# Patient Record
Sex: MALE | Race: WHITE | NOT HISPANIC OR LATINO | Employment: OTHER | URBAN - METROPOLITAN AREA
[De-identification: names, ages, dates, MRNs, and addresses within clinical notes are randomized per-mention and may not be internally consistent; named-entity substitution may affect disease eponyms.]

---

## 2017-03-17 ENCOUNTER — OFFICE VISIT - HEALTHEAST (OUTPATIENT)
Dept: FAMILY MEDICINE | Facility: CLINIC | Age: 67
End: 2017-03-17

## 2017-03-17 DIAGNOSIS — E78.49 OTHER HYPERLIPIDEMIA: ICD-10-CM

## 2017-03-17 DIAGNOSIS — E78.5 HYPERLIPIDEMIA: ICD-10-CM

## 2017-03-17 DIAGNOSIS — E11.9 TYPE 2 DIABETES MELLITUS (H): ICD-10-CM

## 2017-03-17 DIAGNOSIS — B37.2 CANDIDAL DERMATITIS: ICD-10-CM

## 2017-03-17 DIAGNOSIS — I10 ESSENTIAL HYPERTENSION WITH GOAL BLOOD PRESSURE LESS THAN 130/80: ICD-10-CM

## 2017-03-17 DIAGNOSIS — M06.4 UNSPECIFIED INFLAMMATORY POLYARTHROPATHY: ICD-10-CM

## 2017-03-17 LAB
CHOLEST SERPL-MCNC: 177 MG/DL
FASTING STATUS PATIENT QL REPORTED: YES
HBA1C MFR BLD: 6.5 % (ref 3.5–6)
HDLC SERPL-MCNC: 29 MG/DL
LDLC SERPL CALC-MCNC: 76 MG/DL
TRIGL SERPL-MCNC: 360 MG/DL

## 2017-03-21 LAB — ANA SER QL: 0.1 U

## 2017-03-28 ENCOUNTER — OFFICE VISIT - HEALTHEAST (OUTPATIENT)
Dept: FAMILY MEDICINE | Facility: CLINIC | Age: 67
End: 2017-03-28

## 2017-03-28 DIAGNOSIS — E11.9 TYPE 2 DIABETES MELLITUS (H): ICD-10-CM

## 2017-03-28 DIAGNOSIS — Z02.4 ENCOUNTER FOR DEPARTMENT OF TRANSPORTATION (DOT) EXAMINATION FOR TRUCKING LICENCE: ICD-10-CM

## 2017-03-28 DIAGNOSIS — I10 ESSENTIAL HYPERTENSION WITH GOAL BLOOD PRESSURE LESS THAN 130/80: ICD-10-CM

## 2017-03-28 ASSESSMENT — MIFFLIN-ST. JEOR: SCORE: 2017.71

## 2017-05-09 ENCOUNTER — COMMUNICATION - HEALTHEAST (OUTPATIENT)
Dept: FAMILY MEDICINE | Facility: CLINIC | Age: 67
End: 2017-05-09

## 2017-05-09 DIAGNOSIS — I10 HTN (HYPERTENSION): ICD-10-CM

## 2017-06-14 ENCOUNTER — COMMUNICATION - HEALTHEAST (OUTPATIENT)
Dept: FAMILY MEDICINE | Facility: CLINIC | Age: 67
End: 2017-06-14

## 2017-06-14 DIAGNOSIS — E11.9 TYPE 2 DIABETES MELLITUS (H): ICD-10-CM

## 2017-07-08 ENCOUNTER — COMMUNICATION - HEALTHEAST (OUTPATIENT)
Dept: FAMILY MEDICINE | Facility: CLINIC | Age: 67
End: 2017-07-08

## 2017-07-08 DIAGNOSIS — I10 ESSENTIAL HYPERTENSION: ICD-10-CM

## 2017-07-14 ENCOUNTER — OFFICE VISIT - HEALTHEAST (OUTPATIENT)
Dept: FAMILY MEDICINE | Facility: CLINIC | Age: 67
End: 2017-07-14

## 2017-07-14 DIAGNOSIS — I10 ESSENTIAL HYPERTENSION WITH GOAL BLOOD PRESSURE LESS THAN 130/80: ICD-10-CM

## 2017-07-14 DIAGNOSIS — R60.0 PERIPHERAL EDEMA: ICD-10-CM

## 2017-07-14 DIAGNOSIS — E66.09 NON MORBID OBESITY DUE TO EXCESS CALORIES: ICD-10-CM

## 2017-07-14 DIAGNOSIS — E11.9 TYPE 2 DIABETES MELLITUS WITHOUT COMPLICATION, WITHOUT LONG-TERM CURRENT USE OF INSULIN (H): ICD-10-CM

## 2017-07-14 DIAGNOSIS — E78.49 OTHER HYPERLIPIDEMIA: ICD-10-CM

## 2017-07-14 LAB — HBA1C MFR BLD: 6.7 % (ref 3.5–6)

## 2017-07-17 ENCOUNTER — COMMUNICATION - HEALTHEAST (OUTPATIENT)
Dept: FAMILY MEDICINE | Facility: CLINIC | Age: 67
End: 2017-07-17

## 2017-10-06 ENCOUNTER — COMMUNICATION - HEALTHEAST (OUTPATIENT)
Dept: FAMILY MEDICINE | Facility: CLINIC | Age: 67
End: 2017-10-06

## 2017-10-25 ENCOUNTER — COMMUNICATION - HEALTHEAST (OUTPATIENT)
Dept: FAMILY MEDICINE | Facility: CLINIC | Age: 67
End: 2017-10-25

## 2017-10-25 DIAGNOSIS — R60.0 PERIPHERAL EDEMA: ICD-10-CM

## 2017-10-26 ENCOUNTER — OFFICE VISIT - HEALTHEAST (OUTPATIENT)
Dept: FAMILY MEDICINE | Facility: CLINIC | Age: 67
End: 2017-10-26

## 2017-10-26 DIAGNOSIS — E11.9 TYPE 2 DIABETES MELLITUS (H): ICD-10-CM

## 2017-10-26 DIAGNOSIS — E55.9 VITAMIN D INSUFFICIENCY: ICD-10-CM

## 2017-10-26 DIAGNOSIS — Z23 NEED FOR IMMUNIZATION AGAINST INFLUENZA: ICD-10-CM

## 2017-10-26 DIAGNOSIS — E78.5 HYPERLIPIDEMIA: ICD-10-CM

## 2017-10-26 DIAGNOSIS — R60.0 PERIPHERAL EDEMA: ICD-10-CM

## 2017-10-26 DIAGNOSIS — I10 ESSENTIAL HYPERTENSION WITH GOAL BLOOD PRESSURE LESS THAN 130/80: ICD-10-CM

## 2017-10-26 LAB
CHOLEST SERPL-MCNC: 158 MG/DL
FASTING STATUS PATIENT QL REPORTED: ABNORMAL
HBA1C MFR BLD: 6.7 % (ref 3.5–6)
HDLC SERPL-MCNC: 27 MG/DL
LDLC SERPL CALC-MCNC: 83 MG/DL
TRIGL SERPL-MCNC: 240 MG/DL

## 2017-10-27 ENCOUNTER — COMMUNICATION - HEALTHEAST (OUTPATIENT)
Dept: FAMILY MEDICINE | Facility: CLINIC | Age: 67
End: 2017-10-27

## 2017-12-15 ENCOUNTER — COMMUNICATION - HEALTHEAST (OUTPATIENT)
Dept: FAMILY MEDICINE | Facility: CLINIC | Age: 67
End: 2017-12-15

## 2017-12-15 DIAGNOSIS — N40.1 BPH WITH URINARY OBSTRUCTION: ICD-10-CM

## 2017-12-15 DIAGNOSIS — N13.8 BPH WITH URINARY OBSTRUCTION: ICD-10-CM

## 2017-12-24 ENCOUNTER — COMMUNICATION - HEALTHEAST (OUTPATIENT)
Dept: FAMILY MEDICINE | Facility: CLINIC | Age: 67
End: 2017-12-24

## 2017-12-24 DIAGNOSIS — E11.9 TYPE 2 DIABETES MELLITUS (H): ICD-10-CM

## 2018-03-20 ENCOUNTER — AMBULATORY - HEALTHEAST (OUTPATIENT)
Dept: FAMILY MEDICINE | Facility: CLINIC | Age: 68
End: 2018-03-20

## 2018-03-20 DIAGNOSIS — E11.9 TYPE 2 DIABETES MELLITUS (H): ICD-10-CM

## 2018-03-21 ENCOUNTER — COMMUNICATION - HEALTHEAST (OUTPATIENT)
Dept: FAMILY MEDICINE | Facility: CLINIC | Age: 68
End: 2018-03-21

## 2018-03-21 DIAGNOSIS — N13.8 BPH WITH URINARY OBSTRUCTION: ICD-10-CM

## 2018-03-21 DIAGNOSIS — I10 HTN (HYPERTENSION): ICD-10-CM

## 2018-03-21 DIAGNOSIS — N40.1 BPH WITH URINARY OBSTRUCTION: ICD-10-CM

## 2018-04-05 ENCOUNTER — OFFICE VISIT - HEALTHEAST (OUTPATIENT)
Dept: FAMILY MEDICINE | Facility: CLINIC | Age: 68
End: 2018-04-05

## 2018-04-05 DIAGNOSIS — I10 ESSENTIAL HYPERTENSION WITH GOAL BLOOD PRESSURE LESS THAN 130/80: ICD-10-CM

## 2018-04-05 DIAGNOSIS — E55.9 VITAMIN D INSUFFICIENCY: ICD-10-CM

## 2018-04-05 DIAGNOSIS — B35.3 TINEA PEDIS: ICD-10-CM

## 2018-04-05 DIAGNOSIS — E78.49 OTHER HYPERLIPIDEMIA: ICD-10-CM

## 2018-04-05 DIAGNOSIS — E11.9 TYPE 2 DIABETES MELLITUS WITHOUT COMPLICATION, WITHOUT LONG-TERM CURRENT USE OF INSULIN (H): ICD-10-CM

## 2018-04-05 LAB
ANION GAP SERPL CALCULATED.3IONS-SCNC: 11 MMOL/L (ref 5–18)
BUN SERPL-MCNC: 14 MG/DL (ref 8–22)
CALCIUM SERPL-MCNC: 9 MG/DL (ref 8.5–10.5)
CHLORIDE BLD-SCNC: 104 MMOL/L (ref 98–107)
CO2 SERPL-SCNC: 25 MMOL/L (ref 22–31)
CREAT SERPL-MCNC: 0.87 MG/DL (ref 0.7–1.3)
GFR SERPL CREATININE-BSD FRML MDRD: >60 ML/MIN/1.73M2
GLUCOSE BLD-MCNC: 160 MG/DL (ref 70–125)
HBA1C MFR BLD: 7.9 % (ref 3.5–6)
POTASSIUM BLD-SCNC: 4.6 MMOL/L (ref 3.5–5)
SODIUM SERPL-SCNC: 140 MMOL/L (ref 136–145)

## 2018-04-06 ENCOUNTER — COMMUNICATION - HEALTHEAST (OUTPATIENT)
Dept: FAMILY MEDICINE | Facility: CLINIC | Age: 68
End: 2018-04-06

## 2018-04-06 LAB — 25(OH)D3 SERPL-MCNC: 24.7 NG/ML (ref 30–80)

## 2018-05-31 ENCOUNTER — COMMUNICATION - HEALTHEAST (OUTPATIENT)
Dept: SCHEDULING | Facility: CLINIC | Age: 68
End: 2018-05-31

## 2018-06-01 ENCOUNTER — OFFICE VISIT - HEALTHEAST (OUTPATIENT)
Dept: FAMILY MEDICINE | Facility: CLINIC | Age: 68
End: 2018-06-01

## 2018-06-01 DIAGNOSIS — I10 ESSENTIAL HYPERTENSION: ICD-10-CM

## 2018-06-01 DIAGNOSIS — R63.4 UNINTENTIONAL WEIGHT LOSS: ICD-10-CM

## 2018-06-01 DIAGNOSIS — E11.9 TYPE 2 DIABETES MELLITUS (H): ICD-10-CM

## 2018-06-01 LAB
ALBUMIN SERPL-MCNC: 4.4 G/DL (ref 3.5–5)
ALP SERPL-CCNC: 86 U/L (ref 45–120)
ALT SERPL W P-5'-P-CCNC: 19 U/L (ref 0–45)
ANION GAP SERPL CALCULATED.3IONS-SCNC: 10 MMOL/L (ref 5–18)
AST SERPL W P-5'-P-CCNC: 15 U/L (ref 0–40)
BASOPHILS # BLD AUTO: 0 THOU/UL (ref 0–0.2)
BASOPHILS NFR BLD AUTO: 0 % (ref 0–2)
BILIRUB SERPL-MCNC: 0.8 MG/DL (ref 0–1)
BUN SERPL-MCNC: 20 MG/DL (ref 8–22)
CALCIUM SERPL-MCNC: 10.1 MG/DL (ref 8.5–10.5)
CHLORIDE BLD-SCNC: 101 MMOL/L (ref 98–107)
CO2 SERPL-SCNC: 27 MMOL/L (ref 22–31)
CREAT SERPL-MCNC: 1.25 MG/DL (ref 0.7–1.3)
EOSINOPHIL # BLD AUTO: 0.1 THOU/UL (ref 0–0.4)
EOSINOPHIL NFR BLD AUTO: 2 % (ref 0–6)
ERYTHROCYTE [DISTWIDTH] IN BLOOD BY AUTOMATED COUNT: 13.4 % (ref 11–14.5)
GFR SERPL CREATININE-BSD FRML MDRD: 57 ML/MIN/1.73M2
GLUCOSE BLD-MCNC: 171 MG/DL (ref 70–125)
HCT VFR BLD AUTO: 46.3 % (ref 40–54)
HGB BLD-MCNC: 16 G/DL (ref 14–18)
HIV 1+2 AB+HIV1 P24 AG SERPL QL IA: NEGATIVE
LYMPHOCYTES # BLD AUTO: 2.5 THOU/UL (ref 0.8–4.4)
LYMPHOCYTES NFR BLD AUTO: 33 % (ref 20–40)
MCH RBC QN AUTO: 29.2 PG (ref 27–34)
MCHC RBC AUTO-ENTMCNC: 34.6 G/DL (ref 32–36)
MCV RBC AUTO: 85 FL (ref 80–100)
MONOCYTES # BLD AUTO: 0.6 THOU/UL (ref 0–0.9)
MONOCYTES NFR BLD AUTO: 8 % (ref 2–10)
NEUTROPHILS # BLD AUTO: 4.3 THOU/UL (ref 2–7.7)
NEUTROPHILS NFR BLD AUTO: 57 % (ref 50–70)
PLATELET # BLD AUTO: 209 THOU/UL (ref 140–440)
PMV BLD AUTO: 8.6 FL (ref 7–10)
POTASSIUM BLD-SCNC: 4.7 MMOL/L (ref 3.5–5)
PROT SERPL-MCNC: 7.2 G/DL (ref 6–8)
RBC # BLD AUTO: 5.47 MILL/UL (ref 4.4–6.2)
SODIUM SERPL-SCNC: 138 MMOL/L (ref 136–145)
TSH SERPL DL<=0.005 MIU/L-ACNC: 1.87 UIU/ML (ref 0.3–5)
WBC: 7.6 THOU/UL (ref 4–11)

## 2018-06-01 ASSESSMENT — MIFFLIN-ST. JEOR: SCORE: 1950.58

## 2018-06-04 ENCOUNTER — COMMUNICATION - HEALTHEAST (OUTPATIENT)
Dept: FAMILY MEDICINE | Facility: CLINIC | Age: 68
End: 2018-06-04

## 2018-06-05 ENCOUNTER — COMMUNICATION - HEALTHEAST (OUTPATIENT)
Dept: FAMILY MEDICINE | Facility: CLINIC | Age: 68
End: 2018-06-05

## 2018-06-05 ENCOUNTER — OFFICE VISIT - HEALTHEAST (OUTPATIENT)
Dept: FAMILY MEDICINE | Facility: CLINIC | Age: 68
End: 2018-06-05

## 2018-06-05 DIAGNOSIS — M06.9 RHEUMATOID ARTHRITIS, ADULT (H): ICD-10-CM

## 2018-06-05 DIAGNOSIS — E11.9 TYPE 2 DIABETES MELLITUS WITHOUT COMPLICATION, WITHOUT LONG-TERM CURRENT USE OF INSULIN (H): ICD-10-CM

## 2018-06-05 DIAGNOSIS — I10 ESSENTIAL HYPERTENSION WITH GOAL BLOOD PRESSURE LESS THAN 130/80: ICD-10-CM

## 2018-06-05 DIAGNOSIS — R63.0 ANOREXIA: ICD-10-CM

## 2018-06-05 DIAGNOSIS — R53.83 FATIGUE: ICD-10-CM

## 2018-06-05 DIAGNOSIS — R45.89 DEPRESSED MOOD: ICD-10-CM

## 2018-06-05 DIAGNOSIS — R63.4 ABNORMAL WEIGHT LOSS: ICD-10-CM

## 2018-07-05 ENCOUNTER — OFFICE VISIT - HEALTHEAST (OUTPATIENT)
Dept: FAMILY MEDICINE | Facility: CLINIC | Age: 68
End: 2018-07-05

## 2018-07-05 ENCOUNTER — COMMUNICATION - HEALTHEAST (OUTPATIENT)
Dept: FAMILY MEDICINE | Facility: CLINIC | Age: 68
End: 2018-07-05

## 2018-07-05 DIAGNOSIS — I10 ESSENTIAL HYPERTENSION WITH GOAL BLOOD PRESSURE LESS THAN 130/80: ICD-10-CM

## 2018-07-05 DIAGNOSIS — M06.9 RHEUMATOID ARTHRITIS, ADULT (H): ICD-10-CM

## 2018-07-05 DIAGNOSIS — E11.9 TYPE 2 DIABETES MELLITUS (H): ICD-10-CM

## 2018-07-05 DIAGNOSIS — E78.2 MIXED HYPERLIPIDEMIA: ICD-10-CM

## 2018-07-05 DIAGNOSIS — E55.9 VITAMIN D DEFICIENCY: ICD-10-CM

## 2018-07-05 LAB
ANION GAP SERPL CALCULATED.3IONS-SCNC: 10 MMOL/L (ref 5–18)
BUN SERPL-MCNC: 11 MG/DL (ref 8–22)
CALCIUM SERPL-MCNC: 9.9 MG/DL (ref 8.5–10.5)
CHLORIDE BLD-SCNC: 104 MMOL/L (ref 98–107)
CHOLEST SERPL-MCNC: 134 MG/DL
CO2 SERPL-SCNC: 27 MMOL/L (ref 22–31)
CREAT SERPL-MCNC: 0.93 MG/DL (ref 0.7–1.3)
CREAT UR-MCNC: 24.7 MG/DL
FASTING STATUS PATIENT QL REPORTED: ABNORMAL
GFR SERPL CREATININE-BSD FRML MDRD: >60 ML/MIN/1.73M2
GLUCOSE BLD-MCNC: 126 MG/DL (ref 70–125)
HBA1C MFR BLD: 7.3 % (ref 3.5–6)
HDLC SERPL-MCNC: 29 MG/DL
LDLC SERPL CALC-MCNC: 59 MG/DL
MICROALBUMIN UR-MCNC: <0.5 MG/DL (ref 0–1.99)
MICROALBUMIN/CREAT UR: NORMAL MG/G
POTASSIUM BLD-SCNC: 4.3 MMOL/L (ref 3.5–5)
SODIUM SERPL-SCNC: 141 MMOL/L (ref 136–145)
TRIGL SERPL-MCNC: 231 MG/DL

## 2018-07-20 ENCOUNTER — OFFICE VISIT - HEALTHEAST (OUTPATIENT)
Dept: RHEUMATOLOGY | Facility: CLINIC | Age: 68
End: 2018-07-20

## 2018-07-20 ENCOUNTER — COMMUNICATION - HEALTHEAST (OUTPATIENT)
Dept: RHEUMATOLOGY | Facility: CLINIC | Age: 68
End: 2018-07-20

## 2018-07-20 DIAGNOSIS — M13.0 POLYARTHRITIS: ICD-10-CM

## 2018-07-20 DIAGNOSIS — M25.50 POLYARTHRALGIA: ICD-10-CM

## 2018-07-20 LAB
ALBUMIN SERPL-MCNC: 4 G/DL (ref 3.5–5)
ALT SERPL W P-5'-P-CCNC: 13 U/L (ref 0–45)
BASOPHILS # BLD AUTO: 0 THOU/UL (ref 0–0.2)
BASOPHILS NFR BLD AUTO: 0 % (ref 0–2)
C REACTIVE PROTEIN LHE: 0.3 MG/DL (ref 0–0.8)
C3 SERPL-MCNC: 140 MG/DL (ref 83–177)
C4 SERPL-MCNC: 28 MG/DL (ref 19–59)
CREAT SERPL-MCNC: 0.97 MG/DL (ref 0.7–1.3)
EOSINOPHIL # BLD AUTO: 0.2 THOU/UL (ref 0–0.4)
EOSINOPHIL NFR BLD AUTO: 3 % (ref 0–6)
ERYTHROCYTE [DISTWIDTH] IN BLOOD BY AUTOMATED COUNT: 13.2 % (ref 11–14.5)
ERYTHROCYTE [SEDIMENTATION RATE] IN BLOOD BY WESTERGREN METHOD: 7 MM/HR (ref 0–15)
FERRITIN SERPL-MCNC: 466 NG/ML (ref 27–300)
GFR SERPL CREATININE-BSD FRML MDRD: >60 ML/MIN/1.73M2
HCT VFR BLD AUTO: 39.9 % (ref 40–54)
HGB BLD-MCNC: 13.5 G/DL (ref 14–18)
LYMPHOCYTES # BLD AUTO: 2.6 THOU/UL (ref 0.8–4.4)
LYMPHOCYTES NFR BLD AUTO: 36 % (ref 20–40)
MCH RBC QN AUTO: 29.1 PG (ref 27–34)
MCHC RBC AUTO-ENTMCNC: 33.8 G/DL (ref 32–36)
MCV RBC AUTO: 86 FL (ref 80–100)
MONOCYTES # BLD AUTO: 0.6 THOU/UL (ref 0–0.9)
MONOCYTES NFR BLD AUTO: 8 % (ref 2–10)
NEUTROPHILS # BLD AUTO: 3.8 THOU/UL (ref 2–7.7)
NEUTROPHILS NFR BLD AUTO: 53 % (ref 50–70)
PLATELET # BLD AUTO: 231 THOU/UL (ref 140–440)
PMV BLD AUTO: 8.8 FL (ref 7–10)
RBC # BLD AUTO: 4.62 MILL/UL (ref 4.4–6.2)
RHEUMATOID FACT SERPL-ACNC: <15 IU/ML (ref 0–30)
URATE SERPL-MCNC: 6.4 MG/DL (ref 3–8)
WBC: 7.2 THOU/UL (ref 4–11)

## 2018-07-23 LAB
ANA SER QL: 0.1 U
HCV AB SERPL QL IA: NEGATIVE

## 2018-07-24 LAB — CCP AB SER IA-ACNC: <0.5 U/ML

## 2018-07-25 ENCOUNTER — RECORDS - HEALTHEAST (OUTPATIENT)
Dept: ADMINISTRATIVE | Facility: OTHER | Age: 68
End: 2018-07-25

## 2018-07-27 ENCOUNTER — COMMUNICATION - HEALTHEAST (OUTPATIENT)
Dept: ADMINISTRATIVE | Facility: CLINIC | Age: 68
End: 2018-07-27

## 2018-07-27 DIAGNOSIS — M13.0 POLYARTHRITIS: ICD-10-CM

## 2018-09-26 ENCOUNTER — COMMUNICATION - HEALTHEAST (OUTPATIENT)
Dept: RHEUMATOLOGY | Facility: CLINIC | Age: 68
End: 2018-09-26

## 2018-09-26 ENCOUNTER — OFFICE VISIT - HEALTHEAST (OUTPATIENT)
Dept: RHEUMATOLOGY | Facility: CLINIC | Age: 68
End: 2018-09-26

## 2018-09-26 DIAGNOSIS — M13.0 POLYARTHRITIS: ICD-10-CM

## 2018-09-26 DIAGNOSIS — M25.50 POLYARTHRALGIA: ICD-10-CM

## 2018-09-26 DIAGNOSIS — M54.50 LUMBAGO: ICD-10-CM

## 2018-09-26 DIAGNOSIS — M65.321 TRIGGER FINGER, RIGHT INDEX FINGER: ICD-10-CM

## 2018-10-07 ENCOUNTER — COMMUNICATION - HEALTHEAST (OUTPATIENT)
Dept: FAMILY MEDICINE | Facility: CLINIC | Age: 68
End: 2018-10-07

## 2018-10-07 DIAGNOSIS — I10 HTN (HYPERTENSION): ICD-10-CM

## 2018-10-09 ENCOUNTER — COMMUNICATION - HEALTHEAST (OUTPATIENT)
Dept: FAMILY MEDICINE | Facility: CLINIC | Age: 68
End: 2018-10-09

## 2018-10-09 DIAGNOSIS — N40.1 BPH WITH URINARY OBSTRUCTION: ICD-10-CM

## 2018-10-09 DIAGNOSIS — N13.8 BPH WITH URINARY OBSTRUCTION: ICD-10-CM

## 2018-11-08 ENCOUNTER — OFFICE VISIT - HEALTHEAST (OUTPATIENT)
Dept: FAMILY MEDICINE | Facility: CLINIC | Age: 68
End: 2018-11-08

## 2018-11-08 DIAGNOSIS — Z23 NEED FOR VACCINATION: ICD-10-CM

## 2018-11-08 DIAGNOSIS — L30.9 DERMATITIS: ICD-10-CM

## 2018-11-08 DIAGNOSIS — G56.02 CARPAL TUNNEL SYNDROME OF LEFT WRIST: ICD-10-CM

## 2018-11-08 DIAGNOSIS — E78.2 MIXED HYPERLIPIDEMIA: ICD-10-CM

## 2018-11-08 DIAGNOSIS — I10 ESSENTIAL HYPERTENSION WITH GOAL BLOOD PRESSURE LESS THAN 130/80: ICD-10-CM

## 2018-11-08 DIAGNOSIS — E55.9 VITAMIN D DEFICIENCY: ICD-10-CM

## 2018-11-08 DIAGNOSIS — E11.9 TYPE 2 DIABETES MELLITUS (H): ICD-10-CM

## 2018-11-08 DIAGNOSIS — M06.9 RHEUMATOID ARTHRITIS, ADULT (H): ICD-10-CM

## 2018-11-08 DIAGNOSIS — D64.9 NORMOCHROMIC NORMOCYTIC ANEMIA: ICD-10-CM

## 2018-11-08 DIAGNOSIS — Z00.01 ENCOUNTER FOR GENERAL ADULT MEDICAL EXAMINATION WITH ABNORMAL FINDINGS: ICD-10-CM

## 2018-11-08 LAB
ALBUMIN SERPL-MCNC: 4.6 G/DL (ref 3.5–5)
ALP SERPL-CCNC: 114 U/L (ref 45–120)
ALT SERPL W P-5'-P-CCNC: 19 U/L (ref 0–45)
ANION GAP SERPL CALCULATED.3IONS-SCNC: 15 MMOL/L (ref 5–18)
AST SERPL W P-5'-P-CCNC: 16 U/L (ref 0–40)
BILIRUB SERPL-MCNC: 0.6 MG/DL (ref 0–1)
BUN SERPL-MCNC: 13 MG/DL (ref 8–22)
CALCIUM SERPL-MCNC: 10.2 MG/DL (ref 8.5–10.5)
CHLORIDE BLD-SCNC: 102 MMOL/L (ref 98–107)
CO2 SERPL-SCNC: 25 MMOL/L (ref 22–31)
CREAT SERPL-MCNC: 0.92 MG/DL (ref 0.7–1.3)
ERYTHROCYTE [DISTWIDTH] IN BLOOD BY AUTOMATED COUNT: 12.8 % (ref 11–14.5)
GFR SERPL CREATININE-BSD FRML MDRD: >60 ML/MIN/1.73M2
GLUCOSE BLD-MCNC: 135 MG/DL (ref 70–125)
HBA1C MFR BLD: 7 % (ref 3.5–6)
HCT VFR BLD AUTO: 47.8 % (ref 40–54)
HGB BLD-MCNC: 16.2 G/DL (ref 14–18)
MCH RBC QN AUTO: 28.5 PG (ref 27–34)
MCHC RBC AUTO-ENTMCNC: 33.8 G/DL (ref 32–36)
MCV RBC AUTO: 84 FL (ref 80–100)
PLATELET # BLD AUTO: 230 THOU/UL (ref 140–440)
PMV BLD AUTO: 9.5 FL (ref 7–10)
POTASSIUM BLD-SCNC: 4.4 MMOL/L (ref 3.5–5)
PROT SERPL-MCNC: 7.7 G/DL (ref 6–8)
RBC # BLD AUTO: 5.67 MILL/UL (ref 4.4–6.2)
SODIUM SERPL-SCNC: 142 MMOL/L (ref 136–145)
WBC: 8.4 THOU/UL (ref 4–11)

## 2018-11-08 ASSESSMENT — MIFFLIN-ST. JEOR: SCORE: 1919.62

## 2018-11-09 LAB — 25(OH)D3 SERPL-MCNC: 36.9 NG/ML (ref 30–80)

## 2018-11-12 ENCOUNTER — COMMUNICATION - HEALTHEAST (OUTPATIENT)
Dept: FAMILY MEDICINE | Facility: CLINIC | Age: 68
End: 2018-11-12

## 2018-12-15 ENCOUNTER — COMMUNICATION - HEALTHEAST (OUTPATIENT)
Dept: FAMILY MEDICINE | Facility: CLINIC | Age: 68
End: 2018-12-15

## 2018-12-15 DIAGNOSIS — E78.5 HYPERLIPIDEMIA: ICD-10-CM

## 2018-12-27 ENCOUNTER — OFFICE VISIT - HEALTHEAST (OUTPATIENT)
Dept: RHEUMATOLOGY | Facility: CLINIC | Age: 68
End: 2018-12-27

## 2018-12-27 DIAGNOSIS — G56.02 LEFT CARPAL TUNNEL SYNDROME: ICD-10-CM

## 2018-12-27 DIAGNOSIS — M13.0 POLYARTHRITIS: ICD-10-CM

## 2018-12-27 DIAGNOSIS — M25.50 POLYARTHRALGIA: ICD-10-CM

## 2018-12-27 DIAGNOSIS — M54.50 LUMBAGO: ICD-10-CM

## 2019-01-02 ENCOUNTER — COMMUNICATION - HEALTHEAST (OUTPATIENT)
Dept: FAMILY MEDICINE | Facility: CLINIC | Age: 69
End: 2019-01-02

## 2019-01-02 DIAGNOSIS — R60.0 PERIPHERAL EDEMA: ICD-10-CM

## 2019-01-04 ENCOUNTER — COMMUNICATION - HEALTHEAST (OUTPATIENT)
Dept: FAMILY MEDICINE | Facility: CLINIC | Age: 69
End: 2019-01-04

## 2019-01-04 DIAGNOSIS — I10 ESSENTIAL HYPERTENSION WITH GOAL BLOOD PRESSURE LESS THAN 130/80: ICD-10-CM

## 2019-03-04 ENCOUNTER — COMMUNICATION - HEALTHEAST (OUTPATIENT)
Dept: FAMILY MEDICINE | Facility: CLINIC | Age: 69
End: 2019-03-04

## 2019-03-04 DIAGNOSIS — E11.9 TYPE 2 DIABETES MELLITUS (H): ICD-10-CM

## 2019-03-04 DIAGNOSIS — E11.9 TYPE 2 DIABETES MELLITUS WITHOUT COMPLICATION, WITHOUT LONG-TERM CURRENT USE OF INSULIN (H): ICD-10-CM

## 2019-03-25 ENCOUNTER — COMMUNICATION - HEALTHEAST (OUTPATIENT)
Dept: RHEUMATOLOGY | Facility: CLINIC | Age: 69
End: 2019-03-25

## 2019-03-25 DIAGNOSIS — M54.50 LUMBAGO: ICD-10-CM

## 2019-03-25 DIAGNOSIS — M25.50 POLYARTHRALGIA: ICD-10-CM

## 2019-03-25 DIAGNOSIS — M13.0 POLYARTHRITIS: ICD-10-CM

## 2019-03-28 ENCOUNTER — OFFICE VISIT - HEALTHEAST (OUTPATIENT)
Dept: RHEUMATOLOGY | Facility: CLINIC | Age: 69
End: 2019-03-28

## 2019-03-28 ENCOUNTER — AMBULATORY - HEALTHEAST (OUTPATIENT)
Dept: FAMILY MEDICINE | Facility: CLINIC | Age: 69
End: 2019-03-28

## 2019-03-28 DIAGNOSIS — E11.22 TYPE 2 DIABETES MELLITUS WITH STAGE 3 CHRONIC KIDNEY DISEASE, WITH LONG-TERM CURRENT USE OF INSULIN (H): ICD-10-CM

## 2019-03-28 DIAGNOSIS — G56.02 LEFT CARPAL TUNNEL SYNDROME: ICD-10-CM

## 2019-03-28 DIAGNOSIS — M08.3 CHRONIC POLYARTICULAR JUVENILE RHEUMATOID ARTHRITIS (H): ICD-10-CM

## 2019-03-28 DIAGNOSIS — N18.30 TYPE 2 DIABETES MELLITUS WITH STAGE 3 CHRONIC KIDNEY DISEASE, WITH LONG-TERM CURRENT USE OF INSULIN (H): ICD-10-CM

## 2019-03-28 DIAGNOSIS — Z79.4 TYPE 2 DIABETES MELLITUS WITH STAGE 3 CHRONIC KIDNEY DISEASE, WITH LONG-TERM CURRENT USE OF INSULIN (H): ICD-10-CM

## 2019-03-28 DIAGNOSIS — M13.0 POLYARTHRITIS: ICD-10-CM

## 2019-04-09 ENCOUNTER — COMMUNICATION - HEALTHEAST (OUTPATIENT)
Dept: FAMILY MEDICINE | Facility: CLINIC | Age: 69
End: 2019-04-09

## 2019-04-09 DIAGNOSIS — R60.0 PERIPHERAL EDEMA: ICD-10-CM

## 2019-05-25 ENCOUNTER — RECORDS - HEALTHEAST (OUTPATIENT)
Dept: ADMINISTRATIVE | Facility: OTHER | Age: 69
End: 2019-05-25

## 2019-05-25 ENCOUNTER — HOSPITAL ENCOUNTER (EMERGENCY)
Facility: CLINIC | Age: 69
Discharge: HOME | End: 2019-05-25
Attending: EMERGENCY MEDICINE | Admitting: EMERGENCY MEDICINE
Payer: MEDICARE

## 2019-05-25 VITALS
TEMPERATURE: 98.6 F | DIASTOLIC BLOOD PRESSURE: 78 MMHG | HEART RATE: 84 BPM | SYSTOLIC BLOOD PRESSURE: 133 MMHG | WEIGHT: 272.8 LBS | RESPIRATION RATE: 16 BRPM | BODY MASS INDEX: 36.15 KG/M2 | OXYGEN SATURATION: 95 % | HEIGHT: 73 IN

## 2019-05-25 DIAGNOSIS — J30.2 SEASONAL ALLERGIC RHINITIS, UNSPECIFIED TRIGGER: ICD-10-CM

## 2019-05-25 PROCEDURE — 99283 EMERGENCY DEPT VISIT LOW MDM: CPT | Performed by: EMERGENCY MEDICINE

## 2019-05-25 PROCEDURE — A9270 NON-COVERED ITEM OR SERVICE: HCPCS | Performed by: EMERGENCY MEDICINE

## 2019-05-25 PROCEDURE — 25000132 ZZH RX MED GY IP 250 OP 250 PS 637: Performed by: EMERGENCY MEDICINE

## 2019-05-25 PROCEDURE — 99284 EMERGENCY DEPT VISIT MOD MDM: CPT | Mod: Z6 | Performed by: EMERGENCY MEDICINE

## 2019-05-25 RX ORDER — FLUTICASONE PROPIONATE 50 MCG
1 SPRAY, SUSPENSION (ML) NASAL DAILY
Qty: 15.8 ML | Refills: 0 | Status: SHIPPED | OUTPATIENT
Start: 2019-05-25 | End: 2021-08-24

## 2019-05-25 RX ORDER — CETIRIZINE HYDROCHLORIDE 10 MG/1
10 TABLET ORAL DAILY
Qty: 30 TABLET | Refills: 0 | Status: SHIPPED | OUTPATIENT
Start: 2019-05-25 | End: 2021-08-24

## 2019-05-25 RX ORDER — CETIRIZINE HYDROCHLORIDE 10 MG/1
10 TABLET ORAL ONCE
Status: COMPLETED | OUTPATIENT
Start: 2019-05-25 | End: 2019-05-25

## 2019-05-25 RX ADMIN — Medication 1 SPRAY: at 19:56

## 2019-05-25 RX ADMIN — Medication 1 DROP: at 19:55

## 2019-05-25 RX ADMIN — CETIRIZINE HYDROCHLORIDE 10 MG: 10 TABLET, FILM COATED ORAL at 19:55

## 2019-05-25 ASSESSMENT — MIFFLIN-ST. JEOR: SCORE: 2056.29

## 2019-05-25 ASSESSMENT — ENCOUNTER SYMPTOMS
EYE REDNESS: 1
COUGH: 1
EYE ITCHING: 1

## 2019-05-25 NOTE — ED AVS SNAPSHOT
[facility], [place], Emergency Department  [address] [place] [id number]  Phone:  [phone]                                    Thiago Hein   MRN: [id number]    Department:  [facility], Emergency Department   Date of Visit:  5/25/2019           After Visit Summary Signature Page    I have received my discharge instructions, and my questions have been answered. I have discussed any challenges I see with this plan with the nurse or doctor.    ..........................................................................................................................................  Patient/Patient Representative Signature      ..........................................................................................................................................  Patient Representative Print Name and Relationship to Patient    ..................................................               ................................................  Date                                   Time    ..........................................................................................................................................  Reviewed by Signature/Title    ...................................................              ..............................................  Date                                               Time          22EPIC Rev 08/18

## 2019-05-26 NOTE — ED TRIAGE NOTES
Pt was working in his barn today and states there was lots of pigeon poop that he was cleaning up. Pt states both eyes are itching but L eye is worse. Both eyes are reddened. Has been sneezing and mild cough.

## 2019-05-26 NOTE — ED PROVIDER NOTES
[facility] EMERGENCY DEPARTMENT ([facility])  May 25, 2019    History     Chief Complaint   Patient presents with     Eye Problem     HPI  Thiago Hein is a 69 year old male who works as a farmer with cattle, horses and pigeons who presents to the ED with bilateral eye redness and itchiness and sneezing.  Patient states that he recently was cleaning a pigeon coop and noted that there was a lot of dust coming up as he was cleaning.  He states he tried to wash his hand continually and try to be mindful that pigeon feces is very filthy.  However, for the past 3 to 4 days, he has complained of bilateral eye itching and redness, sneezing, coughing and associated left lateral chest wall pain from coughing.  He searched the internet for his symptoms and self diagnosed himself with histoplasmosis.  He  denies history of seasonal allergies.      PAST MEDICAL HISTORY   Past Medical History:   Diagnosis Date     Diabetes (H)      Hypertension      PAST SURGICAL HISTORY  Past Surgical History:   Procedure Laterality Date     ANESTHESIA OUT OF OR MRI N/A 10/2/2015    Procedure: ANESTHESIA OUT OF OR MRI;  Surgeon: GENERIC ANESTHESIA PROVIDER;  Location: [facility] OR     BACK SURGERY  2008    lumbar spine surgery-- unclear details     SOFT TISSUE SURGERY  2012    skin cancer removed from L shoulder, chest, right neck     FAMILY HISTORY  Family History   Problem Relation Age of Onset     Coronary Artery Disease Father      Hypertension Father      SOCIAL HISTORY  Social History     Tobacco Use     Smoking status: Never Smoker     Smokeless tobacco: Never Used   Substance Use Topics     Alcohol use: No     Comment: Quit in 1990     MEDICATIONS  No current facility-administered medications for this encounter.      Current Outpatient Medications   Medication     cetirizine (ZYRTEC) 10 MG tablet     fluticasone (FLONASE) 50 MCG/ACT nasal spray     tetrahydrozoline-Zn sulfate (VISINE-AC) 0.05-0.25 % ophthalmic solution      "acetaminophen 650 MG TABS     amLODIPine (NORVASC) 10 MG tablet     celecoxib (CELEBREX) 200 MG capsule     hydroxychloroquine (PLAQUENIL) 200 MG tablet     lisinopril (PRINIVIL,ZESTRIL) 40 MG tablet     metFORMIN (GLUCOPHAGE-XR) 500 MG 24 hr tablet     metoprolol (LOPRESSOR) 100 MG tablet     multivitamin, therapeutic with minerals (THERA-VIT-M) TABS     ALLERGIES  No Known Allergies    I have reviewed the Medications, Allergies, Past Medical and Surgical History, and Social History in the Epic system.    Review of Systems   HENT: Positive for sneezing.    Eyes: Positive for redness (bilateral) and itching (bilateral).   Respiratory: Positive for cough.    Musculoskeletal:        Positive for left lateral chest wall pain.      All other systems reviewed and are negative.      Physical Exam   BP: 145/87  Pulse: 84  Heart Rate: 88  Temp: 98.6  F (37  C)  Resp: 16  Height: 185.4 cm (6' 1\")  Weight: 123.7 kg (272 lb 12.8 oz)  SpO2: 98 %      Physical Exam   Constitutional: He is oriented to person, place, and time. He appears well-developed and well-nourished. No distress.   Comfortably resting, lying in bed, NAD, nondiaphoretic, lucid, fully conversant, no  respiratory distress, alert and oriented.     HENT:   Head: Normocephalic and atraumatic.   Rhinorrhea   Eyes: Pupils are equal, round, and reactive to light. EOM are normal. No scleral icterus.   Coryza   Neck: Normal range of motion. Neck supple.   Cardiovascular: Normal rate.   Pulmonary/Chest: Effort normal. No respiratory distress.   Neurological: He is alert and oriented to person, place, and time.   Skin: Skin is warm and dry. Capillary refill takes less than 2 seconds. No rash noted. He is not diaphoretic.       ED Course        Procedures   7:09 PM  The patient was seen and examined by Dr. Vo in Room 22.                Critical Care time:  none         Medications   cetirizine (zyrTEC) tablet 10 mg (10 mg Oral Given 5/25/19 1955) "   naphazoline-pheniramine (OPCON-A/VISINE A/NAPHCON A) ophthalmic solution 1 drop (1 drop Both Eyes Given 5/25/19 1955)   phenylephrine (MARYURI-SYNEPHRINE) 0.25 % spray 1 spray (1 spray Both Nostrils Given 5/25/19 1956)            Labs Ordered and Resulted from Time of ED Arrival Up to the Time of Departure from the ED - No data to display      I have reviewed the patient's prior chart including recent labs, ER visits, and available inpatient discharge summaries if available.    On re-evaluation, the patient remained stable and symptoms have completely resolved after allergy mediations .  Discussed  plan including discharge with allergy medications and follow up with PCP.      Assessments & Plan (with Medical Decision Making)   This is a 69-year-old male coming into the emergency room with complaints of itchy red eyes and runny nose as well as some sneezing.  Evidently he was cleaning out his barn and started developing the symptoms.  He presents with a concern that he might have histoplasmosis because he googled this and that was his principal complaint.  We reassured him that his sudden onset of symptoms and list of symptoms are much more associated with allergies.  He is in otherwise no obvious distress.  He has obvious coryza, rhinorrhea but no shortness of breath or hives.  He is provided with allergy eyedrops as well as nasal spray and Zyrtec.  On reassessment he had dramatic improvement of all of his symptoms and is completely resolved.  Patient believes he can be safely discharged and will follow up with his primary care physician for further care management.    Pt was discharged home/self-care.  PT was provided written discharge instructions. Additionally verbal instructions were given and discussed with patient.  PT was asked to return to the ED immediately for any new or concerning symptoms.  Pt was in agreement, endorsed understanding, and questions were answered.       I have reviewed the nursing  "notes.    I have reviewed the findings, diagnosis, plan and need for follow up with the patient.       Medication List      Started    cetirizine 10 MG tablet  Commonly known as:  zyrTEC  10 mg, Oral, DAILY     fluticasone 50 MCG/ACT nasal spray  Commonly known as:  FLONASE  1 spray, Both Nostrils, DAILY     tetrahydrozoline-Zn sulfate 0.05-0.25 % ophthalmic solution  Commonly known as:  VISINE-AC  Place 1 to 2 drops in each eye 4 times a day for symptom relief            Final diagnoses:   Seasonal allergic rhinitis, unspecified trigger     I, Rafa Beatty, am serving as a trained medical scribe to document services personally performed by Maged Vo MD, based on the provider's statements to me.      I, Maged Vo MD, was physically present and have reviewed and verified the accuracy of this note documented by Rafa Beatty.       \"This dictation was performed with the assistance of voice recognition software and may contain inadvertant transcription  errors,  omissions and/or  inadvertent word substitution.\" --Maged Vo MD     5/25/2019   [facility], [place], EMERGENCY DEPARTMENT     Maged Vo MD  05/26/19 2035    "

## 2019-06-25 ENCOUNTER — COMMUNICATION - HEALTHEAST (OUTPATIENT)
Dept: SCHEDULING | Facility: CLINIC | Age: 69
End: 2019-06-25

## 2019-06-26 ENCOUNTER — OFFICE VISIT - HEALTHEAST (OUTPATIENT)
Dept: FAMILY MEDICINE | Facility: CLINIC | Age: 69
End: 2019-06-26

## 2019-06-26 DIAGNOSIS — H60.391 INFECTIVE OTITIS EXTERNA, RIGHT: ICD-10-CM

## 2019-06-26 DIAGNOSIS — E78.49 OTHER HYPERLIPIDEMIA: ICD-10-CM

## 2019-06-26 DIAGNOSIS — H90.2 CONDUCTIVE HEARING LOSS, UNILATERAL: ICD-10-CM

## 2019-06-26 DIAGNOSIS — I10 ESSENTIAL HYPERTENSION WITH GOAL BLOOD PRESSURE LESS THAN 130/80: ICD-10-CM

## 2019-06-26 DIAGNOSIS — E11.22 TYPE 2 DIABETES MELLITUS WITH STAGE 3 CHRONIC KIDNEY DISEASE, WITH LONG-TERM CURRENT USE OF INSULIN (H): ICD-10-CM

## 2019-06-26 DIAGNOSIS — N18.30 TYPE 2 DIABETES MELLITUS WITH STAGE 3 CHRONIC KIDNEY DISEASE, WITH LONG-TERM CURRENT USE OF INSULIN (H): ICD-10-CM

## 2019-06-26 DIAGNOSIS — Z79.4 TYPE 2 DIABETES MELLITUS WITH STAGE 3 CHRONIC KIDNEY DISEASE, WITH LONG-TERM CURRENT USE OF INSULIN (H): ICD-10-CM

## 2019-06-26 LAB — HBA1C MFR BLD: 7.9 % (ref 3.5–6)

## 2019-06-28 ENCOUNTER — COMMUNICATION - HEALTHEAST (OUTPATIENT)
Dept: RHEUMATOLOGY | Facility: CLINIC | Age: 69
End: 2019-06-28

## 2019-06-28 DIAGNOSIS — M54.50 LUMBAGO: ICD-10-CM

## 2019-06-28 DIAGNOSIS — M13.0 POLYARTHRITIS: ICD-10-CM

## 2019-06-28 DIAGNOSIS — M25.50 POLYARTHRALGIA: ICD-10-CM

## 2019-07-01 ENCOUNTER — OFFICE VISIT - HEALTHEAST (OUTPATIENT)
Dept: RHEUMATOLOGY | Facility: CLINIC | Age: 69
End: 2019-07-01

## 2019-07-01 DIAGNOSIS — G56.02 LEFT CARPAL TUNNEL SYNDROME: ICD-10-CM

## 2019-07-01 DIAGNOSIS — R20.0 NUMBNESS AND TINGLING IN LEFT HAND: ICD-10-CM

## 2019-07-01 DIAGNOSIS — R20.2 NUMBNESS AND TINGLING IN LEFT HAND: ICD-10-CM

## 2019-07-01 DIAGNOSIS — M06.00 SERONEGATIVE RHEUMATOID ARTHRITIS (H): ICD-10-CM

## 2019-07-01 LAB
ALBUMIN SERPL-MCNC: 4.2 G/DL (ref 3.5–5)
ALT SERPL W P-5'-P-CCNC: 23 U/L (ref 0–45)
CREAT SERPL-MCNC: 1.02 MG/DL (ref 0.7–1.3)
ERYTHROCYTE [DISTWIDTH] IN BLOOD BY AUTOMATED COUNT: 12.6 % (ref 11–14.5)
GFR SERPL CREATININE-BSD FRML MDRD: >60 ML/MIN/1.73M2
HCT VFR BLD AUTO: 43.3 % (ref 40–54)
HGB BLD-MCNC: 14.4 G/DL (ref 14–18)
MCH RBC QN AUTO: 28.5 PG (ref 27–34)
MCHC RBC AUTO-ENTMCNC: 33.2 G/DL (ref 32–36)
MCV RBC AUTO: 86 FL (ref 80–100)
PLATELET # BLD AUTO: 254 THOU/UL (ref 140–440)
PMV BLD AUTO: 9.3 FL (ref 7–10)
RBC # BLD AUTO: 5.05 MILL/UL (ref 4.4–6.2)
WBC: 8.7 THOU/UL (ref 4–11)

## 2019-07-01 ASSESSMENT — MIFFLIN-ST. JEOR: SCORE: 1996.73

## 2019-07-25 ENCOUNTER — HOSPITAL ENCOUNTER (OUTPATIENT)
Dept: PHYSICAL MEDICINE AND REHAB | Facility: CLINIC | Age: 69
Discharge: HOME | End: 2019-07-25
Attending: INTERNAL MEDICINE

## 2019-07-25 DIAGNOSIS — R20.0 NUMBNESS AND TINGLING IN LEFT HAND: ICD-10-CM

## 2019-07-25 DIAGNOSIS — R20.2 NUMBNESS AND TINGLING IN LEFT HAND: ICD-10-CM

## 2019-07-25 DIAGNOSIS — G56.02 LEFT CARPAL TUNNEL SYNDROME: ICD-10-CM

## 2019-08-09 ENCOUNTER — COMMUNICATION - HEALTHEAST (OUTPATIENT)
Dept: FAMILY MEDICINE | Facility: CLINIC | Age: 69
End: 2019-08-09

## 2019-08-09 DIAGNOSIS — E78.5 HYPERLIPIDEMIA: ICD-10-CM

## 2019-09-11 ENCOUNTER — COMMUNICATION - HEALTHEAST (OUTPATIENT)
Dept: ADMINISTRATIVE | Facility: CLINIC | Age: 69
End: 2019-09-11

## 2019-09-11 DIAGNOSIS — G56.00 CARPAL TUNNEL SYNDROME: ICD-10-CM

## 2019-10-08 ENCOUNTER — OFFICE VISIT - HEALTHEAST (OUTPATIENT)
Dept: RHEUMATOLOGY | Facility: CLINIC | Age: 69
End: 2019-10-08

## 2019-10-08 ENCOUNTER — COMMUNICATION - HEALTHEAST (OUTPATIENT)
Dept: RHEUMATOLOGY | Facility: CLINIC | Age: 69
End: 2019-10-08

## 2019-10-08 DIAGNOSIS — M54.50 LUMBAGO: ICD-10-CM

## 2019-10-08 DIAGNOSIS — M25.50 POLYARTHRALGIA: ICD-10-CM

## 2019-10-08 DIAGNOSIS — M13.0 POLYARTHRITIS: ICD-10-CM

## 2019-10-08 DIAGNOSIS — M06.00 SERONEGATIVE RHEUMATOID ARTHRITIS (H): ICD-10-CM

## 2019-10-08 DIAGNOSIS — G56.02 LEFT CARPAL TUNNEL SYNDROME: ICD-10-CM

## 2019-10-08 DIAGNOSIS — M15.0 PRIMARY OSTEOARTHRITIS INVOLVING MULTIPLE JOINTS: ICD-10-CM

## 2019-10-08 DIAGNOSIS — E11.9 TYPE 2 DIABETES MELLITUS WITHOUT COMPLICATION, WITHOUT LONG-TERM CURRENT USE OF INSULIN (H): ICD-10-CM

## 2019-10-09 ENCOUNTER — COMMUNICATION - HEALTHEAST (OUTPATIENT)
Dept: FAMILY MEDICINE | Facility: CLINIC | Age: 69
End: 2019-10-09

## 2019-10-09 DIAGNOSIS — N40.1 BPH WITH URINARY OBSTRUCTION: ICD-10-CM

## 2019-10-09 DIAGNOSIS — R60.0 PERIPHERAL EDEMA: ICD-10-CM

## 2019-10-09 DIAGNOSIS — E11.9 TYPE 2 DIABETES MELLITUS WITHOUT COMPLICATION, WITHOUT LONG-TERM CURRENT USE OF INSULIN (H): ICD-10-CM

## 2019-10-09 DIAGNOSIS — N13.8 BPH WITH URINARY OBSTRUCTION: ICD-10-CM

## 2019-10-10 ENCOUNTER — RECORDS - HEALTHEAST (OUTPATIENT)
Dept: ADMINISTRATIVE | Facility: OTHER | Age: 69
End: 2019-10-10

## 2019-10-13 ENCOUNTER — COMMUNICATION - HEALTHEAST (OUTPATIENT)
Dept: RHEUMATOLOGY | Facility: CLINIC | Age: 69
End: 2019-10-13

## 2019-10-13 DIAGNOSIS — M13.0 POLYARTHRITIS: ICD-10-CM

## 2019-10-13 DIAGNOSIS — M25.50 POLYARTHRALGIA: ICD-10-CM

## 2019-10-13 DIAGNOSIS — M54.50 LUMBAGO: ICD-10-CM

## 2019-10-14 ENCOUNTER — COMMUNICATION - HEALTHEAST (OUTPATIENT)
Dept: FAMILY MEDICINE | Facility: CLINIC | Age: 69
End: 2019-10-14

## 2019-10-14 DIAGNOSIS — I10 HTN (HYPERTENSION): ICD-10-CM

## 2019-10-14 DIAGNOSIS — I10 ESSENTIAL HYPERTENSION WITH GOAL BLOOD PRESSURE LESS THAN 130/80: ICD-10-CM

## 2019-10-15 ENCOUNTER — COMMUNICATION - HEALTHEAST (OUTPATIENT)
Dept: RHEUMATOLOGY | Facility: CLINIC | Age: 69
End: 2019-10-15

## 2019-10-15 ENCOUNTER — RECORDS - HEALTHEAST (OUTPATIENT)
Dept: ADMINISTRATIVE | Facility: OTHER | Age: 69
End: 2019-10-15

## 2019-10-15 DIAGNOSIS — M25.50 POLYARTHRALGIA: ICD-10-CM

## 2019-10-15 DIAGNOSIS — M54.50 LUMBAGO: ICD-10-CM

## 2019-10-15 DIAGNOSIS — M13.0 POLYARTHRITIS: ICD-10-CM

## 2019-10-21 ENCOUNTER — RECORDS - HEALTHEAST (OUTPATIENT)
Dept: ADMINISTRATIVE | Facility: OTHER | Age: 69
End: 2019-10-21

## 2019-10-25 ENCOUNTER — RECORDS - HEALTHEAST (OUTPATIENT)
Dept: ADMINISTRATIVE | Facility: OTHER | Age: 69
End: 2019-10-25

## 2019-10-29 ENCOUNTER — OFFICE VISIT - HEALTHEAST (OUTPATIENT)
Dept: FAMILY MEDICINE | Facility: CLINIC | Age: 69
End: 2019-10-29

## 2019-10-29 DIAGNOSIS — E11.22 TYPE 2 DIABETES MELLITUS WITH STAGE 3 CHRONIC KIDNEY DISEASE, WITH LONG-TERM CURRENT USE OF INSULIN (H): ICD-10-CM

## 2019-10-29 DIAGNOSIS — I10 ESSENTIAL HYPERTENSION WITH GOAL BLOOD PRESSURE LESS THAN 130/80: ICD-10-CM

## 2019-10-29 DIAGNOSIS — E78.49 OTHER HYPERLIPIDEMIA: ICD-10-CM

## 2019-10-29 DIAGNOSIS — Z79.4 TYPE 2 DIABETES MELLITUS WITH STAGE 3 CHRONIC KIDNEY DISEASE, WITH LONG-TERM CURRENT USE OF INSULIN (H): ICD-10-CM

## 2019-10-29 DIAGNOSIS — N18.30 TYPE 2 DIABETES MELLITUS WITH STAGE 3 CHRONIC KIDNEY DISEASE, WITH LONG-TERM CURRENT USE OF INSULIN (H): ICD-10-CM

## 2019-10-29 DIAGNOSIS — M06.00 RHEUMATOID ARTHRITIS WITH NEGATIVE RHEUMATOID FACTOR, INVOLVING UNSPECIFIED SITE (H): ICD-10-CM

## 2019-10-29 DIAGNOSIS — E11.9 TYPE 2 DIABETES MELLITUS WITHOUT COMPLICATION, WITHOUT LONG-TERM CURRENT USE OF INSULIN (H): ICD-10-CM

## 2019-10-29 LAB
ERYTHROCYTE [DISTWIDTH] IN BLOOD BY AUTOMATED COUNT: 12.6 % (ref 11–14.5)
HBA1C MFR BLD: 7.2 % (ref 3.5–6)
HCT VFR BLD AUTO: 45.5 % (ref 40–54)
HGB BLD-MCNC: 15.6 G/DL (ref 14–18)
MCH RBC QN AUTO: 29 PG (ref 27–34)
MCHC RBC AUTO-ENTMCNC: 34.3 G/DL (ref 32–36)
MCV RBC AUTO: 84 FL (ref 80–100)
PLATELET # BLD AUTO: 270 THOU/UL (ref 140–440)
PMV BLD AUTO: 8.8 FL (ref 7–10)
RBC # BLD AUTO: 5.38 MILL/UL (ref 4.4–6.2)
WBC: 8.2 THOU/UL (ref 4–11)

## 2019-10-30 ENCOUNTER — COMMUNICATION - HEALTHEAST (OUTPATIENT)
Dept: FAMILY MEDICINE | Facility: CLINIC | Age: 69
End: 2019-10-30

## 2019-10-30 LAB
ALBUMIN SERPL-MCNC: 4.5 G/DL (ref 3.5–5)
ALP SERPL-CCNC: 119 U/L (ref 45–120)
ALT SERPL W P-5'-P-CCNC: 21 U/L (ref 0–45)
ANION GAP SERPL CALCULATED.3IONS-SCNC: 11 MMOL/L (ref 5–18)
AST SERPL W P-5'-P-CCNC: 18 U/L (ref 0–40)
BILIRUB SERPL-MCNC: 0.7 MG/DL (ref 0–1)
BUN SERPL-MCNC: 16 MG/DL (ref 8–22)
CALCIUM SERPL-MCNC: 9.9 MG/DL (ref 8.5–10.5)
CHLORIDE BLD-SCNC: 104 MMOL/L (ref 98–107)
CHOLEST SERPL-MCNC: 117 MG/DL
CO2 SERPL-SCNC: 26 MMOL/L (ref 22–31)
CREAT SERPL-MCNC: 1.03 MG/DL (ref 0.7–1.3)
CREAT UR-MCNC: 20.8 MG/DL
FASTING STATUS PATIENT QL REPORTED: YES
GFR SERPL CREATININE-BSD FRML MDRD: >60 ML/MIN/1.73M2
GLUCOSE BLD-MCNC: 131 MG/DL (ref 70–125)
HDLC SERPL-MCNC: 32 MG/DL
LDLC SERPL CALC-MCNC: 54 MG/DL
MICROALBUMIN UR-MCNC: <0.5 MG/DL (ref 0–1.99)
MICROALBUMIN/CREAT UR: NORMAL MG/G{CREAT}
POTASSIUM BLD-SCNC: 4.5 MMOL/L (ref 3.5–5)
PROT SERPL-MCNC: 7.3 G/DL (ref 6–8)
SODIUM SERPL-SCNC: 141 MMOL/L (ref 136–145)
TRIGL SERPL-MCNC: 156 MG/DL

## 2019-11-16 ENCOUNTER — COMMUNICATION - HEALTHEAST (OUTPATIENT)
Dept: RHEUMATOLOGY | Facility: CLINIC | Age: 69
End: 2019-11-16

## 2019-11-16 DIAGNOSIS — M54.50 LUMBAGO: ICD-10-CM

## 2019-11-16 DIAGNOSIS — M13.0 POLYARTHRITIS: ICD-10-CM

## 2019-11-16 DIAGNOSIS — M25.50 POLYARTHRALGIA: ICD-10-CM

## 2019-12-15 ENCOUNTER — COMMUNICATION - HEALTHEAST (OUTPATIENT)
Dept: FAMILY MEDICINE | Facility: CLINIC | Age: 69
End: 2019-12-15

## 2019-12-15 DIAGNOSIS — R60.0 PERIPHERAL EDEMA: ICD-10-CM

## 2019-12-17 ENCOUNTER — RECORDS - HEALTHEAST (OUTPATIENT)
Dept: ADMINISTRATIVE | Facility: OTHER | Age: 69
End: 2019-12-17

## 2019-12-17 LAB — RETINOPATHY: NEGATIVE

## 2019-12-26 ENCOUNTER — RECORDS - HEALTHEAST (OUTPATIENT)
Dept: HEALTH INFORMATION MANAGEMENT | Facility: CLINIC | Age: 69
End: 2019-12-26

## 2020-01-02 ENCOUNTER — RECORDS - HEALTHEAST (OUTPATIENT)
Dept: ADMINISTRATIVE | Facility: OTHER | Age: 70
End: 2020-01-02

## 2020-01-07 ENCOUNTER — OFFICE VISIT - HEALTHEAST (OUTPATIENT)
Dept: RHEUMATOLOGY | Facility: CLINIC | Age: 70
End: 2020-01-07

## 2020-01-07 DIAGNOSIS — Z79.899 HIGH RISK MEDICATION USE: ICD-10-CM

## 2020-01-07 DIAGNOSIS — M25.50 POLYARTHRALGIA: ICD-10-CM

## 2020-01-07 DIAGNOSIS — M15.0 PRIMARY OSTEOARTHRITIS INVOLVING MULTIPLE JOINTS: ICD-10-CM

## 2020-01-07 DIAGNOSIS — M06.00 SERONEGATIVE RHEUMATOID ARTHRITIS (H): ICD-10-CM

## 2020-01-07 ASSESSMENT — MIFFLIN-ST. JEOR: SCORE: 1951.37

## 2020-01-19 ENCOUNTER — COMMUNICATION - HEALTHEAST (OUTPATIENT)
Dept: FAMILY MEDICINE | Facility: CLINIC | Age: 70
End: 2020-01-19

## 2020-01-19 DIAGNOSIS — I10 ESSENTIAL HYPERTENSION WITH GOAL BLOOD PRESSURE LESS THAN 130/80: ICD-10-CM

## 2020-02-20 ENCOUNTER — OFFICE VISIT - HEALTHEAST (OUTPATIENT)
Dept: FAMILY MEDICINE | Facility: CLINIC | Age: 70
End: 2020-02-20

## 2020-02-20 ENCOUNTER — COMMUNICATION - HEALTHEAST (OUTPATIENT)
Dept: FAMILY MEDICINE | Facility: CLINIC | Age: 70
End: 2020-02-20

## 2020-02-20 DIAGNOSIS — E11.22 TYPE 2 DIABETES MELLITUS WITH STAGE 3 CHRONIC KIDNEY DISEASE, WITH LONG-TERM CURRENT USE OF INSULIN (H): ICD-10-CM

## 2020-02-20 DIAGNOSIS — N40.1 BPH WITH URINARY OBSTRUCTION: ICD-10-CM

## 2020-02-20 DIAGNOSIS — M25.50 POLYARTHRALGIA: ICD-10-CM

## 2020-02-20 DIAGNOSIS — Z00.01 ENCOUNTER FOR GENERAL ADULT MEDICAL EXAMINATION WITH ABNORMAL FINDINGS: ICD-10-CM

## 2020-02-20 DIAGNOSIS — M06.00 RHEUMATOID ARTHRITIS WITH NEGATIVE RHEUMATOID FACTOR, INVOLVING UNSPECIFIED SITE (H): ICD-10-CM

## 2020-02-20 DIAGNOSIS — N18.30 TYPE 2 DIABETES MELLITUS WITH STAGE 3 CHRONIC KIDNEY DISEASE, WITH LONG-TERM CURRENT USE OF INSULIN (H): ICD-10-CM

## 2020-02-20 DIAGNOSIS — Z12.5 SCREENING FOR PROSTATE CANCER: ICD-10-CM

## 2020-02-20 DIAGNOSIS — E78.49 OTHER HYPERLIPIDEMIA: ICD-10-CM

## 2020-02-20 DIAGNOSIS — Z23 FLU VACCINE NEED: ICD-10-CM

## 2020-02-20 DIAGNOSIS — I10 ESSENTIAL HYPERTENSION WITH GOAL BLOOD PRESSURE LESS THAN 130/80: ICD-10-CM

## 2020-02-20 DIAGNOSIS — N13.8 BPH WITH URINARY OBSTRUCTION: ICD-10-CM

## 2020-02-20 DIAGNOSIS — Z79.899 HIGH RISK MEDICATION USE: ICD-10-CM

## 2020-02-20 DIAGNOSIS — Z79.4 TYPE 2 DIABETES MELLITUS WITH STAGE 3 CHRONIC KIDNEY DISEASE, WITH LONG-TERM CURRENT USE OF INSULIN (H): ICD-10-CM

## 2020-02-20 LAB
ALBUMIN SERPL-MCNC: 4.4 G/DL (ref 3.5–5)
ALP SERPL-CCNC: 103 U/L (ref 45–120)
ALT SERPL W P-5'-P-CCNC: 23 U/L (ref 0–45)
ANION GAP SERPL CALCULATED.3IONS-SCNC: 11 MMOL/L (ref 5–18)
AST SERPL W P-5'-P-CCNC: 14 U/L (ref 0–40)
BILIRUB SERPL-MCNC: 0.7 MG/DL (ref 0–1)
BUN SERPL-MCNC: 18 MG/DL (ref 8–22)
CALCIUM SERPL-MCNC: 9.8 MG/DL (ref 8.5–10.5)
CHLORIDE BLD-SCNC: 99 MMOL/L (ref 98–107)
CHOLEST SERPL-MCNC: 141 MG/DL
CO2 SERPL-SCNC: 27 MMOL/L (ref 22–31)
CREAT SERPL-MCNC: 0.88 MG/DL (ref 0.7–1.3)
ERYTHROCYTE [DISTWIDTH] IN BLOOD BY AUTOMATED COUNT: 13.7 % (ref 11–14.5)
FASTING STATUS PATIENT QL REPORTED: YES
GFR SERPL CREATININE-BSD FRML MDRD: >60 ML/MIN/1.73M2
GLUCOSE BLD-MCNC: 197 MG/DL (ref 70–125)
HBA1C MFR BLD: 7.7 % (ref 3.5–6)
HCT VFR BLD AUTO: 46.9 % (ref 40–54)
HDLC SERPL-MCNC: 37 MG/DL
HGB BLD-MCNC: 16.3 G/DL (ref 14–18)
LDLC SERPL CALC-MCNC: 76 MG/DL
MCH RBC QN AUTO: 29.1 PG (ref 27–34)
MCHC RBC AUTO-ENTMCNC: 34.7 G/DL (ref 32–36)
MCV RBC AUTO: 84 FL (ref 80–100)
PLATELET # BLD AUTO: 212 THOU/UL (ref 140–440)
PMV BLD AUTO: 8.6 FL (ref 7–10)
POTASSIUM BLD-SCNC: 4.3 MMOL/L (ref 3.5–5)
PROT SERPL-MCNC: 7.1 G/DL (ref 6–8)
PSA SERPL-MCNC: 2.5 NG/ML (ref 0–4.5)
RBC # BLD AUTO: 5.59 MILL/UL (ref 4.4–6.2)
SODIUM SERPL-SCNC: 137 MMOL/L (ref 136–145)
TRIGL SERPL-MCNC: 141 MG/DL
WBC: 8 THOU/UL (ref 4–11)

## 2020-02-20 ASSESSMENT — ANXIETY QUESTIONNAIRES
2. NOT BEING ABLE TO STOP OR CONTROL WORRYING: NOT AT ALL
4. TROUBLE RELAXING: NOT AT ALL
GAD7 TOTAL SCORE: 0
6. BECOMING EASILY ANNOYED OR IRRITABLE: NOT AT ALL
1. FEELING NERVOUS, ANXIOUS, OR ON EDGE: NOT AT ALL
IF YOU CHECKED OFF ANY PROBLEMS ON THIS QUESTIONNAIRE, HOW DIFFICULT HAVE THESE PROBLEMS MADE IT FOR YOU TO DO YOUR WORK, TAKE CARE OF THINGS AT HOME, OR GET ALONG WITH OTHER PEOPLE: NOT DIFFICULT AT ALL
7. FEELING AFRAID AS IF SOMETHING AWFUL MIGHT HAPPEN: NOT AT ALL
3. WORRYING TOO MUCH ABOUT DIFFERENT THINGS: NOT AT ALL
5. BEING SO RESTLESS THAT IT IS HARD TO SIT STILL: NOT AT ALL

## 2020-02-20 ASSESSMENT — MIFFLIN-ST. JEOR: SCORE: 1986.53

## 2020-02-20 ASSESSMENT — PATIENT HEALTH QUESTIONNAIRE - PHQ9: SUM OF ALL RESPONSES TO PHQ QUESTIONS 1-9: 1

## 2020-03-01 ENCOUNTER — COMMUNICATION - HEALTHEAST (OUTPATIENT)
Dept: RHEUMATOLOGY | Facility: CLINIC | Age: 70
End: 2020-03-01

## 2020-03-01 DIAGNOSIS — M54.50 LUMBAGO: ICD-10-CM

## 2020-03-01 DIAGNOSIS — M25.50 POLYARTHRALGIA: ICD-10-CM

## 2020-03-01 DIAGNOSIS — M13.0 POLYARTHRITIS: ICD-10-CM

## 2020-03-27 ENCOUNTER — COMMUNICATION - HEALTHEAST (OUTPATIENT)
Dept: SCHEDULING | Facility: CLINIC | Age: 70
End: 2020-03-27

## 2020-03-27 ENCOUNTER — COMMUNICATION - HEALTHEAST (OUTPATIENT)
Dept: ADMINISTRATIVE | Facility: CLINIC | Age: 70
End: 2020-03-27

## 2020-03-30 ENCOUNTER — COMMUNICATION - HEALTHEAST (OUTPATIENT)
Dept: RHEUMATOLOGY | Facility: CLINIC | Age: 70
End: 2020-03-30

## 2020-03-30 DIAGNOSIS — M54.50 LUMBAGO: ICD-10-CM

## 2020-03-30 DIAGNOSIS — M25.50 POLYARTHRALGIA: ICD-10-CM

## 2020-03-30 DIAGNOSIS — M13.0 POLYARTHRITIS: ICD-10-CM

## 2020-04-01 ENCOUNTER — COMMUNICATION - HEALTHEAST (OUTPATIENT)
Dept: RHEUMATOLOGY | Facility: CLINIC | Age: 70
End: 2020-04-01

## 2020-04-03 ENCOUNTER — COMMUNICATION - HEALTHEAST (OUTPATIENT)
Dept: ADMINISTRATIVE | Facility: CLINIC | Age: 70
End: 2020-04-03

## 2020-04-09 ENCOUNTER — OFFICE VISIT - HEALTHEAST (OUTPATIENT)
Dept: RHEUMATOLOGY | Facility: CLINIC | Age: 70
End: 2020-04-09

## 2020-04-09 DIAGNOSIS — M13.0 POLYARTHRITIS: ICD-10-CM

## 2020-04-09 DIAGNOSIS — M15.0 PRIMARY OSTEOARTHRITIS INVOLVING MULTIPLE JOINTS: ICD-10-CM

## 2020-04-09 DIAGNOSIS — M25.50 POLYARTHRALGIA: ICD-10-CM

## 2020-04-09 DIAGNOSIS — M06.00 SERONEGATIVE RHEUMATOID ARTHRITIS (H): ICD-10-CM

## 2020-05-07 ENCOUNTER — AMBULATORY - HEALTHEAST (OUTPATIENT)
Dept: LAB | Facility: CLINIC | Age: 70
End: 2020-05-07

## 2020-05-07 DIAGNOSIS — Z79.899 HIGH RISK MEDICATION USE: ICD-10-CM

## 2020-05-07 DIAGNOSIS — M25.50 POLYARTHRALGIA: ICD-10-CM

## 2020-05-07 LAB
ALBUMIN SERPL-MCNC: 4.1 G/DL (ref 3.5–5)
ALT SERPL W P-5'-P-CCNC: 20 U/L (ref 0–45)
CREAT SERPL-MCNC: 1.14 MG/DL (ref 0.7–1.3)
ERYTHROCYTE [DISTWIDTH] IN BLOOD BY AUTOMATED COUNT: 12.2 % (ref 11–14.5)
GFR SERPL CREATININE-BSD FRML MDRD: >60 ML/MIN/1.73M2
HCT VFR BLD AUTO: 44.6 % (ref 40–54)
HGB BLD-MCNC: 15.2 G/DL (ref 14–18)
MCH RBC QN AUTO: 29.8 PG (ref 27–34)
MCHC RBC AUTO-ENTMCNC: 34 G/DL (ref 32–36)
MCV RBC AUTO: 87 FL (ref 80–100)
PLATELET # BLD AUTO: 205 THOU/UL (ref 140–440)
PMV BLD AUTO: 9 FL (ref 7–10)
RBC # BLD AUTO: 5.1 MILL/UL (ref 4.4–6.2)
WBC: 7.9 THOU/UL (ref 4–11)

## 2020-06-09 ENCOUNTER — OFFICE VISIT - HEALTHEAST (OUTPATIENT)
Dept: RHEUMATOLOGY | Facility: CLINIC | Age: 70
End: 2020-06-09

## 2020-06-09 DIAGNOSIS — M25.50 POLYARTHRALGIA: ICD-10-CM

## 2020-06-09 DIAGNOSIS — M06.00 SERONEGATIVE RHEUMATOID ARTHRITIS (H): ICD-10-CM

## 2020-06-09 DIAGNOSIS — M15.0 PRIMARY OSTEOARTHRITIS INVOLVING MULTIPLE JOINTS: ICD-10-CM

## 2020-07-14 ENCOUNTER — COMMUNICATION - HEALTHEAST (OUTPATIENT)
Dept: FAMILY MEDICINE | Facility: CLINIC | Age: 70
End: 2020-07-14

## 2020-07-14 DIAGNOSIS — N13.8 BPH WITH URINARY OBSTRUCTION: ICD-10-CM

## 2020-07-14 DIAGNOSIS — N40.1 BPH WITH URINARY OBSTRUCTION: ICD-10-CM

## 2020-07-14 DIAGNOSIS — Z79.4 TYPE 2 DIABETES MELLITUS WITH STAGE 3 CHRONIC KIDNEY DISEASE, WITH LONG-TERM CURRENT USE OF INSULIN (H): ICD-10-CM

## 2020-07-14 DIAGNOSIS — E11.22 TYPE 2 DIABETES MELLITUS WITH STAGE 3 CHRONIC KIDNEY DISEASE, WITH LONG-TERM CURRENT USE OF INSULIN (H): ICD-10-CM

## 2020-07-14 DIAGNOSIS — N18.30 TYPE 2 DIABETES MELLITUS WITH STAGE 3 CHRONIC KIDNEY DISEASE, WITH LONG-TERM CURRENT USE OF INSULIN (H): ICD-10-CM

## 2020-07-14 DIAGNOSIS — I10 HTN (HYPERTENSION): ICD-10-CM

## 2020-08-04 ENCOUNTER — COMMUNICATION - HEALTHEAST (OUTPATIENT)
Dept: ADMINISTRATIVE | Facility: CLINIC | Age: 70
End: 2020-08-04

## 2020-08-09 ENCOUNTER — COMMUNICATION - HEALTHEAST (OUTPATIENT)
Dept: RHEUMATOLOGY | Facility: CLINIC | Age: 70
End: 2020-08-09

## 2020-08-09 DIAGNOSIS — M25.50 POLYARTHRALGIA: ICD-10-CM

## 2020-08-09 DIAGNOSIS — M15.0 PRIMARY OSTEOARTHRITIS INVOLVING MULTIPLE JOINTS: ICD-10-CM

## 2020-08-18 ENCOUNTER — RECORDS - HEALTHEAST (OUTPATIENT)
Dept: ADMINISTRATIVE | Facility: OTHER | Age: 70
End: 2020-08-18

## 2020-09-29 ENCOUNTER — COMMUNICATION - HEALTHEAST (OUTPATIENT)
Dept: RHEUMATOLOGY | Facility: CLINIC | Age: 70
End: 2020-09-29

## 2020-09-29 DIAGNOSIS — M06.00 SERONEGATIVE RHEUMATOID ARTHRITIS (H): ICD-10-CM

## 2020-10-20 ENCOUNTER — COMMUNICATION - HEALTHEAST (OUTPATIENT)
Dept: FAMILY MEDICINE | Facility: CLINIC | Age: 70
End: 2020-10-20

## 2020-10-20 DIAGNOSIS — I10 ESSENTIAL HYPERTENSION WITH GOAL BLOOD PRESSURE LESS THAN 130/80: ICD-10-CM

## 2020-10-21 ENCOUNTER — COMMUNICATION - HEALTHEAST (OUTPATIENT)
Dept: FAMILY MEDICINE | Facility: CLINIC | Age: 70
End: 2020-10-21

## 2020-10-21 DIAGNOSIS — E78.5 HYPERLIPIDEMIA: ICD-10-CM

## 2020-11-24 ENCOUNTER — COMMUNICATION - HEALTHEAST (OUTPATIENT)
Dept: ADMINISTRATIVE | Facility: CLINIC | Age: 70
End: 2020-11-24

## 2020-11-24 DIAGNOSIS — M15.0 PRIMARY OSTEOARTHRITIS INVOLVING MULTIPLE JOINTS: ICD-10-CM

## 2020-11-24 DIAGNOSIS — M25.50 POLYARTHRALGIA: ICD-10-CM

## 2020-11-25 ENCOUNTER — AMBULATORY - HEALTHEAST (OUTPATIENT)
Dept: LAB | Facility: CLINIC | Age: 70
End: 2020-11-25

## 2020-11-25 DIAGNOSIS — Z79.899 HIGH RISK MEDICATION USE: ICD-10-CM

## 2020-11-25 DIAGNOSIS — M25.50 POLYARTHRALGIA: ICD-10-CM

## 2020-11-27 ENCOUNTER — COMMUNICATION - HEALTHEAST (OUTPATIENT)
Dept: SCHEDULING | Facility: CLINIC | Age: 70
End: 2020-11-27

## 2020-11-27 ENCOUNTER — AMBULATORY - HEALTHEAST (OUTPATIENT)
Dept: LAB | Facility: CLINIC | Age: 70
End: 2020-11-27

## 2020-11-27 DIAGNOSIS — M25.50 POLYARTHRALGIA: ICD-10-CM

## 2020-11-27 DIAGNOSIS — E11.22 TYPE 2 DIABETES MELLITUS WITH STAGE 3 CHRONIC KIDNEY DISEASE, WITH LONG-TERM CURRENT USE OF INSULIN (H): ICD-10-CM

## 2020-11-27 DIAGNOSIS — N18.30 TYPE 2 DIABETES MELLITUS WITH STAGE 3 CHRONIC KIDNEY DISEASE, WITH LONG-TERM CURRENT USE OF INSULIN (H): ICD-10-CM

## 2020-11-27 DIAGNOSIS — Z79.4 TYPE 2 DIABETES MELLITUS WITH STAGE 3 CHRONIC KIDNEY DISEASE, WITH LONG-TERM CURRENT USE OF INSULIN (H): ICD-10-CM

## 2020-11-27 DIAGNOSIS — Z79.899 HIGH RISK MEDICATION USE: ICD-10-CM

## 2020-11-27 LAB
ALBUMIN SERPL-MCNC: 4 G/DL (ref 3.5–5)
ALT SERPL W P-5'-P-CCNC: 18 U/L (ref 0–45)
CREAT SERPL-MCNC: 0.91 MG/DL (ref 0.7–1.3)
ERYTHROCYTE [DISTWIDTH] IN BLOOD BY AUTOMATED COUNT: 12.7 % (ref 11–14.5)
GFR SERPL CREATININE-BSD FRML MDRD: >60 ML/MIN/1.73M2
HCT VFR BLD AUTO: 43.3 % (ref 40–54)
HGB BLD-MCNC: 14.9 G/DL (ref 14–18)
MCH RBC QN AUTO: 29.4 PG (ref 27–34)
MCHC RBC AUTO-ENTMCNC: 34.4 G/DL (ref 32–36)
MCV RBC AUTO: 86 FL (ref 80–100)
PLATELET # BLD AUTO: 242 THOU/UL (ref 140–440)
PMV BLD AUTO: 8.1 FL (ref 7–10)
RBC # BLD AUTO: 5.06 MILL/UL (ref 4.4–6.2)
WBC: 9.2 THOU/UL (ref 4–11)

## 2020-12-03 ENCOUNTER — OFFICE VISIT - HEALTHEAST (OUTPATIENT)
Dept: RHEUMATOLOGY | Facility: CLINIC | Age: 70
End: 2020-12-03

## 2020-12-03 ENCOUNTER — COMMUNICATION - HEALTHEAST (OUTPATIENT)
Dept: RHEUMATOLOGY | Facility: CLINIC | Age: 70
End: 2020-12-03

## 2020-12-03 DIAGNOSIS — M25.50 POLYARTHRALGIA: ICD-10-CM

## 2020-12-03 DIAGNOSIS — M06.00 SERONEGATIVE RHEUMATOID ARTHRITIS (H): ICD-10-CM

## 2020-12-03 DIAGNOSIS — M15.0 PRIMARY OSTEOARTHRITIS INVOLVING MULTIPLE JOINTS: ICD-10-CM

## 2020-12-11 ENCOUNTER — AMBULATORY - HEALTHEAST (OUTPATIENT)
Dept: FAMILY MEDICINE | Facility: CLINIC | Age: 70
End: 2020-12-11

## 2020-12-11 DIAGNOSIS — E11.9 TYPE 2 DIABETES MELLITUS WITHOUT COMPLICATION, WITHOUT LONG-TERM CURRENT USE OF INSULIN (H): ICD-10-CM

## 2020-12-29 ENCOUNTER — OFFICE VISIT - HEALTHEAST (OUTPATIENT)
Dept: FAMILY MEDICINE | Facility: CLINIC | Age: 70
End: 2020-12-29

## 2020-12-29 DIAGNOSIS — E11.9 TYPE 2 DIABETES MELLITUS WITHOUT COMPLICATION, WITHOUT LONG-TERM CURRENT USE OF INSULIN (H): ICD-10-CM

## 2020-12-29 DIAGNOSIS — I10 ESSENTIAL HYPERTENSION WITH GOAL BLOOD PRESSURE LESS THAN 130/80: ICD-10-CM

## 2020-12-29 DIAGNOSIS — I10 ESSENTIAL HYPERTENSION: ICD-10-CM

## 2020-12-29 DIAGNOSIS — R60.0 PERIPHERAL EDEMA: ICD-10-CM

## 2020-12-29 DIAGNOSIS — E78.49 OTHER HYPERLIPIDEMIA: ICD-10-CM

## 2020-12-29 DIAGNOSIS — Z23 ENCOUNTER FOR IMMUNIZATION: ICD-10-CM

## 2020-12-29 DIAGNOSIS — M06.00 SERONEGATIVE RHEUMATOID ARTHRITIS (H): ICD-10-CM

## 2020-12-29 DIAGNOSIS — M15.0 PRIMARY OSTEOARTHRITIS INVOLVING MULTIPLE JOINTS: ICD-10-CM

## 2020-12-29 DIAGNOSIS — N13.8 BPH WITH URINARY OBSTRUCTION: ICD-10-CM

## 2020-12-29 DIAGNOSIS — M25.50 POLYARTHRALGIA: ICD-10-CM

## 2020-12-29 DIAGNOSIS — E66.9 OBESITY, UNSPECIFIED: ICD-10-CM

## 2020-12-29 DIAGNOSIS — N40.1 BPH WITH URINARY OBSTRUCTION: ICD-10-CM

## 2020-12-29 DIAGNOSIS — K57.90 DIVERTICULOSIS: ICD-10-CM

## 2020-12-29 LAB
ANION GAP SERPL CALCULATED.3IONS-SCNC: 9 MMOL/L (ref 5–18)
BUN SERPL-MCNC: 19 MG/DL (ref 8–28)
CALCIUM SERPL-MCNC: 9.8 MG/DL (ref 8.5–10.5)
CHLORIDE BLD-SCNC: 101 MMOL/L (ref 98–107)
CO2 SERPL-SCNC: 31 MMOL/L (ref 22–31)
CREAT SERPL-MCNC: 0.94 MG/DL (ref 0.7–1.3)
CREAT UR-MCNC: 31.9 MG/DL
GFR SERPL CREATININE-BSD FRML MDRD: >60 ML/MIN/1.73M2
GLUCOSE BLD-MCNC: 129 MG/DL (ref 70–125)
HBA1C MFR BLD: 7.2 %
MICROALBUMIN UR-MCNC: <0.5 MG/DL (ref 0–1.99)
MICROALBUMIN/CREAT UR: NORMAL MG/G{CREAT}
POTASSIUM BLD-SCNC: 4.5 MMOL/L (ref 3.5–5)
SODIUM SERPL-SCNC: 141 MMOL/L (ref 136–145)

## 2020-12-30 ENCOUNTER — COMMUNICATION - HEALTHEAST (OUTPATIENT)
Dept: FAMILY MEDICINE | Facility: CLINIC | Age: 70
End: 2020-12-30

## 2021-01-18 ENCOUNTER — COMMUNICATION - HEALTHEAST (OUTPATIENT)
Dept: FAMILY MEDICINE | Facility: CLINIC | Age: 71
End: 2021-01-18

## 2021-01-18 DIAGNOSIS — R60.0 PERIPHERAL EDEMA: ICD-10-CM

## 2021-03-04 ENCOUNTER — OFFICE VISIT - HEALTHEAST (OUTPATIENT)
Dept: FAMILY MEDICINE | Facility: CLINIC | Age: 71
End: 2021-03-04

## 2021-03-04 ENCOUNTER — OFFICE VISIT - HEALTHEAST (OUTPATIENT)
Dept: RHEUMATOLOGY | Facility: CLINIC | Age: 71
End: 2021-03-04

## 2021-03-04 ENCOUNTER — COMMUNICATION - HEALTHEAST (OUTPATIENT)
Dept: FAMILY MEDICINE | Facility: CLINIC | Age: 71
End: 2021-03-04

## 2021-03-04 DIAGNOSIS — R10.31 BILATERAL GROIN PAIN: ICD-10-CM

## 2021-03-04 DIAGNOSIS — L03.818 CELLULITIS OF OTHER SPECIFIED SITE: ICD-10-CM

## 2021-03-04 DIAGNOSIS — E66.01 MORBID OBESITY (H): ICD-10-CM

## 2021-03-04 DIAGNOSIS — M15.0 PRIMARY OSTEOARTHRITIS INVOLVING MULTIPLE JOINTS: ICD-10-CM

## 2021-03-04 DIAGNOSIS — B35.6 TINEA CRURIS: ICD-10-CM

## 2021-03-04 DIAGNOSIS — M25.50 POLYARTHRALGIA: ICD-10-CM

## 2021-03-04 DIAGNOSIS — M06.00 SERONEGATIVE RHEUMATOID ARTHRITIS (H): ICD-10-CM

## 2021-03-04 DIAGNOSIS — R10.32 BILATERAL GROIN PAIN: ICD-10-CM

## 2021-03-12 ENCOUNTER — OFFICE VISIT - HEALTHEAST (OUTPATIENT)
Dept: FAMILY MEDICINE | Facility: CLINIC | Age: 71
End: 2021-03-12

## 2021-03-12 DIAGNOSIS — B35.6 TINEA CRURIS: ICD-10-CM

## 2021-03-15 ENCOUNTER — COMMUNICATION - HEALTHEAST (OUTPATIENT)
Dept: SCHEDULING | Facility: CLINIC | Age: 71
End: 2021-03-15

## 2021-03-15 DIAGNOSIS — E11.22 TYPE 2 DIABETES MELLITUS WITH STAGE 3 CHRONIC KIDNEY DISEASE, WITH LONG-TERM CURRENT USE OF INSULIN (H): ICD-10-CM

## 2021-03-15 DIAGNOSIS — Z79.4 TYPE 2 DIABETES MELLITUS WITH STAGE 3 CHRONIC KIDNEY DISEASE, WITH LONG-TERM CURRENT USE OF INSULIN (H): ICD-10-CM

## 2021-03-15 DIAGNOSIS — N18.30 TYPE 2 DIABETES MELLITUS WITH STAGE 3 CHRONIC KIDNEY DISEASE, WITH LONG-TERM CURRENT USE OF INSULIN (H): ICD-10-CM

## 2021-03-18 ENCOUNTER — COMMUNICATION - HEALTHEAST (OUTPATIENT)
Dept: FAMILY MEDICINE | Facility: CLINIC | Age: 71
End: 2021-03-18

## 2021-03-19 ENCOUNTER — IMMUNIZATION (OUTPATIENT)
Dept: FAMILY MEDICINE | Facility: CLINIC | Age: 71
End: 2021-03-19
Payer: MEDICARE

## 2021-03-19 PROCEDURE — 91301 PR COVID VAC MODERNA 100 MCG/0.5 ML IM: CPT

## 2021-03-19 PROCEDURE — 0011A PR COVID VAC MODERNA 100 MCG/0.5 ML IM: CPT

## 2021-03-22 ENCOUNTER — COMMUNICATION - HEALTHEAST (OUTPATIENT)
Dept: RHEUMATOLOGY | Facility: CLINIC | Age: 71
End: 2021-03-22

## 2021-03-22 DIAGNOSIS — M15.0 PRIMARY OSTEOARTHRITIS INVOLVING MULTIPLE JOINTS: ICD-10-CM

## 2021-03-22 DIAGNOSIS — M25.50 POLYARTHRALGIA: ICD-10-CM

## 2021-04-08 ENCOUNTER — COMMUNICATION - HEALTHEAST (OUTPATIENT)
Dept: FAMILY MEDICINE | Facility: CLINIC | Age: 71
End: 2021-04-08

## 2021-04-08 DIAGNOSIS — B35.6 TINEA CRURIS: ICD-10-CM

## 2021-04-15 ENCOUNTER — COMMUNICATION - HEALTHEAST (OUTPATIENT)
Dept: FAMILY MEDICINE | Facility: CLINIC | Age: 71
End: 2021-04-15

## 2021-04-15 DIAGNOSIS — N13.8 BPH WITH URINARY OBSTRUCTION: ICD-10-CM

## 2021-04-15 DIAGNOSIS — E78.5 HYPERLIPIDEMIA: ICD-10-CM

## 2021-04-15 DIAGNOSIS — I10 HTN (HYPERTENSION): ICD-10-CM

## 2021-04-15 DIAGNOSIS — N40.1 BPH WITH URINARY OBSTRUCTION: ICD-10-CM

## 2021-04-16 ENCOUNTER — IMMUNIZATION (OUTPATIENT)
Dept: FAMILY MEDICINE | Facility: CLINIC | Age: 71
End: 2021-04-16
Attending: FAMILY MEDICINE
Payer: MEDICARE

## 2021-04-16 PROCEDURE — 0012A PR COVID VAC MODERNA 100 MCG/0.5 ML IM: CPT

## 2021-04-16 PROCEDURE — 91301 PR COVID VAC MODERNA 100 MCG/0.5 ML IM: CPT

## 2021-04-22 ENCOUNTER — HOSPITAL ENCOUNTER (EMERGENCY)
Facility: CLINIC | Age: 71
Discharge: HOME | End: 2021-04-22
Attending: PHYSICIAN ASSISTANT | Admitting: PHYSICIAN ASSISTANT
Payer: MEDICARE

## 2021-04-22 ENCOUNTER — RECORDS - HEALTHEAST (OUTPATIENT)
Dept: ADMINISTRATIVE | Facility: OTHER | Age: 71
End: 2021-04-22

## 2021-04-22 VITALS
DIASTOLIC BLOOD PRESSURE: 80 MMHG | TEMPERATURE: 97 F | HEART RATE: 84 BPM | SYSTOLIC BLOOD PRESSURE: 161 MMHG | BODY MASS INDEX: 32.98 KG/M2 | RESPIRATION RATE: 16 BRPM | OXYGEN SATURATION: 97 % | WEIGHT: 250 LBS

## 2021-04-22 DIAGNOSIS — M54.16 LUMBAR BACK PAIN WITH RADICULOPATHY AFFECTING LEFT LOWER EXTREMITY: ICD-10-CM

## 2021-04-22 PROCEDURE — G0463 HOSPITAL OUTPT CLINIC VISIT: HCPCS | Performed by: PHYSICIAN ASSISTANT

## 2021-04-22 PROCEDURE — 99214 OFFICE O/P EST MOD 30 MIN: CPT | Performed by: PHYSICIAN ASSISTANT

## 2021-04-22 RX ORDER — OXYCODONE HYDROCHLORIDE 5 MG/1
5 TABLET ORAL EVERY 6 HOURS PRN
Qty: 10 TABLET | Refills: 0 | Status: SHIPPED | OUTPATIENT
Start: 2021-04-22 | End: 2021-04-23

## 2021-04-22 ASSESSMENT — ENCOUNTER SYMPTOMS
CONSTITUTIONAL NEGATIVE: 1
GASTROINTESTINAL NEGATIVE: 1
NEUROLOGICAL NEGATIVE: 1
BACK PAIN: 1

## 2021-04-22 NOTE — ED PROVIDER NOTES
History     Chief Complaint   Patient presents with     Back Pain     low back pain since sunday, no injury left knee pain, left  leg pain ,       HPI  Thiago Hein is a 71 year old male with hx back pain including osteomyelitis of the lumbar spine in 2015 as well as lumbar radiculopathy in the past who presents with complaints of left-sided low back pain for the past 4 days.  Patient has a history of back pain with last MRI in 2015.  He followed with neurosurgery at that time and had surgery on his back in 2009.  Patient states his back pain at this time is localized to his left low back and radiates down into his left hip, left upper leg, and left knee.  His pain is worse with walking and any sort of movement.  He does recall having history of spinal stenosis as well.  He thinks his back pain was exacerbated this past weekend after he rode for a long time in a bumpy vehicle as well as lifting a heavy piece of steel repeatedly with his left arm causing him to walk on even.  Denies saddle anesthesia, bowel or bladder incontinence, or lower extremity numbness/tingling/weakness.  Gait is steady but guarded.  Denies fevers, chills, nausea, vomiting, abdominal pain, urinary symptoms, or leg pain/swelling.        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     Priority: Medium     Leukocytosis 09/29/2015     Priority: Medium     Sepsis (H) 09/29/2015     Priority: Medium        Past Medical History:    Past Medical History:   Diagnosis Date     Diabetes (H)      Hypertension        Past Surgical History:    Past Surgical History:   Procedure Laterality Date     ANESTHESIA  OUT OF OR MRI N/A 10/2/2015    Procedure: ANESTHESIA OUT OF OR MRI;  Surgeon: GENERIC ANESTHESIA PROVIDER;  Location: [facility] OR     BACK SURGERY  2008    lumbar spine surgery-- unclear details     SOFT TISSUE SURGERY  2012    skin cancer removed from L shoulder, chest, right neck       Family History:    Family History   Problem Relation Age of Onset     Coronary Artery Disease Father      Hypertension Father        Social History:  Marital Status:  Single [1]  Social History     Tobacco Use     Smoking status: Never Smoker     Smokeless tobacco: Never Used   Substance Use Topics     Alcohol use: No     Comment: Quit in 1990     Drug use: No        Medications:    diclofenac (VOLTAREN) 1 % topical gel  oxyCODONE (ROXICODONE) 5 MG tablet  acetaminophen 650 MG TABS  amLODIPine (NORVASC) 10 MG tablet  celecoxib (CELEBREX) 200 MG capsule  cetirizine (ZYRTEC) 10 MG tablet  fluticasone (FLONASE) 50 MCG/ACT nasal spray  hydroxychloroquine (PLAQUENIL) 200 MG tablet  lisinopril (PRINIVIL,ZESTRIL) 40 MG tablet  metFORMIN (GLUCOPHAGE-XR) 500 MG 24 hr tablet  metoprolol (LOPRESSOR) 100 MG tablet  multivitamin, therapeutic with minerals (THERA-VIT-M) TABS  tetrahydrozoline-Zn sulfate (VISINE-AC) 0.05-0.25 % ophthalmic solution          Review of Systems   Constitutional: Negative.    Gastrointestinal: Negative.    Genitourinary: Negative.    Musculoskeletal: Positive for back pain (left low).        Back pain radiating down left leg   Skin: Negative.    Neurological: Negative.    All other systems reviewed and are negative.      Physical Exam   BP: (!) 161/80  Pulse: 84  Temp: 97  F (36.1  C)  Resp: 16  Weight: 113.4 kg (250 lb)  SpO2: 97 %      Physical Exam  Constitutional:       General: He is not in acute distress.     Appearance: Normal appearance. He is well-developed. He is not ill-appearing, toxic-appearing or diaphoretic.   HENT:      Head: Normocephalic and atraumatic.      Nose: Nose normal.      Mouth/Throat:      Mouth:  Mucous membranes are moist.   Eyes:      Extraocular Movements: Extraocular movements intact.      Conjunctiva/sclera: Conjunctivae normal.      Pupils: Pupils are equal, round, and reactive to light.   Neck:      Musculoskeletal: Normal range of motion and neck supple.   Cardiovascular:      Rate and Rhythm: Normal rate and regular rhythm.      Pulses: Normal pulses.      Heart sounds: Normal heart sounds.   Pulmonary:      Effort: Pulmonary effort is normal.      Breath sounds: Normal breath sounds.   Abdominal:      General: There is no distension.      Palpations: Abdomen is soft.      Tenderness: There is no abdominal tenderness. There is no guarding or rebound.   Musculoskeletal:      Left knee: Normal. He exhibits normal range of motion and no swelling. No tenderness found.      Lumbar back: He exhibits decreased range of motion, tenderness and pain. He exhibits no bony tenderness, no swelling and no edema.      Left upper leg: Normal. He exhibits no bony tenderness and no swelling.      Comments: No midline back tenderness on exam.  There is diffuse left-sided lumbar paraspinal muscle tenderness to palpation.  No overlying skin changes.   Skin:     General: Skin is warm and dry.      Capillary Refill: Capillary refill takes less than 2 seconds.   Neurological:      General: No focal deficit present.      Mental Status: He is alert.      Sensory: Sensation is intact.      Motor: Motor function is intact.         ED Course        Procedures    No results found for this or any previous visit (from the past 24 hour(s)).    Medications - No data to display    Assessments & Plan (with Medical Decision Making)     Pt is a 71 year old male with hx back pain including osteomyelitis of the lumbar spine in 2015 as well as lumbar radiculopathy in the past who presents with complaints of left-sided low back pain for the past 4 days.  Patient has a history of back pain with last MRI in 2015.  He followed with neurosurgery  at that time and had surgery on his back in 2009.  Patient states his back pain at this time is localized to his left low back and radiates down into his left hip, left upper leg, and left knee.  His pain is worse with walking and any sort of movement.  He does recall having history of spinal stenosis as well.  He thinks his back pain was exacerbated this past weekend after he rode for a long time in a bumpy vehicle as well as lifting a heavy piece of steel repeatedly with his left arm causing him to walk on even.      Pt is afebrile on arrival.  No midline back tenderness on exam.  There is diffuse left-sided lumbar paraspinal muscle tenderness to palpation.  No evidence of cauda equina.  Denies saddle anesthesia, bowel or bladder incontinence, lower extremity numbness/tingling/weakness.  Suspect musculoskeletal cause of pt's back pain given history and physical exam.  Lower suspicion for occult fracture as there is no high energy mechanism of injury and therefore x-ray imaging not indicated.  No infectious symptoms.  No indication for emergent MRI imaging today as pt has no new trauma or neurologic symptoms or objective findings of weakness or signs of cauda equina on exam.  Encouraged symptomatic treatments at home.  Physical therapy referral was placed.  Recommend he follow-up with his spine surgeon as well.  Pt is requesting I place an outpatient order for an MRI as he is a farmer and needs to expedite work-up.  Will place order for this but stressed importance of following-up with PCP and/or spine surgeon to go over these results.  He expressed understanding of this and agreement with this plan.  Return precautions were reviewed.  Hand-outs were provided.    Patient was sent with Voltaren gel and Oxycodone and was instructed to follow-up with PCP in 3-5 days for continued care and management or sooner if new or worsening symptoms.  He is to return to the ED for persistent and/or worsening symptoms.  Patient  expressed understanding of the diagnosis and plan and was discharged home in good condition.    I have reviewed the nursing notes.    I have reviewed the findings, diagnosis, plan and need for follow up with the patient.    Discharge Medication List as of 4/22/2021 12:45 PM      START taking these medications    Details   diclofenac (VOLTAREN) 1 % topical gel Apply 2 g topically 4 times daily, Disp-50 g, R-0, E-Prescribe      oxyCODONE (ROXICODONE) 5 MG tablet Take 1 tablet (5 mg) by mouth every 6 hours as needed for pain, Disp-10 tablet, R-0, E-Prescribe             Final diagnoses:   Lumbar back pain with radiculopathy affecting left lower extremity       4/22/2021   [facility] EMERGENCY DEPT      Disclaimer:  This note consists of symbols derived from keyboarding, dictation and/or voice recognition software.  As a result, there may be errors in the script that have gone undetected.  Please consider this when interpreting information found in this chart.     Mattie Merchant PA-C  04/22/21 [id number]

## 2021-04-23 ENCOUNTER — RECORDS - HEALTHEAST (OUTPATIENT)
Dept: ADMINISTRATIVE | Facility: OTHER | Age: 71
End: 2021-04-23

## 2021-04-23 ENCOUNTER — HOSPITAL ENCOUNTER (EMERGENCY)
Facility: CLINIC | Age: 71
Discharge: HOME | End: 2021-04-23
Attending: FAMILY MEDICINE | Admitting: FAMILY MEDICINE
Payer: MEDICARE

## 2021-04-23 ENCOUNTER — COMMUNICATION - HEALTHEAST (OUTPATIENT)
Dept: FAMILY MEDICINE | Facility: CLINIC | Age: 71
End: 2021-04-23

## 2021-04-23 ENCOUNTER — AMBULATORY - HEALTHEAST (OUTPATIENT)
Dept: FAMILY MEDICINE | Facility: CLINIC | Age: 71
End: 2021-04-23

## 2021-04-23 VITALS
HEART RATE: 73 BPM | TEMPERATURE: 97.8 F | HEIGHT: 72 IN | OXYGEN SATURATION: 97 % | RESPIRATION RATE: 18 BRPM | BODY MASS INDEX: 35.21 KG/M2 | SYSTOLIC BLOOD PRESSURE: 136 MMHG | WEIGHT: 260 LBS | DIASTOLIC BLOOD PRESSURE: 72 MMHG

## 2021-04-23 DIAGNOSIS — M54.42 ACUTE LEFT-SIDED LOW BACK PAIN WITH LEFT-SIDED SCIATICA: ICD-10-CM

## 2021-04-23 LAB
ANION GAP SERPL CALCULATED.3IONS-SCNC: 7 MMOL/L (ref 3–14)
BASOPHILS # BLD AUTO: 0 10E9/L (ref 0–0.2)
BASOPHILS NFR BLD AUTO: 0.5 %
BUN SERPL-MCNC: 20 MG/DL (ref 7–30)
CALCIUM SERPL-MCNC: 8.6 MG/DL (ref 8.5–10.1)
CHLORIDE SERPL-SCNC: 106 MMOL/L (ref 94–109)
CO2 SERPL-SCNC: 25 MMOL/L (ref 20–32)
CREAT SERPL-MCNC: 0.86 MG/DL (ref 0.66–1.25)
CRP SERPL-MCNC: 3.6 MG/L (ref 0–8)
DIFFERENTIAL METHOD BLD: NORMAL
EOSINOPHIL # BLD AUTO: 0.3 10E9/L (ref 0–0.7)
EOSINOPHIL NFR BLD AUTO: 3.7 %
ERYTHROCYTE [DISTWIDTH] IN BLOOD BY AUTOMATED COUNT: 13.1 % (ref 10–15)
ERYTHROCYTE [SEDIMENTATION RATE] IN BLOOD BY WESTERGREN METHOD: 9 MM/H (ref 0–20)
GFR SERPL CREATININE-BSD FRML MDRD: 87 ML/MIN/{1.73_M2}
GLUCOSE SERPL-MCNC: 176 MG/DL (ref 70–99)
HCT VFR BLD AUTO: 44.8 % (ref 40–53)
HGB BLD-MCNC: 15.2 G/DL (ref 13.3–17.7)
IMM GRANULOCYTES # BLD: 0 10E9/L (ref 0–0.4)
IMM GRANULOCYTES NFR BLD: 0.5 %
LYMPHOCYTES # BLD AUTO: 1.7 10E9/L (ref 0.8–5.3)
LYMPHOCYTES NFR BLD AUTO: 20.9 %
MCH RBC QN AUTO: 29 PG (ref 26.5–33)
MCHC RBC AUTO-ENTMCNC: 33.9 G/DL (ref 31.5–36.5)
MCV RBC AUTO: 85 FL (ref 78–100)
MONOCYTES # BLD AUTO: 0.6 10E9/L (ref 0–1.3)
MONOCYTES NFR BLD AUTO: 7.6 %
NEUTROPHILS # BLD AUTO: 5.5 10E9/L (ref 1.6–8.3)
NEUTROPHILS NFR BLD AUTO: 66.8 %
NRBC # BLD AUTO: 0 10*3/UL
NRBC BLD AUTO-RTO: 0 /100
PLATELET # BLD AUTO: 222 10E9/L (ref 150–450)
PLATELET # BLD EST: NORMAL 10*3/UL
POTASSIUM SERPL-SCNC: 4.1 MMOL/L (ref 3.4–5.3)
RBC # BLD AUTO: 5.25 10E12/L (ref 4.4–5.9)
RBC MORPH BLD: NORMAL
SODIUM SERPL-SCNC: 138 MMOL/L (ref 133–144)
WBC # BLD AUTO: 8.2 10E9/L (ref 4–11)

## 2021-04-23 PROCEDURE — 86140 C-REACTIVE PROTEIN: CPT | Performed by: FAMILY MEDICINE

## 2021-04-23 PROCEDURE — 85025 COMPLETE CBC W/AUTO DIFF WBC: CPT | Performed by: FAMILY MEDICINE

## 2021-04-23 PROCEDURE — 258N000003 HC RX IP 258 OP 636: Performed by: FAMILY MEDICINE

## 2021-04-23 PROCEDURE — 99284 EMERGENCY DEPT VISIT MOD MDM: CPT | Mod: 25 | Performed by: FAMILY MEDICINE

## 2021-04-23 PROCEDURE — 99284 EMERGENCY DEPT VISIT MOD MDM: CPT | Performed by: FAMILY MEDICINE

## 2021-04-23 PROCEDURE — 96374 THER/PROPH/DIAG INJ IV PUSH: CPT | Performed by: FAMILY MEDICINE

## 2021-04-23 PROCEDURE — 96361 HYDRATE IV INFUSION ADD-ON: CPT | Performed by: FAMILY MEDICINE

## 2021-04-23 PROCEDURE — 80048 BASIC METABOLIC PNL TOTAL CA: CPT | Performed by: FAMILY MEDICINE

## 2021-04-23 PROCEDURE — 250N000011 HC RX IP 250 OP 636: Performed by: FAMILY MEDICINE

## 2021-04-23 PROCEDURE — 85652 RBC SED RATE AUTOMATED: CPT | Performed by: FAMILY MEDICINE

## 2021-04-23 RX ORDER — KETOROLAC TROMETHAMINE 15 MG/ML
15 INJECTION, SOLUTION INTRAMUSCULAR; INTRAVENOUS ONCE
Status: COMPLETED | OUTPATIENT
Start: 2021-04-23 | End: 2021-04-23

## 2021-04-23 RX ORDER — OXYCODONE HYDROCHLORIDE 5 MG/1
5-10 TABLET ORAL EVERY 6 HOURS PRN
Qty: 12 TABLET | Refills: 0 | Status: SHIPPED | OUTPATIENT
Start: 2021-04-23 | End: 2021-08-24

## 2021-04-23 RX ORDER — HYDROMORPHONE HYDROCHLORIDE 1 MG/ML
0.5 INJECTION, SOLUTION INTRAMUSCULAR; INTRAVENOUS; SUBCUTANEOUS
Status: DISCONTINUED | OUTPATIENT
Start: 2021-04-23 | End: 2021-04-23 | Stop reason: HOSPADM

## 2021-04-23 RX ADMIN — KETOROLAC TROMETHAMINE 15 MG: 15 INJECTION, SOLUTION INTRAMUSCULAR; INTRAVENOUS at 07:13

## 2021-04-23 RX ADMIN — SODIUM CHLORIDE 500 ML: 9 INJECTION, SOLUTION INTRAVENOUS at 07:12

## 2021-04-23 ASSESSMENT — MIFFLIN-ST. JEOR: SCORE: 1972.35

## 2021-04-23 NOTE — DISCHARGE INSTRUCTIONS
Return to the Emergency Room if the following occurs:     Worsened pain in the back, new abdominal pain, fever, difficulty with bowel or bladder, new weakness of your legs that doesn't go away, or for any concern at anytime.    Or, follow-up with the following provider as we discussed:     Return to your primary doctor as needed, or if not improved over the next 5-7 days.    Medications discussed:    Ibuprofen 600 mg every six hours for pain (7 days duration).  Tylenol 1000 mg every six hours for pain (7 days duration).  Therefore, you can alternate these every three hours and do it safely.  Oxycodone 1-2 tablets every six hours as needed for pain control that is not relieved by the above.  MiraLax OTC.  Titrate the medicine to achieve a soft, easy stool every 1-2 days.    If you received pain-relieving or sedating medication during your time in the ER, avoid alcohol, driving automobiles, or working with machinery.  Also, a responsible adult must stay with you.        Call the Nurse Advice Line at [phone] or [phone] for any concern at anytime.

## 2021-04-23 NOTE — ED TRIAGE NOTES
Pt presents by ambulance from home with left sided low back pain that radiates down left leg to the knee. Onset was Sunday afternoon. Denies injury or recent falls. Pt was seen at  yesterday here for same complaint. Prescribed oxycodone which he states is not helping. Was referred to have MRI done, but did not follow through with plan due to difficulties with scheduling and pt states he needs an open MRI due to claustrophobia. Pt has hx of back pain in the past that was related to infections leading to sepsis.

## 2021-04-23 NOTE — ED PROVIDER NOTES
HPI   The patient is a 71-year-old male presenting with recurrent/persistent low back pain.  He was seen in the urgent care yesterday, see that note for details.  He has a known history of lumbar epidural abscess with osteomyelitis in 2015.  He had low back surgery in 2008.  He denies surgery related to the abscess or osteomyelitis.  He received a prescription for oxycodone and has taken the medication as directed.  He last took a pill at about midnight.    The patient has had low back pain over the past 5 days.  He recalls having a very bumpy ride in a truck prior to the onset of pain.  He recalls lifting some steel with his left arm away from his body prior to the onset of pain.  He recalls the exact mechanism with pain onset being bending over to fold some T-shirts.  He began to have low back pain on the left since that time.  His pain is been constant but waxing and waning.  It is worsened with activity or position change.  He denies new weakness.  He denies new difficulty with bowel or bladder.  He denies no saddle anesthesia.  He denies recent fall.  He denies having a fever or weight loss.  He denies paresthesia.  He does have radiating symptoms into his left buttocks and down the left lower extremity.        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     Priority: Medium     Leukocytosis 09/29/2015     Priority: Medium     Sepsis (H) 09/29/2015     Priority: Medium      Past Medical History:    Past Medical History:   Diagnosis Date     Diabetes (H)      Hypertension      Past Surgical History:    Past Surgical History:   Procedure Laterality Date     ANESTHESIA OUT OF OR  MRI N/A 10/2/2015    Procedure: ANESTHESIA OUT OF OR MRI;  Surgeon: GENERIC ANESTHESIA PROVIDER;  Location: [facility] OR     BACK SURGERY  2008    lumbar spine surgery-- unclear details     SOFT TISSUE SURGERY  2012    skin cancer removed from L shoulder, chest, right neck     Family History:    Family History   Problem Relation Age of Onset     Coronary Artery Disease Father      Hypertension Father      Social History:  Marital Status:  Single [1]  Social History     Tobacco Use     Smoking status: Never Smoker     Smokeless tobacco: Never Used   Substance Use Topics     Alcohol use: No     Comment: Quit in 1990     Drug use: No      Medications:    oxyCODONE (ROXICODONE) 5 MG tablet  acetaminophen 650 MG TABS  amLODIPine (NORVASC) 10 MG tablet  celecoxib (CELEBREX) 200 MG capsule  cetirizine (ZYRTEC) 10 MG tablet  diclofenac (VOLTAREN) 1 % topical gel  fluticasone (FLONASE) 50 MCG/ACT nasal spray  hydroxychloroquine (PLAQUENIL) 200 MG tablet  lisinopril (PRINIVIL,ZESTRIL) 40 MG tablet  metFORMIN (GLUCOPHAGE-XR) 500 MG 24 hr tablet  metoprolol (LOPRESSOR) 100 MG tablet  multivitamin, therapeutic with minerals (THERA-VIT-M) TABS  tetrahydrozoline-Zn sulfate (VISINE-AC) 0.05-0.25 % ophthalmic solution      Review of Systems   All other systems reviewed and are negative.      PE   BP: 131/81  Pulse: 85  Temp: 97.8  F (36.6  C)  Resp: 18  Height: 182.9 cm (6')  Weight: 117.9 kg (260 lb)  SpO2: 95 %  Physical Exam  Vitals signs and nursing note reviewed.   Constitutional:       General: He is not in acute distress.     Comments: The patient has great difficulty moving from the stretcher to the bed but he does so on his own.  He is able to slide from 1 to the next and denies assistance.  He has difficulty because of pain in the left low back.   HENT:      Head: Atraumatic.      Right Ear: External ear normal.      Left Ear: External ear normal.      Nose: Nose normal.      Mouth/Throat:      Mouth: Mucous membranes are moist.       Pharynx: Oropharynx is clear.   Eyes:      General: No scleral icterus.     Extraocular Movements: Extraocular movements intact.      Conjunctiva/sclera: Conjunctivae normal.      Pupils: Pupils are equal, round, and reactive to light.   Neck:      Musculoskeletal: Normal range of motion.   Cardiovascular:      Rate and Rhythm: Normal rate.   Pulmonary:      Effort: Pulmonary effort is normal. No respiratory distress.   Abdominal:      General: Abdomen is flat.      Tenderness: There is no abdominal tenderness.   Musculoskeletal:      Comments: The patient has tenderness in his low back.  The tenderness involves his lumbar musculature.  He is also tender along the midline.  He has tenderness in the left buttock.  He has pain that radiates into the left buttock and down the leg with any movement.  Straight leg raise causes radiating symptoms.  Strength 5/5.  Sensation grossly intact.   Skin:     General: Skin is warm and dry.   Neurological:      Mental Status: He is alert and oriented to person, place, and time.   Psychiatric:         Behavior: Behavior normal.         ED COURSE and MDM   0708.  The patient has low back pain with radiculopathy since 5 days ago.  Known history of osteomyelitis and epidural abscess.  He does not feel sick today or within the past week.  He suspects the pain was exacerbated by his recent activities, as above.  I did offer to get an MRI here today to clarify his anatomy and look for evidence of infection.  However, the patient refuses this because he needs a stand-up MRI secondary to claustrophobia.  I have ordered blood work and medication for pain.  I will reach out to his primary physician to try and coordinate that he place an order for an MRI lumbar spine as an outpatient.  There is no neurological deficit here.  He does not have a fever.    1015.  The patient's primary physician has not called back unfortunately.  The patient is better and is able to sit up in bed and move  about without difficulty.  I will provide a prescription for oxycodone at home to complement when he received yesterday, he had 7 left.  His markers of inflammation are all normal so I have low concern for osteomyelitis or recurrent epidural abscess.  I am not going to transfer the patient for an MRI.  He should call his primary physician to discuss further options.  Return here for worsening as discussed.    LABS  Labs Ordered and Resulted from Time of ED Arrival Up to the Time of Departure from the ED   BASIC METABOLIC PANEL - Abnormal; Notable for the following components:       Result Value    Glucose 176 (*)     All other components within normal limits   CBC WITH PLATELETS DIFFERENTIAL   CRP INFLAMMATION   ERYTHROCYTE SEDIMENTATION RATE AUTO       IMAGING  Images reviewed by me.  Radiology report also reviewed.  No orders to display       Procedures    Medications   HYDROmorphone (PF) (DILAUDID) injection 0.5 mg (0.5 mg Intravenous Not Given 4/23/21 0718)   ketorolac (TORADOL) injection 15 mg (15 mg Intravenous Given 4/23/21 0713)   0.9% sodium chloride BOLUS (500 mLs Intravenous New Bag 4/23/21 0712)         IMPRESSION       ICD-10-CM    1. Acute left-sided low back pain with left-sided sciatica  M54.42             Medication List      Modified    oxyCODONE 5 MG tablet  Commonly known as: ROXICODONE  5-10 mg, Oral, EVERY 6 HOURS PRN, Do not exceed 6 tablets over the course of 24 hours.  What changed:     how much to take    additional instructions                          Arun Carlin MD  04/23/21 1016

## 2021-04-24 ENCOUNTER — TRANSFERRED RECORDS (OUTPATIENT)
Dept: HEALTH INFORMATION MANAGEMENT | Facility: CLINIC | Age: 71
End: 2021-04-24

## 2021-04-24 ENCOUNTER — RECORDS - HEALTHEAST (OUTPATIENT)
Dept: ADMINISTRATIVE | Facility: OTHER | Age: 71
End: 2021-04-24

## 2021-04-30 ENCOUNTER — OFFICE VISIT - HEALTHEAST (OUTPATIENT)
Dept: FAMILY MEDICINE | Facility: CLINIC | Age: 71
End: 2021-04-30

## 2021-04-30 ENCOUNTER — COMMUNICATION - HEALTHEAST (OUTPATIENT)
Dept: FAMILY MEDICINE | Facility: CLINIC | Age: 71
End: 2021-04-30

## 2021-04-30 DIAGNOSIS — N40.1 BPH WITH URINARY OBSTRUCTION: ICD-10-CM

## 2021-04-30 DIAGNOSIS — Z00.01 ENCOUNTER FOR GENERAL ADULT MEDICAL EXAMINATION WITH ABNORMAL FINDINGS: ICD-10-CM

## 2021-04-30 DIAGNOSIS — N13.8 BPH WITH URINARY OBSTRUCTION: ICD-10-CM

## 2021-04-30 DIAGNOSIS — I10 ESSENTIAL HYPERTENSION: ICD-10-CM

## 2021-04-30 DIAGNOSIS — E78.49 OTHER HYPERLIPIDEMIA: ICD-10-CM

## 2021-04-30 DIAGNOSIS — M54.42 ACUTE LEFT-SIDED LOW BACK PAIN WITH LEFT-SIDED SCIATICA: ICD-10-CM

## 2021-04-30 DIAGNOSIS — E11.9 TYPE 2 DIABETES MELLITUS WITHOUT COMPLICATION, WITHOUT LONG-TERM CURRENT USE OF INSULIN (H): ICD-10-CM

## 2021-04-30 DIAGNOSIS — M06.00 SERONEGATIVE RHEUMATOID ARTHRITIS (H): ICD-10-CM

## 2021-04-30 LAB
CHOLEST SERPL-MCNC: 120 MG/DL
FASTING STATUS PATIENT QL REPORTED: NO
HBA1C MFR BLD: 7.5 %
HDLC SERPL-MCNC: 31 MG/DL
LDLC SERPL CALC-MCNC: 48 MG/DL
TRIGL SERPL-MCNC: 203 MG/DL

## 2021-04-30 ASSESSMENT — MIFFLIN-ST. JEOR: SCORE: 1959.31

## 2021-05-27 VITALS
SYSTOLIC BLOOD PRESSURE: 133 MMHG | DIASTOLIC BLOOD PRESSURE: 78 MMHG | OXYGEN SATURATION: 95 % | RESPIRATION RATE: 18 BRPM | HEART RATE: 88 BPM

## 2021-05-27 ASSESSMENT — PATIENT HEALTH QUESTIONNAIRE - PHQ9: SUM OF ALL RESPONSES TO PHQ QUESTIONS 1-9: 1

## 2021-05-27 NOTE — PROGRESS NOTES
"   ASSESSMENT AND PLAN:  Thiago Hein 69 y.o. male is seen here on 03/28/19 for follow-up.  He has combination of seronegative inflammatory arthritis, degenerative joint disease.  He has been on Celebrex hydroxychloroquine combination for the past more than a decade.  Prior to that on methotrexate, at one point he was considered to have still's later on not thought to be the case.  He has been having \"buzzing\" in his left hand, worsening at this typically affects his radial 3 and part of the ring finger almost textbook picture of left carpal tunnel syndrome.  His Tinel was told was negative.  He has had remote injury 50 years ago and carpal area.  We discussed various options as we have done before in December.  He would like to after reviewing various aspects of each of the options, side effects potentially of an injection done under ultrasound, wants to proceed with local injection which is done with 20 mg of methylprednisolone without Marcaine.  Should this not provide him durable relief then he would have an EMG and orthopedic referral.  For his underlying inflammatory joint disease he is to return for follow-up here in 3 months.    Diagnoses and all orders for this visit:    Left carpal tunnel syndrome  -     methylPREDNISolone acetate injection 20 mg (DEPO-MEDROL)    Adult Still's Disease    Polyarthritis      HISTORY OF PRESENTING ILLNESS:  Thiago Hein, 69 y.o., male is here for for follow-up of polyarthritis, at one point elsewhere consideration for still's disease, osteoarthritis.  He is on hydroxychloroquine and Celebrex and finding it helpful.  He has noted discomfort buzzing sensation in his left hand.  This is troubled over the past several months.  This can interfere with day-to-day activities.  He noted this to be moderately severe in intensity.  The right hand is not affected.  There is no involvement of the neck of the proximal left upper extremities.  The rest of his joints are doing " "great where he noted \"none\" in terms of pain.  He is able to do most of his day-to-day activities without difficulty morning stiffness no more than 30 minutes.He has had eye examination.  No retinal toxicity noted.  His recent labs are negative for autoimmunity.  His morning stiffness is no more than 30 minutes.  He has not had fever weight loss blurry vision eye redness moccasin nausea cough or rash.        As noted during his first visit here he reports that it was approximately 15 years or so ago when he first started hurting in his joints.  He remembers it was in his hands, fingers.  He also had pain in the knees.  He was started on methotrexate.  He was on it for 18-24 months.  He wonders if the reason for discontinuation of methotrexate was something to do with his skin and sun sensitivity.  He was then changed to a combination of hydroxychloroquine and Celebrex.  He has been taking that regularly over the past 12-13 years.  Only recently instead of taking 200 twice daily of each she has been taking one daily of each.  He ran out of these a few days ago and is beginning to notice some discomfort in his hands and knees.  His previous rheumatologisty recently gave him sulfasalazine.  He wonders if the plan was for him to continue hydroxychloroquine and sulfasalazine and discontinue Celebrex.  Currently he noted pain as being mild.  He reports that he is able to do most of his day-to-day activities without difficulty.  He noted morning stiffness of 30 minutes.  He has no personal family history of psoriasis, rheumatoid arthritis in the family or lupus.  He reports history of sun sensitivity.  He does not smoke does not take alcohol no regular exercise apart from being on his feet most of the time.  He had back surgery this is 10 years ago her lumbar spine region.  He considers himself otherwise in good health.    Further historical information and ADL limitations as noted in the multidimensional health assessment " questionnaire attached in the EMR.  ALLERGIES:Patient has no known allergies.    PAST MEDICAL/ACTIVE PROBLEMS/MEDICATION/ FAMILY HISTORY/SOCIAL DATA:  The patient has a family history of  No past medical history on file.  Social History     Tobacco Use   Smoking Status Never Smoker   Smokeless Tobacco Former User     Patient Active Problem List   Diagnosis     Type 2 diabetes mellitus (H)     Obesity     Hypertension     Lower Back Pain     Emotional Lability     Adult Still's Disease     Epidural abscess     Sepsis (H)     Arm DVT (deep venous thromboembolism), acute, left (H)     Hyperlipidemia     Diverticulosis     Peripheral edema     Non morbid obesity due to excess calories     Essential hypertension with goal blood pressure less than 130/80     Unintentional weight loss     Polyarthralgia     Polyarthritis     Trigger finger, right index finger     Left carpal tunnel syndrome     Current Outpatient Medications   Medication Sig Dispense Refill     amLODIPine (NORVASC) 5 MG tablet Take 1 tablet (5 mg total) by mouth daily. 90 tablet 3     atorvastatin (LIPITOR) 20 MG tablet TAKE 1 TABLET(20 MG) BY MOUTH AT BEDTIME 90 tablet 2     celecoxib (CELEBREX) 100 MG capsule TAKE 1 CAPSULE(100 MG) BY MOUTH TWICE DAILY 180 capsule 0     furosemide (LASIX) 40 MG tablet TAKE 1 TABLET(40 MG) BY MOUTH DAILY 90 tablet 0     hydroxychloroquine (PLAQUENIL) 200 mg tablet TAKE 1 TABLET(200 MG) BY MOUTH TWICE DAILY 180 tablet 1     lisinopril (PRINIVIL,ZESTRIL) 10 MG tablet Take 1 tablet (10 mg total) by mouth daily. 90 tablet 3     metFORMIN (GLUCOPHAGE-XR) 500 MG 24 hr tablet Take 3 tablets (1,500 mg total) by mouth daily with breakfast. 270 tablet 1     nystatin (MYCOSTATIN) cream apply topically to affected areas 3-4 times per day as needed 60 g 3     tamsulosin (FLOMAX) 0.4 mg cap TAKE 1 CAPSULE(0.4 MG) BY MOUTH DAILY AFTER BREAKFAST 90 capsule 3     No current facility-administered medications for this visit.        DETAILED  EXAMINATION  03/28/19  :  Vitals:    03/28/19 1029   BP: 112/70   Patient Site: Right Arm   Patient Position: Sitting   Cuff Size: Adult Large   Pulse: 84   Weight: (!) 251 lb (113.9 kg)     Alert oriented. Head including the face is examined for malar rash, heliotropes, scarring, lupus pernio. Eyes examined for redness such as in episcleritis/scleritis, periorbital lesions.   Neck/ Face examined for parotid gland swelling, range of motion of neck.  Left upper and lower and right upper and lower extremities examined for tenderness, swelling, warmth of the appendicular joints, range of motion, edema, rash.  Some of the important findings included: He has a scar on his left carpal area remanent of a remote injury.  He does not have a positive Tinel's or Phalen's.  He does not have synovitis of any of the palpable joints of upper extremities subtle tenderness in the left wrist.    LAB / IMAGING DATA:  ALT   Date Value Ref Range Status   11/08/2018 19 0 - 45 U/L Final   07/20/2018 13 0 - 45 U/L Final   06/01/2018 19 0 - 45 U/L Final     Albumin   Date Value Ref Range Status   11/08/2018 4.6 3.5 - 5.0 g/dL Final   07/20/2018 4.0 3.5 - 5.0 g/dL Final   06/01/2018 4.4 3.5 - 5.0 g/dL Final     Creatinine   Date Value Ref Range Status   11/08/2018 0.92 0.70 - 1.30 mg/dL Final   07/20/2018 0.97 0.70 - 1.30 mg/dL Final   07/05/2018 0.93 0.70 - 1.30 mg/dL Final       WBC   Date Value Ref Range Status   11/08/2018 8.4 4.0 - 11.0 thou/uL Final   07/20/2018 7.2 4.0 - 11.0 thou/uL Final     Hemoglobin   Date Value Ref Range Status   11/08/2018 16.2 14.0 - 18.0 g/dL Final   07/20/2018 13.5 (L) 14.0 - 18.0 g/dL Final   06/01/2018 16.0 14.0 - 18.0 g/dL Final     Platelets   Date Value Ref Range Status   11/08/2018 230 140 - 440 thou/uL Final   07/20/2018 231 140 - 440 thou/uL Final   06/01/2018 209 140 - 440 thou/uL Final       Lab Results   Component Value Date    RF <15.0 07/20/2018    SEDRATE 7 07/20/2018

## 2021-05-27 NOTE — TELEPHONE ENCOUNTER
Refill Approved    Rx renewed per Medication Renewal Policy. Medication was last renewed on 1/3/19.    Laura Zaldivar, [facility] Triage/Med Refill 4/10/2019     Requested Prescriptions   Pending Prescriptions Disp Refills     furosemide (LASIX) 40 MG tablet [Pharmacy Med Name: FUROSEMIDE 40MG TABLETS] 90 tablet 0     Sig: TAKE 1 TABLET(40 MG) BY MOUTH DAILY       Diuretics/Combination Diuretics Refill Protocol  Passed - 4/9/2019 11:23 AM        Passed - Visit with PCP or prescribing provider visit in past 12 months     Last office visit with prescriber/PCP: 7/5/2018 Don Armijo MD OR same dept: 7/5/2018 Don Armijo MD OR same specialty: 7/5/2018 Don Armijo MD  Last physical: 11/8/2018 Last MTM visit: Visit date not found   Next visit within 3 mo: Visit date not found  Next physical within 3 mo: Visit date not found  Prescriber OR PCP: Don Armijo MD  Last diagnosis associated with med order: 1. Peripheral edema  - furosemide (LASIX) 40 MG tablet [Pharmacy Med Name: FUROSEMIDE 40MG TABLETS]; TAKE 1 TABLET(40 MG) BY MOUTH DAILY  Dispense: 90 tablet; Refill: 0    If protocol passes may refill for 12 months if within 3 months of last provider visit (or a total of 15 months).             Passed - Serum Potassium in past 12 months      Lab Results   Component Value Date    Potassium 4.4 11/08/2018             Passed - Serum Sodium in past 12 months      Lab Results   Component Value Date    Sodium 142 11/08/2018             Passed - Blood pressure on file in past 12 months     BP Readings from Last 1 Encounters:   03/28/19 112/70             Passed - Serum Creatinine in past 12 months      Creatinine   Date Value Ref Range Status   11/08/2018 0.92 0.70 - 1.30 mg/dL Final

## 2021-05-28 ASSESSMENT — ANXIETY QUESTIONNAIRES: GAD7 TOTAL SCORE: 0

## 2021-05-29 ENCOUNTER — RECORDS - HEALTHEAST (OUTPATIENT)
Dept: ADMINISTRATIVE | Facility: CLINIC | Age: 71
End: 2021-05-29

## 2021-05-30 VITALS — HEIGHT: 72 IN | BODY MASS INDEX: 36.57 KG/M2 | WEIGHT: 270 LBS

## 2021-05-30 VITALS — BODY MASS INDEX: 35.94 KG/M2 | WEIGHT: 265 LBS

## 2021-05-30 NOTE — PATIENT INSTRUCTIONS - HE
Thank you for choosing the [facility] for your EMG testing.    The ordering provider will receive your final EMG results within the next few days.  Please follow up with your provider for the results and further treatment recommendations.

## 2021-05-30 NOTE — PROGRESS NOTES
"   ASSESSMENT AND PLAN:  Thiago Hein 69 y.o. male is seen here on 07/01/19 for follow-up of seronegative rheumatoid arthritis -elsewhere at one time thought to have Still's was on methotrexate 2, degenerative joint disease, numbness and tingling of his left hand partial responsive to corticosteroid injection, reminded of eye examination for hydroxychloroquine which is working well for his RA.  We will proceed with a EMG of the upper extremities.  Orthopedic referral or a repeat corticosteroid injection will be 1 of the options.  We will meet here in 3 months.      Diagnoses and all orders for this visit:    Seronegative rheumatoid arthritis (H)  -     hydroxychloroquine (PLAQUENIL) 200 mg tablet; TAKE 1 TABLET(200 MG) BY MOUTH TWICE DAILY  Dispense: 180 tablet; Refill: 1  -     ALT (SGPT)  -     Albumin  -     Creatinine  -     HM2(CBC w/o Differential)    Left carpal tunnel syndrome  -     EMG; Future; Expected date: 07/01/2019    Numbness and tingling in left hand  -     EMG; Future; Expected date: 07/01/2019      HISTORY OF PRESENTING ILLNESS:  Thiago Hein, 69 y.o., male is here for for follow-up of polyarthritis, at one point elsewhere consideration for still's disease, osteoarthritis.  He is on hydroxychloroquine and Celebrex and finding it helpful.  He has not had a flareup of his joint pains.  He noted that the \"buzzing\" feeling in his hand especially the thumb and index finger continues.  He is having difficult time using the hand such as counting money holding onto things, dropping things, he is not woken up from sleep.  He is not sure how much benefit he may have gotten from the corticosteroid injection done here 3 months ago.  He rated overall symptom severity at 1.5/10.  He is due for eye examination.      He has noted discomfort buzzing sensation in his left hand.  This is troubled over the past several months.  This can interfere with day-to-day activities.  He noted this to be moderately " "severe in intensity.  The right hand is not affected.  There is no involvement of the neck of the proximal left upper extremities.  The rest of his joints are doing great where he noted \"none\" in terms of pain.  He is able to do most of his day-to-day activities without difficulty morning stiffness no more than 30 minutes.He has had eye examination.  No retinal toxicity noted.  His recent labs are negative for autoimmunity.  His morning stiffness is no more than 30 minutes.  He has not had fever weight loss blurry vision eye redness moccasin nausea cough or rash.        As noted during his first visit here he reports that it was approximately 15 years or so ago when he first started hurting in his joints.  He remembers it was in his hands, fingers.  He also had pain in the knees.  He was started on methotrexate.  He was on it for 18-24 months.  He wonders if the reason for discontinuation of methotrexate was something to do with his skin and sun sensitivity.  He was then changed to a combination of hydroxychloroquine and Celebrex.  He has been taking that regularly over the past 12-13 years.  Only recently instead of taking 200 twice daily of each she has been taking one daily of each.  He ran out of these a few days ago and is beginning to notice some discomfort in his hands and knees.  His previous rheumatologisty recently gave him sulfasalazine.  He wonders if the plan was for him to continue hydroxychloroquine and sulfasalazine and discontinue Celebrex.  Currently he noted pain as being mild.  He reports that he is able to do most of his day-to-day activities without difficulty.  He noted morning stiffness of 30 minutes.  He has no personal family history of psoriasis, rheumatoid arthritis in the family or lupus.  He reports history of sun sensitivity.  He does not smoke does not take alcohol no regular exercise apart from being on his feet most of the time.  He had back surgery this is 10 years ago her lumbar " spine region.  He considers himself otherwise in good health.    Further historical information and ADL limitations as noted in the multidimensional health assessment questionnaire attached in the EMR.  ALLERGIES:Patient has no known allergies.    PAST MEDICAL/ACTIVE PROBLEMS/MEDICATION/ FAMILY HISTORY/SOCIAL DATA:  The patient has a family history of  No past medical history on file.  Social History     Tobacco Use   Smoking Status Never Smoker   Smokeless Tobacco Former User     Patient Active Problem List   Diagnosis     Type 2 diabetes mellitus (H)     Obesity     Hypertension     Lower Back Pain     Emotional Lability     Adult Still's Disease     Epidural abscess     Sepsis (H)     Arm DVT (deep venous thromboembolism), acute, left (H)     Hyperlipidemia     Diverticulosis     Peripheral edema     Non morbid obesity due to excess calories     Essential hypertension with goal blood pressure less than 130/80     Unintentional weight loss     Polyarthralgia     Polyarthritis     Trigger finger, right index finger     Left carpal tunnel syndrome     Current Outpatient Medications   Medication Sig Dispense Refill     amLODIPine (NORVASC) 5 MG tablet Take 1 tablet (5 mg total) by mouth daily. 90 tablet 3     atorvastatin (LIPITOR) 20 MG tablet TAKE 1 TABLET(20 MG) BY MOUTH AT BEDTIME 90 tablet 2     celecoxib (CELEBREX) 100 MG capsule TAKE 1 CAPSULE(100 MG) BY MOUTH TWICE DAILY 180 capsule 0     ciprofloxacin-dexamethasone (CIPRODEX) otic suspension Administer 4 drops to the right ear 2 (two) times a day for 7 days. 7.5 mL 0     furosemide (LASIX) 40 MG tablet TAKE 1 TABLET(40 MG) BY MOUTH DAILY 90 tablet 1     hydroxychloroquine (PLAQUENIL) 200 mg tablet TAKE 1 TABLET(200 MG) BY MOUTH TWICE DAILY 180 tablet 1     lisinopril (PRINIVIL,ZESTRIL) 10 MG tablet Take 1 tablet (10 mg total) by mouth daily. 90 tablet 3     metFORMIN (GLUCOPHAGE-XR) 500 MG 24 hr tablet Take 3 tablets (1,500 mg total) by mouth daily with  "breakfast. 270 tablet 1     nystatin (MYCOSTATIN) cream apply topically to affected areas 3-4 times per day as needed 60 g 3     tamsulosin (FLOMAX) 0.4 mg cap TAKE 1 CAPSULE(0.4 MG) BY MOUTH DAILY AFTER BREAKFAST 90 capsule 3     No current facility-administered medications for this visit.        DETAILED EXAMINATION  07/01/19  :  Vitals:    07/01/19 1201   BP: 110/78   Pulse: (!) 58   Weight: (!) 268 lb (121.6 kg)   Height: 5' 11.25\" (1.81 m)     Alert oriented. Head including the face is examined for malar rash, heliotropes, scarring, lupus pernio. Eyes examined for redness such as in episcleritis/scleritis, periorbital lesions.   Neck/ Face examined for parotid gland swelling, range of motion of neck.  Left upper and lower and right upper and lower extremities examined for tenderness, swelling, warmth of the appendicular joints, range of motion, edema, rash.  Some of the important findings included:he does not have evidence of synovitis in any of the palpable joints of the upper extremities.  No significant deformities of the digits.  No JLT effusion or warmth of the knees. He has a scar on his left carpal area remanent of a remote injury.  He does not have a positive Tinel's or Phalen's.      LAB / IMAGING DATA:  ALT   Date Value Ref Range Status   11/08/2018 19 0 - 45 U/L Final   07/20/2018 13 0 - 45 U/L Final   06/01/2018 19 0 - 45 U/L Final     Albumin   Date Value Ref Range Status   11/08/2018 4.6 3.5 - 5.0 g/dL Final   07/20/2018 4.0 3.5 - 5.0 g/dL Final   06/01/2018 4.4 3.5 - 5.0 g/dL Final     Creatinine   Date Value Ref Range Status   11/08/2018 0.92 0.70 - 1.30 mg/dL Final   07/20/2018 0.97 0.70 - 1.30 mg/dL Final   07/05/2018 0.93 0.70 - 1.30 mg/dL Final       WBC   Date Value Ref Range Status   11/08/2018 8.4 4.0 - 11.0 thou/uL Final   07/20/2018 7.2 4.0 - 11.0 thou/uL Final     Hemoglobin   Date Value Ref Range Status   11/08/2018 16.2 14.0 - 18.0 g/dL Final   07/20/2018 13.5 (L) 14.0 - 18.0 g/dL " Final   06/01/2018 16.0 14.0 - 18.0 g/dL Final     Platelets   Date Value Ref Range Status   11/08/2018 230 140 - 440 thou/uL Final   07/20/2018 231 140 - 440 thou/uL Final   06/01/2018 209 140 - 440 thou/uL Final       Lab Results   Component Value Date    RF <15.0 07/20/2018    SEDRATE 7 07/20/2018

## 2021-05-30 NOTE — PROGRESS NOTES
Patient presents at the request of Dr Bundy for a left upper extremity EMG.  He complains of left hand numbness and tingling digits 1-3.  No symptoms in the right arm.  He is right-hand.  Does have a history of left palm and wrist injury 40 years ago requiring stitches.    EMG/NCS  results: Please see scanned full report.    Comment NCS: Abnormal study:    1.  Absent left median SNAP and transcarpal study.  2.  Prolonged left median CMAP latency, decreased amplitude, and slowed conduction velocity.  3.  Low amplitude left upper extremity radial and ulnar SNAPs, and ulnar transcarpal study.  4.  normal left ulnar CMAP.      Comment EMG: Normal study.  1.  Normal needle EMG left upper extremity.    Interpretation: Abnormal study: There is electrodiagnostic evidence of:    1.  Left median neuropathy at the wrist consistent with entrapment in the carpal tunnel, severe.    2.  Diffuse low Left upper extremity sensory amplitudes and ulnar transcarpal amplitude.  This may be normal for the patient, but I cannot exclude a sensory or sensorimotor polyneuropathy which would correlate with his diagnosis of diabetes mellitus.     3. There is no electrodiagnostic evidence of cervical radiculopathy, brachial plexopathy, or ulnar neuropathy in the left upper extremity.    The testing was completed in its entirety by the physician.      It was our pleasure caring for your patient today, if there any questions or concerns please do not hesitate to contact us.

## 2021-05-30 NOTE — PROGRESS NOTES
"Assessment/Plan:    1. Infective otitis externa, right  Evidence for otitis externa.  Ciprodex 4 drops right ear twice daily x 7 days.  Notify of further concerns.  - ciprofloxacin-dexamethasone (CIPRODEX) otic suspension; Administer 4 drops to the right ear 2 (two) times a day for 7 days.  Dispense: 7.5 mL; Refill: 0    2. Type 2 diabetes mellitus with stage 3 chronic kidney disease, with long-term current use of insulin (H)  Type 2 diabetes suboptimal control with A1c increasing from 7.0 up to 7.9%.  Will increase metformin extended release from 1000 mg up to 1500 mg with dietary and exercise modifications reviewed before follow-up office visit in 4 months.  - Glycosylated Hemoglobin A1c    3. Essential hypertension with goal blood pressure less than 130/80  Hypertension stable on lisinopril 10 mg daily and amlodipine 5 mg daily.    4. Other hyperlipidemia  Continues atorvastatin 20 mg daily.  Will check fasting lipid cascade at follow-up in 4 months.    5. Conductive hearing loss, unilateral  Cerumen impaction with ear lavage performed.  Tolerated adequately.          Subjective:    Thiago Hein is seen today for right ear pain.  Decreased hearing.  Has had issues with cerumen impaction historically.  Type 2 diabetes.  Only using metformin extended release 1000 mg daily instead of 1500 mg daily.  Prior A1c had decreased from 7.3% down to 7.0% November 8, 2018.  Missed recent diabetic visit.  Atorvastatin 20 mg daily for lipid management.  Lisinopril 10 mg daily for hypertension along with amlodipine 5 mg daily.  Prior microalbumin screen October 2017 normal.  Will provide urine specimen at follow-up office visit.  Comprehensive review of systems as above otherwise all negative.  Denies fevers or chills.  No nausea.    Single  1 son - 32 \"Kraig\"  Tobacco: none  EtOH: none  Mom - Meena   Dad - currently 86 Yovanny - DM, CAD, HTN, high cholesterol, back problems, arthritis, spinal stenosis, " "pacemaker/defibrillator  1 bro - Be - HTN  Surgeries: \"lumbar back surgery for cyst removal\" - September, 2008 (Dr. 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     No family history on file.     No past medical history on file.     Social History     Tobacco Use     Smoking status: Never Smoker     Smokeless tobacco: Former User   Substance Use Topics     Alcohol use: No     Drug use: No        Current Outpatient Medications   Medication Sig Dispense Refill     amLODIPine (NORVASC) 5 MG tablet Take 1 tablet (5 mg total) by mouth daily. 90 tablet 3     atorvastatin (LIPITOR) 20 MG tablet TAKE 1 TABLET(20 MG) BY MOUTH AT BEDTIME 90 tablet 2     celecoxib (CELEBREX) 100 MG capsule TAKE 1 CAPSULE(100 MG) BY MOUTH TWICE DAILY 180 capsule 0     furosemide (LASIX) 40 MG tablet TAKE 1 TABLET(40 MG) BY MOUTH DAILY 90 tablet 1     hydroxychloroquine (PLAQUENIL) 200 mg tablet TAKE 1 TABLET(200 MG) BY MOUTH TWICE DAILY 180 tablet 1     lisinopril (PRINIVIL,ZESTRIL) 10 MG tablet Take 1 tablet (10 mg total) by mouth daily. 90 tablet 3     metFORMIN (GLUCOPHAGE-XR) 500 MG 24 hr tablet Take 3 tablets (1,500 mg total) by mouth daily with breakfast. 270 tablet 1     nystatin (MYCOSTATIN) cream apply topically to affected areas 3-4 times per day as needed 60 g 3     tamsulosin (FLOMAX) 0.4 mg cap TAKE 1 CAPSULE(0.4 MG) BY MOUTH DAILY AFTER BREAKFAST 90 capsule 3     ciprofloxacin-dexamethasone (CIPRODEX) otic suspension Administer 4 drops to the right ear 2 (two) times a day for 7 days. 7.5 mL 0     No current facility-administered medications for this visit.           Objective:    Vitals:    06/26/19 1332   BP: 120/80   Pulse: 91   SpO2: 97%   Weight: (!) 268 lb (121.6 kg)      Body mass index is 37.12 kg/m .    Alert.  No apparent distress.  Right external " auditory canal with concerns with seborrheic dermatitis with secondary otitis externa likely.  Cerumen impaction was removed cautiously with a ear lavage and tolerated adequately.  Tympanic membrane appears to be not involved.  Nasopharynx and oropharynx normal.  Chest clear.  Extremities warm and dry.      This note has been dictated using voice recognition software and as a result may contain minor grammatical errors and unintended word substitutions.

## 2021-05-30 NOTE — TELEPHONE ENCOUNTER
Triage call:   Patient is calling with concerns about right ear fullness that has been going on for about one month. Patient reports that he is aware of his ear and states that it is uncomfortable but doesn't necessarily think its pain. Patient reports that his hearing is decreased due to this congestion and has been using peroxide in his ear for the last month to clean it. If patient puts pressure on his ear he reports some relief but fullness returns in a short period of time. Patient denies additional symptoms.     Triaged to be seen with in 3 days, reviewed additional care advice and he verbalizes understanding. Patient warm transferred to scheduling for appointment. Appointment scheduled for tomorrow @ 1:20 pm with PCP.     Samantha Liu RN BA [facility] Triage/Med Refill 6/25/2019 4:53 PM    Reason for Disposition    [1] Decreased hearing in 1 ear AND [2] gradual onset    Protocols used: HEARING LOSS OR CHANGE-A-AH

## 2021-05-31 VITALS — WEIGHT: 272 LBS | BODY MASS INDEX: 36.89 KG/M2

## 2021-05-31 VITALS — BODY MASS INDEX: 35.67 KG/M2 | WEIGHT: 263 LBS

## 2021-05-31 NOTE — TELEPHONE ENCOUNTER
Refill Approved    Rx renewed per Medication Renewal Policy. Medication was last renewed on 12/17/18.    Olga Young, [facility] Triage/Med Refill 8/10/2019     Requested Prescriptions   Pending Prescriptions Disp Refills     atorvastatin (LIPITOR) 20 MG tablet [Pharmacy Med Name: ATORVASTATIN 20MG TABLETS] 90 tablet 0     Sig: TAKE 1 TABLET(20 MG) BY MOUTH AT BEDTIME       Statins Refill Protocol (Hmg CoA Reductase Inhibitors) Passed - 8/9/2019 10:15 AM        Passed - PCP or prescribing provider visit in past 12 months      Last office visit with prescriber/PCP: 6/26/2019 Don Armijo MD OR same dept: 6/26/2019 Don Armijo MD OR same specialty: 6/26/2019 Don Armijo MD  Last physical: 11/8/2018 Last MTM visit: Visit date not found   Next visit within 3 mo: Visit date not found  Next physical within 3 mo: Visit date not found  Prescriber OR PCP: Don Armijo MD  Last diagnosis associated with med order: 1. Hyperlipidemia  - atorvastatin (LIPITOR) 20 MG tablet [Pharmacy Med Name: ATORVASTATIN 20MG TABLETS]; TAKE 1 TABLET(20 MG) BY MOUTH AT BEDTIME  Dispense: 90 tablet; Refill: 0    If protocol passes may refill for 12 months if within 3 months of last provider visit (or a total of 15 months).

## 2021-06-01 VITALS — BODY MASS INDEX: 34.57 KG/M2 | HEIGHT: 72 IN | WEIGHT: 255.2 LBS

## 2021-06-01 VITALS — BODY MASS INDEX: 34.86 KG/M2 | WEIGHT: 257 LBS

## 2021-06-01 VITALS — BODY MASS INDEX: 34.45 KG/M2 | WEIGHT: 254 LBS

## 2021-06-01 VITALS — BODY MASS INDEX: 35.26 KG/M2 | WEIGHT: 260 LBS

## 2021-06-01 VITALS — BODY MASS INDEX: 37.7 KG/M2 | WEIGHT: 278 LBS

## 2021-06-01 NOTE — TELEPHONE ENCOUNTER
Per Dr Bundy- could repeat CTS injection but if it did not help the first time it may not help a second time, other option is sending pt to orthopedics. Pt notified and would like referral to Ortho. Referral entered in chart per Dr Bundy.

## 2021-06-01 NOTE — TELEPHONE ENCOUNTER
Pt notified of lab results and EMG results- left CTS per Dr Bundy. Pt states cortisone injection in Lt CTS did not help at all that he had done March 2019, pt states his hand is numb and has trouble picking things up and grasping things, wonders what the next step is.

## 2021-06-01 NOTE — TELEPHONE ENCOUNTER
Patient is wondering about recent testing he had done, he says Dr Bundy ordered testing and he never received results or a call about it and he's just wondering what ever came of that. Could someone call him about this?

## 2021-06-02 VITALS — WEIGHT: 251 LBS | BODY MASS INDEX: 35.14 KG/M2 | HEIGHT: 71 IN

## 2021-06-02 VITALS — BODY MASS INDEX: 34.76 KG/M2 | WEIGHT: 251 LBS

## 2021-06-02 NOTE — TELEPHONE ENCOUNTER
Refill Approved    Rx renewed per Medication Renewal Policy. Medication was last renewed on 10/8/18.11/8/18    Laura Zaldivar, [facility] Triage/Med Refill 10/15/2019     Requested Prescriptions   Pending Prescriptions Disp Refills     amLODIPine (NORVASC) 5 MG tablet [Pharmacy Med Name: AMLODIPINE BESYLATE 5MG TABLETS] 90 tablet 0     Sig: TAKE 1 TABLET(5 MG) BY MOUTH DAILY       Calcium-Channel Blockers Protocol Passed - 10/14/2019 10:17 AM        Passed - PCP or prescribing provider visit in past 12 months or next 3 months     Last office visit with prescriber/PCP: 6/26/2019 Don Armijo MD OR same dept: 6/26/2019 Don Armijo MD OR same specialty: 6/26/2019 Don Armijo MD  Last physical: 11/8/2018 Last MTM visit: Visit date not found   Next visit within 3 mo: Visit date not found  Next physical within 3 mo: Visit date not found  Prescriber OR PCP: Don Armijo MD  Last diagnosis associated with med order: 1. HTN (hypertension)  - amLODIPine (NORVASC) 5 MG tablet [Pharmacy Med Name: AMLODIPINE BESYLATE 5MG TABLETS]; TAKE 1 TABLET(5 MG) BY MOUTH DAILY  Dispense: 90 tablet; Refill: 0    2. Essential hypertension with goal blood pressure less than 130/80  - lisinopril (PRINIVIL,ZESTRIL) 10 MG tablet [Pharmacy Med Name: LISINOPRIL 10MG TABLETS]; TAKE 1 TABLET(10 MG) BY MOUTH DAILY  Dispense: 90 tablet; Refill: 0    If protocol passes may refill for 12 months if within 3 months of last provider visit (or a total of 15 months).             Passed - Blood pressure filed in past 12 months     BP Readings from Last 1 Encounters:   10/08/19 134/80             lisinopril (PRINIVIL,ZESTRIL) 10 MG tablet [Pharmacy Med Name: LISINOPRIL 10MG TABLETS] 90 tablet 0     Sig: TAKE 1 TABLET(10 MG) BY MOUTH DAILY       Ace Inhibitors Refill Protocol Passed - 10/14/2019 10:17 AM        Passed - PCP or prescribing provider visit in past 12 months       Last office visit with prescriber/PCP: 6/26/2019 Aleksander  Don STEELE MD OR same dept: 6/26/2019 Don Armijo MD OR same specialty: 6/26/2019 Don Armijo MD  Last physical: 11/8/2018 Last MTM visit: Visit date not found   Next visit within 3 mo: Visit date not found  Next physical within 3 mo: Visit date not found  Prescriber OR PCP: Don Armijo MD  Last diagnosis associated with med order: 1. HTN (hypertension)  - amLODIPine (NORVASC) 5 MG tablet [Pharmacy Med Name: AMLODIPINE BESYLATE 5MG TABLETS]; TAKE 1 TABLET(5 MG) BY MOUTH DAILY  Dispense: 90 tablet; Refill: 0    2. Essential hypertension with goal blood pressure less than 130/80  - lisinopril (PRINIVIL,ZESTRIL) 10 MG tablet [Pharmacy Med Name: LISINOPRIL 10MG TABLETS]; TAKE 1 TABLET(10 MG) BY MOUTH DAILY  Dispense: 90 tablet; Refill: 0    If protocol passes may refill for 12 months if within 3 months of last provider visit (or a total of 15 months).             Passed - Serum Potassium in past 12 months     Lab Results   Component Value Date    Potassium 4.4 11/08/2018             Passed - Blood pressure filed in past 12 months     BP Readings from Last 1 Encounters:   10/08/19 134/80             Passed - Serum Creatinine in past 12 months     Creatinine   Date Value Ref Range Status   07/01/2019 1.02 0.70 - 1.30 mg/dL Final

## 2021-06-02 NOTE — TELEPHONE ENCOUNTER
RN cannot approve Refill Request    RN can NOT refill Metformin because the Protocol failed and NO refill given. Last office visit: 6/26/2019 Don Armijo MD Last Physical: 11/8/2018 Last MTM visit: Visit date not found Last visit same specialty: 6/26/2019 Don Armijo MD.  Next visit within 3 mo: Visit date not found  Next physical within 3 mo: Visit date not found    Flomax and lasix Rx renewed per Medication Renewal Policy.    Gem Bowers, [facility] Triage/Med Refill 10/10/2019    Requested Prescriptions   Pending Prescriptions Disp Refills     tamsulosin (FLOMAX) 0.4 mg cap [Pharmacy Med Name: TAMSULOSIN 0.4MG CAPSULES] 90 capsule 0     Sig: TAKE 1 CAPSULE(0.4 MG) BY MOUTH DAILY AFTER BREAKFAST       Alfuzosin/Tamsulosin/Silodosin Refill Protocol  Passed - 10/9/2019 10:17 AM        Passed - PCP or prescribing provider visit in past 12 months       Last office visit with prescriber/PCP: 6/26/2019 Don Armijo MD OR same dept: 6/26/2019 Don Armijo MD OR same specialty: 6/26/2019 Don Armijo MD  Last physical: 11/8/2018 Last MTM visit: Visit date not found   Next visit within 3 mo: Visit date not found  Next physical within 3 mo: Visit date not found  Prescriber OR PCP: Don Armijo MD  Last diagnosis associated with med order: 1. BPH with urinary obstruction  - tamsulosin (FLOMAX) 0.4 mg cap [Pharmacy Med Name: TAMSULOSIN 0.4MG CAPSULES]; TAKE 1 CAPSULE(0.4 MG) BY MOUTH DAILY AFTER BREAKFAST  Dispense: 90 capsule; Refill: 0    2. Type 2 diabetes mellitus without complication, without long-term current use of insulin (H)  - metFORMIN (GLUCOPHAGE-XR) 500 MG 24 hr tablet [Pharmacy Med Name: METFORMIN ER 500MG 24HR TABS]; TAKE 3 TABLETS(1500 MG) BY MOUTH DAILY WITH BREAKFAST  Dispense: 270 tablet; Refill: 0    3. Peripheral edema  - furosemide (LASIX) 40 MG tablet [Pharmacy Med Name: FUROSEMIDE 40MG TABLETS]; TAKE 1 TABLET(40 MG) BY MOUTH DAILY  Dispense: 90 tablet; Refill:  0    If protocol passes may refill for 12 months if within 3 months of last provider visit (or a total of 15 months).             metFORMIN (GLUCOPHAGE-XR) 500 MG 24 hr tablet [Pharmacy Med Name: METFORMIN ER 500MG 24HR TABS] 270 tablet 0     Sig: TAKE 3 TABLETS(1500 MG) BY MOUTH DAILY WITH BREAKFAST       Metformin Refill Protocol Failed - 10/9/2019 10:17 AM        Failed - Microalbumin in last year      Microalbumin, Random Urine   Date Value Ref Range Status   07/05/2018 <0.50 0.00 - 1.99 mg/dL Final                  Passed - Blood pressure in last 12 months     BP Readings from Last 1 Encounters:   10/08/19 134/80             Passed - LFT or AST or ALT in last 12 months     Albumin   Date Value Ref Range Status   07/01/2019 4.2 3.5 - 5.0 g/dL Final     Bilirubin, Total   Date Value Ref Range Status   11/08/2018 0.6 0.0 - 1.0 mg/dL Final     Bilirubin, Direct   Date Value Ref Range Status   08/31/2011 0.2 <0.6 mg/dL Final     Alkaline Phosphatase   Date Value Ref Range Status   11/08/2018 114 45 - 120 U/L Final     AST   Date Value Ref Range Status   11/08/2018 16 0 - 40 U/L Final     ALT   Date Value Ref Range Status   07/01/2019 23 0 - 45 U/L Final     Protein, Total   Date Value Ref Range Status   11/08/2018 7.7 6.0 - 8.0 g/dL Final                Passed - GFR or Serum Creatinine in last 6 months     GFR MDRD Non Af Amer   Date Value Ref Range Status   07/01/2019 >60 >60 mL/min/1.73m2 Final     GFR MDRD Af Amer   Date Value Ref Range Status   07/01/2019 >60 >60 mL/min/1.73m2 Final             Passed - Visit with PCP or prescribing provider visit in last 6 months or next 3 months     Last office visit with prescriber/PCP: 6/26/2019 OR same dept: 6/26/2019 Don Armijo MD OR same specialty: 6/26/2019 Don Armijo MD Last physical: Visit date not found Last MTM visit: Visit date not found         Next appt within 3 mo: Visit date not found  Next physical within 3 mo: Visit date not found  Prescriber OR  PCP: Don Armijo MD  Last diagnosis associated with med order: 1. BPH with urinary obstruction  - tamsulosin (FLOMAX) 0.4 mg cap [Pharmacy Med Name: TAMSULOSIN 0.4MG CAPSULES]; TAKE 1 CAPSULE(0.4 MG) BY MOUTH DAILY AFTER BREAKFAST  Dispense: 90 capsule; Refill: 0    2. Type 2 diabetes mellitus without complication, without long-term current use of insulin (H)  - metFORMIN (GLUCOPHAGE-XR) 500 MG 24 hr tablet [Pharmacy Med Name: METFORMIN ER 500MG 24HR TABS]; TAKE 3 TABLETS(1500 MG) BY MOUTH DAILY WITH BREAKFAST  Dispense: 270 tablet; Refill: 0    3. Peripheral edema  - furosemide (LASIX) 40 MG tablet [Pharmacy Med Name: FUROSEMIDE 40MG TABLETS]; TAKE 1 TABLET(40 MG) BY MOUTH DAILY  Dispense: 90 tablet; Refill: 0     If protocol passes may refill for 12 months if within 3 months of last provider visit (or a total of 15 months).           Passed - A1C in last 6 months     Hemoglobin A1c   Date Value Ref Range Status   06/26/2019 7.9 (H) 3.5 - 6.0 % Final               furosemide (LASIX) 40 MG tablet [Pharmacy Med Name: FUROSEMIDE 40MG TABLETS] 90 tablet 0     Sig: TAKE 1 TABLET(40 MG) BY MOUTH DAILY       Diuretics/Combination Diuretics Refill Protocol  Passed - 10/9/2019 10:17 AM        Passed - Visit with PCP or prescribing provider visit in past 12 months     Last office visit with prescriber/PCP: 6/26/2019 Don Armijo MD OR same dept: 6/26/2019 Don Armijo MD OR same specialty: 6/26/2019 Don Armijo MD  Last physical: 11/8/2018 Last MTM visit: Visit date not found   Next visit within 3 mo: Visit date not found  Next physical within 3 mo: Visit date not found  Prescriber OR PCP: Don Armijo MD  Last diagnosis associated with med order: 1. BPH with urinary obstruction  - tamsulosin (FLOMAX) 0.4 mg cap [Pharmacy Med Name: TAMSULOSIN 0.4MG CAPSULES]; TAKE 1 CAPSULE(0.4 MG) BY MOUTH DAILY AFTER BREAKFAST  Dispense: 90 capsule; Refill: 0    2. Type 2 diabetes mellitus without complication,  without long-term current use of insulin (H)  - metFORMIN (GLUCOPHAGE-XR) 500 MG 24 hr tablet [Pharmacy Med Name: METFORMIN ER 500MG 24HR TABS]; TAKE 3 TABLETS(1500 MG) BY MOUTH DAILY WITH BREAKFAST  Dispense: 270 tablet; Refill: 0    3. Peripheral edema  - furosemide (LASIX) 40 MG tablet [Pharmacy Med Name: FUROSEMIDE 40MG TABLETS]; TAKE 1 TABLET(40 MG) BY MOUTH DAILY  Dispense: 90 tablet; Refill: 0    If protocol passes may refill for 12 months if within 3 months of last provider visit (or a total of 15 months).             Passed - Serum Potassium in past 12 months      Lab Results   Component Value Date    Potassium 4.4 11/08/2018             Passed - Serum Sodium in past 12 months      Lab Results   Component Value Date    Sodium 142 11/08/2018             Passed - Blood pressure on file in past 12 months     BP Readings from Last 1 Encounters:   10/08/19 134/80             Passed - Serum Creatinine in past 12 months      Creatinine   Date Value Ref Range Status   07/01/2019 1.02 0.70 - 1.30 mg/dL Final
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
## 2021-06-02 NOTE — PROGRESS NOTES
Assessment/Plan:    1. Type 2 diabetes mellitus with stage 3 chronic kidney disease, with long-term current use of insulin (H)  Type 2 diabetes improved control with A1c decreasing from 7.9% down to 7.2% with 10 pound weight loss since June 26, 2019.  Microbe human screen obtained.  Med monitoring.  Increase metformin extended release 500 mg from 3 tablets up to 4 tablets once daily with breakfast.  Reassess around February 15, 2020 annual wellness visit.  - metFORMIN (GLUCOPHAGE-XR) 500 MG 24 hr tablet; Take 4 tablets (2,000 mg total) by mouth daily.  Dispense: 360 tablet; Refill: 1  - Glycosylated Hemoglobin A1c  - Microalbumin, Random Urine  - Comprehensive Metabolic Panel    2. Essential hypertension with goal blood pressure less than 130/80  Hypertension stable.  Continues use of lisinopril 10 mg daily and amlodipine 5 mg daily.  - Comprehensive Metabolic Panel    3. Other hyperlipidemia  Remains on atorvastatin 20 mg daily for lipid management.  Lipid cascade pending.  - Lipid Cascade    4. Rheumatoid arthritis with negative rheumatoid factor, involving unspecified site (H)  Use of Celebrex 100 mg twice daily with hydroxychloroquine 200 mg twice daily.  Followed by Dr. Bundy and follows up with 3-month interval with prior office visit October 8, 2019 reviewed.  - Comprehensive Metabolic Panel  - HM2(CBC w/o Differential)        The following high BMI interventions were performed this visit: encouragement to exercise, weight monitoring, weight loss from baseline weight and lifestyle education regarding diet .  Ensure ongoing efforts to achieve weight goal < 250 pounds initially, < 240 pounds ideally.         Subjective:    Thiago Hein is seen today for follow-up evaluation.  Type 2 diabetes.  Continues metformin extended release 1500 mg daily.  Lisinopril 10 mg daily and amlodipine 5 mg daily for hypertension.  Atorvastatin 20 mg daily for lipid management.  Recent left carpal tunnel release surgery  "about 2 weeks ago on 10/15/19 with orthopedic specialist.  Seronegative rheumatoid arthritis followed by Dr. Bundy.  Has abnormal fingernail but he would like to have trimmed today with prior injury described versus local onychomycosis findings.  Denies recent illness.  Needs flu shot that is not available through our office at this time due to shortage.  No recent illness.  No side effects of metformin extended release 1500 mg daily including diarrhea, GI distress etc.  Comprehensive review of systems as above otherwise all negative.    Single  1 son - 32 \"Kraig\"  Tobacco: none  EtOH: none  Mom - Meena   Dad - currently 86 Yovanny - DM, CAD, HTN, high cholesterol, back problems, arthritis, spinal stenosis, pacemaker/defibrillator  1 bro - Be - HTN  Surgeries: \"lumbar back surgery for cyst removal\" - September, 2008 (Dr. 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     No family history on file.     No past medical history on file.     Social History     Tobacco Use     Smoking status: Never Smoker     Smokeless tobacco: Former User   Substance Use Topics     Alcohol use: No     Drug use: No        Current Outpatient Medications   Medication Sig Dispense Refill     amLODIPine (NORVASC) 5 MG tablet TAKE 1 TABLET(5 MG) BY MOUTH DAILY 90 tablet 2     atorvastatin (LIPITOR) 20 MG tablet TAKE 1 TABLET(20 MG) BY MOUTH AT BEDTIME 90 tablet 3     celecoxib (CELEBREX) 100 MG capsule TAKE 1 CAPSULE(100 MG) BY MOUTH TWICE DAILY 60 capsule 0     furosemide (LASIX) 40 MG tablet Take 1 tablet (40 mg total) by mouth daily. 90 tablet 0     hydroxychloroquine (PLAQUENIL) 200 mg tablet TAKE 1 TABLET(200 MG) BY MOUTH TWICE DAILY 180 tablet 1     lisinopril (PRINIVIL,ZESTRIL) 10 MG tablet TAKE 1 TABLET(10 MG) BY MOUTH DAILY 90 tablet 0     metFORMIN (GLUCOPHAGE-XR) " 500 MG 24 hr tablet Take 4 tablets (2,000 mg total) by mouth daily. 360 tablet 1     nystatin (MYCOSTATIN) cream apply topically to affected areas 3-4 times per day as needed 60 g 3     tamsulosin (FLOMAX) 0.4 mg cap Take 1 capsule (0.4 mg total) by mouth Daily after breakfast. 90 capsule 2     No current facility-administered medications for this visit.           Objective:    Vitals:    10/29/19 1003   BP: 120/70   Pulse: 88   SpO2: 97%   Weight: (!) 258 lb (117 kg)      Body mass index is 35.73 kg/m .    Alert.  No apparent distress.  Chest clear.  Cardiac exam regular.  Extremities warm and dry.  I did trim 1 of patient's fingernails today due to concerns regarding likely local onychomycosis.      This note has been dictated using voice recognition software and as a result may contain minor grammatical errors and unintended word substitutions.

## 2021-06-03 VITALS
DIASTOLIC BLOOD PRESSURE: 80 MMHG | SYSTOLIC BLOOD PRESSURE: 134 MMHG | HEART RATE: 76 BPM | WEIGHT: 260 LBS | BODY MASS INDEX: 36.01 KG/M2

## 2021-06-03 VITALS
BODY MASS INDEX: 36.12 KG/M2 | WEIGHT: 258 LBS | DIASTOLIC BLOOD PRESSURE: 80 MMHG | HEIGHT: 71 IN | SYSTOLIC BLOOD PRESSURE: 122 MMHG

## 2021-06-03 VITALS
DIASTOLIC BLOOD PRESSURE: 70 MMHG | WEIGHT: 258 LBS | BODY MASS INDEX: 35.73 KG/M2 | HEART RATE: 88 BPM | SYSTOLIC BLOOD PRESSURE: 120 MMHG | OXYGEN SATURATION: 97 %

## 2021-06-03 VITALS — BODY MASS INDEX: 35.36 KG/M2 | WEIGHT: 268 LBS

## 2021-06-03 VITALS — HEIGHT: 71 IN | WEIGHT: 268 LBS | BODY MASS INDEX: 37.52 KG/M2

## 2021-06-03 NOTE — TELEPHONE ENCOUNTER
Currently using: Celebrex       Medication Problem: He only received 60 tablets at his last refill. He was supposed to have gotten a 180 tablets. He is wondering why he only received 60 tablets. Please send the remainder asap. He took his last tablet last Thursday     Navin @ [phone]

## 2021-06-04 VITALS
BODY MASS INDEX: 35.76 KG/M2 | DIASTOLIC BLOOD PRESSURE: 76 MMHG | OXYGEN SATURATION: 97 % | SYSTOLIC BLOOD PRESSURE: 126 MMHG | WEIGHT: 264 LBS | HEIGHT: 72 IN | HEART RATE: 88 BPM

## 2021-06-04 NOTE — TELEPHONE ENCOUNTER
Refill Approved    Rx renewed per Medication Renewal Policy. Medication was last renewed on 10/10/19.    Laura Zaldivar, [facility] Triage/Med Refill 12/16/2019     Requested Prescriptions   Pending Prescriptions Disp Refills     furosemide (LASIX) 40 MG tablet [Pharmacy Med Name: FUROSEMIDE 40MG TABLETS] 90 tablet 0     Sig: TAKE 1 TABLET(40 MG) BY MOUTH DAILY       Diuretics/Combination Diuretics Refill Protocol  Passed - 12/15/2019  3:46 PM        Passed - Visit with PCP or prescribing provider visit in past 12 months     Last office visit with prescriber/PCP: 10/29/2019 oDn Armijo MD OR same dept: 10/29/2019 Don Armijo MD OR same specialty: 10/29/2019 Don Armijo MD  Last physical: 11/8/2018 Last MTM visit: Visit date not found   Next visit within 3 mo: Visit date not found  Next physical within 3 mo: Visit date not found  Prescriber OR PCP: Don Armijo MD  Last diagnosis associated with med order: 1. Peripheral edema  - furosemide (LASIX) 40 MG tablet [Pharmacy Med Name: FUROSEMIDE 40MG TABLETS]; TAKE 1 TABLET(40 MG) BY MOUTH DAILY  Dispense: 90 tablet; Refill: 0    If protocol passes may refill for 12 months if within 3 months of last provider visit (or a total of 15 months).             Passed - Serum Potassium in past 12 months      Lab Results   Component Value Date    Potassium 4.5 10/29/2019             Passed - Serum Sodium in past 12 months      Lab Results   Component Value Date    Sodium 141 10/29/2019             Passed - Blood pressure on file in past 12 months     BP Readings from Last 1 Encounters:   10/29/19 120/70             Passed - Serum Creatinine in past 12 months      Creatinine   Date Value Ref Range Status   10/29/2019 1.03 0.70 - 1.30 mg/dL Final

## 2021-06-05 VITALS
SYSTOLIC BLOOD PRESSURE: 120 MMHG | HEART RATE: 76 BPM | BODY MASS INDEX: 34.95 KG/M2 | HEIGHT: 72 IN | WEIGHT: 258 LBS | DIASTOLIC BLOOD PRESSURE: 70 MMHG

## 2021-06-05 VITALS
DIASTOLIC BLOOD PRESSURE: 64 MMHG | WEIGHT: 264.7 LBS | HEART RATE: 82 BPM | BODY MASS INDEX: 35.9 KG/M2 | SYSTOLIC BLOOD PRESSURE: 110 MMHG

## 2021-06-05 VITALS
WEIGHT: 259 LBS | DIASTOLIC BLOOD PRESSURE: 80 MMHG | OXYGEN SATURATION: 98 % | TEMPERATURE: 97.4 F | HEART RATE: 88 BPM | SYSTOLIC BLOOD PRESSURE: 135 MMHG | BODY MASS INDEX: 35.37 KG/M2

## 2021-06-05 NOTE — PROGRESS NOTES
ASSESSMENT AND PLAN:  Thiago Hein 69 y.o. male is seen here on 01/07/20 for follow-up of seronegative rheumatoid arthritis, widespread osteoarthritis, on hydroxychloroquine.  While he has had good response to HCQ he has residual pain such as in the PIP likely associated with OA.  We discussed options.  He is already on Celebrex, he wondered about increasing the dose other options discussed including duloxetine literature provided.  He had his eyes examined recently.  No retinal toxicity noted.  Follow-up here in 3 months.      Diagnoses and all orders for this visit:    Seronegative rheumatoid arthritis (H)  -     hydroxychloroquine (PLAQUENIL) 200 mg tablet; TAKE 1 TABLET(200 MG) BY MOUTH TWICE DAILY  Dispense: 180 tablet; Refill: 1    Primary osteoarthritis involving multiple joints  -     DULoxetine (CYMBALTA) 30 MG capsule; Take 1 capsule (30 mg total) by mouth daily.  Dispense: 30 capsule; Refill: 2    Polyarthralgia  -     DULoxetine (CYMBALTA) 30 MG capsule; Take 1 capsule (30 mg total) by mouth daily.  Dispense: 30 capsule; Refill: 2  -     ALT (SGPT); Standing  -     Albumin; Standing  -     Creatinine; Standing  -     HM2(CBC w/o Differential); Standing    High risk medication use  -     ALT (SGPT); Standing  -     Albumin; Standing  -     Creatinine; Standing  -     HM2(CBC w/o Differential); Standing      HISTORY OF PRESENTING ILLNESS:  Thiago Hein, 69 y.o., male is here for for follow-up of polyarthritis, at one point elsewhere consideration for still's disease, osteoarthritis.  He is on hydroxychloroquine and Celebrex and finding it helpful.  He has noted pain.  This is in his PIPs.  This is been intermittent.  Without significant swelling.  More worse with activity.  More so on the right than the left hand.  He has noted Celebrex helps partially in the past he used to take 200 mg twice daily and thought it was more helpful.  He has noted pain level apart from the PIPs to be 1.0/10 able  "to do most of his day-to-day activities.  He is noted stiffness in the morning of no more than 20 minutes. He is having difficult time using the hand such as counting money holding onto things, dropping things, he is not woken up from sleep.  He is not sure how much benefit he may have gotten from the corticosteroid injection done here 3 months ago.  He rated overall symptom severity at 1.5/10.  He is due for eye examination.  He did have carpal tunnel release done on his left hand he feels that he can a he will have difficult time calling it a success as he continues to \"feel buzzy\" at his digits on the left side..  His recent labs are negative for autoimmunity.  His morning stiffness is no more than 30 minutes.  He has not had fever weight loss blurry vision eye redness moccasin nausea cough or rash.        As noted during his first visit here he reports that it was approximately 15 years or so ago when he first started hurting in his joints.  He remembers it was in his hands, fingers.  He also had pain in the knees.  He was started on methotrexate.  He was on it for 18-24 months.  He wonders if the reason for discontinuation of methotrexate was something to do with his skin and sun sensitivity.  He was then changed to a combination of hydroxychloroquine and Celebrex.  He has been taking that regularly over the past 12-13 years.  Only recently instead of taking 200 twice daily of each she has been taking one daily of each.  He ran out of these a few days ago and is beginning to notice some discomfort in his hands and knees.  His previous rheumatologisty recently gave him sulfasalazine.  He wonders if the plan was for him to continue hydroxychloroquine and sulfasalazine and discontinue Celebrex.  Currently he noted pain as being mild.  He reports that he is able to do most of his day-to-day activities without difficulty.  He noted morning stiffness of 30 minutes.  He has no personal family history of psoriasis, " rheumatoid arthritis in the family or lupus.  He reports history of sun sensitivity.  He does not smoke does not take alcohol no regular exercise apart from being on his feet most of the time.  He had back surgery this is 10 years ago her lumbar spine region.  He considers himself otherwise in good health.    Further historical information and ADL limitations as noted in the multidimensional health assessment questionnaire attached in the EMR.  ALLERGIES:Patient has no known allergies.    PAST MEDICAL/ACTIVE PROBLEMS/MEDICATION/ FAMILY HISTORY/SOCIAL DATA:  The patient has a family history of  No past medical history on file.  Social History     Tobacco Use   Smoking Status Never Smoker   Smokeless Tobacco Former User     Patient Active Problem List   Diagnosis     Type 2 diabetes mellitus (H)     Obesity     Hypertension     Lower Back Pain     Emotional Lability     Adult Still's Disease     Epidural abscess     Sepsis (H)     Arm DVT (deep venous thromboembolism), acute, left (H)     Hyperlipidemia     Diverticulosis     Peripheral edema     Non morbid obesity due to excess calories     Essential hypertension with goal blood pressure less than 130/80     Unintentional weight loss     Polyarthralgia     Polyarthritis     Trigger finger, right index finger     Left carpal tunnel syndrome     Seronegative rheumatoid arthritis (H)     Primary osteoarthritis involving multiple joints     Current Outpatient Medications   Medication Sig Dispense Refill     amLODIPine (NORVASC) 5 MG tablet TAKE 1 TABLET(5 MG) BY MOUTH DAILY 90 tablet 2     atorvastatin (LIPITOR) 20 MG tablet TAKE 1 TABLET(20 MG) BY MOUTH AT BEDTIME 90 tablet 3     celecoxib (CELEBREX) 100 MG capsule TAKE 1 CAPSULE(100 MG) BY MOUTH TWICE DAILY 120 capsule 0     furosemide (LASIX) 40 MG tablet TAKE 1 TABLET(40 MG) BY MOUTH DAILY 90 tablet 3     hydroxychloroquine (PLAQUENIL) 200 mg tablet TAKE 1 TABLET(200 MG) BY MOUTH TWICE DAILY 180 tablet 1      "lisinopril (PRINIVIL,ZESTRIL) 10 MG tablet TAKE 1 TABLET(10 MG) BY MOUTH DAILY 90 tablet 0     metFORMIN (GLUCOPHAGE-XR) 500 MG 24 hr tablet Take 4 tablets (2,000 mg total) by mouth daily. 360 tablet 1     nystatin (MYCOSTATIN) cream apply topically to affected areas 3-4 times per day as needed 60 g 3     tamsulosin (FLOMAX) 0.4 mg cap Take 1 capsule (0.4 mg total) by mouth Daily after breakfast. 90 capsule 2     No current facility-administered medications for this visit.        DETAILED EXAMINATION  01/07/20  :  Vitals:    01/07/20 0736   BP: 122/80   Patient Site: Right Arm   Patient Position: Sitting   Cuff Size: Adult Regular   Weight: (!) 258 lb (117 kg)   Height: 5' 11.25\" (1.81 m)     Alert oriented. Head including the face is examined for malar rash, heliotropes, scarring, lupus pernio. Eyes examined for redness such as in episcleritis/scleritis, periorbital lesions.   Neck/ Face examined for parotid gland swelling, range of motion of neck.  Left upper and lower and right upper and lower extremities examined for tenderness, swelling, warmth of the appendicular joints, range of motion, edema, rash.  Some of the important findings included:he does not have synovitis in any of the palpable joints of the upper extremities.  Flexion deformity such as at his index and middle finger PIP especially the right side.  No JLT effusion or warmth of the knees. He has a scar on his left carpal area remanent of a remote injury.   Left index finger nail dystrophy from a trauma in the past.    LAB / IMAGING DATA:  ALT   Date Value Ref Range Status   10/29/2019 21 0 - 45 U/L Final   07/01/2019 23 0 - 45 U/L Final   11/08/2018 19 0 - 45 U/L Final     Albumin   Date Value Ref Range Status   10/29/2019 4.5 3.5 - 5.0 g/dL Final   07/01/2019 4.2 3.5 - 5.0 g/dL Final   11/08/2018 4.6 3.5 - 5.0 g/dL Final     Creatinine   Date Value Ref Range Status   10/29/2019 1.03 0.70 - 1.30 mg/dL Final   07/01/2019 1.02 0.70 - 1.30 mg/dL Final "   11/08/2018 0.92 0.70 - 1.30 mg/dL Final       WBC   Date Value Ref Range Status   10/29/2019 8.2 4.0 - 11.0 thou/uL Final   07/01/2019 8.7 4.0 - 11.0 thou/uL Final     Hemoglobin   Date Value Ref Range Status   10/29/2019 15.6 14.0 - 18.0 g/dL Final   07/01/2019 14.4 14.0 - 18.0 g/dL Final   11/08/2018 16.2 14.0 - 18.0 g/dL Final     Platelets   Date Value Ref Range Status   10/29/2019 270 140 - 440 thou/uL Final   07/01/2019 254 140 - 440 thou/uL Final   11/08/2018 230 140 - 440 thou/uL Final       Lab Results   Component Value Date    RF <15.0 07/20/2018    SEDRATE 7 07/20/2018

## 2021-06-05 NOTE — TELEPHONE ENCOUNTER
Refill Approved    Rx renewed per Medication Renewal Policy. Medication was last renewed on 10/15/19.    Laura Zaldivar, [facility] Triage/Med Refill 1/20/2020     Requested Prescriptions   Pending Prescriptions Disp Refills     lisinopril (PRINIVIL,ZESTRIL) 10 MG tablet [Pharmacy Med Name: LISINOPRIL 10MG TABLETS] 90 tablet 0     Sig: TAKE 1 TABLET(10 MG) BY MOUTH DAILY       [facility] Inhibitors Refill Protocol Passed - 1/19/2020  4:17 PM        Passed - PCP or prescribing provider visit in past 12 months       Last office visit with prescriber/PCP: 10/29/2019 Don Armijo MD OR same dept: 10/29/2019 Don Armijo MD OR same specialty: 10/29/2019 Don Armijo MD  Last physical: 11/8/2018 Last MTM visit: Visit date not found   Next visit within 3 mo: Visit date not found  Next physical within 3 mo: Visit date not found  Prescriber OR PCP: Don Armijo MD  Last diagnosis associated with med order: 1. Essential hypertension with goal blood pressure less than 130/80  - lisinopril (PRINIVIL,ZESTRIL) 10 MG tablet [Pharmacy Med Name: LISINOPRIL 10MG TABLETS]; TAKE 1 TABLET(10 MG) BY MOUTH DAILY  Dispense: 90 tablet; Refill: 0    If protocol passes may refill for 12 months if within 3 months of last provider visit (or a total of 15 months).             Passed - Serum Potassium in past 12 months     Lab Results   Component Value Date    Potassium 4.5 10/29/2019             Passed - Blood pressure filed in past 12 months     BP Readings from Last 1 Encounters:   01/07/20 122/80             Passed - Serum Creatinine in past 12 months     Creatinine   Date Value Ref Range Status   10/29/2019 1.03 0.70 - 1.30 mg/dL Final

## 2021-06-06 NOTE — PROGRESS NOTES
Assessment and Plan:       1. Encounter for general adult medical examination with abnormal findings  Annual wellness visit completed.  Risks associated with lack of purposeful exercise, suboptimal diet, etc.  Risk modifications reviewed.  Annual wellness visits to continue.  Prior colonoscopy November 14, 2016 normal told to repeat 10-year interval.  PHQ 9 questionnaire 1 out of 27 and OTONIEL 7 questionnaire 0 out of 21.    2. Type 2 diabetes mellitus with stage 3 chronic kidney disease, with long-term current use of insulin (H)  Type 2 diabetes noted with fair control with A1c increasing from 7.2% up to 7.7% while on metformin extended release 1500 mg daily.  Did recommend increasing to 2000 mg daily and reassessing at follow-up in 4 months with weight goal less than 250 pounds initially, less than 240 pounds ideally.  Microalbumin screen normal October 29, 2019.  Last diabetic eye exam December 2019.  Monofilament testing with some decreased sensation, mild.  - Glycosylated Hemoglobin A1c  - Comprehensive Metabolic Panel    3. Essential hypertension with goal blood pressure less than 130/80  Continued use of amlodipine 5 mg daily with trace peripheral edema noted on exam otherwise lisinopril 10 mg daily as well with renal protective benefits noted.  Blood pressure 126/76 on recheck.  - Comprehensive Metabolic Panel    4. Other hyperlipidemia  Atorvastatin 20 mg daily.  Check lipid cascade today.  - Lipid Cascade    5. Rheumatoid arthritis with negative rheumatoid factor, involving unspecified site (H)  Rheumatoid arthritis.  Followed by Dr. Bundy.  Seen January 7, 2020.  Started on duloxetine 30 mg daily however has not begun medication with PIP joint discomfort described at that visit.  Continues Plaquenil 200 mg twice daily and Celebrex 100 mg twice daily.    6. Polyarthralgia  As above, med monitoring completed.  - ALT (SGPT)  - Albumin  - Creatinine  - HM2(CBC w/o Differential)    7. High risk medication  use  As above, med monitoring completed.  - ALT (SGPT)  - Albumin  - Creatinine  - HM2(CBC w/o Differential)  - Comprehensive Metabolic Panel    8. Flu vaccine need  High-dose flu shot provided today.  - Influenza High Dose, Seasonal 65+ yrs    9. Screening for prostate cancer  PSA screen completed.  - PSA (Prostatic-Specific Antigen), Annual Screen    10. BPH with urinary obstruction  Continues use of tamsulosin 0.4 mg daily.  Nocturia decreasing from 3-4 times per night down to perhaps once per night.        The patient's current medical problems were reviewed.    I have had an Advance Directives discussion with the patient.  The following high BMI interventions were performed this visit: encouragement to exercise, weight monitoring, weight loss from baseline weight and lifestyle education regarding diet.  Ensure ongoing efforts to achieve weight goal < 250 pounds initially, < 240 pounds ideally.     The following health maintenance schedule was reviewed with the patient and provided in printed form in the after visit summary:   Health Maintenance   Topic Date Due     ZOSTER VACCINES (2 of 3) 02/16/2016     INFLUENZA VACCINE RULE BASED (1) 08/01/2019     A1C  08/20/2020     BMP  10/29/2020     LIPID  10/29/2020     MICROALBUMIN  10/29/2020     DIABETIC EYE EXAM  12/17/2020     MEDICARE ANNUAL WELLNESS VISIT  02/20/2021     DIABETIC FOOT EXAM  02/20/2021     FALL RISK ASSESSMENT  02/20/2021     TD 18+ HE  05/17/2021     ADVANCE CARE PLANNING  02/20/2025     COLONOSCOPY  11/14/2026     HEPATITIS C SCREENING  Completed     PNEUMOCOCCAL IMMUNIZATION 65+ HIGH/HIGHEST RISK  Completed        Subjective:     Chief Complaint: Thiago Hein is an 69 y.o. male here for an Annual Wellness visit.     HPI: In general doing well.  Underlying rheumatoid arthritis.  Plaquenil 200 mg twice daily and Celebrex 100 mg twice daily.  Was seen by Dr. Bundy January 7, 2020 and started on duloxetine 30 mg daily for PIP joint  "discomfort however has not begun medication yet.  Still has PIP joint discomfort.  Amlodipine 5 mg daily and lisinopril 10 mg daily for hypertension.  Atorvastatin 20 mg daily for lipid management.  Using metformin extended release 500 mg taking 3 tablets a day.  Prescription is for 4 tablets.  A1c previously improved from 7.9% down to 7.2% October 29, 2019.  Dietary indiscretions.  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  Right ear seems to drain at times.  Using hydrogen peroxide.    Review of Systems:  Please see above.  The rest of the review of systems are negative for all systems.    Single  1 son - 32 \"Kraig\"  Tobacco: none  EtOH: none  Mom - Meena   Dad - currently 86 Yovanny - DM, CAD, HTN, high cholesterol, back problems, arthritis, spinal stenosis, pacemaker/defibrillator  1 bro - Be - HTN  Surgeries: \"lumbar back surgery for cyst removal\" - September, 2008 (Dr. 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[place], [place])  Hobbies:     Patient Care Team:  Don Armijo MD as PCP - General  Don Armijo MD as Assigned PCP     Patient Active Problem List   Diagnosis     Type 2 diabetes mellitus (H)     Obesity     Hypertension     Lower Back Pain     Emotional Lability     Adult Still's Disease     Epidural abscess     Sepsis (H)     Arm DVT (deep venous thromboembolism), acute, left (H)     Hyperlipidemia     Diverticulosis     Peripheral edema     Non morbid obesity due to excess calories     Essential hypertension with goal blood pressure less than 130/80     Unintentional weight loss     Polyarthralgia     Polyarthritis     Trigger finger, right index finger     Left carpal tunnel syndrome     Rheumatoid arthritis with negative rheumatoid factor, involving unspecified site (H)     Primary osteoarthritis involving multiple joints     BPH with urinary " obstruction     History reviewed. No pertinent past medical history.   History reviewed. No pertinent surgical history.   History reviewed. No pertinent family history.   Social History     Socioeconomic History     Marital status: Single     Spouse name: Not on file     Number of children: Not on file     Years of education: Not on file     Highest education level: Not on file   Occupational History     Not on file   Social Needs     Financial resource strain: Not on file     Food insecurity:     Worry: Not on file     Inability: Not on file     Transportation needs:     Medical: Not on file     Non-medical: Not on file   Tobacco Use     Smoking status: Never Smoker     Smokeless tobacco: Former User   Substance and Sexual Activity     Alcohol use: No     Drug use: No     Sexual activity: Not on file   Lifestyle     Physical activity:     Days per week: Not on file     Minutes per session: Not on file     Stress: Not on file   Relationships     Social connections:     Talks on phone: Not on file     Gets together: Not on file     Attends [employer] service: Not on file     Active member of club or organization: Not on file     Attends meetings of clubs or organizations: Not on file     Relationship status: Not on file     Intimate partner violence:     Fear of current or ex partner: Not on file     Emotionally abused: Not on file     Physically abused: Not on file     Forced sexual activity: Not on file   Other Topics Concern     Not on file   Social History Narrative     Not on file      Current Outpatient Medications   Medication Sig Dispense Refill     amLODIPine (NORVASC) 5 MG tablet TAKE 1 TABLET(5 MG) BY MOUTH DAILY 90 tablet 2     atorvastatin (LIPITOR) 20 MG tablet TAKE 1 TABLET(20 MG) BY MOUTH AT BEDTIME 90 tablet 3     celecoxib (CELEBREX) 100 MG capsule TAKE 1 CAPSULE(100 MG) BY MOUTH TWICE DAILY 120 capsule 0     DULoxetine (CYMBALTA) 30 MG capsule Take 1 capsule (30 mg total) by mouth daily. 30 capsule  "2     furosemide (LASIX) 40 MG tablet TAKE 1 TABLET(40 MG) BY MOUTH DAILY 90 tablet 3     hydroxychloroquine (PLAQUENIL) 200 mg tablet TAKE 1 TABLET(200 MG) BY MOUTH TWICE DAILY 180 tablet 1     lisinopril (PRINIVIL,ZESTRIL) 10 MG tablet TAKE 1 TABLET(10 MG) BY MOUTH DAILY 90 tablet 2     metFORMIN (GLUCOPHAGE-XR) 500 MG 24 hr tablet Take 4 tablets (2,000 mg total) by mouth daily. 360 tablet 1     tamsulosin (FLOMAX) 0.4 mg cap Take 1 capsule (0.4 mg total) by mouth Daily after breakfast. 90 capsule 2     No current facility-administered medications for this visit.       Objective:   Vital Signs:   Visit Vitals  /76   Pulse 88   Ht 5' 11.75\" (1.822 m)   Wt (!) 264 lb (119.7 kg)   SpO2 97%   BMI 36.05 kg/m         VisionScreening:  No exam data present     PHYSICAL EXAM    General Appearance:    Alert, cooperative, no distress, appears stated age.  Obesity   Head:    Normocephalic, without obvious abnormality, atraumatic   Eyes:    PERRL, conjunctiva/corneas clear, EOM's intact, fundi     benign, both eyes.  Glasses.        Ears:    Normal TM's and external ear canals, both ears.  Bilateral cerumen removal with curette performed.   Nose:   Nares normal, septum midline, mucosa normal, no drainage    or sinus tenderness   Throat:   Lips, mucosa, and tongue normal; teeth and gums normal   Neck:   Supple, symmetrical, trachea midline, no adenopathy;        thyroid:  No enlargement/tenderness/nodules; no carotid    bruit or JVD   Back:     Symmetric, no curvature, ROM normal, no CVA tenderness   Lungs:     Clear to auscultation bilaterally, respirations unlabored   Chest wall:    No tenderness or deformity   Heart:    Regular rate and rhythm, S1 and S2 normal, no murmur, rub   or gallop   Abdomen:     Soft, non-tender, bowel sounds active all four quadrants,     no masses, no organomegaly.     Genitalia:    deferred per patient request     Rectal:    deferred per patient request.   Extremities:   Extremities normal, " atraumatic, no cyanosis or edema.  Onychomycosis.   Pulses:   2+ and symmetric all extremities   Skin:   Skin color, texture, turgor normal, no rashes or lesions   Lymph nodes:   Cervical, supraclavicular, and axillary nodes normal   Neurologic:   CNII-XII intact. Normal strength, sensation and reflexes       throughout.  Monofilament slightly decreased only.        Assessment Results 2/20/2020   Activities of Daily Living No help needed   Instrumental Activities of Daily Living No help needed   Get Up and Go Score Less than 12 seconds   Mini Cog Total Score 5   Some recent data might be hidden     A Mini-Cog score of 0-2 suggests the possibility of dementia, score of 3-5 suggests no dementia    Identified Health Risks:     Information regarding advance directives (living reece), including where he can download the appropriate form, was provided to the patient via the AVS.

## 2021-06-07 NOTE — TELEPHONE ENCOUNTER
To reduce the risk of the COVID-19 outbreak. Instead of coming in for an office visit pt is offered for a telephone visit / or rescheduled   Pt states he  would prefer a telephone visit from Dr. Bundy on Thursday 4.9.2020 around  1140. And is not interested in a video ishmael.    Pt has no other question at this time.    BENNY [facility] 4/1/2020 4:03 PM

## 2021-06-07 NOTE — PROGRESS NOTES
"Thiago Hein is a 70 y.o. male who is being evaluated via a billable telephone visit.      The patient has been notified of following:     \"This telephone visit will be conducted via a call between you and your physician/provider. We have found that certain health care needs can be provided without the need for a physical exam.  This service lets us provide the care you need with a short phone conversation.  If a prescription is necessary we can send it directly to your pharmacy.  If lab work is needed we can place an order for that and you can then stop by our lab to have the test done at a later time.    Telephone visits are billed at different rates depending on your insurance coverage. During this emergency period, for some insurers they may be billed the same as an in-person visit.  Please reach out to your insurance provider with any questions.    If during the course of the call the physician/provider feels a telephone visit is not appropriate, you will not be charged for this service.\"    Patient has given verbal consent to a Telephone visit? Yes    Patient would like to receive their AVS by AVS Preference: Cherelle.    Thiago Hein complains of    Chief Complaint   Patient presents with     Follow-up       I have reviewed and updated the patient's Past Medical History, Social History, Family History and Medication List.    ALLERGIES  Patient has no known allergies.    Additional provider notes:     Genoveva Bal CMA    ASSESSMENT AND PLAN:    Diagnoses and all orders for this visit:    Seronegative rheumatoid arthritis (H)  -     hydroxychloroquine (PLAQUENIL) 200 mg tablet; TAKE 1 TABLET(200 MG) BY MOUTH TWICE DAILY  Dispense: 180 tablet; Refill: 0    Polyarthritis    Primary osteoarthritis involving multiple joints  -     celecoxib (CELEBREX) 100 MG capsule; Take 1 capsule (100 mg total) by mouth 2 (two) times a day.  Dispense: 180 capsule; Refill: 0    Polyarthralgia  -     celecoxib " (CELEBREX) 100 MG capsule; Take 1 capsule (100 mg total) by mouth 2 (two) times a day.  Dispense: 180 capsule; Refill: 0          HISTORY OF PRESENTING ILLNESS:  Thiago Hein 70 y.o. is evaluated here via phone/tele health visit.  He has seronegative rheumatoid arthritis osteoarthritis, he is on now hydroxychloroquine, Celebrex.  He has painful knees worse with activity, mild to moderate in intensity no swelling no nocturnal symptoms.  On his previous visit he was given duloxetine.  Given the pandemic he did not want to start a new medication has not therefore started it.  He had eyes examined in December 2019.  Have asked him to cut back on Celebrex as much as he can and take acetaminophen instead.  We will meet here in 2 months or sooner.  Today we also discussed the issues related to the current pandemic, the pros and cons of the current treatment plan, the CDC guidelines such as social distancing washing the hands covering the cough.  ALLERGIES:Patient has no known allergies.    PAST MEDICAL/ACTIVE PROBLEMS/MEDICATION/SOCIAL DATA  No past medical history on file.  Social History     Tobacco Use   Smoking Status Never Smoker   Smokeless Tobacco Former User     Patient Active Problem List   Diagnosis     Type 2 diabetes mellitus (H)     Obesity     Hypertension     Lower Back Pain     Emotional Lability     Adult Still's Disease     Epidural abscess     Sepsis (H)     Arm DVT (deep venous thromboembolism), acute, left (H)     Hyperlipidemia     Diverticulosis     Peripheral edema     Non morbid obesity due to excess calories     Essential hypertension with goal blood pressure less than 130/80     Unintentional weight loss     Polyarthralgia     Polyarthritis     Trigger finger, right index finger     Left carpal tunnel syndrome     Rheumatoid arthritis with negative rheumatoid factor, involving unspecified site (H)     Primary osteoarthritis involving multiple joints     BPH with urinary obstruction      Current Outpatient Medications   Medication Sig Dispense Refill     amLODIPine (NORVASC) 5 MG tablet TAKE 1 TABLET(5 MG) BY MOUTH DAILY 90 tablet 2     atorvastatin (LIPITOR) 20 MG tablet TAKE 1 TABLET(20 MG) BY MOUTH AT BEDTIME 90 tablet 3     celecoxib (CELEBREX) 100 MG capsule TAKE 1 CAPSULE(100 MG) BY MOUTH TWICE DAILY 120 capsule 0     DULoxetine (CYMBALTA) 30 MG capsule Take 1 capsule (30 mg total) by mouth daily. 30 capsule 2     furosemide (LASIX) 40 MG tablet TAKE 1 TABLET(40 MG) BY MOUTH DAILY 90 tablet 3     hydroxychloroquine (PLAQUENIL) 200 mg tablet TAKE 1 TABLET(200 MG) BY MOUTH TWICE DAILY 180 tablet 1     lisinopril (PRINIVIL,ZESTRIL) 10 MG tablet TAKE 1 TABLET(10 MG) BY MOUTH DAILY 90 tablet 2     metFORMIN (GLUCOPHAGE-XR) 500 MG 24 hr tablet Take 4 tablets (2,000 mg total) by mouth daily. 360 tablet 1     tamsulosin (FLOMAX) 0.4 mg cap Take 1 capsule (0.4 mg total) by mouth Daily after breakfast. 90 capsule 2     No current facility-administered medications for this visit.          EXAMINATION: None, phone visit.      LAB / IMAGING DATA:  ALT   Date Value Ref Range Status   02/20/2020 23 0 - 45 U/L Final   10/29/2019 21 0 - 45 U/L Final   07/01/2019 23 0 - 45 U/L Final     Albumin   Date Value Ref Range Status   02/20/2020 4.4 3.5 - 5.0 g/dL Final   10/29/2019 4.5 3.5 - 5.0 g/dL Final   07/01/2019 4.2 3.5 - 5.0 g/dL Final     Creatinine   Date Value Ref Range Status   02/20/2020 0.88 0.70 - 1.30 mg/dL Final   10/29/2019 1.03 0.70 - 1.30 mg/dL Final   07/01/2019 1.02 0.70 - 1.30 mg/dL Final       WBC   Date Value Ref Range Status   02/20/2020 8.0 4.0 - 11.0 thou/uL Final   10/29/2019 8.2 4.0 - 11.0 thou/uL Final     Hemoglobin   Date Value Ref Range Status   02/20/2020 16.3 14.0 - 18.0 g/dL Final   10/29/2019 15.6 14.0 - 18.0 g/dL Final   07/01/2019 14.4 14.0 - 18.0 g/dL Final     Platelets   Date Value Ref Range Status   02/20/2020 212 140 - 440 thou/uL Final   10/29/2019 270 140 - 440 thou/uL  Final   07/01/2019 254 140 - 440 thou/uL Final       Lab Results   Component Value Date    RF <15.0 07/20/2018    SEDRATE 7 07/20/2018     Duration of the call: 5: 32 minutes

## 2021-06-07 NOTE — TELEPHONE ENCOUNTER
Pt notified that being on Hydroxychloroquine does not make him immune to COVID 19, pt should still use CDC guideline precautions to hopefully prevent from getting this virus. Pt notified that we have heard of potential shortages of HCQ and he will call to request a refill from [facility] as he will be due next week.

## 2021-06-07 NOTE — TELEPHONE ENCOUNTER
Patient wants to know since he's been on Hydroxychloroquine for 15 years dose that make him immuned to Covid-19?

## 2021-06-07 NOTE — TELEPHONE ENCOUNTER
Patient calling with questions.    On  Hydroxychloroquine or arthritis for many years.    Pt is wondering if being on this medication will make him immune from getting Covid-19.    Advised patient that it has not been proven in the [place]. According to the CDC, there is no approved FDA treatment for Covid-19 at this time.      Relayed information to patient. Pt verbalizes understanding and declines additional questions.    Genna John RN       Reason for Disposition    [1] No COVID-19 EXPOSURE BUT [2] questions about    Protocols used: CORONAVIRUS (COVID-19) EXPOSURE-A- 03.17.20

## 2021-06-08 NOTE — PROGRESS NOTES
"Thiago Hein is a 70 y.o. male who is being evaluated via a billable telephone visit.      The patient has been notified of following:     \"This telephone visit will be conducted via a call between you and your physician/provider. We have found that certain health care needs can be provided without the need for a physical exam.  This service lets us provide the care you need with a short phone conversation.  If a prescription is necessary we can send it directly to your pharmacy.  If lab work is needed we can place an order for that and you can then stop by our lab to have the test done at a later time.    Telephone visits are billed at different rates depending on your insurance coverage. During this emergency period, for some insurers they may be billed the same as an in-person visit.  Please reach out to your insurance provider with any questions.    If during the course of the call the physician/provider feels a telephone visit is not appropriate, you will not be charged for this service.\"    Patient has given verbal consent to a Telephone visit? Yes    What phone number would you like to be contacted at? [phone]    Patient would like to receive their AVS by AVS Preference: Cherelle.      ASSESSMENT AND PLAN:    Diagnoses and all orders for this visit:    Seronegative rheumatoid arthritis (H)  -     hydrOXYchloroQUINE (PLAQUENIL) 200 mg tablet; TAKE 1 TABLET(200 MG) BY MOUTH TWICE DAILY  Dispense: 180 tablet; Refill: 0    Primary osteoarthritis involving multiple joints    Polyarthralgia          HISTORY OF PRESENTING ILLNESS:  Thiago Hein 70 y.o. is evaluated here via phone link.  This is for follow-up.  This is for rheumatoid arthritis which is seronegative, osteoarthritis.  He is on hydroxychloroquine.  This is controlled his symptoms well.  With the exception of knee pain he noted no discomfort.  This is typically bilateral, worse with activity, without swelling, this is not such that he " thinks corticosteroid injection might be needed.  In the past 1 done in 1 of the joints in the hands was not helpful.  He has not observed swelling in any of his joints.  He had eyes examination 3 months ago.  He is not taking duloxetine anymore.  We will stay the course.  We will meet here in 3 months or sooner he will come in sooner if he feels corticosteroid injections might be needed for his knees.    ROS enquiry held for fever, ocular symptoms, rash, headache,  GI issues.  Today we also discussed the issues related to the current pandemic, the pros and cons of the current treatment plan, the CDC guidelines such as social distancing washing the hands covering the cough.  ALLERGIES:Patient has no known allergies.    PAST MEDICAL/ACTIVE PROBLEMS/MEDICATION/SOCIAL DATA  No past medical history on file.  Social History     Tobacco Use   Smoking Status Never Smoker   Smokeless Tobacco Former User     Patient Active Problem List   Diagnosis     Type 2 diabetes mellitus (H)     Obesity     Hypertension     Lower Back Pain     Emotional Lability     Adult Still's Disease     Epidural abscess     Sepsis (H)     Arm DVT (deep venous thromboembolism), acute, left (H)     Hyperlipidemia     Diverticulosis     Peripheral edema     Non morbid obesity due to excess calories     Essential hypertension with goal blood pressure less than 130/80     Unintentional weight loss     Polyarthralgia     Polyarthritis     Trigger finger, right index finger     Left carpal tunnel syndrome     Rheumatoid arthritis with negative rheumatoid factor, involving unspecified site (H)     Primary osteoarthritis involving multiple joints     BPH with urinary obstruction     Seronegative rheumatoid arthritis (H)     Current Outpatient Medications   Medication Sig Dispense Refill     amLODIPine (NORVASC) 5 MG tablet TAKE 1 TABLET(5 MG) BY MOUTH DAILY 90 tablet 2     atorvastatin (LIPITOR) 20 MG tablet TAKE 1 TABLET(20 MG) BY MOUTH AT BEDTIME 90  tablet 3     celecoxib (CELEBREX) 100 MG capsule Take 1 capsule (100 mg total) by mouth 2 (two) times a day. 180 capsule 0     furosemide (LASIX) 40 MG tablet TAKE 1 TABLET(40 MG) BY MOUTH DAILY 90 tablet 3     hydroxychloroquine (PLAQUENIL) 200 mg tablet TAKE 1 TABLET(200 MG) BY MOUTH TWICE DAILY 180 tablet 0     lisinopril (PRINIVIL,ZESTRIL) 10 MG tablet TAKE 1 TABLET(10 MG) BY MOUTH DAILY 90 tablet 2     metFORMIN (GLUCOPHAGE-XR) 500 MG 24 hr tablet Take 4 tablets (2,000 mg total) by mouth daily. 360 tablet 1     tamsulosin (FLOMAX) 0.4 mg cap Take 1 capsule (0.4 mg total) by mouth Daily after breakfast. 90 capsule 2     DULoxetine (CYMBALTA) 30 MG capsule Take 1 capsule (30 mg total) by mouth daily. 30 capsule 2     No current facility-administered medications for this visit.          EXAMINATION: Phone link    LAB / IMAGING DATA:  ALT   Date Value Ref Range Status   05/07/2020 20 0 - 45 U/L Final   02/20/2020 23 0 - 45 U/L Final   10/29/2019 21 0 - 45 U/L Final     Albumin   Date Value Ref Range Status   05/07/2020 4.1 3.5 - 5.0 g/dL Final   02/20/2020 4.4 3.5 - 5.0 g/dL Final   10/29/2019 4.5 3.5 - 5.0 g/dL Final     Creatinine   Date Value Ref Range Status   05/07/2020 1.14 0.70 - 1.30 mg/dL Final   02/20/2020 0.88 0.70 - 1.30 mg/dL Final   10/29/2019 1.03 0.70 - 1.30 mg/dL Final       WBC   Date Value Ref Range Status   05/07/2020 7.9 4.0 - 11.0 thou/uL Final   02/20/2020 8.0 4.0 - 11.0 thou/uL Final     Hemoglobin   Date Value Ref Range Status   05/07/2020 15.2 14.0 - 18.0 g/dL Final   02/20/2020 16.3 14.0 - 18.0 g/dL Final   10/29/2019 15.6 14.0 - 18.0 g/dL Final     Platelets   Date Value Ref Range Status   05/07/2020 205 140 - 440 thou/uL Final   02/20/2020 212 140 - 440 thou/uL Final   10/29/2019 270 140 - 440 thou/uL Final       Lab Results   Component Value Date    RF <15.0 07/20/2018    SEDRATE 7 07/20/2018     Duration of the call:5  Minutes  Call start: 1245  pm  Call end:   1251 pm

## 2021-06-09 NOTE — PROGRESS NOTES
"Subjective:    Thiago Hein is seen today for follow-up evaluation type 2 diabetes.  Recent A1c = 6.4% on 11/15/16 with normal microalbumin screen at that time.  Continues metformin extended release 500 mg using 2 tablets each morning. No hypoglycemic symptoms described. Tolerating well without diarrhea, constipation etc. Patient did recently complete colonoscopy yesterday at five-year interval stating that he does not want to wait 10 years because a lot can happen. Diverticulosis without other concerns identified told to repeat 10 year interval. Please lisinopril 20 mg daily with hypertension and renal protective effects. Recent microalbumin normal August 4, 2016. Continues amlodipine 5 mg daily metoprolol succinate 50 mg daily for hypertension management. Plaquenil 200 mg twice daily and Celebrex 200 mg twice daily for her history of still's disease followed by Dr. Patel. Immunizations reviewed through [facility] etc. up-to-date including Pneumovax, Prevnar, etc. History of prior L5 osteomyelitis concerns with MSSA bacteria completed cefazolin. Would like to have repeat blood work for history of sepsis including BMP, magnesium, CRP, ESR and CBC. Recent direct LDL 66 August 4, 2016.  Patient is fasting today.  Recent colonoscopy 11/14/16 \"normal\" (repeat in 10 years).  Ongoing groin rash.  Initially some improvement with desonide 0.05% cream.  Ran out.  Try ketoconazole 2% cream, clotrimazole 1% cream and triamcinolone 0.1% cream.  Also using petroleum jelly.  Significant pruritus noted.    Single  1 son - 32 \"Kraig\"  Tobacco: none  EtOH: none  Mom - Meena   Dad - currently 86 Yovanny - DM, CAD, HTN, high cholesterol, back problems, arthritis, spinal stenosis, pacemaker/defibrillator  1 bro - Be - HTN  Surgeries: \"lumbar back surgery for cyst removal\" - September, 2008 (Dr. 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 No pertinent surgical history.     History reviewed. No pertinent family history.     History reviewed. No pertinent past medical history.     Social History   Substance Use Topics     Smoking status: Never Smoker     Smokeless tobacco: None     Alcohol use No        Current Outpatient Prescriptions   Medication Sig Dispense Refill     amLODIPine (NORVASC) 5 MG tablet TAKE 1 TABLET BY MOUTH DAILY 90 tablet 2     celecoxib (CELEBREX) 200 MG capsule Take 200 mg by mouth.       hydroxychloroquine (PLAQUENIL) 200 mg tablet Take 200 mg by mouth.       lisinopril (PRINIVIL,ZESTRIL) 20 MG tablet TAKE 1 TABLET BY MOUTH DAILY 90 tablet 2     metFORMIN (GLUCOPHAGE-XR) 500 MG 24 hr tablet TAKE 2 TABLETS BY MOUTH DAILY WITH BREAKFAST 180 tablet 1     metoprolol succinate (TOPROL-XL) 50 MG 24 hr tablet TAKE 1 TABLET BY MOUTH DAILY 90 tablet 2     tamsulosin (FLOMAX) 0.4 mg Cp24 Take 1 capsule (0.4 mg total) by mouth Daily after breakfast. 90 capsule 3     nystatin (MYCOSTATIN) cream apply topically to affected areas 3-4 times per day as needed 60 g 3     No current facility-administered medications for this visit.           Objective:    Vitals:    03/17/17 1009 03/17/17 1032   BP: 140/78 126/80   Pulse: 64    Weight: (!) 265 lb (120.2 kg)       There is no height or weight on file to calculate BMI.    Alert.  No apparent distress.  Chest clear.  Cardiac exam regular.  Abdomen benign, obese.  Groin does show erythematous fairly well demarcated rash with satellite lesions consistent with Candida infection.  Extremities warm and dry.      Assessment:    1. Type 2 diabetes mellitus  Glycosylated Hemoglobin A1c    Glycosylated Hemoglobin A1c    Basic Metabolic Panel   2. Other hyperlipidemia     3. Essential hypertension with goal blood pressure less than 130/80  Basic Metabolic Panel   4. Candidal dermatitis  nystatin (MYCOSTATIN) cream   5. Hyperlipidemia  Lipid Cascade          Plan:    A1c relatively stable at 6.5% and will continue metformin extended release 500 mg using 2 capsules each morning with breakfast.  Blood pressure on recheck 126/80 and will continue amlodipine 5 mg daily metoprolol succinate 50 mg daily.  Continues to follow with Dr. Patel regarding stills disease.  Tamsulosin 0.4 mg daily for BPH management.  Check lipid cascade with history of hyperlipidemia.  Nystatin cream 3-4 times daily to affected areas of groin over next 2 weeks.  Diabetes follow-up in 4 months.  Immunizations reviewed and up-to-date.  Weight goal less than 240 pounds initially.

## 2021-06-09 NOTE — PROGRESS NOTES
Assessment/Plan:     1. 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  2 year medical card granted due to chronic conditions below.  - Urinalysis    2. Essential hypertension with goal blood pressure less than 130/80  3. Type 2 diabetes mellitus  Currently well controlled.  Plan to follow-up with primary care provider as directed.      Recent Results (from the past 24 hour(s))   Urinalysis   Result Value Ref Range    Color, UA Shannan (!) Colorless, Yellow, Straw, Light Yellow    Clarity, UA Clear Clear    Glucose, UA Negative Negative    Bilirubin, UA Negative Negative    Ketones, UA Trace (!) Negative    Specific Gravity, UA >=1.030 1.005 - 1.030    Blood, UA Negative Negative    pH, UA 5.5 5.0 - 8.0    Protein, UA 30 mg/dL (!) Negative mg/dL    Urobilinogen, UA 1.0 E.U./dL 0.2 E.U./dL, 1.0 E.U./dL    Nitrite, UA Negative Negative    Leukocytes, UA Negative Negative    Bacteria, UA Few (!) None Seen hpf    RBC, UA None Seen None Seen, 0-2 hpf    WBC, UA None Seen None Seen, 0-5 hpf    Squam Epithel, UA 0-5 None Seen, 0-5 lpf    Mucus, UA Moderate (!) None Seen lpf       Subjective:      Thiago Hein is a 67 y.o. male comes in today for DOT physical.  He currently has a 's license.  He works on a farm and uses heavy equipment.  He recently saw his primary care provider earlier this month we were able to review the visit note as well as the labs that were done.  Hemoglobin A1c is controlled at 6.5.  He is taking his medications he is not on insulin.  Otherwise he feels well.  Normal muscular strength and does not complain of any loss of sensation with diabetes.  He does follow with ophthalmology.  He does not wear distance glasses but does use glasses for reading.    Current Outpatient Prescriptions   Medication Sig Dispense Refill     amLODIPine (NORVASC) 5 MG tablet TAKE 1 TABLET BY MOUTH DAILY 90 tablet 2      celecoxib (CELEBREX) 200 MG capsule Take 200 mg by mouth.       hydroxychloroquine (PLAQUENIL) 200 mg tablet Take 200 mg by mouth.       lisinopril (PRINIVIL,ZESTRIL) 20 MG tablet TAKE 1 TABLET BY MOUTH DAILY 90 tablet 2     metFORMIN (GLUCOPHAGE-XR) 500 MG 24 hr tablet TAKE 2 TABLETS BY MOUTH DAILY WITH BREAKFAST 180 tablet 1     metoprolol succinate (TOPROL-XL) 50 MG 24 hr tablet TAKE 1 TABLET BY MOUTH DAILY 90 tablet 2     nystatin (MYCOSTATIN) cream apply topically to affected areas 3-4 times per day as needed 60 g 3     tamsulosin (FLOMAX) 0.4 mg Cp24 Take 1 capsule (0.4 mg total) by mouth Daily after breakfast. 90 capsule 3     No current facility-administered medications for this visit.        Past Medical History, Family History, and Social History reviewed.  No past medical history on file.  No past surgical history on file.  Review of patient's allergies indicates no known allergies.  No family history on file.  Social History     Social History     Marital status: Single     Spouse name: N/A     Number of children: N/A     Years of education: N/A     Occupational History     Not on file.     Social History Main Topics     Smoking status: Never Smoker     Smokeless tobacco: Not on file     Alcohol use No     Drug use: No     Sexual activity: Not on file     Other Topics Concern     Not on file     Social History Narrative         Review of systems is as stated in HPI, and the remainder of the 10 system review is otherwise negative.    Objective:     Vitals:    03/28/17 1034   BP: 128/74   Pulse: 78   SpO2: 96%   Weight: (!) 270 lb (122.5 kg)   Height: 6' (1.829 m)    Body mass index is 36.62 kg/(m^2).     Visual Acuity Screening    Right eye Left eye Both eyes   Without correction: 20/20 20/25 20/20   With correction:        General Appearance:    Alert, cooperative, no distress, appears stated age   Head:    Normocephalic, without obvious abnormality, atraumatic   Eyes:    PERRL, EOM's intact   Ears:     Normal TM's and external ear canals   Nose:   Mucosa normal, no drainage     or sinus tenderness   Throat:   Oropharynx is clear   Neck:   Supple, symmetrical, no adenopathy, no thyromegally       Lungs:     Clear to auscultation bilaterally, respirations unlabored   Chest Wall:    No tenderness or deformity    Heart:    Regular rate and rhythm, S1 and S2 normal, no murmur, rub    or gallop       Abdomen:     Soft, non-tender, bowel sounds active all four quadrants,     no masses, no organomegaly           Extremities    Neuro:   Extremities normal, atraumatic, no cyanosis or edema, normal muscular strength  Normal sensation reflexes in extremities    Pulses:   2+ and symmetric all extremities   Skin:   No rashes or lesions         This note has been dictated using voice recognition software. Any grammatical or context distortions are unintentional and inherent to the the software.

## 2021-06-09 NOTE — TELEPHONE ENCOUNTER
Refill Approved    Rx renewed per Medication Renewal Policy. Medication was last renewed on 10/15/19.    Laura Zaldivar, [facility] Triage/Med Refill 7/14/2020     Requested Prescriptions   Pending Prescriptions Disp Refills     amLODIPine (NORVASC) 5 MG tablet [Pharmacy Med Name: AMLODIPINE BESYLATE 5MG TABLETS] 90 tablet 2     Sig: TAKE 1 TABLET(5 MG) BY MOUTH DAILY       Calcium-Channel Blockers Protocol Passed - 7/14/2020 12:38 PM        Passed - PCP or prescribing provider visit in past 12 months or next 3 months     Last office visit with prescriber/PCP: 10/29/2019 Don Armijo MD OR same dept: 10/29/2019 Don Armijo MD OR same specialty: 10/29/2019 Don Armijo MD  Last physical: 2/20/2020 Last MTM visit: Visit date not found   Next visit within 3 mo: Visit date not found  Next physical within 3 mo: Visit date not found  Prescriber OR PCP: Don Armijo MD  Last diagnosis associated with med order: 1. HTN (hypertension)  - amLODIPine (NORVASC) 5 MG tablet [Pharmacy Med Name: AMLODIPINE BESYLATE 5MG TABLETS]; TAKE 1 TABLET(5 MG) BY MOUTH DAILY  Dispense: 90 tablet; Refill: 2    2. Type 2 diabetes mellitus with stage 3 chronic kidney disease, with long-term current use of insulin (H)  - metFORMIN (GLUCOPHAGE-XR) 500 MG 24 hr tablet [Pharmacy Med Name: METFORMIN ER 500MG 24HR TABS]; TAKE 4 TABLETS BY MOUTH EVERY DAY  Dispense: 360 tablet; Refill: 1    3. BPH with urinary obstruction  - tamsulosin (FLOMAX) 0.4 mg cap [Pharmacy Med Name: TAMSULOSIN 0.4MG CAPSULES]; TAKE 1 CAPSULE(0.4 MG) BY MOUTH DAILY AFTER BREAKFAST  Dispense: 90 capsule; Refill: 2    If protocol passes may refill for 12 months if within 3 months of last provider visit (or a total of 15 months).             Passed - Blood pressure filed in past 12 months     BP Readings from Last 1 Encounters:   02/20/20 126/76                metFORMIN (GLUCOPHAGE-XR) 500 MG 24 hr tablet [Pharmacy Med Name: METFORMIN ER 500MG 24HR TABS]  360 tablet 1     Sig: TAKE 4 TABLETS BY MOUTH EVERY DAY       Metformin Refill Protocol Passed - 7/14/2020 12:38 PM        Passed - Blood pressure in last 12 months     BP Readings from Last 1 Encounters:   02/20/20 126/76             Passed - LFT or AST or ALT in last 12 months     Albumin   Date Value Ref Range Status   05/07/2020 4.1 3.5 - 5.0 g/dL Final     Bilirubin, Total   Date Value Ref Range Status   02/20/2020 0.7 0.0 - 1.0 mg/dL Final     Bilirubin, Direct   Date Value Ref Range Status   08/31/2011 0.2 <0.6 mg/dL Final     Alkaline Phosphatase   Date Value Ref Range Status   02/20/2020 103 45 - 120 U/L Final     AST   Date Value Ref Range Status   02/20/2020 14 0 - 40 U/L Final     ALT   Date Value Ref Range Status   05/07/2020 20 0 - 45 U/L Final     Protein, Total   Date Value Ref Range Status   02/20/2020 7.1 6.0 - 8.0 g/dL Final                Passed - GFR or Serum Creatinine in last 6 months     GFR MDRD Non Af Amer   Date Value Ref Range Status   05/07/2020 >60 >60 mL/min/1.73m2 Final     GFR MDRD Af Amer   Date Value Ref Range Status   05/07/2020 >60 >60 mL/min/1.73m2 Final             Passed - Visit with PCP or prescribing provider visit in last 6 months or next 3 months     Last office visit with prescriber/PCP: Visit date not found OR same dept: 10/29/2019 Don Armijo MD OR same specialty: 10/29/2019 Don Armijo MD Last physical: 2/20/2020 Last MTM visit: Visit date not found         Next appt within 3 mo: Visit date not found  Next physical within 3 mo: Visit date not found  Prescriber OR PCP: Don Armijo MD  Last diagnosis associated with med order: 1. HTN (hypertension)  - amLODIPine (NORVASC) 5 MG tablet [Pharmacy Med Name: AMLODIPINE BESYLATE 5MG TABLETS]; TAKE 1 TABLET(5 MG) BY MOUTH DAILY  Dispense: 90 tablet; Refill: 2    2. Type 2 diabetes mellitus with stage 3 chronic kidney disease, with long-term current use of insulin (H)  - metFORMIN (GLUCOPHAGE-XR) 500 MG 24 hr  tablet [Pharmacy Med Name: METFORMIN ER 500MG 24HR TABS]; TAKE 4 TABLETS BY MOUTH EVERY DAY  Dispense: 360 tablet; Refill: 1    3. BPH with urinary obstruction  - tamsulosin (FLOMAX) 0.4 mg cap [Pharmacy Med Name: TAMSULOSIN 0.4MG CAPSULES]; TAKE 1 CAPSULE(0.4 MG) BY MOUTH DAILY AFTER BREAKFAST  Dispense: 90 capsule; Refill: 2     If protocol passes may refill for 12 months if within 3 months of last provider visit (or a total of 15 months).           Passed - A1C in last 6 months     Hemoglobin A1c   Date Value Ref Range Status   02/20/2020 7.7 (H) 3.5 - 6.0 % Final               Passed - Microalbumin in last year      Microalbumin, Random Urine   Date Value Ref Range Status   10/29/2019 <0.50 0.00 - 1.99 mg/dL Final                     tamsulosin (FLOMAX) 0.4 mg cap [Pharmacy Med Name: TAMSULOSIN 0.4MG CAPSULES] 90 capsule 2     Sig: TAKE 1 CAPSULE(0.4 MG) BY MOUTH DAILY AFTER BREAKFAST       Alfuzosin/Tamsulosin/Silodosin Refill Protocol  Passed - 7/14/2020 12:38 PM        Passed - PCP or prescribing provider visit in past 12 months       Last office visit with prescriber/PCP: 10/29/2019 Don Armijo MD OR same dept: 10/29/2019 Don Armijo MD OR same specialty: 10/29/2019 Don Armijo MD  Last physical: 2/20/2020 Last MTM visit: Visit date not found   Next visit within 3 mo: Visit date not found  Next physical within 3 mo: Visit date not found  Prescriber OR PCP: Don Armijo MD  Last diagnosis associated with med order: 1. HTN (hypertension)  - amLODIPine (NORVASC) 5 MG tablet [Pharmacy Med Name: AMLODIPINE BESYLATE 5MG TABLETS]; TAKE 1 TABLET(5 MG) BY MOUTH DAILY  Dispense: 90 tablet; Refill: 2    2. Type 2 diabetes mellitus with stage 3 chronic kidney disease, with long-term current use of insulin (H)  - metFORMIN (GLUCOPHAGE-XR) 500 MG 24 hr tablet [Pharmacy Med Name: METFORMIN ER 500MG 24HR TABS]; TAKE 4 TABLETS BY MOUTH EVERY DAY  Dispense: 360 tablet; Refill: 1    3. BPH with urinary  obstruction  - tamsulosin (FLOMAX) 0.4 mg cap [Pharmacy Med Name: TAMSULOSIN 0.4MG CAPSULES]; TAKE 1 CAPSULE(0.4 MG) BY MOUTH DAILY AFTER BREAKFAST  Dispense: 90 capsule; Refill: 2    If protocol passes may refill for 12 months if within 3 months of last provider visit (or a total of 15 months).

## 2021-06-10 NOTE — TELEPHONE ENCOUNTER
Dr Bundy      Pt would like an appointment reminder letter sent out to him.    His appt is:  Date: 9/21/2020 Status: Ila   Time: 10:00 AM Length: 15   Visit Type: OFFICE VISIT [[id number]] Copay: $0.00   Provider: Elias Bundy MBBS

## 2021-06-11 NOTE — PROGRESS NOTES
"Assessment:    1. Type 2 diabetes mellitus without complication, without long-term current use of insulin  Glycosylated Hemoglobin A1c    metFORMIN (GLUCOPHAGE-XR) 500 MG 24 hr tablet    Basic Metabolic Panel   2. Essential hypertension with goal blood pressure less than 130/80  lisinopril (PRINIVIL,ZESTRIL) 20 MG tablet    Basic Metabolic Panel   3. Other hyperlipidemia     4. Non morbid obesity due to excess calories     5. Peripheral edema  furosemide (LASIX) 40 MG tablet         Plan:    A1c did increase slightly from 6.5% to 6.7% with 7 pound weight gain since March 17, 2017.  Will increase metformin extended release 500 mg from 2 capsules up to 3 capsules each morning.  Stop amlodipine 5 mg due to peripheral edema.  Use furosemide 40 mg each morning ×5 days then as needed.  Increase lisinopril from 20 mg up to 40 mg since discontinuing amlodipine otherwise will continue metoprolol succinate 50 mg daily.  Recheck blood pressure, edema etc. at follow-up just over 3 months.  Patient continues Celebrex 200 mg twice daily and Plaquenil 200 mg twice daily for still's disease.        Subjective:    Thiago Hein is seen today for follow-up evaluation of type 2 diabetes.  Continues metformin extended release 500 mg using 2 capsules each morning.  Lisinopril 20 mg daily, amlodipine 5 mg daily, metoprolol succinate 50 mg daily for hypertension management.  Continues Celebrex and Plaquenil for arthritis associated with still's disease.  History of hyperlipidemia noted.  Has gained 7 pounds since prior office visit.  Notices increased ankle swelling.  Often has urinary frequency at night several hours after going to bed and then frequently after first awakening.  No fevers or chills.  No back pain.  Denies orthopnea or PND symptoms.  Comprehensive review of systems as above otherwise all negative.    Single  1 son - 32 \"Kraig\"  Tobacco: none  EtOH: none  Mom - Meena   Dad - currently 86 Yovanny - DM, CAD, HTN, high " "cholesterol, back problems, arthritis, spinal stenosis, pacemaker/defibrillator  1 bro - Be - HTN  Surgeries: \"lumbar back surgery for cyst removal\" - September, 2008 (Dr. MARY Ahn)  Hospitalizations: sepsis 9/29/15 [place], transferred to 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 No pertinent surgical history.     History reviewed. No pertinent family history.     History reviewed. No pertinent past medical history.     Social History   Substance Use Topics     Smoking status: Never Smoker     Smokeless tobacco: None     Alcohol use No        Current Outpatient Prescriptions   Medication Sig Dispense Refill     celecoxib (CELEBREX) 200 MG capsule Take 200 mg by mouth.       hydroxychloroquine (PLAQUENIL) 200 mg tablet Take 200 mg by mouth.       lisinopril (PRINIVIL,ZESTRIL) 20 MG tablet Take 2 tablets (40 mg total) by mouth daily. 180 tablet 1     metFORMIN (GLUCOPHAGE-XR) 500 MG 24 hr tablet Take 3 tablets (1,500 mg total) by mouth daily with breakfast. 270 tablet 1     metoprolol succinate (TOPROL-XL) 50 MG 24 hr tablet Take 1 tablet (50 mg total) by mouth daily. 90 tablet 3     nystatin (MYCOSTATIN) cream apply topically to affected areas 3-4 times per day as needed 60 g 3     tamsulosin (FLOMAX) 0.4 mg Cp24 Take 1 capsule (0.4 mg total) by mouth Daily after breakfast. 90 capsule 3     furosemide (LASIX) 40 MG tablet  30 each 0     furosemide (LASIX) 40 MG tablet Take 1 tablet (40 mg total) by mouth daily as needed. 30 tablet 2     No current facility-administered medications for this visit.           Objective:    Vitals:    07/14/17 1108   BP: 130/70   Pulse: 64   Weight: (!) 272 lb (123.4 kg)      Body mass index is 36.89 kg/(m^2).    Alert.  No apparent distress.  1+ edema bilateral lower extremities without calf tenderness.  Chest clear.  Cardiac exam regular.  Extremities warm and dry.       "

## 2021-06-12 NOTE — TELEPHONE ENCOUNTER
Refill Approved    Rx renewed per Medication Renewal Policy. Medication was last renewed on 1/20/20.    Laura Zaldivar, [facility] Triage/Med Refill 10/20/2020     Requested Prescriptions   Pending Prescriptions Disp Refills     lisinopriL (PRINIVIL,ZESTRIL) 10 MG tablet 90 tablet 2     Sig: Take 1 tablet (10 mg total) by mouth daily.       Ace Inhibitors Refill Protocol Passed - 10/20/2020  7:11 AM        Passed - PCP or prescribing provider visit in past 12 months       Last office visit with prescriber/PCP: 10/29/2019 Don Armijo MD OR same dept: 10/29/2019 Don Armijo MD OR same specialty: 10/29/2019 Don Armijo MD  Last physical: 2/20/2020 Last MTM visit: Visit date not found   Next visit within 3 mo: Visit date not found  Next physical within 3 mo: Visit date not found  Prescriber OR PCP: Don Armijo MD  Last diagnosis associated with med order: 1. Essential hypertension with goal blood pressure less than 130/80  - lisinopriL (PRINIVIL,ZESTRIL) 10 MG tablet; Take 1 tablet (10 mg total) by mouth daily.  Dispense: 90 tablet; Refill: 2    If protocol passes may refill for 12 months if within 3 months of last provider visit (or a total of 15 months).             Passed - Serum Potassium in past 12 months     Lab Results   Component Value Date    Potassium 4.3 02/20/2020             Passed - Blood pressure filed in past 12 months     BP Readings from Last 1 Encounters:   02/20/20 126/76             Passed - Serum Creatinine in past 12 months     Creatinine   Date Value Ref Range Status   05/07/2020 1.14 0.70 - 1.30 mg/dL Final

## 2021-06-12 NOTE — TELEPHONE ENCOUNTER
Refill Approved    Rx renewed per Medication Renewal Policy. Medication was last renewed on 8/10/19.    Laura Zaldivar, [facility] Triage/Med Refill 10/23/2020     Requested Prescriptions   Pending Prescriptions Disp Refills     atorvastatin (LIPITOR) 20 MG tablet [Pharmacy Med Name: ATORVASTATIN 20MG TABLETS] 90 tablet 3     Sig: TAKE 1 TABLET(20 MG) BY MOUTH AT BEDTIME       Statins Refill Protocol (Hmg CoA Reductase Inhibitors) Passed - 10/21/2020 10:16 AM        Passed - PCP or prescribing provider visit in past 12 months      Last office visit with prescriber/PCP: 10/29/2019 Don Armijo MD OR same dept: 10/29/2019 Don Armijo MD OR same specialty: 10/29/2019 Don Armijo MD  Last physical: 2/20/2020 Last MTM visit: Visit date not found   Next visit within 3 mo: Visit date not found  Next physical within 3 mo: Visit date not found  Prescriber OR PCP: Don Armijo MD  Last diagnosis associated with med order: 1. Hyperlipidemia  - atorvastatin (LIPITOR) 20 MG tablet [Pharmacy Med Name: ATORVASTATIN 20MG TABLETS]; TAKE 1 TABLET(20 MG) BY MOUTH AT BEDTIME  Dispense: 90 tablet; Refill: 3    If protocol passes may refill for 12 months if within 3 months of last provider visit (or a total of 15 months).

## 2021-06-13 NOTE — TELEPHONE ENCOUNTER
30 day supply sent to pharmacy per RN refill protocol    Please call pt to schedule lab appt and follow up appt with Dr Bundy within the next 30 days for future refills

## 2021-06-13 NOTE — TELEPHONE ENCOUNTER
Patient is calling because he needs a refill of his Celebrex. [facility] in [place]. He stated that they have sent refill requests to us. He is completely out and needs this ASAP.

## 2021-06-13 NOTE — TELEPHONE ENCOUNTER
Refill Request  Did you contact pharmacy: Yes  Medication name:   Requested Prescriptions     Pending Prescriptions Disp Refills     metFORMIN (GLUCOPHAGE-XR) 500 MG 24 hr tablet 360 tablet 0     Sig: Take 4 tablets (2,000 mg total) by mouth daily.     Who prescribed the medication: Don Armijo MD    Requested Pharmacy: [facility] # [id number]  Is patient out of medication: Yes, patient thought had refills, has been out for 2 days, Needs ASAP today please.  Patient notified refills processed in 3 business days:  yes  Okay to leave a detailed message: yes

## 2021-06-13 NOTE — TELEPHONE ENCOUNTER
RN cannot approve Refill Request    RN can NOT refill this medication PCP messaged that patient is overdue for Labs and Office Visit and Protocol failed and NO refill given. Last office visit: 10/29/2019 Don Armijo MD Last Physical: 2/20/2020 Last MTM visit: Visit date not found Last visit same specialty: Visit date not found.  Next visit within 3 mo: Visit date not found  Next physical within 3 mo: Visit date not found      Genna John, [facility] Triage/Med Refill 11/27/2020    Requested Prescriptions   Pending Prescriptions Disp Refills     metFORMIN (GLUCOPHAGE-XR) 500 MG 24 hr tablet 360 tablet 0     Sig: Take 4 tablets (2,000 mg total) by mouth daily.       Metformin Refill Protocol Failed - 11/27/2020 10:42 AM        Failed - Visit with PCP or prescribing provider visit in last 6 months or next 3 months     Last office visit with prescriber/PCP: Visit date not found OR same dept: Visit date not found OR same specialty: Visit date not found Last physical: Visit date not found Last MTM visit: Visit date not found         Next appt within 3 mo: Visit date not found  Next physical within 3 mo: Visit date not found  Prescriber OR PCP: Don Armijo MD  Last diagnosis associated with med order: 1. Type 2 diabetes mellitus with stage 3 chronic kidney disease, with long-term current use of insulin (H)  - metFORMIN (GLUCOPHAGE-XR) 500 MG 24 hr tablet; Take 4 tablets (2,000 mg total) by mouth daily.  Dispense: 360 tablet; Refill: 0     If protocol passes may refill for 12 months if within 3 months of last provider visit (or a total of 15 months).           Failed - A1C in last 6 months     Hemoglobin A1c   Date Value Ref Range Status   02/20/2020 7.7 (H) 3.5 - 6.0 % Final               Failed - Microalbumin in last year      Microalbumin, Random Urine   Date Value Ref Range Status   10/29/2019 <0.50 0.00 - 1.99 mg/dL Final                  Passed - Blood pressure in last 12 months     BP Readings  from Last 1 Encounters:   02/20/20 126/76             Passed - LFT or AST or ALT in last 12 months     Albumin   Date Value Ref Range Status   05/07/2020 4.1 3.5 - 5.0 g/dL Final     Bilirubin, Total   Date Value Ref Range Status   02/20/2020 0.7 0.0 - 1.0 mg/dL Final     Bilirubin, Direct   Date Value Ref Range Status   08/31/2011 0.2 <0.6 mg/dL Final     Alkaline Phosphatase   Date Value Ref Range Status   02/20/2020 103 45 - 120 U/L Final     AST   Date Value Ref Range Status   02/20/2020 14 0 - 40 U/L Final     ALT   Date Value Ref Range Status   05/07/2020 20 0 - 45 U/L Final     Protein, Total   Date Value Ref Range Status   02/20/2020 7.1 6.0 - 8.0 g/dL Final                Passed - GFR or Serum Creatinine in last 6 months     GFR MDRD Non Af Amer   Date Value Ref Range Status   05/07/2020 >60 >60 mL/min/1.73m2 Final     GFR MDRD Af Amer   Date Value Ref Range Status   05/07/2020 >60 >60 mL/min/1.73m2 Final

## 2021-06-13 NOTE — PROGRESS NOTES
Assessment:    1. Type 2 diabetes mellitus  Glycosylated Hemoglobin A1c    Basic Metabolic Panel    Microalbumin, Random Urine   2. Essential hypertension with goal blood pressure less than 130/80  lisinopril (PRINIVIL,ZESTRIL) 20 MG tablet    Basic Metabolic Panel   3. Hyperlipidemia  atorvastatin (LIPITOR) 20 MG tablet    Lipid Cascade   4. Need for immunization against influenza  Influenza High Dose, Seasonal 65+ yrs   5. Peripheral edema  furosemide (LASIX) 40 MG tablet   6. Vitamin D insufficiency  Vitamin D, Total (25-Hydroxy)         Plan:    A1c stable at 6.7%.  Did instruct patient to try increasing Metformin extended release 500 mg from 2 tablets up to 3 tablets daily with reassessment of follow-up in 4 months.  Reviewed medication for blood pressure and patient is to ensure that he had stopped amlodipine.  Since blood pressure currently good continue lisinopril 20 mg which he had only been using 1 tablet daily as well as metoprolol succinate 50 mg daily.  Will remain on furosemide 40 mg a.m. for peripheral edema management may use additional dose at noon on as-needed basis however try to use infrequently.  Refills provided.  Flu shot given.  Initiate atorvastatin 20 mg daily for lipid management.  Check lipid cascade today.  Repeat at follow-up in 4 months.        Subjective:    Thiago Hein is seen today for follow-up evaluation.  Type 2 diabetes.  Slow increase of A1c's noted historically with most recent 6.7% July 14, 2017.  States not certain that he stopped amlodipine previously with prior peripheral edema concerns.  States only using lisinopril 20 mg daily instead of 40 mg dose.  Continues metoprolol succinate 50 mg daily.  Tamsulosin 0.4 mg daily.  Plaquenil and Celebrex for still's disease management with arthritis.  Has lost 9 pounds since prior office visit.  Vitamin D insufficiency with level of 25.7 March 17, 2017.  Will check vitamin D level today.    Single  1 son - 32  "\"Kraig\"  Tobacco: none  EtOH: none  Rosario Robledo   Dad - currently 86 Yovanny - DM, CAD, HTN, high cholesterol, back problems, arthritis, spinal stenosis, pacemaker/defibrillator  1 bro - Be - HTN  Surgeries: \"lumbar back surgery for cyst removal\" - September, 2008 (Dr. 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 No pertinent surgical history.     History reviewed. No pertinent family history.     History reviewed. No pertinent past medical history.     Social History   Substance Use Topics     Smoking status: Never Smoker     Smokeless tobacco: None     Alcohol use No        Current Outpatient Prescriptions   Medication Sig Dispense Refill     celecoxib (CELEBREX) 200 MG capsule Take 200 mg by mouth.       hydroxychloroquine (PLAQUENIL) 200 mg tablet Take 200 mg by mouth.       lisinopril (PRINIVIL,ZESTRIL) 20 MG tablet Take 1 tablet (20 mg total) by mouth daily. 90 tablet 3     metFORMIN (GLUCOPHAGE-XR) 500 MG 24 hr tablet Take 3 tablets (1,500 mg total) by mouth daily with breakfast. 270 tablet 1     metoprolol succinate (TOPROL-XL) 50 MG 24 hr tablet Take 1 tablet (50 mg total) by mouth daily. 90 tablet 3     atorvastatin (LIPITOR) 20 MG tablet Take 1 tablet (20 mg total) by mouth at bedtime. 90 tablet 3     furosemide (LASIX) 40 MG tablet Take 1 tablet (40 mg total) by mouth daily. 90 tablet 3     nystatin (MYCOSTATIN) cream apply topically to affected areas 3-4 times per day as needed 60 g 3     tamsulosin (FLOMAX) 0.4 mg Cp24 Take 1 capsule (0.4 mg total) by mouth Daily after breakfast. 90 capsule 3     No current facility-administered medications for this visit.           Objective:    Vitals:    10/26/17 1007   BP: 120/70   Pulse: 64   Weight: (!) 263 lb (119.3 kg)      Body mass index is 35.67 kg/(m^2).    Alert.  No apparent distress.  Blood pressure " 120/70.  Chest clear.  Cardiac exam regular.  Extremities warm and dry.  Trace to 1+ peripheral edema.  No calf tenderness.  Symmetric exam.

## 2021-06-13 NOTE — PROGRESS NOTES
"Thiago Hein is a 70 y.o. male who is being evaluated via a billable telephone visit.      The patient has been notified of following:     \"This telephone visit will be conducted via a call between you and your physician/provider. We have found that certain health care needs can be provided without the need for a physical exam.  This service lets us provide the care you need with a short phone conversation.  If a prescription is necessary we can send it directly to your pharmacy.  If lab work is needed we can place an order for that and you can then stop by our lab to have the test done at a later time.    Telephone visits are billed at different rates depending on your insurance coverage. During this emergency period, for some insurers they may be billed the same as an in-person visit.  Please reach out to your insurance provider with any questions.    If during the course of the call the physician/provider feels a telephone visit is not appropriate, you will not be charged for this service.\"    Patient has given verbal consent to a Telephone visit? Yes    What phone number would you like to be contacted at? [phone]     Patient would like to receive their AVS by AVS Preference: Cherelle.          This document was created using a software with less than 100% fidelity, at times resulting in unintended, even erroneous syntax and grammar.  The reader is advised to keep this under consideration while reviewing, interpreting this note.    ASSESSMENT AND PLAN:    Diagnoses and all orders for this visit:    Primary osteoarthritis involving multiple joints    Seronegative rheumatoid arthritis (H)  -     hydrOXYchloroQUINE (PLAQUENIL) 200 mg tablet; Take 1 tablet (200 mg total) by mouth 2 (two) times a day.  Dispense: 180 tablet; Refill: 0    Polyarthralgia          HISTORY OF PRESENTING ILLNESS:  Thiago Hein 70 y.o. is evaluated here via phone link for follow-up.  He has rheumatoid arthritis, osteoarthritis. "  His rheumatoid arthritis is longstanding, multiple joints affected, this is not associated with autoimmunity signals, hands he is seronegative.  Is good response to current regimen.  Of recently noted discomfort in his fingers such as the right middle which may be locking, for example holding the setting of easily finds it hard to straighten his fingers.  In the morning he has stiffness for 30 minutes.  He noted no swelling.  He has noted pain in the knees.  This is worse with activity and at nighttime.  This is bilateral.  Moderately severe.  He has not tried over-the-counter measures yet. He is not taking duloxetine anymore.  We discussed various options he does not want to take prednisone.  The likelihood of knee pain emanating from osteoarthritis is discussed.  He is going to start him with acetaminophen sustained-release if that does not provide relief consider corticosteroid injections.  Hydroxychloroquine interaction with some of the commonly used drugs including Z-Bryson discussed eye examination outlined.  Follow-up here in 3 months.   ROS enquiry held for fever, ocular symptoms, rash, headache,  GI issues.  Today we also discussed the issues related to the current pandemic, the pros and cons of the current treatment plan, the CDC guidelines such as social distancing washing the hands covering the cough.  ALLERGIES:Patient has no known allergies.    PAST MEDICAL/ACTIVE PROBLEMS/MEDICATION/SOCIAL DATA  No past medical history on file.  Social History     Tobacco Use   Smoking Status Never Smoker   Smokeless Tobacco Former User     Patient Active Problem List   Diagnosis     Type 2 diabetes mellitus (H)     Obesity     Hypertension     Lower Back Pain     Emotional Lability     Adult Still's Disease     Epidural abscess     Sepsis (H)     Arm DVT (deep venous thromboembolism), acute, left (H)     Hyperlipidemia     Diverticulosis     Peripheral edema     Non morbid obesity due to excess calories     Essential  hypertension with goal blood pressure less than 130/80     Unintentional weight loss     Polyarthralgia     Polyarthritis     Trigger finger, right index finger     Left carpal tunnel syndrome     Rheumatoid arthritis with negative rheumatoid factor, involving unspecified site (H)     Primary osteoarthritis involving multiple joints     BPH with urinary obstruction     Seronegative rheumatoid arthritis (H)     Current Outpatient Medications   Medication Sig Dispense Refill     amLODIPine (NORVASC) 5 MG tablet TAKE 1 TABLET(5 MG) BY MOUTH DAILY 90 tablet 2     atorvastatin (LIPITOR) 20 MG tablet TAKE 1 TABLET(20 MG) BY MOUTH AT BEDTIME 90 tablet 1     celecoxib (CELEBREX) 100 MG capsule TAKE 1 CAPSULE(100 MG) BY MOUTH TWICE DAILY 60 capsule 0     DULoxetine (CYMBALTA) 30 MG capsule Take 1 capsule (30 mg total) by mouth daily. 30 capsule 2     furosemide (LASIX) 40 MG tablet TAKE 1 TABLET(40 MG) BY MOUTH DAILY 90 tablet 3     hydrOXYchloroQUINE (PLAQUENIL) 200 mg tablet TAKE 1 TABLET(200 MG) BY MOUTH TWICE DAILY 180 tablet 0     lisinopriL (PRINIVIL,ZESTRIL) 10 MG tablet Take 1 tablet (10 mg total) by mouth daily. 90 tablet 0     metFORMIN (GLUCOPHAGE-XR) 500 MG 24 hr tablet Take 4 tablets (2,000 mg total) by mouth daily. 360 tablet 0     tamsulosin (FLOMAX) 0.4 mg cap TAKE 1 CAPSULE(0.4 MG) BY MOUTH DAILY AFTER BREAKFAST 90 capsule 2     No current facility-administered medications for this visit.          EXAMINATION: On the phone he noted that he is able to fully flex the digits, into fists bilaterally, wrist and  elbow range of motion appear normal, abduction of the shoulder is normal.      LAB / IMAGING DATA:  ALT   Date Value Ref Range Status   11/27/2020 18 0 - 45 U/L Final   05/07/2020 20 0 - 45 U/L Final   02/20/2020 23 0 - 45 U/L Final     Albumin   Date Value Ref Range Status   11/27/2020 4.0 3.5 - 5.0 g/dL Final   05/07/2020 4.1 3.5 - 5.0 g/dL Final   02/20/2020 4.4 3.5 - 5.0 g/dL Final     Creatinine    Date Value Ref Range Status   11/27/2020 0.91 0.70 - 1.30 mg/dL Final   05/07/2020 1.14 0.70 - 1.30 mg/dL Final   02/20/2020 0.88 0.70 - 1.30 mg/dL Final       WBC   Date Value Ref Range Status   11/27/2020 9.2 4.0 - 11.0 thou/uL Final   05/07/2020 7.9 4.0 - 11.0 thou/uL Final     Hemoglobin   Date Value Ref Range Status   11/27/2020 14.9 14.0 - 18.0 g/dL Final   05/07/2020 15.2 14.0 - 18.0 g/dL Final   02/20/2020 16.3 14.0 - 18.0 g/dL Final     Platelets   Date Value Ref Range Status   11/27/2020 242 140 - 440 thou/uL Final   05/07/2020 205 140 - 440 thou/uL Final   02/20/2020 212 140 - 440 thou/uL Final       Lab Results   Component Value Date    RF <15.0 07/20/2018    SEDRATE 7 07/20/2018     Duration of the call:9  Minutes  Call start: 921am  Call end:   930am

## 2021-06-14 NOTE — TELEPHONE ENCOUNTER
Refill Approved    Rx renewed per Medication Renewal Policy. Medication was last renewed on 12/16/19.    [facility], [facility] Triage/Med Refill 1/19/2021     Requested Prescriptions   Pending Prescriptions Disp Refills     furosemide (LASIX) 40 MG tablet 90 tablet 3     Sig: Take 1 tablet (40 mg total) by mouth daily.       Diuretics/Combination Diuretics Refill Protocol  Passed - 1/18/2021  3:11 PM        Passed - Visit with PCP or prescribing provider visit in past 12 months     Last office visit with prescriber/PCP: 12/29/2020 Don Armijo MD OR same dept: 12/29/2020 Don Armijo MD OR same specialty: 12/29/2020 Don Armijo MD  Last physical: 2/20/2020 Last MTM visit: Visit date not found   Next visit within 3 mo: Visit date not found  Next physical within 3 mo: Visit date not found  Prescriber OR PCP: Don Armijo MD  Last diagnosis associated with med order: 1. Peripheral edema  - furosemide (LASIX) 40 MG tablet; Take 1 tablet (40 mg total) by mouth daily.  Dispense: 90 tablet; Refill: 3    If protocol passes may refill for 12 months if within 3 months of last provider visit (or a total of 15 months).             Passed - Serum Potassium in past 12 months      Lab Results   Component Value Date    Potassium 4.5 12/29/2020             Passed - Serum Sodium in past 12 months      Lab Results   Component Value Date    Sodium 141 12/29/2020             Passed - Blood pressure on file in past 12 months     BP Readings from Last 1 Encounters:   12/29/20 135/80             Passed - Serum Creatinine in past 12 months      Creatinine   Date Value Ref Range Status   12/29/2020 0.94 0.70 - 1.30 mg/dL Final

## 2021-06-14 NOTE — PROGRESS NOTES
Assessment/Plan:    1. Type 2 diabetes mellitus without complication, without long-term current use of insulin (H)  A1C 02/20/20 was 7.7. Today is 7.2. Encouraged ongoing active lifestyle and healthy diet.   - Glycosylated Hemoglobin A1c  - Microalbumin, Random Urine  - Basic Metabolic Panel    2. Hypertension  Stable.   - Basic Metabolic Panel  - lisinopriL (PRINIVIL,ZESTRIL) 10 MG tablet; Take 1 tablet (10 mg total) by mouth daily.  Dispense: 90 tablet; Refill: 3    3. Hyperlipidemia  On Atorvastatin 20 mg every day.   - Patient is not fasting today. Will check in spring on AWV.     4. Weight Check.   On 02/20/20 weight was 264. Today is 259. Encouraged active lifestyle and healthy diet with initial weight goal of <250 lbs and final goal of <240 lbs.     5. Polyarthralgia  Stable. On Celecoxib and Duloxetine.     6. Seronegative rheumatoid arthritis (H)  Stable. Sees Rheumatology. On Celecoxib and Hydroxychloroquine.     7. Primary osteoarthritis involving multiple joints  Stable. On Celecoxib.     8. BPH with urinary obstruction  On Tamsulosin.     9. Peripheral edema  No complaints. On lasix.     10. Diverticulosis  Stable.     11. Encounter for immunization  The patient was in agreement with this.   - Influenza,Quad,High Dose,PF 65 YR+    I have had an Advance Directives discussion with the patient.         Subjective:    Thiago Hein is seen today for diabetes recheck. The patient is doing well overall. He has no health concerns today. He states that he was seen by ophthalmology 1 week ago and that there were no concerns at that time. He has otherwise been in his usual state of healthy.  Currently utilizing Metformin extended release 500 mg using 3 tablets in the morning 1 tablet in the evening.  Also continues to use Celebrex, hydroxychloroquine and duloxetine with ongoing management by rheumatology for seronegative rheumatoid arthritis..  Amlodipine 5 mg daily for hypertension as well as lisinopril 10  mg daily.  Continued use of atorvastatin 20 mg daily for lipid management.  Patient was able to provide urine specimen today for microalbumin screening.  Needs flu shot at this time.  Denies need for refills however wants to ensure that they are up-to-date.  Believes he is using furosemide 40 mg each morning however not certain.  No recent peripheral edema issues.  Comprehensive review of systems as above otherwise all negative.    No past surgical history on file.     No family history on file.     No past medical history on file.     Social History     Tobacco Use     Smoking status: Never Smoker     Smokeless tobacco: Former User   Substance Use Topics     Alcohol use: No     Drug use: No        Current Outpatient Medications   Medication Sig Dispense Refill     amLODIPine (NORVASC) 5 MG tablet TAKE 1 TABLET(5 MG) BY MOUTH DAILY 90 tablet 2     atorvastatin (LIPITOR) 20 MG tablet TAKE 1 TABLET(20 MG) BY MOUTH AT BEDTIME 90 tablet 1     celecoxib (CELEBREX) 100 MG capsule TAKE 1 CAPSULE(100 MG) BY MOUTH TWICE DAILY 180 capsule 0     DULoxetine (CYMBALTA) 30 MG capsule Take 1 capsule (30 mg total) by mouth daily. 30 capsule 2     furosemide (LASIX) 40 MG tablet TAKE 1 TABLET(40 MG) BY MOUTH DAILY 90 tablet 3     hydrOXYchloroQUINE (PLAQUENIL) 200 mg tablet Take 1 tablet (200 mg total) by mouth 2 (two) times a day. 180 tablet 0     lisinopriL (PRINIVIL,ZESTRIL) 10 MG tablet Take 1 tablet (10 mg total) by mouth daily. 90 tablet 3     metFORMIN (GLUCOPHAGE-XR) 500 MG 24 hr tablet Take 4 tablets (2,000 mg total) by mouth daily. 360 tablet 0     tamsulosin (FLOMAX) 0.4 mg cap TAKE 1 CAPSULE(0.4 MG) BY MOUTH DAILY AFTER BREAKFAST 90 capsule 2     No current facility-administered medications for this visit.           Objective:    Vitals:    12/29/20 1115 12/29/20 1148   BP: 138/70 135/80   Pulse: 88    Temp: 97.4  F (36.3  C)    SpO2: 98%    Weight: (!) 259 lb (117.5 kg)       Body mass index is 35.37 kg/m .    Awake,  alert, and oriented x3. No acute distress.  Chest CTA.  Cardiac RRR.  Extremities warm and dry.      This note has been dictated using voice recognition software and as a result may contain minor grammatical errors and unintended word substitutions.

## 2021-06-15 NOTE — PROGRESS NOTES
Chief Complaint   Patient presents with     Recurrent Skin Infections     X2 DAYS- REALLY ITCHY, PAINFUL.        ASSESSMENT & PLAN:   Diagnoses and all orders for this visit:    Bilateral groin pain  -     acetaminophen tablet 650 mg (TYLENOL)    Cellulitis of other specified site  -     acetaminophen tablet 650 mg (TYLENOL)    Tinea cruris    Morbid obesity (H)      Patient with what appears to be rapidly expanding severe Candida infection superinfected with cellulitis tracking near the groin and testicular area, right greater than left with tenderness and swelling.  Concern for Morro's risk.  Patient has type 2 diabetes.  There is loss of the top layer of skin.  Pain is disproportionate to what you would expect with simple Candida infection.  Recommended emergency department for evaluation and treatment.    Spoke with Dr. Perdomo about patient.  Patient states he will go to [facility] emergency room.  Instructions given on how to get there.      Dr. Donahue in the walk-in clinic was asked to double check necessity for emergency room and she was in agreement with my plan.    SUBJECTIVE    HPI:  HPI  Thiago Hein presents to the walk-in clinic with   Chief Complaint   Patient presents with     Recurrent Skin Infections     X2 DAYS- REALLY ITCHY, PAINFUL.      Itching in groin area noticed 2 days ago.  Patient states he thinks he scratched very hard and may have opened his own skin.  Over the last 2 days, area has become swollen, tender, painful and erythema has been spreading.  Right side is much worse than the left groin area.  Denies fever, chills.    Associated with: Hx Type II DM. Last A1c in Dec was 7.2%    See ROS for additional symptoms and/or pertinent negatives.       History obtained from the patient.      Review of Systems    No other complaints    OBJECTIVE    Vitals:    03/04/21 1610   BP: 133/78   Patient Site: Right Arm   Patient Position: Sitting   Cuff Size: Adult Large   Pulse: 88    Resp: 18   SpO2: 95%       Physical Exam  Constitutional:       General: He is not in acute distress.     Appearance: He is well-developed.      Comments: Bright red face.  Patient states this is normal for him.   HENT:      Right Ear: External ear normal.      Left Ear: External ear normal.   Eyes:      General:         Right eye: No discharge.         Left eye: No discharge.      Conjunctiva/sclera: Conjunctivae normal.   Pulmonary:      Effort: Pulmonary effort is normal.   Musculoskeletal: Normal range of motion.   Skin:     General: Skin is warm and dry.      Capillary Refill: Capillary refill takes less than 2 seconds.      Findings: Erythema (Fiery red erythematous area with loss of top skin layer located in entirety of right groin extending out approximately 1 to 2 inches from either side and tracking near the perineum with satellite lesions.  Secondary light pink erythema tracking out ) and rash present.      Comments: Swollen, firm area located along the lateral portion of the right groin area erythema.   Neurological:      Mental Status: He is alert and oriented to person, place, and time.   Psychiatric:         Mood and Affect: Mood normal.         Behavior: Behavior normal.         Thought Content: Thought content normal.         Judgment: Judgment normal.         Labs/EKG:          Radiology:    No results found.    PATIENT INSTRUCTIONS:   Patient Instructions   Please go to [facility] ER to have your red, painful areas better evaluated and proper treatment started.

## 2021-06-15 NOTE — TELEPHONE ENCOUNTER
Reason for call:  Patient reporting a symptom    Symptom or request: Itching in groin area, washed the area well with soap now also having swelling, burning and raised bumps. Had Triamcinolone ointment at home and has been using that, okay to use?     Duration (how long have symptoms been present): a little while    Have you been treated for this before? No    Additional comments: advice over phone or appointment?    Phone Number patient can be reached at:  Home number on file [phone] (home)    Best Time:  anytime3    Can we leave a detailed message on this number: Yes    Call taken on 3/4/2021 at 8:47 AM by Fina Farrell

## 2021-06-15 NOTE — TELEPHONE ENCOUNTER
Refill Approved    Rx renewed per Medication Renewal Policy. Medication was last renewed on 11/27/20.    [place], [facility] Triage/Med Refill 3/16/2021     Requested Prescriptions   Pending Prescriptions Disp Refills     metFORMIN (GLUCOPHAGE-XR) 500 MG 24 hr tablet [Pharmacy Med Name: METFORMIN ER 500MG 24HR TABS] 360 tablet 0     Sig: TAKE 4 TABLETS(2000 MG) BY MOUTH DAILY       Metformin Refill Protocol Passed - 3/15/2021  1:27 PM        Passed - Blood pressure in last 12 months     BP Readings from Last 1 Encounters:   03/12/21 110/64             Passed - LFT or AST or ALT in last 12 months     Albumin   Date Value Ref Range Status   03/04/2021 4.0 3.5 - 5.0 g/dL Final     Bilirubin, Total   Date Value Ref Range Status   03/04/2021 0.6 0.0 - 1.0 mg/dL Final     Bilirubin, Direct   Date Value Ref Range Status   08/31/2011 0.2 <0.6 mg/dL Final     Alkaline Phosphatase   Date Value Ref Range Status   03/04/2021 104 45 - 120 U/L Final     AST   Date Value Ref Range Status   03/04/2021 12 0 - 40 U/L Final     ALT   Date Value Ref Range Status   03/04/2021 19 0 - 45 U/L Final     Protein, Total   Date Value Ref Range Status   03/04/2021 6.8 6.0 - 8.0 g/dL Final                Passed - GFR or Serum Creatinine in last 6 months     GFR MDRD Non Af Amer   Date Value Ref Range Status   03/04/2021 >60 >60 mL/min/1.73m2 Final     GFR MDRD Af Amer   Date Value Ref Range Status   03/04/2021 >60 >60 mL/min/1.73m2 Final             Passed - Visit with PCP or prescribing provider visit in last 6 months or next 3 months     Last office visit with prescriber/PCP: 12/29/2020 OR same dept: Visit date not found OR same specialty: Visit date not found Last physical: Visit date not found Last MTM visit: Visit date not found         Next appt within 3 mo: Visit date not found  Next physical within 3 mo: Visit date not found  Prescriber OR PCP: Don Armijo MD  Last diagnosis associated with med order: 1. Type 2 diabetes  mellitus with stage 3 chronic kidney disease, with long-term current use of insulin (H)  - metFORMIN (GLUCOPHAGE-XR) 500 MG 24 hr tablet [Pharmacy Med Name: METFORMIN ER 500MG 24HR TABS]; TAKE 4 TABLETS(2000 MG) BY MOUTH DAILY  Dispense: 360 tablet; Refill: 0     If protocol passes may refill for 12 months if within 3 months of last provider visit (or a total of 15 months).           Passed - A1C in last 6 months     Hemoglobin A1c   Date Value Ref Range Status   12/29/2020 7.2 (H) <=5.6 % Final               Passed - Microalbumin in last year      Microalbumin, Random Urine   Date Value Ref Range Status   12/29/2020 <0.50 0.00 - 1.99 mg/dL Final

## 2021-06-15 NOTE — PROGRESS NOTES
ASSESSMENT:  1. Tinea cruris  Affecting the groin perineal and buttock area.  - ketoconazole (NIZORAL) 2 % cream; Apply topically 2 (two) times a day. Apply to groin area twice daily after cleansing with warm soapy water and drying.  Dispense: 60 g; Refill: 1        PLAN:  1.  Continue and renew the ketoconazole cream  2.  I explained to the patient that this may take a good month for this to resolve.  3.  Follow-up with his primary for his comorbidities.       No orders of the defined types were placed in this encounter.    Medications Discontinued During This Encounter   Medication Reason     ketoconazole (NIZORAL) 2 % cream Reorder       No follow-ups on file.    CHIEF COMPLAINT:  Chief Complaint   Patient presents with     Hospital Visit Follow Up     03/04/2021       SUBJECTIVE:  Thiago is a 71 y.o. male patient was seen in the emergency room on March 4 for a rash in the groin area diagnosed with tinea cruris and started on ketoconazole, which she has been on for about a week.  Patient reports that he does not think it is really gotten any better though perhaps some discomfort is improved.  On examination the patient does have extensive tinea cruris in the inguinal folds scrotal area and then the buttock crease, I explained to the patient that this is going to take some time to resolve the fungal cream is going to help, his underlying diabetes will probably slow the healing.    The rash can be uncomfortable at times and sometimes itchy.  The patient reports the rash was there probably a few weeks prior to him being evaluated.  He does not have a history of fungal infections.    Otherwise no other change in his health status.      REVIEW OF SYSTEMS:      All other systems are negative.    PFSH:  Immunization History   Administered Date(s) Administered     Influenza high dose,seasonal,PF, 65+ yrs 10/26/2017, 11/08/2018, 02/20/2020     Influenza,quad,high Dose,PF, 65yr + 12/29/2020     Pneumo Conj 13-V  (2010&after) 12/31/2015     Pneumo Polysac 23-V 10/01/2015, 10/20/2015     Tdap 05/17/2011     ZOSTER, LIVE 12/22/2015     Social History     Socioeconomic History     Marital status: Single     Spouse name: Not on file     Number of children: Not on file     Years of education: Not on file     Highest education level: Not on file   Occupational History     Not on file   Social Needs     Financial resource strain: Not on file     Food insecurity     Worry: Not on file     Inability: Not on file     Transportation needs     Medical: Not on file     Non-medical: Not on file   Tobacco Use     Smoking status: Never Smoker     Smokeless tobacco: Former User   Substance and Sexual Activity     Alcohol use: No     Drug use: No     Sexual activity: Not on file   Lifestyle     Physical activity     Days per week: Not on file     Minutes per session: Not on file     Stress: Not on file   Relationships     Social connections     Talks on phone: Not on file     Gets together: Not on file     Attends Restorationism service: Not on file     Active member of club or organization: Not on file     Attends meetings of clubs or organizations: Not on file     Relationship status: Not on file     Intimate partner violence     Fear of current or ex partner: Not on file     Emotionally abused: Not on file     Physically abused: Not on file     Forced sexual activity: Not on file   Other Topics Concern     Not on file   Social History Narrative    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      No past medical history on file.  No family history on file.    MEDICATIONS:  Current Outpatient Medications   Medication Sig Dispense Refill     amLODIPine (NORVASC) 5 MG tablet TAKE 1 TABLET(5 MG) BY MOUTH DAILY 90 tablet 2     atorvastatin (LIPITOR) 20 MG tablet TAKE 1 TABLET(20 MG) BY MOUTH AT BEDTIME 90 tablet 1     celecoxib (CELEBREX) 100 MG capsule TAKE 1 CAPSULE(100 MG) BY MOUTH TWICE DAILY 180 capsule 0     furosemide  (LASIX) 40 MG tablet Take 1 tablet (40 mg total) by mouth daily. 90 tablet 3     hydrOXYchloroQUINE (PLAQUENIL) 200 mg tablet Take 1 tablet (200 mg total) by mouth 2 (two) times a day. 180 tablet 0     ketoconazole (NIZORAL) 2 % cream Apply topically 2 (two) times a day. Apply to groin area twice daily after cleansing with warm soapy water and drying. 60 g 1     lisinopriL (PRINIVIL,ZESTRIL) 10 MG tablet Take 1 tablet (10 mg total) by mouth daily. 90 tablet 3     metFORMIN (GLUCOPHAGE-XR) 500 MG 24 hr tablet Take 4 tablets (2,000 mg total) by mouth daily. 360 tablet 0     tamsulosin (FLOMAX) 0.4 mg cap TAKE 1 CAPSULE(0.4 MG) BY MOUTH DAILY AFTER BREAKFAST 90 capsule 2     No current facility-administered medications for this visit.        TOBACCO USE:  Social History     Tobacco Use   Smoking Status Never Smoker   Smokeless Tobacco Former User       VITALS:  Vitals:    03/12/21 1044   BP: 110/64   Patient Site: Right Arm   Patient Position: Sitting   Cuff Size: Adult Large   Pulse: 82   Weight: (!) 264 lb 11.2 oz (120.1 kg)     Wt Readings from Last 3 Encounters:   03/12/21 (!) 264 lb 11.2 oz (120.1 kg)   03/04/21 (!) 260 lb (117.9 kg)   12/29/20 (!) 259 lb (117.5 kg)       PHYSICAL EXAM:  Constitutional:   Reveals a male who appears his stated age.  Vitals: per nursing notes.  HEENT:  Ears:  External canals, TMs clear.    Eyes:  EOMs full, PERRL.  Musculoskeletal: No peripheral swelling.  Neuro:  Alert and oriented. Cranial nerves, motor, sensory exams are intact.  No gross focal deficits.  Psychiatric:  Memory intact, mood appropriate.  Skin examination.  There is indeed a fairly bright red rash in the bilateral inguinal folds, the scrotal and perineal area as well as the buttock crease.    QUALITY MEASURES:      DATA REVIEWED:

## 2021-06-15 NOTE — PROGRESS NOTES
Thiago Hein is a 71 y.o. male who is being evaluated via a billable telephone visit.      What phone number would you like to be contacted at? [phone]  How would you like to obtain your AVS? AVS Preference: Mail a copy.  Phone call duration: 8 minutes    This document was created using a software with less than 100% fidelity, at times resulting in unintended, even erroneous syntax and grammar.  The reader is advised to keep this under consideration while reviewing, interpreting this note.           ASSESSMENT AND PLAN:    Diagnoses and all orders for this visit:    Seronegative rheumatoid arthritis (H)  -     hydrOXYchloroQUINE (PLAQUENIL) 200 mg tablet; Take 1 tablet (200 mg total) by mouth 2 (two) times a day.  Dispense: 180 tablet; Refill: 0    Primary osteoarthritis involving multiple joints  -     celecoxib (CELEBREX) 100 MG capsule; TAKE 1 CAPSULE(100 MG) BY MOUTH TWICE DAILY  Dispense: 180 capsule; Refill: 0    Polyarthralgia  -     celecoxib (CELEBREX) 100 MG capsule; TAKE 1 CAPSULE(100 MG) BY MOUTH TWICE DAILY  Dispense: 180 capsule; Refill: 0        Follow up in 6 months      HISTORY OF PRESENTING ILLNESS:  Thiago Hein 71 y.o. is evaluated here via phone link.  This is for follow-up.  And he has rheumatoid arthritis, osteoarthritis, intermittent pain in his knees still troubled him with activity and sometimes at nighttime, he is on hydroxychloroquine, is going for his eye exam in the next few weeks, he is on a Celebrex, having tolerated this well, reminded him of interactions in the background of his antihypertensives including lisinopril, most recent labs including liver and kidney function have remained intact.  He never took duloxetine.  He did not want to start a new medication during Covid pandemic.  This will be taken off his list.  He is to stay the course.  We will meet here in 6 months.  We discussed the possibility of corticosteroid injections for the knees.       ROS enquiry  held for fever, ocular symptoms, rash, headache,  GI issues.  Today we also discussed the issues related to the current pandemic, the pros and cons of the current treatment plan, the CDC guidelines such as social distancing washing the hands covering the cough.  ALLERGIES:Patient has no known allergies.    PAST MEDICAL/ACTIVE PROBLEMS/MEDICATION/SOCIAL DATA  No past medical history on file.  Social History     Tobacco Use   Smoking Status Never Smoker   Smokeless Tobacco Former User     Patient Active Problem List   Diagnosis     Type 2 diabetes mellitus (H)     Obesity     Hypertension     Lower Back Pain     Emotional Lability     Adult Still's Disease     Epidural abscess     Sepsis (H)     Arm DVT (deep venous thromboembolism), acute, left (H)     Hyperlipidemia     Diverticulosis     Peripheral edema     Non morbid obesity due to excess calories     Essential hypertension with goal blood pressure less than 130/80     Unintentional weight loss     Polyarthralgia     Trigger finger, right index finger     Left carpal tunnel syndrome     Primary osteoarthritis involving multiple joints     BPH with urinary obstruction     Seronegative rheumatoid arthritis (H)     Current Outpatient Medications   Medication Sig Dispense Refill     amLODIPine (NORVASC) 5 MG tablet TAKE 1 TABLET(5 MG) BY MOUTH DAILY 90 tablet 2     atorvastatin (LIPITOR) 20 MG tablet TAKE 1 TABLET(20 MG) BY MOUTH AT BEDTIME 90 tablet 1     celecoxib (CELEBREX) 100 MG capsule TAKE 1 CAPSULE(100 MG) BY MOUTH TWICE DAILY 180 capsule 0     furosemide (LASIX) 40 MG tablet Take 1 tablet (40 mg total) by mouth daily. 90 tablet 3     hydrOXYchloroQUINE (PLAQUENIL) 200 mg tablet Take 1 tablet (200 mg total) by mouth 2 (two) times a day. 180 tablet 0     lisinopriL (PRINIVIL,ZESTRIL) 10 MG tablet Take 1 tablet (10 mg total) by mouth daily. 90 tablet 3     metFORMIN (GLUCOPHAGE-XR) 500 MG 24 hr tablet Take 4 tablets (2,000 mg total) by mouth daily. 360 tablet 0      tamsulosin (FLOMAX) 0.4 mg cap TAKE 1 CAPSULE(0.4 MG) BY MOUTH DAILY AFTER BREAKFAST 90 capsule 2     DULoxetine (CYMBALTA) 30 MG capsule Take 1 capsule (30 mg total) by mouth daily. 30 capsule 2     No current facility-administered medications for this visit.          EXAMINATION:     On the phone sounded comfortable, alert, oriented, speech was fluent,  memory recall appeared normal, did not sound like was in pain or short of breath.  LAB / IMAGING DATA:  ALT   Date Value Ref Range Status   11/27/2020 18 0 - 45 U/L Final   05/07/2020 20 0 - 45 U/L Final   02/20/2020 23 0 - 45 U/L Final     Albumin   Date Value Ref Range Status   11/27/2020 4.0 3.5 - 5.0 g/dL Final   05/07/2020 4.1 3.5 - 5.0 g/dL Final   02/20/2020 4.4 3.5 - 5.0 g/dL Final     Creatinine   Date Value Ref Range Status   12/29/2020 0.94 0.70 - 1.30 mg/dL Final   11/27/2020 0.91 0.70 - 1.30 mg/dL Final   05/07/2020 1.14 0.70 - 1.30 mg/dL Final       WBC   Date Value Ref Range Status   11/27/2020 9.2 4.0 - 11.0 thou/uL Final   05/07/2020 7.9 4.0 - 11.0 thou/uL Final     Hemoglobin   Date Value Ref Range Status   11/27/2020 14.9 14.0 - 18.0 g/dL Final   05/07/2020 15.2 14.0 - 18.0 g/dL Final   02/20/2020 16.3 14.0 - 18.0 g/dL Final     Platelets   Date Value Ref Range Status   11/27/2020 242 140 - 440 thou/uL Final   05/07/2020 205 140 - 440 thou/uL Final   02/20/2020 212 140 - 440 thou/uL Final       Lab Results   Component Value Date    RF <15.0 07/20/2018    SEDRATE 7 07/20/2018

## 2021-06-15 NOTE — PATIENT INSTRUCTIONS - HE
Please go to [facility] ER to have your red, painful areas better evaluated and proper treatment started.

## 2021-06-16 NOTE — TELEPHONE ENCOUNTER
Candace from [facility] called again to speak with Dr. Armijo regarding this Pt. When you call back ask for Dr. Carlin   Call back number [phone]

## 2021-06-16 NOTE — TELEPHONE ENCOUNTER
Spoke with patient.  Agree to order lumbar MRI with left sided sciatica.  Patient should follow-up with me next week in office in order to review results and complete further evaluation.

## 2021-06-16 NOTE — TELEPHONE ENCOUNTER
Reason contacted:  Orders request  Information relayed:    Patient is calling to request Dr. Armijo place an order for a lumbar spine MRI.    He was seen in the ER yesterday and today due to significant back pain per patient.    Advised the patient he needs to arrange an appointment to follow-up with Dr. Armijo regarding his hospital visits prior to placing an order for an MRI - patient became irate and states he tried calling yesterday to get an MRI ordered and he is in an immense amount of pain etc.    I do not see any documentation that patient called yesterday requesting this.         Please place order for lumbar spine MRI if appropriate.  Patient states he is experiencing low back pain that radiates into his left hip and left knee.      Please place order if appropriate.   Additional questions:  No  Further follow-up needed:  Yes  Okay to leave a detailed message:  Yes [phone]

## 2021-06-16 NOTE — TELEPHONE ENCOUNTER
Refill Approved    Rx renewed per Medication Renewal Policy. Medication was last renewed on 7/14/20, 10/23/20.    [place] Triage/Med Refill 4/16/2021     Requested Prescriptions   Pending Prescriptions Disp Refills     atorvastatin (LIPITOR) 20 MG tablet [Pharmacy Med Name: ATORVASTATIN 20MG TABLETS] 90 tablet 1     Sig: TAKE 1 TABLET(20 MG) BY MOUTH AT BEDTIME       Statins Refill Protocol (Hmg CoA Reductase Inhibitors) Passed - 4/15/2021  3:37 AM        Passed - PCP or prescribing provider visit in past 12 months      Last office visit with prescriber/PCP: 12/29/2020 Don Armijo MD OR same dept: 12/29/2020 Don Armijo MD OR same specialty: 3/12/2021 Landon Goetz MD  Last physical: 2/20/2020 Last MTM visit: Visit date not found   Next visit within 3 mo: Visit date not found  Next physical within 3 mo: Visit date not found  Prescriber OR PCP: Don Armijo MD  Last diagnosis associated with med order: 1. Hyperlipidemia  - atorvastatin (LIPITOR) 20 MG tablet [Pharmacy Med Name: ATORVASTATIN 20MG TABLETS]; TAKE 1 TABLET(20 MG) BY MOUTH AT BEDTIME  Dispense: 90 tablet; Refill: 1    2. BPH with urinary obstruction  - tamsulosin (FLOMAX) 0.4 mg cap [Pharmacy Med Name: TAMSULOSIN 0.4MG CAPSULES]; TAKE 1 CAPSULE(0.4 MG) BY MOUTH DAILY AFTER BREAKFAST  Dispense: 90 capsule; Refill: 2    3. HTN (hypertension)  - amLODIPine (NORVASC) 5 MG tablet [Pharmacy Med Name: AMLODIPINE BESYLATE 5MG TABLETS]; TAKE 1 TABLET(5 MG) BY MOUTH DAILY  Dispense: 90 tablet; Refill: 2    If protocol passes may refill for 12 months if within 3 months of last provider visit (or a total of 15 months).                tamsulosin (FLOMAX) 0.4 mg cap [Pharmacy Med Name: TAMSULOSIN 0.4MG CAPSULES] 90 capsule 2     Sig: TAKE 1 CAPSULE(0.4 MG) BY MOUTH DAILY AFTER BREAKFAST       Alfuzosin/Tamsulosin/Silodosin Refill Protocol  Passed - 4/15/2021  3:37 AM        Passed - PCP or prescribing provider visit in past 12  months       Last office visit with prescriber/PCP: 12/29/2020 Don Armijo MD OR same dept: 12/29/2020 Don Armijo MD OR same specialty: 3/12/2021 Landon Goetz MD  Last physical: 2/20/2020 Last MTM visit: Visit date not found   Next visit within 3 mo: Visit date not found  Next physical within 3 mo: Visit date not found  Prescriber OR PCP: Don Armijo MD  Last diagnosis associated with med order: 1. Hyperlipidemia  - atorvastatin (LIPITOR) 20 MG tablet [Pharmacy Med Name: ATORVASTATIN 20MG TABLETS]; TAKE 1 TABLET(20 MG) BY MOUTH AT BEDTIME  Dispense: 90 tablet; Refill: 1    2. BPH with urinary obstruction  - tamsulosin (FLOMAX) 0.4 mg cap [Pharmacy Med Name: TAMSULOSIN 0.4MG CAPSULES]; TAKE 1 CAPSULE(0.4 MG) BY MOUTH DAILY AFTER BREAKFAST  Dispense: 90 capsule; Refill: 2    3. HTN (hypertension)  - amLODIPine (NORVASC) 5 MG tablet [Pharmacy Med Name: AMLODIPINE BESYLATE 5MG TABLETS]; TAKE 1 TABLET(5 MG) BY MOUTH DAILY  Dispense: 90 tablet; Refill: 2    If protocol passes may refill for 12 months if within 3 months of last provider visit (or a total of 15 months).                amLODIPine (NORVASC) 5 MG tablet [Pharmacy Med Name: AMLODIPINE BESYLATE 5MG TABLETS] 90 tablet 2     Sig: TAKE 1 TABLET(5 MG) BY MOUTH DAILY       Calcium-Channel Blockers Protocol Passed - 4/15/2021  3:37 AM        Passed - PCP or prescribing provider visit in past 12 months or next 3 months     Last office visit with prescriber/PCP: 12/29/2020 Don Armijo MD OR same dept: 12/29/2020 Don Armijo MD OR same specialty: 3/12/2021 Landon Goetz MD  Last physical: 2/20/2020 Last MTM visit: Visit date not found   Next visit within 3 mo: Visit date not found  Next physical within 3 mo: Visit date not found  Prescriber OR PCP: Don Armijo MD  Last diagnosis associated with med order: 1. Hyperlipidemia  - atorvastatin (LIPITOR) 20 MG tablet [Pharmacy Med Name: ATORVASTATIN 20MG TABLETS]; TAKE 1 TABLET(20  MG) BY MOUTH AT BEDTIME  Dispense: 90 tablet; Refill: 1    2. BPH with urinary obstruction  - tamsulosin (FLOMAX) 0.4 mg cap [Pharmacy Med Name: TAMSULOSIN 0.4MG CAPSULES]; TAKE 1 CAPSULE(0.4 MG) BY MOUTH DAILY AFTER BREAKFAST  Dispense: 90 capsule; Refill: 2    3. HTN (hypertension)  - amLODIPine (NORVASC) 5 MG tablet [Pharmacy Med Name: AMLODIPINE BESYLATE 5MG TABLETS]; TAKE 1 TABLET(5 MG) BY MOUTH DAILY  Dispense: 90 tablet; Refill: 2    If protocol passes may refill for 12 months if within 3 months of last provider visit (or a total of 15 months).             Passed - Blood pressure filed in past 12 months     BP Readings from Last 1 Encounters:   03/12/21 110/64

## 2021-06-16 NOTE — TELEPHONE ENCOUNTER
Called back.  ER provider gone for the day.  They should call back if ongoing needs.  Will await record of encounter through ER earlier today.

## 2021-06-16 NOTE — TELEPHONE ENCOUNTER
Reason contacted:  Provider to provider  Information relayed:    Received a telephone call.  Dr. Carlin at [facility] would like to speak with Dr. Aleksander SCHULER.    Phone number: [phone]  Additional questions:  Yes  Further follow-up needed:  Yes  Okay to leave a detailed message:  Yes

## 2021-06-16 NOTE — TELEPHONE ENCOUNTER
Refill request from [facility] [address] [place], [place]    Medication: Celecoxib 100mg    Last Refill: 12/21/20    Last OV: 3/4/21  Last Lab: 11/20/20  Future OV: 9/1/21

## 2021-06-16 NOTE — TELEPHONE ENCOUNTER
Patient called and states he is unable to refill the ketoconazole 2 % cream because the pharmacy is stating it is too soon for a refill.    Patient states he needs to apply this to his groin and buttock area and a 60 g tube of this cream lasts between 5-10 days.    Patient should be able to apply 7 fingertip units (0.5 g) to his buttocks and groin area therefore use 3.5 g twice daily ( 7 g total) of the ketoconazole cream.     rx pended for approval with updated instructions and an increase in the quantity if appropriate.    Please send to the pharmacy if appropriate.

## 2021-06-17 NOTE — PROGRESS NOTES
Assessment and Plan:     Patient has been advised of split billing requirements and indicates understanding: No     1. Encounter for general adult medical examination with abnormal findings  Annual wellness visit completed.  Risks associated with lack of consistent exercise and suboptimal diet.  Needs to replace carbon monoxide detector battery.  Healthcare directives recommended with paperwork to be completed.  Annual wellness visits to continue.  Will need tetanus booster more than 2 weeks following most recently during a vaccine April 16, 2021.  Shingrix immunization series recommended through local pharmacy.    2. Type 2 diabetes mellitus without complication, without long-term current use of insulin (H)  Type 2 diabetes relatively stable with A1c increasing slightly from 7.2% up to 7.5% since December 29, 2020.  Continues Metformin extended release 500 mg using 4 tablets daily.  Recent renal function April 23, 2021 appears normal.  Monofilament testing normal.  Microalbumin screen December 29, 2020 was negative.  Annual diabetic eye exams to continue.  - Glycosylated Hemoglobin A1c    3. Hypertension  Hypertension well controlled and continues lisinopril 10 mg daily and amlodipine 5 mg daily.  No significant peripheral edema concerns associated with amlodipine use.    4. Other hyperlipidemia  Remains on atorvastatin 20 mg daily.  Unfortunately not fasting today however will check lipid cascade in order to update with prior lipid cascade greater than 1 year.  Reassess at follow-up visit in August 2021 with dietary and exercise modification for weight goal less than 240 pounds initially.  - Lipid Cascade    5. Seronegative rheumatoid arthritis (H)  Continued management for seronegative rheumatoid arthritis and use of Plaquenil 200 mg twice daily with Celebrex 100 mg twice daily as directed.    6. BPH with urinary obstruction  BPH history with urinary obstruction improved with tamsulosin 0.4 mg daily without  "significant concerns for nocturia or worsening post void dribbling, urinary hesitancy etc.    7. 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  Progressive worsening to the point of being seen through [place] [facility] on Thursday, April 22 as well as Saturday, April 24 apparently.  Subsequent lumbar MRI completed through [facility] April 24, 2021 with noted large 11 mm cephalad extruded high signal intensity left posterolateral disc herniation at L3-L4 with left L3 neural impingement medial to and within the neural foramen.  Moderate central and severe bilateral subarticular recess stenosis at L4-L5 with 6 mm spondylolisthesis, a 2 to 3 mm right posterior lateral disc protrusion and a 12 mm synovial cyst on the right.  Moderate bilateral foraminal stenosis at L5-S1 and L4-L5 also present.  Comparison with May 26, 2016 study showing disc herniation on the left at L3-L4 new and degenerative changes progressing at L4-L5 with synovial cyst on the right also new in the interval.  Patient describes \"90% better\" and declines further intervention at this time will notify with worsening or recurrent issues.        The patient's current medical problems were reviewed.    I have had an Advance Directives discussion with the patient.  The following high BMI interventions were performed this visit: encouragement to exercise, weight monitoring, weight loss from baseline weight and lifestyle education regarding diet.  Ensure ongoing efforts to achieve weight goal < 250 pounds initially, < 240 pounds ideally.     The following health maintenance schedule was reviewed with the patient and provided in printed form in the after visit summary:   Health Maintenance   Topic Date Due     ZOSTER VACCINES (2 of 3) 02/16/2016     DIABETIC FOOT EXAM  02/20/2021     LIPID  02/20/2021     TD 18+ HE  05/17/2021     A1C  " 10/30/2021     DIABETIC EYE EXAM  12/22/2021     MICROALBUMIN  12/29/2021     BMP  03/04/2022     MEDICARE ANNUAL WELLNESS VISIT  04/30/2022     FALL RISK ASSESSMENT  04/30/2022     ADVANCE CARE PLANNING  04/30/2026     COLORECTAL CANCER SCREENING  11/14/2026     HEPATITIS C SCREENING  Completed     Pneumococcal Vaccine: Pediatrics (0 to 5 Years) and At-Risk Patients (6 to 64 Years)  Completed     Pneumococcal Vaccine: 65+ Years  Completed     INFLUENZA VACCINE RULE BASED  Completed     COVID-19 Vaccine  Completed       Subjective:     Chief Complaint: Thiago Hein is an 71 y.o. male here for an Annual Wellness visit.     HPI: Known history of type 2 diabetes with prior A1c improving from 7.7% down to 7.2% December 29, 2020.  Microbe human screen at that time was negative.  No history of peripheral neuropathy.  Diabetic eye exams on annual basis to continue.  Amlodipine 5 mg daily and lisinopril 10 mg daily for hypertension while using atorvastatin 20 mg daily for lipid management.  Forgot to fast this morning unfortunately had a bowl of cereal.  Seronegative rheumatoid arthritis treated with Plaquenil 200 mg twice daily and Celebrex 100 mg twice daily.  BPH managed with tamsulosin 0.4 mg daily.  Patient has received Moderna COVID-19 vaccination March 19 and April 16, 2021 without side effects..  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  Progressive worsening to the point of being seen through [facility] on Thursday, April 22 as well as Saturday, April 24 apparently.  Subsequent lumbar MRI completed through [facility] April 24, 2021 with noted large 11 mm cephalad extruded high signal intensity left posterolateral disc herniation at L3-L4 with left L3 neural impingement medial to and within the neural foramen.  Moderate central and severe bilateral subarticular recess stenosis at L4-L5 with 6 mm spondylolisthesis, a 2 to 3 mm right  "posterior lateral disc protrusion and a 12 mm synovial cyst on the right.  Moderate bilateral foraminal stenosis at L5-S1 and L4-L5 also present.  Comparison with May 26, 2016 study showing disc herniation on the left at L3-L4 new and degenerative changes progressing at L4-L5 with synovial cyst on the right also new in the interval.  Patient describes \"90% better\" and declines further intervention at this time will notify with worsening or recurrent issues.    Review of Systems:  Please see above.  The rest of the review of systems are negative for all systems.    Single   1 son - 32 \"Kraig\"   Tobacco: none   EtOH: none   Mom - Meena   Dad - currently 86 Yovanny - DM, CAD, HTN, high cholesterol, back problems, arthritis, spinal stenosis, pacemaker/defibrillator   1 bro - Be - HTN   Surgeries: \"lumbar back surgery for cyst removal\" - September, 2008 (Dr. 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[place], [place])   Hobbies:     Patient Care Team:  Don Armijo MD as PCP - General  Don Armijo MD as Assigned PCP     Patient Active Problem List   Diagnosis     Type 2 diabetes mellitus (H)     Obesity     Hypertension     Lower Back Pain     Emotional Lability     Adult Still's Disease     Epidural abscess     Sepsis (H)     Arm DVT (deep venous thromboembolism), acute, left (H)     Hyperlipidemia     Diverticulosis     Peripheral edema     Non morbid obesity due to excess calories     Essential hypertension with goal blood pressure less than 130/80     Unintentional weight loss     Polyarthralgia     Trigger finger, right index finger     Left carpal tunnel syndrome     Primary osteoarthritis involving multiple joints     BPH with urinary obstruction     Seronegative rheumatoid arthritis (H)     Obesity (BMI 35.0-39.9) with comorbidity (H)     History reviewed. No pertinent past medical history. "   History reviewed. No pertinent surgical history.   History reviewed. No pertinent family history.   Social History     Socioeconomic History     Marital status: Single     Spouse name: Not on file     Number of children: Not on file     Years of education: Not on file     Highest education level: Not on file   Occupational History     Not on file   Social Needs     Financial resource strain: Not on file     Food insecurity     Worry: Not on file     Inability: Not on file     Transportation needs     Medical: Not on file     Non-medical: Not on file   Tobacco Use     Smoking status: Never Smoker     Smokeless tobacco: Former User   Substance and Sexual Activity     Alcohol use: No     Drug use: No     Sexual activity: Not on file   Lifestyle     Physical activity     Days per week: Not on file     Minutes per session: Not on file     Stress: Not on file   Relationships     Social connections     Talks on phone: Not on file     Gets together: Not on file     Attends [employer]ars[employer] service: Not on file     Active member of club or organization: Not on file     Attends meetings of clubs or organizations: Not on file     Relationship status: Not on file     Intimate partner violence     Fear of current or ex partner: Not on file     Emotionally abused: Not on file     Physically abused: Not on file     Forced sexual activity: Not on file   Other Topics Concern     Not on file   Social History Narrative    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       Current Outpatient Medications   Medication Sig Dispense Refill     amLODIPine (NORVASC) 5 MG tablet TAKE 1 TABLET(5 MG) BY MOUTH DAILY 90 tablet 3     atorvastatin (LIPITOR) 20 MG tablet TAKE 1 TABLET(20 MG) BY MOUTH AT BEDTIME 90 tablet 3     celecoxib (CELEBREX) 100 MG capsule TAKE 1 CAPSULE(100 MG) BY MOUTH TWICE DAILY 180 capsule 0     furosemide (LASIX) 40 MG tablet Take 1 tablet (40 mg total) by mouth daily. 90 tablet 3      "hydrOXYchloroQUINE (PLAQUENIL) 200 mg tablet Take 1 tablet (200 mg total) by mouth 2 (two) times a day. 180 tablet 0     ketoconazole (NIZORAL) 2 % cream Apply 3.5 g to affected area(s) twice daily. Apply to groin area twice daily after cleansing with warm soapy water and drying. 120 g 2     lisinopriL (PRINIVIL,ZESTRIL) 10 MG tablet Take 1 tablet (10 mg total) by mouth daily. 90 tablet 3     metFORMIN (GLUCOPHAGE-XR) 500 MG 24 hr tablet TAKE 4 TABLETS(2000 MG) BY MOUTH DAILY 360 tablet 3     tamsulosin (FLOMAX) 0.4 mg cap TAKE 1 CAPSULE(0.4 MG) BY MOUTH DAILY AFTER BREAKFAST 90 capsule 3     No current facility-administered medications for this visit.       Objective:   Vital Signs:   Visit Vitals  /70   Pulse 76   Ht 5' 11.75\" (1.822 m)   Wt (!) 258 lb (117 kg)   BMI 35.24 kg/m           VisionScreening:  No exam data present     PHYSICAL EXAM    General Appearance:    Alert, cooperative, no distress, appears stated age.  BMI = 35.24   Head:    Normocephalic, without obvious abnormality, atraumatic   Eyes:    PERRL, conjunctiva/corneas clear, EOM's intact, fundi     benign, both eyes        Ears:    Normal TM's and external ear canals, both ears.  Cerumen removed with curette bilateral external auditory canals.   Nose:   Nares normal, septum midline, mucosa normal, no drainage    or sinus tenderness   Throat:   Lips, mucosa, and tongue normal; teeth and gums normal   Neck:   Supple, symmetrical, trachea midline, no adenopathy;        thyroid:  No enlargement/tenderness/nodules; no carotid    bruit or JVD   Back:     Symmetric, no curvature, ROM normal, no CVA tenderness   Lungs:     Clear to auscultation bilaterally, respirations unlabored   Chest wall:    No tenderness or deformity   Heart:    Regular rate and rhythm, S1 and S2 normal, no murmur, rub   or gallop   Abdomen:     Soft, non-tender, bowel sounds active all four quadrants,     no masses, no organomegaly.     Genitalia:    Normal male without " lesion, discharge or tenderness.  No inguinal hernia noted.     Rectal:    Normal tone.  Prostate mildly enlarged/symmetric, no masses or tenderness.   Extremities:   Extremities normal, atraumatic, no cyanosis or edema   Pulses:   2+ and symmetric all extremities   Skin:   Skin color, texture, turgor normal, no rashes or lesions   Lymph nodes:   Cervical, supraclavicular, and axillary nodes normal   Neurologic:   CNII-XII intact. Normal strength, sensation and reflexes       throughout        Assessment Results 4/30/2021   Activities of Daily Living No help needed   Instrumental Activities of Daily Living No help needed   Get Up and Go Score Less than 12 seconds   Mini Cog Total Score 4   Some recent data might be hidden     A Mini-Cog score of 0-2 suggests the possibility of dementia, score of 3-5 suggests no dementia    Identified Health Risks:     He is at risk for lack of exercise and has been provided with information to increase physical activity for the benefit of his well-being.  The patient was counseled and encouraged to consider modifying their diet and eating habits. He was provided with information on recommended healthy diet options.  The patient reports that he does not have all recommended working emergency equipment available. He was provided with information about emergency preparedness, including smoke detectors.  Information regarding advance directives (living reece), including where he can download the appropriate form, was provided to the patient via the AVS.

## 2021-06-17 NOTE — PROGRESS NOTES
Assessment/Plan:    1. Type 2 diabetes mellitus without complication, without long-term current use of insulin  Type 2 diabetes noted with suboptimal control.  A1c increasing from 6.7% up to 7.9% today following 15 pound weight gain since October 26, 2017.  Will increase Metformin extended release from 1000 mg up to 1500 mg initially.  May go up to 2000 mg as tolerated.  Basic metabolic panel pending.  - Glycosylated Hemoglobin A1c  - Basic Metabolic Panel  - metFORMIN (GLUCOPHAGE-XR) 500 MG 24 hr tablet; Take 3 tablets (1,500 mg total) by mouth daily with breakfast.  Dispense: 270 tablet; Refill: 1    2. Essential hypertension with goal blood pressure less than 130/80  Hypertension, fair control with blood pressure 136/72 on recheck.  Continues metoprolol succinate 50 mg daily, amlodipine 5 mg daily and lisinopril 20 mg daily.  - Basic Metabolic Panel    3. Other hyperlipidemia  Patient uncertain if he is using atorvastatin 20 mg daily.  Will ensure that he has had a repeat lipid cascade at follow-up in 3 months.    4. Vitamin D insufficiency  Patient not currently using vitamin D supplement.  Ensure vitamin D supplement minimum 2000 units daily.  Vitamin D level to be rechecked today with further dose adjustment as indicated.  - Vitamin D, Total (25-Hydroxy)    5. Tinea pedis  Lamisil AT antifungal cream twice daily ×14-21 days bilateral feet plantar aspect.  No current interdigital involvement.            Subjective:    Thiago ZARIA CardosoHein is seen today for follow-up evaluation.  Type 2 diabetes.  Using metformin extended release 500 mg taking 2 tablets with breakfast.  No side effects noted.  Not active over the winter.  Has gained 15 pounds unfortunately.  Does have underlying hypertension on multiple meds including metoprolol succinate 50 mg daily, amlodipine 5 mg daily and lisinopril 20 mg daily.  Uncertain if he is using atorvastatin 20 mg daily.  Prior prescription was provided last fall with 90 day supply  "with refills however so therefore may be taking it.  Nonfasting currently.  Vitamin D insufficiency with level of 28 October 26, 2017 however not using vitamin D supplement.  Feet are dry with scaly skin plantar aspect bilateral feet.  Comprehensive review of systems as above otherwise all negative.    Single  1 son - 32 \"Kraig\"  Tobacco: none  EtOH: none  Mom - Meena   Dad - currently 86 Yovanny - DM, CAD, HTN, high cholesterol, back problems, arthritis, spinal stenosis, pacemaker/defibrillator  1 bro - Be - HTN  Surgeries: \"lumbar back surgery for cyst removal\" - September, 2008 (Dr. 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     No family history on file.     No past medical history on file.     Social History   Substance Use Topics     Smoking status: Never Smoker     Smokeless tobacco: Former User     Alcohol use No        Current Outpatient Prescriptions   Medication Sig Dispense Refill     amLODIPine (NORVASC) 5 MG tablet TAKE 1 TABLET(5 MG) BY MOUTH DAILY 90 tablet 1     atorvastatin (LIPITOR) 20 MG tablet Take 1 tablet (20 mg total) by mouth at bedtime. 90 tablet 3     celecoxib (CELEBREX) 200 MG capsule Take 200 mg by mouth.       furosemide (LASIX) 40 MG tablet Take 1 tablet (40 mg total) by mouth daily. 90 tablet 3     hydroxychloroquine (PLAQUENIL) 200 mg tablet Take 200 mg by mouth.       lisinopril (PRINIVIL,ZESTRIL) 20 MG tablet Take 1 tablet (20 mg total) by mouth daily. 90 tablet 3     metFORMIN (GLUCOPHAGE-XR) 500 MG 24 hr tablet Take 3 tablets (1,500 mg total) by mouth daily with breakfast. 270 tablet 1     metoprolol succinate (TOPROL-XL) 50 MG 24 hr tablet Take 1 tablet (50 mg total) by mouth daily. 90 tablet 3     nystatin (MYCOSTATIN) cream apply topically to affected areas 3-4 times per day as needed 60 g 3     " tamsulosin (FLOMAX) 0.4 mg Cp24 TAKE 1 CAPSULE(0.4 MG) BY MOUTH DAILY AFTER BREAKFAST 90 capsule 1     No current facility-administered medications for this visit.           Objective:    Vitals:    04/05/18 1317 04/05/18 1350   BP: 140/70 136/72   Pulse: 80    SpO2: 96%    Weight: (!) 278 lb (126.1 kg)       Body mass index is 37.7 kg/(m^2).    Alert.  No apparent distress.  Bilateral plantar foot scaliness noted otherwise dorsalis pedis and posterior tibial pulses intact feet otherwise warm.  Mildly decreased monofilament testing distal toes primarily.  Chest clear.  Cardiac exam regular.  Blood pressure 136/72 on recheck.      This note has been dictated using voice recognition software and as a result may contain minor grammatical errors and unintended word substitutions.

## 2021-06-18 NOTE — PATIENT INSTRUCTIONS - HE
Patient Instructions by Don Armijo MD at 4/30/2021 10:00 AM     Author: Don Armijo MD Service: -- Author Type: Physician    Filed: 4/30/2021 10:10 AM Encounter Date: 4/30/2021 Status: Signed    : Don Armijo MD (Physician)         Patient Education     Exercise for a Healthier Heart  You may wonder how you can improve the health of your heart. If youre thinking about exercise, youre on the right track. You dont need to become an athlete, but you do need a certain amount of brisk exercise to help strengthen your heart. If you have been diagnosed with a heart condition, your doctor may recommend exercise to help stabilize your condition. To help make exercise a habit, choose safe, fun activities.       Be sure to check with your health care provider before starting an exercise program.    Why exercise?  Exercising regularly offers many healthy rewards. It can help you do all of the following:    Improve your blood cholesterol levels to help prevent further heart trouble    Lower your blood pressure to help prevent a stroke or heart attack    Control diabetes, or reduce your risk of getting this disease    Improve your heart and lung function    Reach and maintain a healthy weight    Make your muscles stronger and more limber so you can stay active    Prevent falls and fractures by slowing the loss of bone mass (osteoporosis)    Manage stress better  Exercise tips  Ease into your routine. Set small goals. Then build on them.  Exercise on most days. Aim for a total of 150 or more minutes of moderate to  vigorous intensity activity each week. Consider 40 minutes, 3 to 4 times a week. For best results, activity should last for 40 minutes on average. It is OK to work up to the 40 minute period over time. Examples of moderate-intensity activity is walking one mile in 15 minutes or 30 to 45 minutes of yard work.  Step up your daily activity level. Along with your exercise program, try being more  active throughout the day. Walk instead of drive. Do more household tasks or yard work.  Choose one or more activities you enjoy. Walking is one of the easiest things you can do. You can also try swimming, riding a bike, or taking an exercise class.  Stop exercising and call your doctor if you:    Have chest pain or feel dizzy or lightheaded    Feel burning, tightness, pressure, or heaviness in your chest, neck, shoulders, back, or arms    Have unusual shortness of breath    Have increased joint or muscle pain    Have palpitations or an irregular heartbeat      3525-5315 [employer]. [address] [place] [address]. All rights reserved. This information is not intended as a substitute for professional medical care. Always follow your healthcare professional's instructions.         Patient Education   Understanding Number 100 MyPlate  The USDA (US Department of Agriculture) has guidelines to help you make healthy food choices. These are called MyPlate. MyPlate shows the food groups that make up healthy meals using the image of a place setting. Before you eat, think about the healthiest choices for what to put onto your plate or into your cup or bowl. To learn more about building a healthy plate, visit www.choosemyplate.gov.       The Food Groups    Fruits: Any fruit or 100% fruit juice counts as part of the Fruit Group. Fruits may be fresh, canned, frozen, or dried, and may be whole, cut-up, or pureed. Make half your plate fruits and vegetables.    Vegetables: Any vegetable or 100% vegetable juice counts as a member of the Vegetable Group. Vegetables may be fresh, frozen, canned, or dried. They can be served raw or cooked and may be whole, cut-up, or mashed. Make half your plate fruits and vegetables.     Grains: All foods made from grains are part of the Grains Group. These include wheat, rice, oats, cornmeal, and barley such as bread, pasta, oatmeal, cereal, tortillas, and grits. Grains should be  no more than a quarter of your plate. At least half of your grains should be whole grains.    Protein: This group includes meat, poultry, seafood, beans and peas, eggs, processed soy products (like tofu), nuts (including nut butters), and seeds. Make protein choices no more than a quarter of your plate. Meat and poultry choices should be lean or low fat.    Dairy: All fluid milk products and foods made from milk that contain calcium, like yogurt and cheese are part of the Dairy Group. (Foods that have little calcium, such as cream, butter, and cream cheese, are not part of the group.) Most dairy choices should be low-fat or fat-free.    Oils: These are fats that are liquid at room temperature. They include canola, corn, olive, soybean, and sunflower oil. Foods that are mainly oil include mayonnaise, certain salad dressings, and soft margarines. You should have only 5 to 7 teaspoons of oils a day. You probably already get this much from the food you eat.  Use [employer] to Help Build Your Meals  The SuperTracker can help you plan and track your meals and activity. You can look up individual foods to see or compare their nutritional value. You can get guidelines for what and how much you should eat. You can compare your food choices. And you can assess personal physical activities and see ways you can improve. Go to www.[employer].gov/supertracker/.    2249-9755 [employer]. [address] [place] [address]. All rights reserved. This information is not intended as a substitute for professional medical care. Always follow your healthcare professional's instructions.           Patient Education    Home Fire Safety  Each year, thousands of people, including children, are injured and killed in home fires. Children are often curious about fire, and may not understand the dangers. This makes home fire safety practices especially important. Three important things you can do to keep your home safe  from fire are:    Install smoke alarms in your home and make sure they work properly.    Teach children not to play with matches, lighters, and other materials that can be used to start fires. And keep these materials out of childrens reach.    Teach children what to do in case of fire. Create a fire safety action plan and practice it.  Read on for more details about keeping your family and home safe from fire.        Being Prepared for a Fire  A home fire can happen at any time. The following can help you be prepared:    Install smoke alarms on every level of your home, including the basement and outside all sleeping areas. This simple step cuts your familys risk of dying in a fire nearly in half.    Test smoke alarms monthly, and change the batteries once a year or when the alarm chirps.    Dont disable smoke alarms, even for a short time.    Ask your local fire department for tips on where to place smoke alarms in your home.    Replace all smoke alarms every 10 years.    Consider using voice smoke alarms. These alarms allow you to substitute your own voice for the alarm sound. They are helpful because many children dont wake up to the sound of a regular smoke alarm.    Install carbon monoxide detectors near sleeping areas.    Be aware that carbon monoxide is a byproduct of smoke that can be deadly. Its a gas that you cant see, smell, or taste.    Consider buying a combination smoke alarm / carbon monoxide detector.    Keep fire extinguishers in the home.    Keep them in accessible locations, especially in the kitchen.    Check usage dates to make sure they are not .    Use fire extinguishers only when the fire is in a contained area and is not spreading. (Otherwise, you should focus on getting out of the home.)    Train adults to use fire extinguishers. (Children should focus on getting out of the home during a fire.)    If you live in an apartment, talk to your landlord about where smoke alarms are and  how often they are tested. Also ask about fire extinguisher locations and emergency exit routes.  Indoor Fire Safety  Many things in your home are potential fire hazards. Follow these steps to help keep your home safe.    Be careful in the kitchen.    Never leave food thats cooking unattended.    If a fire breaks out in a cooking pan, put a lid on it to smother it. And never throw water on a grease fire. It will make the fire worse.    Conduct a home safety inspection. Look for anything, such as frayed wires and cords, that can cause a fire. Fix or remove any fire safety hazards you find.    Keep all matches and lighters in a secured drawer or cabinet out of the reach of children. Use childproof lighters.    Check to make sure all appliances, including the stove, are turned off before leaving the home.    Know where the gas main shut-off is located.    Make sure space heaters are stable and have protective covers. Keep them at least 3 feet from anything that can burn, such as curtains. Dont use space heaters in areas where young kids spend time alone.    Keep flammable liquids such as kerosene and gasoline locked up and safely stored away from kids and heat.    Keep all smoking materials out of reach of children. And never smoke in bed. If possible, smoke outdoors only.  Outdoor Fire Safety  Fire can be a hazard outdoors as well as indoors. When outdoors, be sure to do the following:    Always supervise kids near a barbecue grill, campfire, or portable stove.    Dont use fire pits around children. Kids can fall into them, and pits can be hot even after the fire goes out.    Keep a garden hose or fire extinguisher handy when cooking outdoors in case of fire.  Teaching Your Child About Fire Safety  One of the best ways to keep your home safe from fire is education. Make sure everyone in your family knows fire safety rules, including children.    Teach your children the dangers of matches, lighters, and other dangerous  items.    Teach them to never touch these and other objects that are hot, such as candles.    Have them tell you right away whenever they find matches or lighters. Explain that these items are tools for grown-ups, not toys. And never amuse children with matches or lighters.    Round up all matches and lighters and store them safely. In case you missed some, ask your children to tell you where any are located throughout your home.    Never leave a child alone in a room with a lit candle. Dont allow teens to have candles in their rooms.    Show children what to do in case of fire.    Be sure your kids know what the fire alarm sounds like and what to do if it goes off.    Teach kids what to do if their clothes catch fire: Stop, Drop to the ground, and Roll until the fire is put out. They should also cover their face with their hands. Practice these steps with your children. Make sure they understand that running will make the fire burn faster.    Show children how to crawl below smoke during a fire.    Make sure kids know at least two escape routes from each room in the home. These escape routes can be windows.    Teach kids to test doors for nearby fire by feeling for heat with the back of their hand. If the door is warm or hot, they should try their second exit.    Explain to children that they cant hide from a fire. Hiding in a closet or under a bed wont make them safe. Instead, they should try to escape the home. And if they cant escape, they should let others know they are trapped. They can do this by shutting the door to the room, opening a window, and turning on the lights.    Talk to your local fire department.    Introduce your children to a . Let them know that firefighters will look different when in full protective gear. Tell them to never hide from firefighters, and to follow all directions from firefighters during a fire.    Find out if the fire department has a fire safety program for  kids.      Create a Fire Safety Plan  Create a plan for your family to follow in case of a fire. Try making it a family project. Important steps for the plan include leaving the home right away and having a designated meeting place.    Make sure your child understands to get out and stay out. He or she should get out of the home immediately and not go back in, even if family members or pets are still inside.    Decide on a safe meeting place away from the home for everyone to gather.    Teach children to call 911 or emergency services from a cell phone or neighbors phone. Make sure they know to do this only after they are safely out of the home.    Teach your children the fire safety plan. Practice it and make sure they understand it.    Have fire drills twice a year to keep your children prepared in case of fire.    Visit the National Fire Protection Association web site at www.nfpa.org for more information.      4086-9642 [employer]. [address] [place] [address]. All rights reserved. This information is not intended as a substitute for professional medical care. Always follow your healthcare professional's instructions.         Patient Education   Understanding Advance Care Planning  Advance care planning is the process of deciding ones own future medical care. It helps ensure that if you cant speak for yourself, your wishes can still be carried out. The plan is a series of legal documents that note a persons wishes. The documents vary by state. Advance care planning may be done when a person has a serious illness that is expected to get worse. It may be done before major surgery. And it can help you and your family be prepared in case of a major illness or injury. Advance care planning helps with making decisions at these times.       A health care proxy is a person who acts as the voice of a patient when the patient cant speak for himself or herself. The name of this role varies by  state. It may be called a Durable Medical Power of  or Durable Power of  for Healthcare. It may be called an agent, surrogate, or advocate. Or it may be called a representative or decision maker. It is an official duty that is identified by a legal document. The document also varies by state.    Why Is Advance Care Planning Important?  If a person communicates their healthcare wishes:    They will be given medical care that matches their values and goals.    Their family members will not be forced to make decisions in a crisis with no guidance.  Creating a Plan  Making an advance care plan is often done in 3 steps:    Thinking about ones wishes. To create an advance care plan, you should think about what kind of medical treatment you would want if you lose the ability to communicate. Are there any situations in which you would refuse or stop treatment? Are there therapies you would want or not want? And whom do you want to make decisions for you? There are many places to learn more about how to plan for your care. Ask your doctor or  for resources.    Picking a health care proxy. This means choosing a trusted person to speak for you only when you cant speak for yourself. When you cannot make medical decisions, your proxy makes sure the instructions in your advance care plan are followed. A proxy does not make decisions based on his or her own opinions. They must put aside those opinions and values if needed, and carry out your wishes.    Filling out the legal documents. There are several kinds of legal documents for advance care planning. Each one tells health care providers your wishes. The documents may vary by state. They must be signed and may need to be witnessed or notarized. You can cancel or change them whenever you wish. Depending on your state, the documents may include a Healthcare Proxy form, Living Will, Durable Medical Power of , Advance Directive, or others.  The  Familys Role  The best help a family can give is to support their loved ones wishes. Open and honest communication is vital. Family should express any concerns they have about the patients choices while the patient can still make decisions.    8128-1778 [employer]. [address] [place] [address], [place] [address]. All rights reserved. This information is not intended as a substitute for professional medical care. Always follow your healthcare professional's instructions.         Also, [facility] offers a free, downloadable health care directive that allows you to share your treatment choices and personal preferences if you cannot communicate your wishes. It also allows you to appoint another person (called a health care agent) to make health care decisions if you are unable to do so. You can download an advance directive by going here: [email or web address]     Patient Education   Personalized Prevention Plan  You are due for the preventive services outlined below.  Your care team is available to assist you in scheduling these services.  If you have already completed any of these items, please share that information with your care team to update in your medical record.  Health Maintenance Due   Topic Date Due   ? ZOSTER VACCINES (2 of 3) 02/16/2016   ? DIABETIC FOOT EXAM  02/20/2021   ? LIPID  02/20/2021   ? TD 18+ HE  05/17/2021

## 2021-06-18 NOTE — PATIENT INSTRUCTIONS - HE
Patient Instructions by Don Armijo MD at 2/20/2020  8:20 AM     Author: Don Armijo MD Service: -- Author Type: Physician    Filed: 2/20/2020  8:43 AM Encounter Date: 2/20/2020 Status: Signed    : Don Armijo MD (Physician)         Patient Education   Understanding Advance Care Planning  Advance care planning is the process of deciding ones own future medical care. It helps ensure that if you cant speak for yourself, your wishes can still be carried out. The plan is a series of legal documents that note a persons wishes. The documents vary by state. Advance care planning may be done when a person has a serious illness that is expected to get worse. It may be done before major surgery. And it can help you and your family be prepared in case of a major illness or injury. Advance care planning helps with making decisions at these times.       A health care proxy is a person who acts as the voice of a patient when the patient cant speak for himself or herself. The name of this role varies by state. It may be called a Durable Medical Power of  or Durable Power of  for Healthcare. It may be called an agent, surrogate, or advocate. Or it may be called a representative or decision maker. It is an official duty that is identified by a legal document. The document also varies by state.    Why Is Advance Care Planning Important?  If a person communicates their healthcare wishes:    They will be given medical care that matches their values and goals.    Their family members will not be forced to make decisions in a crisis with no guidance.  Creating a Plan  Making an advance care plan is often done in 3 steps:    Thinking about ones wishes. To create an advance care plan, you should think about what kind of medical treatment you would want if you lose the ability to communicate. Are there any situations in which you would refuse or stop treatment? Are there therapies you would want or  not want? And whom do you want to make decisions for you? There are many places to learn more about how to plan for your care. Ask your doctor or  for resources.    Picking a health care proxy. This means choosing a trusted person to speak for you only when you cant speak for yourself. When you cannot make medical decisions, your proxy makes sure the instructions in your advance care plan are followed. A proxy does not make decisions based on his or her own opinions. They must put aside those opinions and values if needed, and carry out your wishes.    Filling out the legal documents. There are several kinds of legal documents for advance care planning. Each one tells health care providers your wishes. The documents may vary by state. They must be signed and may need to be witnessed or notarized. You can cancel or change them whenever you wish. Depending on your state, the documents may include a Healthcare Proxy form, Living Will, Durable Medical Power of , Advance Directive, or others.  The Familys Role  The best help a family can give is to support their loved ones wishes. Open and honest communication is vital. Family should express any concerns they have about the patients choices while the patient can still make decisions.    7905-4933 [employer]. [address] [place] [address], [place] [address]. All rights reserved. This information is not intended as a substitute for professional medical care. Always follow your healthcare professional's instructions.         Also, [facility] [place] offers a free, downloadable health care directive that allows you to share your treatment choices and personal preferences if you cannot communicate your wishes. It also allows you to appoint another person (called a health care agent) to make health care decisions if you are unable to do so. You can download an advance directive by going here:  [email or web address]     Patient Education   Personalized Prevention Plan  You are due for the preventive services outlined below.  Your care team is available to assist you in scheduling these services.  If you have already completed any of these items, please share that information with your care team to update in your medical record.  Health Maintenance   Topic Date Due   ? DIABETIC FOOT EXAM  1950   ? ZOSTER VACCINES (2 of 3) 02/16/2016   ? INFLUENZA VACCINE RULE BASED (1) 08/01/2019   ? MEDICARE ANNUAL WELLNESS VISIT  11/08/2019   ? FALL RISK ASSESSMENT  11/08/2019   ? A1C  04/29/2020   ? BMP  10/29/2020   ? LIPID  10/29/2020   ? MICROALBUMIN  10/29/2020   ? DIABETIC EYE EXAM  12/17/2020   ? TD 18+ HE  05/17/2021   ? ADVANCE CARE PLANNING  11/08/2023   ? COLONOSCOPY  11/14/2026   ? HEPATITIS C SCREENING  Completed   ? PNEUMOCOCCAL IMMUNIZATION 65+ HIGH/HIGHEST RISK  Completed

## 2021-06-18 NOTE — PROGRESS NOTES
Assessment/Plan:    1. Unintentional weight loss  Chest x-ray is negative for any obvious infiltrate or consolidation. Will have radiology confirm this.  The following labs will be ordered as noted to rule out any underlying cause for weight loss and current symptoms.  Will follow up with patient regarding these results when available.  He is advised to hold his sulfasalazine for now in case this medication is contributing to any of his symptoms.  Patient should ensure that he is drinking adequate amounts of water throughout the day to prevent dehydration.  He will schedule follow-up appointment next week for reevaluation of symptoms.  Discussed red flag symptoms that would trigger immediate visit to the emergency department.  Patient is to notify clinic with any new or worsening symptoms.  - XR Chest 2 Views  - HM1(CBC and Differential)  - Comprehensive Metabolic Panel  - Thyroid Stimulating Hormone (TSH)  - HIV Antigen/Antibody Screening Cascade  - HM1 (CBC with Diff)    2. Type 2 diabetes mellitus  Suboptimal control noted at patient's previous visit on April 5, 2018.  A1c had increased from 6.7% up to 7.9 from October 2017.  Patient was advised to increase his metformin from 1005-0344 mg with the goal of increasing to 2000 mg as tolerated.  Patient has not increased as directed.  Advised that he do this to ensure adequate blood sugar control.  Discussed importance of diet and exercise.  Will follow up with patient at his visit next week.    3. Hypertension  Blood pressure remains stable on current regimen of metoprolol succinate 50 mg daily, amlodipine 5 mg daily and lisinopril 20 mg daily.  Will continue and follow-up at future visit.      Subjective:    Thiago Hein is a 68 year old male seen today for evaluation of recent weight loss and fatigue.  Patient reports that since past Saturday he has no been feeling himself.  Has a difficult time describing specific symptoms but notes that he does not feel as  "clear headed is normal.  Feels like he may be dehydrated.  Also not having much of an appetite within the last week or so.  Notes that he has lost approximately 23 pounds since his visit in April 2018.  Is worried about this.  He is not having any headache but notes that his head feels \"fuzzy\".   He has been drinking a fair amount of water but still feels that he may not be having enough.  Is not having any chest pain but does feel a little bit more short of breath over the past 2 days.  Has taken his temperature a few times and has not had any fevers.  Patient does note that he recently found a prescription for sulfasalazine.  This was prescribed by his rheumatologist for his arthritis back in March of 2018 and her father had forgotten about it.  He began taking this medication on Saturday.  It was around this time that he started feeling these symptoms.  He continues to take Celebrex and plaquenil as prescribed by his rheumatologist.  Wonders if he should have stopped these medications when he started his sulfasalazine.  Is not having any significant pain.  Has joint pain at baseline.  No other changes to his medications have been made.  Was advised at his previous visit to increase metformin from 1000 mg to 1500 mg daily.  Patient has not done this, still taking 1000 mg daily.  Recent travel.  No contacts with similar symptoms that he is aware of.  Has no additional concerns today. Review of systems is as stated in HPI, and the remainder of the 10 system review is otherwise unremarkable.    Past Medical History, Family History, and Social History reviewed.  Single  1 son - 32 \"Kraig\"  Tobacco: none  EtOH: none  Mom - Meena   Dad - currently 86 Yovanny - DM, CAD, HTN, high cholesterol, back problems, arthritis, spinal stenosis, pacemaker/defibrillator  1 bro - Be - HTN  Surgeries: \"lumbar back surgery for cyst removal\" - September, 2008 (Dr. 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 90 tablet 3     celecoxib (CELEBREX) 200 MG capsule Take 200 mg by mouth.       furosemide (LASIX) 40 MG tablet Take 1 tablet (40 mg total) by mouth daily. 90 tablet 3     hydroxychloroquine (PLAQUENIL) 200 mg tablet Take 200 mg by mouth.       lisinopril (PRINIVIL,ZESTRIL) 20 MG tablet Take 1 tablet (20 mg total) by mouth daily. 90 tablet 3     metFORMIN (GLUCOPHAGE-XR) 500 MG 24 hr tablet Take 3 tablets (1,500 mg total) by mouth daily with breakfast. 270 tablet 1     metoprolol succinate (TOPROL-XL) 50 MG 24 hr tablet Take 1 tablet (50 mg total) by mouth daily. 90 tablet 3     nystatin (MYCOSTATIN) cream apply topically to affected areas 3-4 times per day as needed 60 g 3     sulfaSALAzine (AZULFIDINE) 500 mg tablet Take 500 mg by mouth 4 (four) times a day.       tamsulosin (FLOMAX) 0.4 mg Cp24 TAKE 1 CAPSULE(0.4 MG) BY MOUTH DAILY AFTER BREAKFAST 90 capsule 1     No current facility-administered medications for this visit.         Objective:    Vitals:    06/01/18 0931   BP: 120/62   Patient Site: Left Arm   Patient Position: Sitting   Cuff Size: Adult Regular   Pulse: 98   SpO2: 96%   Weight: (!) 255 lb 3.2 oz (115.8 kg)   Height: 6' (1.829 m)      Body mass index is 34.61 kg/(m^2).      General Appearance:  Alert, cooperative, no distress, appears stated age   HEENT:  Normal.  No acute findings.   Neck: Supple, symmetrical, no adenopathy.   Lungs:   Clear to auscultation bilaterally, respirations unlabored.  No expiratory wheeze or  inspiratory crackles noted.   Heart:  Regular rate and rhythm, S1, S2 normal, no murmur, rub or gallop   Abdomen:   Soft, non-tender, positive bowel sounds, no masses, no organomegaly   Extremities: Extremities normal.  No cyanosis or edema   Skin: Warm, dry.  Skin color, texture, turgor normal, no rashes or lesions   Neurologic: Nonfocal.     Chest Xray: Ordered and personally reviewed in clinic.    This note has been dictated using voice recognition software. Any grammatical or context distortions are unintentional and inherent to the use of this software.

## 2021-06-18 NOTE — PROGRESS NOTES
Assessment/Plan:    1. Fatigue  Fatigue likely multifactorial.  Hypotensive blood pressure reading with blood pressure 96/64 on recheck.  Will remain off metoprolol succinate 50 mg daily.  Continues amlodipine 5 mg daily.  Decrease lisinopril from 20 mg down to 10 mg daily and recheck at scheduled follow-up no later than 4 weeks.    2. Anorexia  Anorexia likely secondary to increased dosing of metformin extended release 500 mg previously 2 tablets tolerated fine however now using 4 tablets daily and will reduce to 2 tablets daily until follow-up in 4 weeks.    3. Abnormal weight loss  Abnormal weight loss as discussed with 24 pound weight loss since prior visit April 5, 2018.  Likely associate with increasing Metformin dosing and will decrease from 2000 mg down to 1000 mg which she had tolerated in the past.    4. Type 2 diabetes mellitus without complication, without long-term current use of insulin  Continue close monitoring of diabetes with prior A1c of 7.9% April 5, 2018.  Will resume metformin extended release 500 mg using 2 tablets once daily instead of 4 tablets daily then reassess at scheduled diabetic follow-up with noted 24 pound weight loss which will be of benefit.    5. Essential hypertension with goal blood pressure less than 130/80  As above hypotensive blood pressure reading with blood pressure 96/64 on recheck.  Will remain off metoprolol succinate 50 mg daily.  Continues amlodipine 5 mg daily.  Decrease lisinopril from 20 mg down to 10 mg daily and recheck at scheduled follow-up no later than 4 weeks.    6. Rheumatoid arthritis, adult  Referral to [facility] rheumatology for second opinion.  Followed by Dr. Patel who did not communicate well with patient.  Patient still uncertain of diagnosis of rheumatoid arthritis versus adult version of juvenile stills.  - Ambulatory referral to Rheumatology    7. Depressed mood  Discussed potential for correlating depression.  Escitalopram 5 mg daily.  PHQ 9  "questionnaire etc. follow-up in 4 weeks.  - escitalopram oxalate (LEXAPRO) 5 MG tablet; Take 1 tablet (5 mg total) by mouth daily.  Dispense: 30 tablet; Refill: 2    8.  Tinea pedis  Lamisil AT cream twice daily ×21 days.      40 minutes total time with patient (and mother and sister-in-law), > 50% with counseling and coordination of cares with multiple questions/concerns.          Subjective:    Thiago Hein is seen today for follow-up evaluation.  Fatigue.  Some dizziness.  More constant.  Decreased appetite.  24 pound weight loss since April 5, 2018.  Recent chest x-ray negative.  Apprehensive metabolic panel with creatinine 1.25 and GFR 57 otherwise normal other than blood sugar 171.  CBC normal.  TSH and HIV negative.  Not using metoprolol succinate apparently.  Uses lisinopril 20 mg and amlodipine 5 mg daily for hypertension.  Had increase metformin extended release 500 mg from 2 tablets up to 4 tablets and this may CARE correlate with decreased appetite etc.  Was prescribed sulfasalazine 500 mg to use 2 tablets twice daily by Dr. Marina however discontinued about 5 days ago and symptoms persisted.  Comprehensive review of systems as above otherwise all negative per    Reviewed recent evaluation 6/1/18 for recent weight loss and fatigue.  Patient reports that since past Saturday he has no been feeling himself.  Has a difficult time describing specific symptoms but notes that he does not feel as clear headed is normal.  Feels like he may be dehydrated.  Also not having much of an appetite within the last week or so.  Notes that he has lost approximately 23 pounds since his visit in April 2018.  Is worried about this.  He is not having any headache but notes that his head feels \"fuzzy\".   He has been drinking a fair amount of water but still feels that he may not be having enough.  Is not having any chest pain but does feel a little bit more short of breath over the past 2 days.  Has taken his temperature a " "few times and has not had any fevers.  Patient does note that he recently found a prescription for sulfasalazine.  This was prescribed by his rheumatologist for his arthritis back in March of 2018 and her father had forgotten about it.  He began taking this medication on Saturday.  It was around this time that he started feeling these symptoms.  He continues to take Celebrex and plaquenil as prescribed by his rheumatologist.  Wonders if he should have stopped these medications when he started his sulfasalazine.  Is not having any significant pain.  Has joint pain at baseline.  No other changes to his medications have been made.  Was advised at his previous visit to increase metformin from 1000 mg to 1500 mg daily.  Patient has not done this, still taking 1000 mg daily.  Recent travel.  No contacts with similar symptoms that he is aware of.  Has no additional concerns today. Review of systems is as stated in HPI, and the remainder of the 10 system review is otherwise unremarkable.    Single  1 son - 32 \"Kraig\"  Tobacco: none  EtOH: none  Mom - Meena   Dad - currently 86 Yovanny - DM, CAD, HTN, high cholesterol, back problems, arthritis, spinal stenosis, pacemaker/defibrillator  1 bro - Be - HTN  Surgeries: \"lumbar back surgery for cyst removal\" - September, 2008 (Dr. 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 No pertinent surgical history.     History reviewed. No pertinent family history.     History reviewed. No pertinent past medical history.     Social History   Substance Use Topics     Smoking status: Never Smoker     Smokeless tobacco: Former User     Alcohol use No        Current Outpatient Prescriptions   Medication Sig Dispense Refill     amLODIPine (NORVASC) 5 MG tablet TAKE 1 TABLET(5 MG) BY MOUTH DAILY 90 tablet 1     atorvastatin (LIPITOR) 20 MG " tablet Take 1 tablet (20 mg total) by mouth at bedtime. 90 tablet 3     celecoxib (CELEBREX) 200 MG capsule Take 200 mg by mouth.       furosemide (LASIX) 40 MG tablet Take 1 tablet (40 mg total) by mouth daily. 90 tablet 3     hydroxychloroquine (PLAQUENIL) 200 mg tablet Take 200 mg by mouth.       lisinopril (PRINIVIL,ZESTRIL) 20 MG tablet Take 1 tablet (20 mg total) by mouth daily. 90 tablet 3     metFORMIN (GLUCOPHAGE-XR) 500 MG 24 hr tablet Take 3 tablets (1,500 mg total) by mouth daily with breakfast. 270 tablet 1     nystatin (MYCOSTATIN) cream apply topically to affected areas 3-4 times per day as needed 60 g 3     tamsulosin (FLOMAX) 0.4 mg Cp24 TAKE 1 CAPSULE(0.4 MG) BY MOUTH DAILY AFTER BREAKFAST 90 capsule 1     escitalopram oxalate (LEXAPRO) 5 MG tablet Take 1 tablet (5 mg total) by mouth daily. 30 tablet 2     metoprolol succinate (TOPROL-XL) 50 MG 24 hr tablet Take 1 tablet (50 mg total) by mouth daily. 90 tablet 3     sulfaSALAzine (AZULFIDINE) 500 mg tablet Take 500 mg by mouth 4 (four) times a day.       No current facility-administered medications for this visit.           Objective:    Vitals:    06/05/18 1410 06/05/18 1501   BP: 100/60 96/64   Pulse: (!) 106    SpO2: 95%    Weight: (!) 254 lb (115.2 kg)       Body mass index is 34.45 kg/(m^2).    Alert.  No apparent distress.  Blood pressure 96/64 on recheck.  Chest clear.  Cardiac exam with sinus tachycardia likely.  Abdomen benign.  Extremities warm and dry.      XR CHEST 2 VIEWS  6/1/2018 10:22 AM     INDICATION: Abnormal weight loss  COMPARISON: None.     FINDINGS: Mild somewhat streaky density at the lung bases probably atelectasis. Lungs otherwise clear. Normal heart size and pulmonary vascularity.        This note has been dictated using voice recognition software and as a result may contain minor grammatical errors and unintended word substitutions.

## 2021-06-19 NOTE — PROGRESS NOTES
Assessment/Plan:    1. Type 2 diabetes mellitus (H)  Type 2 diabetes with noted improvement with A1c decreasing from 7.9% down to 7.3%.  Continue attempts at goal less than 7% ideally.  Remains on metformin extended release 500 mg using 2 tablets once daily.  Question of anorexia etc. at higher doses previously.  Weight goal remains less than 230 pounds initially, less than 220 pounds ideally.  Patient did leave urine specimen.  We will check microalbumin for screen.  - Glycosylated Hemoglobin A1c  - Basic Metabolic Panel  - microalbumin    2. Essential hypertension with goal blood pressure less than 130/80  Hypertension, somewhat hypotensive blood pressure readings and has continue lisinopril 20 mg daily.  Will decrease to 10 mg daily and remain on amlodipine 5 mg daily.  Remain off metoprolol succinate 50 mg daily.  Reassess at annual wellness visit in 4 months.  - Basic Metabolic Panel    3. Rheumatoid arthritis, adult (H)  Has appointment with Dr. Bundy July 23, 2018 for second opinion regarding RA management previously followed by Dr. Patel.  Has cut back on hydroxychloroquine 200 mg now using only once daily as well as Celebrex 200 mg now using only once daily.  Is feeling great.  Will review current dosing with Dr. Bundy at upcoming visit.    4. Mixed hyperlipidemia  Check lipid cascade following over 20 pound weight loss with weight currently stable BMI 34.45.  - Lipid Cascade    5. Vitamin D deficiency  Patient does not feel that he is currently using vitamin D supplement.  Will resume 2000 units once daily and reassess at annual wellness visit in 4 months.      The following high BMI interventions were performed this visit: encouragement to exercise, weight monitoring, weight loss from baseline weight and lifestyle education regarding diet.  Ensure ongoing efforts to achieve weight goal < 230 pounds initially, < 220 pounds ideally.         Subjective:    Thiago Hein is seen today for follow-up  "evaluation.  States energy has returned to normal.  Feels \"fully charged\".  No concerns with nausea.  Joints feel better.  I cut back on his own with hydroxychloroquine 200 mg from twice daily down to once daily as well as Celebrex 200 mg from twice daily down to once daily.  Failed to decrease lisinopril 20 mg daily which was felt to possibly be causing hypotensive blood pressure readings and possible association with fatigue.  Has remained off metoprolol succinate 50 mg daily.  Continues amlodipine 5 mg daily.  Does not feel that he is using vitamin D supplement currently with prior vitamin D level of 24.7 April 5, 2018.  Had been prescribed citalopram however discontinued after 2 doses due to fatigue.  Mood has returned to normal.  PHQ 9 questionnaire 0 out of 27 and OTONIEL 7 questionnaire 0 out of 21 today.    Reviewed recent evaluation 6/1/18 for recent weight loss and fatigue.  Patient reports that since past Saturday he has no been feeling himself.  Has a difficult time describing specific symptoms but notes that he does not feel as clear headed is normal.  Feels like he may be dehydrated.  Also not having much of an appetite within the last week or so.  Notes that he has lost approximately 23 pounds since his visit in April 2018.  Is worried about this.  He is not having any headache but notes that his head feels \"fuzzy\".   He has been drinking a fair amount of water but still feels that he may not be having enough.  Is not having any chest pain but does feel a little bit more short of breath over the past 2 days.  Has taken his temperature a few times and has not had any fevers.  Patient does note that he recently found a prescription for sulfasalazine.  This was prescribed by his rheumatologist for his arthritis back in March of 2018 and her father had forgotten about it.  He began taking this medication on Saturday.  It was around this time that he started feeling these symptoms.  He continues to take " "Celebrex and plaquenil as prescribed by his rheumatologist.  Wonders if he should have stopped these medications when he started his sulfasalazine.  Is not having any significant pain.  Has joint pain at baseline.  No other changes to his medications have been made.  Was advised at his previous visit to increase metformin from 1000 mg to 1500 mg daily.  Patient has not done this, still taking 1000 mg daily.  Recent travel.  No contacts with similar symptoms that he is aware of.  Has no additional concerns today. Review of systems is as stated in HPI, and the remainder of the 10 system review is otherwise unremarkable.    Single  1 son - 32 \"Kraig\"  Tobacco: none  EtOH: none  Mom - Meena   Dad - currently 86 Yovanny - DM, CAD, HTN, high cholesterol, back problems, arthritis, spinal stenosis, pacemaker/defibrillator  1 bro - Be - HTN  Surgeries: \"lumbar back surgery for cyst removal\" - September, 2008 (Dr. 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     No family history on file.     No past medical history on file.     Social History   Substance Use Topics     Smoking status: Never Smoker     Smokeless tobacco: Former User     Alcohol use No        Current Outpatient Prescriptions   Medication Sig Dispense Refill     amLODIPine (NORVASC) 5 MG tablet TAKE 1 TABLET(5 MG) BY MOUTH DAILY 90 tablet 1     atorvastatin (LIPITOR) 20 MG tablet Take 1 tablet (20 mg total) by mouth at bedtime. 90 tablet 3     celecoxib (CELEBREX) 200 MG capsule Take 200 mg by mouth daily.       furosemide (LASIX) 40 MG tablet Take 1 tablet (40 mg total) by mouth daily. 90 tablet 3     hydroxychloroquine (PLAQUENIL) 200 mg tablet Take 200 mg by mouth daily.       lisinopril (PRINIVIL,ZESTRIL) 20 MG tablet Take 1 tablet (20 mg total) by mouth daily. 90 tablet 3     metFORMIN " (GLUCOPHAGE-XR) 500 MG 24 hr tablet Take 3 tablets (1,500 mg total) by mouth daily with breakfast. 270 tablet 1     nystatin (MYCOSTATIN) cream apply topically to affected areas 3-4 times per day as needed 60 g 3     sulfaSALAzine (AZULFIDINE) 500 mg tablet Take 500 mg by mouth 4 (four) times a day.       tamsulosin (FLOMAX) 0.4 mg Cp24 TAKE 1 CAPSULE(0.4 MG) BY MOUTH DAILY AFTER BREAKFAST 90 capsule 1     escitalopram oxalate (LEXAPRO) 5 MG tablet TAKE 1 TABLET(5 MG) BY MOUTH DAILY 90 tablet 0     No current facility-administered medications for this visit.           Objective:    Vitals:    07/05/18 1007   BP: 110/60   Pulse: (!) 104   SpO2: 96%   Weight: (!) 254 lb (115.2 kg)      Body mass index is 34.45 kg/(m^2).    Alert.  No apparent distress.  Chest clear.  Cardiac exam regular.  Blood pressure 98/68 on recheck today.      This note has been dictated using voice recognition software and as a result may contain minor grammatical errors and unintended word substitutions.

## 2021-06-19 NOTE — LETTER
"Letter by Don Armijo MD at      Author: Don Armijo MD Service: -- Author Type: --    Filed:  Encounter Date: 10/30/2019 Status: Signed         Thiago Hein  [address] [place] [address]         October 30, 2019       Dear Mr. Hein,    Below are the results from your recent visit:    Resulted Orders   Glycosylated Hemoglobin A1c   Result Value Ref Range    Hemoglobin A1c 7.2 (H) 3.5 - 6.0 %   Microalbumin, Random Urine   Result Value Ref Range    Microalbumin, Random Urine <0.50 0.00 - 1.99 mg/dL    Creatinine, Urine 20.8 mg/dL    Microalbumin/Creatinine Ratio Random Urine        Comment:      \"Unable to calculate: Creatinine and/or Microalbumin value below detectable level\"    Narrative    Microalbumin, Random Urine  <2.0 mg/dL . . . . . . . . Normal  3.0-30.0 mg/dL . . . . . . Microalbuminuria  >30.0 mg/dL . . . . . .  . Clinical Proteinuria    Microalbumin/Creatinine Ratio, Random Urine  <20 mg/g . . . . .. . . . Normal   mg/g . . . . . . . Microalbuminuria  >300 mg/g . . . . . . . . Clinical Proteinuria       Comprehensive Metabolic Panel   Result Value Ref Range    Sodium 141 136 - 145 mmol/L    Potassium 4.5 3.5 - 5.0 mmol/L    Chloride 104 98 - 107 mmol/L    CO2 26 22 - 31 mmol/L    Anion Gap, Calculation 11 5 - 18 mmol/L    Glucose 131 (H) 70 - 125 mg/dL    BUN 16 8 - 22 mg/dL    Creatinine 1.03 0.70 - 1.30 mg/dL    GFR MDRD Af Amer >60 >60 mL/min/1.73m2    GFR MDRD Non Af Amer >60 >60 mL/min/1.73m2    Bilirubin, Total 0.7 0.0 - 1.0 mg/dL    Calcium 9.9 8.5 - 10.5 mg/dL    Protein, Total 7.3 6.0 - 8.0 g/dL    Albumin 4.5 3.5 - 5.0 g/dL    Alkaline Phosphatase 119 45 - 120 U/L    AST 18 0 - 40 U/L    ALT 21 0 - 45 U/L    Narrative    Fasting Glucose reference range is 70-99 mg/dL per  American Diabetes Association (ADA) guidelines.   Lipid Cascade   Result Value Ref Range    Cholesterol 117 <=199 mg/dL    Triglycerides 156 (H) <=149 mg/dL    HDL Cholesterol 32 (L) >=40 mg/dL "    LDL Calculated 54 <=129 mg/dL    Patient Fasting > 8hrs? Yes    HM2(CBC w/o Differential)   Result Value Ref Range    WBC 8.2 4.0 - 11.0 thou/uL    RBC 5.38 4.40 - 6.20 mill/uL    Hemoglobin 15.6 14.0 - 18.0 g/dL    Hematocrit 45.5 40.0 - 54.0 %    MCV 84 80 - 100 fL    MCH 29.0 27.0 - 34.0 pg    MCHC 34.3 32.0 - 36.0 g/dL    RDW 12.6 11.0 - 14.5 %    Platelets 270 140 - 440 thou/uL    MPV 8.8 7.0 - 10.0 fL       Continue your current diabetes management as discussed in order to further improve.  Goal A1c < 8.0% initially, < 7.0% ideally.     Your urine screen was normal.  No evidence for significant protein in your urine.  We will plan to repeat this test on a yearly basis.     Your kidney and liver tests were normal.     Your cholesterol results remain mildly abnormal.  Ensure regular exercise, healthy diet, and weight loss modifications in order to further improve.  Weight goal < 250 pounds initially, < 240 pounds ideally.  We will plan to recheck your labs while fasting in the next 6-12 months to ensure desired improvement.       Your complete blood count results were normal.  No evidence for anemia, etc.     Continue your current medication as discussed.     Please call with questions or contact us using [email or web address].    Sincerely,      Electronically signed by Don Armijo MD

## 2021-06-20 NOTE — LETTER
Letter by Don Armijo MD at      Author: Don Armijo MD Service: -- Author Type: --    Filed:  Encounter Date: 2/20/2020 Status: (Other)         Thiago Hein  [address] [place] [address]             February 20, 2020         Dear Mr. Hein,    Below are the results from your recent visit:    Resulted Orders   Glycosylated Hemoglobin A1c   Result Value Ref Range    Hemoglobin A1c 7.7 (H) 3.5 - 6.0 %   Lipid Cascade   Result Value Ref Range    Cholesterol 141 <=199 mg/dL    Triglycerides 141 <=149 mg/dL    HDL Cholesterol 37 (L) >=40 mg/dL    LDL Calculated 76 <=129 mg/dL    Patient Fasting > 8hrs? Yes    Comprehensive Metabolic Panel   Result Value Ref Range    Sodium 137 136 - 145 mmol/L    Potassium 4.3 3.5 - 5.0 mmol/L    Chloride 99 98 - 107 mmol/L    CO2 27 22 - 31 mmol/L    Anion Gap, Calculation 11 5 - 18 mmol/L    Glucose 197 (H) 70 - 125 mg/dL    BUN 18 8 - 22 mg/dL    Creatinine 0.88 0.70 - 1.30 mg/dL    GFR MDRD Af Amer >60 >60 mL/min/1.73m2    GFR MDRD Non Af Amer >60 >60 mL/min/1.73m2    Bilirubin, Total 0.7 0.0 - 1.0 mg/dL    Calcium 9.8 8.5 - 10.5 mg/dL    Protein, Total 7.1 6.0 - 8.0 g/dL    Albumin 4.4 3.5 - 5.0 g/dL    Alkaline Phosphatase 103 45 - 120 U/L    AST 14 0 - 40 U/L    ALT 23 0 - 45 U/L    Narrative    Fasting Glucose reference range is 70-99 mg/dL per  American Diabetes Association (ADA) guidelines.   PSA (Prostatic-Specific Antigen), Annual Screen   Result Value Ref Range    PSA 2.5 0.0 - 4.5 ng/mL    Narrative    Method is Abbott Prostate-Specific Antigen (PSA)  Standard-WHO 1st International (90:10)       Continue your current diabetes management as discussed in order to further improve.  Goal A1c < 7.5% initially, < 7.0% ideally.     Your cholesterol results were near goal.  Ensure regular exercise, healthy diet, and weight loss modifications in order to further improve.  Weight goal < 250 pounds initially, < 240 pounds ideally.  We will plan to recheck  your labs while fasting in the next 6-12 months to ensure desired improvement.       Your kidney and liver tests were otherwise normal.    Your prostate cancer screening test (i.e. PSA) was normal.     Please call with questions or contact us using [email or web address].    Sincerely,        Electronically signed by Don Armijo MD

## 2021-06-20 NOTE — PROGRESS NOTES
ASSESSMENT AND PLAN:  Thiago Hein 68 y.o. male is seen here on 09/26/18 for follow-up.  He has combination of seronegative inflammatory arthritis, degenerative joint disease.  He has been on Celebrex hydroxychloroquine combination for the past more than a decade.  Prior to that on methotrexate, at one point he was considered to have still's later on not thought to be the case.  He had his eyes examined recently.  No Plaquenil toxicity.  He tried to hold back on Celebrex but within 10 days had pain in various areas and feels better when he is back on Celebrex.  His renal function is normal.  We reviewed the side effects of nonsteroidals.  We will continue the current regimen.  He has mild long-standing triggering of the right index and middle finger treatment options are reviewed he would like to continue to observe.  Return for follow-up here in 3 months.        Diagnoses and all orders for this visit:    Polyarthritis  -     hydroxychloroquine (PLAQUENIL) 200 mg tablet; TAKE 1 TABLET(200 MG) BY MOUTH TWICE DAILY  Dispense: 180 tablet; Refill: 1  -     celecoxib (CELEBREX) 100 MG capsule; Take 1 capsule (100 mg total) by mouth 2 (two) times a day.  Dispense: 180 capsule; Refill: 0    Polyarthralgia  -     hydroxychloroquine (PLAQUENIL) 200 mg tablet; TAKE 1 TABLET(200 MG) BY MOUTH TWICE DAILY  Dispense: 180 tablet; Refill: 1  -     celecoxib (CELEBREX) 100 MG capsule; Take 1 capsule (100 mg total) by mouth 2 (two) times a day.  Dispense: 180 capsule; Refill: 0    Lumbago  -     celecoxib (CELEBREX) 100 MG capsule; Take 1 capsule (100 mg total) by mouth 2 (two) times a day.  Dispense: 180 capsule; Refill: 0    Trigger finger, right index finger      HISTORY OF PRESENTING ILLNESS:  Thiago Hein, 68 y.o., male is here for for follow-up of polyarthritis, at one point elsewhere consideration for still's disease, osteoarthritis.  He has noted that while he was off Celebrex after several days he started hurting.  " It was \"all over\" most of it was in the lower back.  E his knees were hurting.  He is back on Celebrex and feels comfortable.  He noted pain level of 1.5/10.  He has mild discomfort such as in the right index and middle finger when he tries to open a jar or other such object where he requires a lot of force for a short while his index and middle finger can get locked.  He has not seen swelling.  He has had eye examination.  No retinal toxicity noted.  His recent labs are negative for autoimmunity.  His morning stiffness is no more than 30 minutes.  He has not had fever weight loss blurry vision eye redness moccasin nausea cough or rash.        As noted during his first visit here he reports that it was approximately 15 years or so ago when he first started hurting in his joints.  He remembers it was in his hands, fingers.  He also had pain in the knees.  He was started on methotrexate.  He was on it for 18-24 months.  He wonders if the reason for discontinuation of methotrexate was something to do with his skin and sun sensitivity.  He was then changed to a combination of hydroxychloroquine and Celebrex.  He has been taking that regularly over the past 12-13 years.  Only recently instead of taking 200 twice daily of each she has been taking one daily of each.  He ran out of these a few days ago and is beginning to notice some discomfort in his hands and knees.  His previous rheumatologisty recently gave him sulfasalazine.  He wonders if the plan was for him to continue hydroxychloroquine and sulfasalazine and discontinue Celebrex.  Currently he noted pain as being mild.  He reports that he is able to do most of his day-to-day activities without difficulty.  He noted morning stiffness of 30 minutes.  He has no personal family history of psoriasis, rheumatoid arthritis in the family or lupus.  He reports history of sun sensitivity.  He does not smoke does not take alcohol no regular exercise apart from being on his " feet most of the time.  He had back surgery this is 10 years ago her lumbar spine region.  He considers himself otherwise in good health.    Further historical information and ADL limitations as noted in the multidimensional health assessment questionnaire attached in the EMR.  ALLERGIES:Review of patient's allergies indicates no known allergies.    PAST MEDICAL/ACTIVE PROBLEMS/MEDICATION/ FAMILY HISTORY/SOCIAL DATA:  The patient has a family history of  No past medical history on file.  History   Smoking Status     Never Smoker   Smokeless Tobacco     Former User     Patient Active Problem List   Diagnosis     Type 2 diabetes mellitus (H)     Obesity     Hypertension     Lower Back Pain     Emotional Lability     Adult Still's Disease     Epidural abscess     Sepsis (H)     Arm DVT (deep venous thromboembolism), acute, left (H)     Hyperlipidemia     Diverticulosis     Peripheral edema     Non morbid obesity due to excess calories     Essential hypertension with goal blood pressure less than 130/80     Unintentional weight loss     Polyarthralgia     Polyarthritis     Current Outpatient Prescriptions   Medication Sig Dispense Refill     amLODIPine (NORVASC) 5 MG tablet TAKE 1 TABLET(5 MG) BY MOUTH DAILY 90 tablet 1     atorvastatin (LIPITOR) 20 MG tablet Take 1 tablet (20 mg total) by mouth at bedtime. 90 tablet 3     celecoxib (CELEBREX) 100 MG capsule Take 1 capsule (100 mg total) by mouth 2 (two) times a day. 180 capsule 0     escitalopram oxalate (LEXAPRO) 5 MG tablet TAKE 1 TABLET(5 MG) BY MOUTH DAILY 90 tablet 0     furosemide (LASIX) 40 MG tablet Take 1 tablet (40 mg total) by mouth daily. 90 tablet 3     hydroxychloroquine (PLAQUENIL) 200 mg tablet TAKE 1 TABLET(200 MG) BY MOUTH TWICE DAILY 180 tablet 1     lisinopril (PRINIVIL,ZESTRIL) 20 MG tablet Take 1 tablet (20 mg total) by mouth daily. (Patient taking differently: Take 10 mg by mouth daily. ) 90 tablet 3     metFORMIN (GLUCOPHAGE-XR) 500 MG 24 hr  tablet Take 3 tablets (1,500 mg total) by mouth daily with breakfast. 270 tablet 1     nystatin (MYCOSTATIN) cream apply topically to affected areas 3-4 times per day as needed 60 g 3     sulfaSALAzine (AZULFIDINE) 500 mg tablet Take 500 mg by mouth 4 (four) times a day.       tamsulosin (FLOMAX) 0.4 mg Cp24 TAKE 1 CAPSULE(0.4 MG) BY MOUTH DAILY AFTER BREAKFAST 90 capsule 1     No current facility-administered medications for this visit.        DETAILED EXAMINATION  09/26/18  :  Vitals:    09/26/18 0742   BP: 118/62   Pulse: 88   Weight: (!) 260 lb (117.9 kg)     Alert oriented. Head including the face is examined for malar rash, heliotropes, scarring, lupus pernio. Eyes examined for redness such as in episcleritis/scleritis, periorbital lesions.   Neck/ Face examined for parotid gland swelling, range of motion of neck.  Left upper and lower and right upper and lower extremities examined for tenderness, swelling, warmth of the appendicular joints, range of motion, edema, rash.  Some of the important findings included: He has tenderness at A1 level of the right index and middle finger.  He has tenderness at the IP joints of the thumbs, and mild JLT of the knees.  There is no synovitis of the palpable upper extremity joints.  No dactylitis of the digits.     c  LAB / IMAGING DATA:  ALT   Date Value Ref Range Status   07/20/2018 13 0 - 45 U/L Final   06/01/2018 19 0 - 45 U/L Final   08/31/2011 26 <46 IU/L Final     Albumin   Date Value Ref Range Status   07/20/2018 4.0 3.5 - 5.0 g/dL Final   06/01/2018 4.4 3.5 - 5.0 g/dL Final   08/31/2011 4.1 3.5 - 5.0 g/dL Final     Creatinine   Date Value Ref Range Status   07/20/2018 0.97 0.70 - 1.30 mg/dL Final   07/05/2018 0.93 0.70 - 1.30 mg/dL Final   06/01/2018 1.25 0.70 - 1.30 mg/dL Final       WBC   Date Value Ref Range Status   07/20/2018 7.2 4.0 - 11.0 thou/uL Final   06/01/2018 7.6 4.0 - 11.0 thou/uL Final     Hemoglobin   Date Value Ref Range Status   07/20/2018 13.5 (L)  14.0 - 18.0 g/dL Final   06/01/2018 16.0 14.0 - 18.0 g/dL Final   11/15/2016 14.1 14.0 - 18.0 g/dL Final     Platelets   Date Value Ref Range Status   07/20/2018 231 140 - 440 thou/uL Final   06/01/2018 209 140 - 440 thou/uL Final   11/15/2016 220 140 - 440 thou/uL Final       Lab Results   Component Value Date    RF <15.0 07/20/2018    SEDRATE 7 07/20/2018

## 2021-06-21 NOTE — PROGRESS NOTES
Assessment and Plan:       1. Encounter for general adult medical examination with abnormal findings  Annual wellness visit completed.  Risks associated with need for completion of advanced directives otherwise risk assessment negative.  Annual wellness visits to continue.  Prior hepatitis C screen negative.    2. Type 2 diabetes mellitus (H)  Type 2 diabetes, fair control with A1c improving from 7.3% down to 7.0%.  Did encourage patient to use metformin extended release 500 mg and increase from 2 tablets up to 3 tablets daily.  Reassess at follow-up in 4 months.  Ensure stable renal function while on metformin.  - Glycosylated Hemoglobin A1c    3. Need for vaccination  High-dose flu shot given.  Immunizations otherwise up-to-date.  - Influenza High Dose, Seasonal 65+ yrs    4. Rheumatoid arthritis, adult (H)  Continues to follow with Dr. Bundy.  Plaquenil 200 mg twice daily and Celebrex 100 mg twice daily.    5. Vitamin D deficiency  Vitamin D deficiency history.  Not using vitamin D supplement currently.  Repeat vitamin D level today.    6. Mixed hyperlipidemia  Nonfasting.  History of hyperlipidemia.  Continues atorvastatin 20 mg daily.  Therapeutic lifestyle changes in addition to statin therapy for additional lipid improvement recommended.    7. Essential hypertension with goal blood pressure less than 130/80  Hypertension, stable.  Using lisinopril 20 mg taking half tablet daily.  Will refill is 10 mg daily dose.  Amlodipine 5 mg daily as well with trace peripheral edema.    8. Carpal tunnel syndrome of left wrist  Described left hand paresthesias consistent with mild carpal tunnel syndrome.  Declines EMG studies for confirmation.  Wrist brace to be utilized.    9. Dermatitis  Referral was placed to see dermatology with significant dermatitis especially upper extremities while on Plaquenil.  Has seen dermatologist several years ago.    10. Normochromic normocytic anemia  Repeat CBC regarding normochromic  normocytic anemia associated with rheumatoid arthritis and anemia of chronic disease.        The patient's current medical problems were reviewed.    I have had an Advance Directives discussion with the patient.  The following high BMI interventions were performed this visit: encouragement to exercise, weight monitoring, weight loss from baseline weight and lifestyle education regarding diet.  Ensure ongoing efforts to achieve weight goal < 240 pounds initially, < 220 pounds ideally.     The following health maintenance schedule was reviewed with the patient and provided in printed form in the after visit summary:   Health Maintenance   Topic Date Due     DIABETES FOOT EXAM  02/22/1960     DIABETES OPHTHALMOLOGY EXAM  02/22/1960     ADVANCE DIRECTIVES DISCUSSED WITH PATIENT  02/22/1968     INFLUENZA VACCINE RULE BASED (1) 08/01/2018     DIABETES FOLLOW-UP  01/05/2019     DEPRESSION FOLLOW UP  05/08/2019     DIABETES HEMOGLOBIN A1C  05/08/2019     DIABETES URINE MICROALBUMIN  07/05/2019     FALL RISK ASSESSMENT  07/05/2019     TD 18+ HE  05/17/2021     COLONOSCOPY  11/14/2026     PNEUMOCOCCAL POLYSACCHARIDE VACCINE AGE 65 AND OVER  Completed     PNEUMOCOCCAL CONJUGATE VACCINE FOR ADULTS (PCV13 OR PREVNAR)  Completed     ZOSTER VACCINE  Completed        Subjective:   Chief Complaint: Thiago Hein is an 68 y.o. male here for an Annual Wellness visit.   HPI: Patient seen today for annual wellness visit.  Type 2 diabetes.  Using metformin extended release 500 mg using 2 tablets each morning.  Followed by Dr. Bundy for rheumatoid arthritis and continues Plaquenil and Celebrex.  Low blood pressures in the past and did decrease lisinopril from 20 mg down to 10 mg while continuing amlodipine 5 mg daily.  Improvement noted.  Needs refills.  Atorvastatin 20 mg daily for lipid management.  No further concerns for depression.  Had been prescribed escitalopram however only took a couple doses then discontinued.  PHQ 9  questionnaire 0 out of 27 today.  Tamsulosin 0.4 mg daily for BPH management.  Did have a couple cookies and orange juice at 3:30 AM today.    Review of Systems:  Please see above.  The rest of the review of systems are negative for all systems.    Patient Care Team:  Don Armijo MD as PCP - General     Patient Active Problem List   Diagnosis     Type 2 diabetes mellitus (H)     Obesity     Hypertension     Lower Back Pain     Emotional Lability     Adult Still's Disease     Epidural abscess     Sepsis (H)     Arm DVT (deep venous thromboembolism), acute, left (H)     Hyperlipidemia     Diverticulosis     Peripheral edema     Non morbid obesity due to excess calories     Essential hypertension with goal blood pressure less than 130/80     Unintentional weight loss     Polyarthralgia     Polyarthritis     Trigger finger, right index finger     No past medical history on file.   No past surgical history on file.   No family history on file.   Social History     Social History     Marital status: Single     Spouse name: N/A     Number of children: N/A     Years of education: N/A     Occupational History     Not on file.     Social History Main Topics     Smoking status: Never Smoker     Smokeless tobacco: Former User     Alcohol use No     Drug use: No     Sexual activity: Not on file     Other Topics Concern     Not on file     Social History Narrative      Current Outpatient Prescriptions   Medication Sig Dispense Refill     amLODIPine (NORVASC) 5 MG tablet Take 1 tablet (5 mg total) by mouth daily. 90 tablet 3     atorvastatin (LIPITOR) 20 MG tablet Take 1 tablet (20 mg total) by mouth at bedtime. 90 tablet 3     celecoxib (CELEBREX) 100 MG capsule Take 1 capsule (100 mg total) by mouth 2 (two) times a day. 180 capsule 0     furosemide (LASIX) 40 MG tablet Take 1 tablet (40 mg total) by mouth daily. 90 tablet 3     hydroxychloroquine (PLAQUENIL) 200 mg tablet TAKE 1 TABLET(200 MG) BY MOUTH TWICE DAILY 180  "tablet 1     lisinopril (PRINIVIL,ZESTRIL) 10 MG tablet Take 1 tablet (10 mg total) by mouth daily. 90 tablet 3     metFORMIN (GLUCOPHAGE-XR) 500 MG 24 hr tablet Take 3 tablets (1,500 mg total) by mouth daily with breakfast. 270 tablet 1     nystatin (MYCOSTATIN) cream apply topically to affected areas 3-4 times per day as needed 60 g 3     tamsulosin (FLOMAX) 0.4 mg cap TAKE 1 CAPSULE(0.4 MG) BY MOUTH DAILY AFTER BREAKFAST 90 capsule 3     No current facility-administered medications for this visit.       Objective:   Vital Signs:   Visit Vitals     /70     Pulse 98     Ht 5' 11.25\" (1.81 m)     Wt (!) 251 lb (113.9 kg)     SpO2 97%     BMI 34.76 kg/m2        VisionScreening:  No exam data present     PHYSICAL EXAM    General Appearance:    Alert, cooperative, no distress, appears stated age.  Obesity.   Head:    Normocephalic, without obvious abnormality, atraumatic   Eyes:    PERRL, conjunctiva/corneas clear, EOM's intact, fundi     benign, both eyes.  Glasses.        Ears:    Normal TM's and external ear canals, both ears   Nose:   Nares normal, septum midline, mucosa normal, no drainage    or sinus tenderness   Throat:   Lips, mucosa, and tongue normal; teeth and gums normal   Neck:   Supple, symmetrical, trachea midline, no adenopathy;        thyroid:  No enlargement/tenderness/nodules; no carotid    bruit or JVD   Back:     Symmetric, no curvature, ROM normal, no CVA tenderness   Lungs:     Clear to auscultation bilaterally, respirations unlabored   Chest wall:    No tenderness or deformity   Heart:    Regular rate and rhythm, S1 and S2 normal, no murmur, rub   or gallop   Abdomen:     Soft, non-tender, bowel sounds active all four quadrants,     no masses, no organomegaly.     Genitalia:    Normal male without lesion, discharge or tenderness.  No inguinal hernia noted.     Rectal:    Normal tone.  Prostate mildly enlarged/symmetric, no masses or tenderness.   Extremities:   Extremities normal, " atraumatic, no cyanosis or edema   Pulses:   2+ and symmetric all extremities   Skin:   Skin color, texture, turgor normal, no rashes.  Significant dermatitis left upper extremity more so than right upper extremity.  Multiple nevi.   Lymph nodes:   Cervical, supraclavicular, and axillary nodes normal   Neurologic:   CNII-XII intact. Normal strength, sensation and reflexes       throughout         Assessment Results 11/8/2018   Activities of Daily Living No help needed   Instrumental Activities of Daily Living No help needed   Get Up and Go Score Less than 12 seconds   Mini Cog Total Score 4   Some recent data might be hidden     A Mini-Cog score of 0-2 suggests the possibility of dementia, score of 3-5 suggests no dementia    Identified Health Risks:     Information regarding advance directives (living reece), including where he can download the appropriate form, was provided to the patient via the AVS.

## 2021-06-21 NOTE — LETTER
Letter by Don Armijo MD at      Author: Don Armijo MD Service: -- Author Type: --    Filed:  Encounter Date: 4/30/2021 Status: (Other)         Thiago Hein  [address] [place] [address]             April 30, 2021         Dear Mr. Hein,    Below are the results from your recent visit:    Resulted Orders   Glycosylated Hemoglobin A1c   Result Value Ref Range    Hemoglobin A1c 7.5 (H) <=5.6 %   Lipid Cascade   Result Value Ref Range    Cholesterol 120 <=199 mg/dL    Triglycerides 203 (H) <=149 mg/dL    HDL Cholesterol 31 (L) >=40 mg/dL    LDL Calculated 48 <=129 mg/dL    Patient Fasting > 8hrs? No        Continue your current diabetes management as discussed in order to further improve.  Goal A1c remains < 7.0% ideally.     Your non-fasting cholesterol labs were only mildly abnormal.  Continue your current medication as discussed.  Ensure regular exercise, healthy diet, and weight loss modifications in order to further improve.  Weight goal < 250 pounds initially, < 240 pounds ideally.     Please call with questions or contact us using [employer].    Sincerely,        Electronically signed by Don Armijo MD

## 2021-06-21 NOTE — LETTER
"Letter by Don Armijo MD at      Author: Don Armijo MD Service: -- Author Type: --    Filed:  Encounter Date: 12/30/2020 Status: (Other)         Thiago Hein  [address] [place] [address]             December 30, 2020         Dear Mr. Hein,    Below are the results from your recent visit:    Resulted Orders   Glycosylated Hemoglobin A1c   Result Value Ref Range    Hemoglobin A1c 7.2 (H) <=5.6 %   Microalbumin, Random Urine   Result Value Ref Range    Microalbumin, Random Urine <0.50 0.00 - 1.99 mg/dL    Creatinine, Urine 31.9 mg/dL    Microalbumin/Creatinine Ratio Random Urine        Comment:      \"Unable to calculate: Creatinine and/or Microalbumin value below detectable level\"    Narrative    Microalbumin, Random Urine  <2.0 mg/dL . . . . . . . . Normal  3.0-30.0 mg/dL . . . . . . Microalbuminuria  >30.0 mg/dL . . . . . .  . Clinical Proteinuria    Microalbumin/Creatinine Ratio, Random Urine  <20 mg/g . . . . .. . . . Normal   mg/g . . . . . . . Microalbuminuria  >300 mg/g . . . . . . . . Clinical Proteinuria       Basic Metabolic Panel   Result Value Ref Range    Sodium 141 136 - 145 mmol/L    Potassium 4.5 3.5 - 5.0 mmol/L    Chloride 101 98 - 107 mmol/L    CO2 31 22 - 31 mmol/L    Anion Gap, Calculation 9 5 - 18 mmol/L    Glucose 129 (H) 70 - 125 mg/dL    Calcium 9.8 8.5 - 10.5 mg/dL    BUN 19 8 - 28 mg/dL    Creatinine 0.94 0.70 - 1.30 mg/dL    GFR MDRD Af Amer >60 >60 mL/min/1.73m2    GFR MDRD Non Af Amer >60 >60 mL/min/1.73m2    Narrative    Fasting Glucose reference range is 70-99 mg/dL per  American Diabetes Association (ADA) guidelines.       Continue your current diabetes management as discussed in order to further improve.  Goal A1c remains < 7.0% ideally.     Your urine screen was normal.  No evidence for significant protein in your urine.  We will plan to repeat this test on a yearly basis.     Your kidney function tests (i.e. Basic Metabolic Panel) were otherwise " normal.    Please call with questions or contact us using [employer].    Sincerely,        Electronically signed by Don Armijo MD

## 2021-06-22 NOTE — TELEPHONE ENCOUNTER
Refill Approved    Rx renewed per Medication Renewal Policy. Medication was last renewed on 10/26/17.    Last office visit 11/8/18    Riley Bolaños, [facility] Connection Triage/Med Refill 1/3/2019     Requested Prescriptions   Pending Prescriptions Disp Refills     furosemide (LASIX) 40 MG tablet [Pharmacy Med Name: FUROSEMIDE 40MG TABLETS] 90 tablet 0     Sig: TAKE 1 TABLET(40 MG) BY MOUTH DAILY    Diuretics/Combination Diuretics Refill Protocol  Passed - 1/2/2019 10:56 AM       Passed - Visit with PCP or prescribing provider visit in past 12 months    Last office visit with prescriber/PCP: 7/5/2018 Don Armijo MD OR same dept: 7/5/2018 Don Armijo MD OR same specialty: 7/5/2018 Don Armijo MD  Last physical: 11/8/2018 Last MTM visit: Visit date not found   Next visit within 3 mo: Visit date not found  Next physical within 3 mo: Visit date not found  Prescriber OR PCP: Don Armijo MD  Last diagnosis associated with med order: 1. Peripheral edema  - furosemide (LASIX) 40 MG tablet [Pharmacy Med Name: FUROSEMIDE 40MG TABLETS]; TAKE 1 TABLET(40 MG) BY MOUTH DAILY  Dispense: 90 tablet; Refill: 0    If protocol passes may refill for 12 months if within 3 months of last provider visit (or a total of 15 months).            Passed - Serum Potassium in past 12 months     Lab Results   Component Value Date    Potassium 4.4 11/08/2018            Passed - Serum Sodium in past 12 months     Lab Results   Component Value Date    Sodium 142 11/08/2018            Passed - Blood pressure on file in past 12 months    BP Readings from Last 1 Encounters:   12/27/18 132/80            Passed - Serum Creatinine in past 12 months     Creatinine   Date Value Ref Range Status   11/08/2018 0.92 0.70 - 1.30 mg/dL Final
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
## 2021-06-24 NOTE — TELEPHONE ENCOUNTER
Refill Approved    Rx renewed per Medication Renewal Policy. Medication was last renewed on 4/5/2018 with 1 refill.  Last office visit: 11/8/2018 with PCP Dr LAMONT Armijo.     [facility], [facility] Triage/Med Refill 3/6/2019     Requested Prescriptions   Pending Prescriptions Disp Refills     metFORMIN (GLUCOPHAGE-XR) 500 MG 24 hr tablet 270 tablet 1     Sig: Take 3 tablets (1,500 mg total) by mouth daily with breakfast.    There is no refill protocol information for this order      Refused Prescriptions Disp Refills     metFORMIN (GLUCOPHAGE-XR) 500 MG 24 hr tablet [Pharmacy Med Name: METFORMIN ER 500MG 24HR TABS] 180 tablet 0     Sig: TAKE 2 TABLETS BY MOUTH DAILY WITH BREAKFAST    Metformin Refill Protocol Passed - 3/4/2019 10:24 AM       Passed - Blood pressure in last 12 months    BP Readings from Last 1 Encounters:   12/27/18 132/80            Passed - LFT or AST or ALT in last 12 months    Albumin   Date Value Ref Range Status   11/08/2018 4.6 3.5 - 5.0 g/dL Final     Bilirubin, Total   Date Value Ref Range Status   11/08/2018 0.6 0.0 - 1.0 mg/dL Final     Bilirubin, Direct   Date Value Ref Range Status   08/31/2011 0.2 <0.6 mg/dL Final     Alkaline Phosphatase   Date Value Ref Range Status   11/08/2018 114 45 - 120 U/L Final     AST   Date Value Ref Range Status   11/08/2018 16 0 - 40 U/L Final     ALT   Date Value Ref Range Status   11/08/2018 19 0 - 45 U/L Final     Protein, Total   Date Value Ref Range Status   11/08/2018 7.7 6.0 - 8.0 g/dL Final               Passed - GFR or Serum Creatinine in last 6 months    GFR MDRD Non Af Amer   Date Value Ref Range Status   11/08/2018 >60 >60 mL/min/1.73m2 Final     GFR MDRD Af Amer   Date Value Ref Range Status   11/08/2018 >60 >60 mL/min/1.73m2 Final            Passed - Visit with PCP or prescribing provider visit in last 6 months or next 3 months    Last office visit with prescriber/PCP: Visit date not found OR same dept: 7/5/2018 Don Armijo MD OR  same specialty: 7/5/2018 Don Armijo MD Last physical: 11/8/2018 Last MTM visit: Visit date not found         Next appt within 3 mo: Visit date not found  Next physical within 3 mo: Visit date not found  Prescriber OR PCP: Don Armijo MD  Last diagnosis associated with med order: 1. Type 2 diabetes mellitus (H)  - metFORMIN (GLUCOPHAGE-XR) 500 MG 24 hr tablet [Pharmacy Med Name: METFORMIN ER 500MG 24HR TABS]; TAKE 2 TABLETS BY MOUTH DAILY WITH BREAKFAST  Dispense: 180 tablet; Refill: 0    2. Type 2 diabetes mellitus without complication, without long-term current use of insulin (H)     If protocol passes may refill for 12 months if within 3 months of last provider visit (or a total of 15 months).          Passed - A1C in last 6 months    Hemoglobin A1c   Date Value Ref Range Status   11/08/2018 7.0 (H) 3.5 - 6.0 % Final              Passed - Microalbumin in last year     Microalbumin, Random Urine   Date Value Ref Range Status   07/05/2018 <0.50 0.00 - 1.99 mg/dL Final

## 2021-07-03 NOTE — ADDENDUM NOTE
Addendum Note by Fay Brooks CMA at 3/17/2017 11:21 AM     Author: Fay Brooks CMA Service: -- Author Type: Certified Medical Assistant    Filed: 3/17/2017 11:21 AM Encounter Date: 3/17/2017 Status: Signed    : Fay Brooks CMA (Certified Medical Assistant)    Addended by: FAY BROOKS on: 3/17/2017 11:21 AM        Modules accepted: Orders        
[facility]...

## 2021-07-07 ENCOUNTER — COMMUNICATION - HEALTHEAST (OUTPATIENT)
Dept: RHEUMATOLOGY | Facility: CLINIC | Age: 71
End: 2021-07-07

## 2021-07-07 DIAGNOSIS — M25.50 POLYARTHRALGIA: ICD-10-CM

## 2021-07-07 DIAGNOSIS — M15.0 PRIMARY OSTEOARTHRITIS INVOLVING MULTIPLE JOINTS: ICD-10-CM

## 2021-07-07 DIAGNOSIS — M06.00 SERONEGATIVE RHEUMATOID ARTHRITIS (H): ICD-10-CM

## 2021-07-07 NOTE — TELEPHONE ENCOUNTER
Telephone Encounter by Shira Haider RN at 7/7/2021 10:51 AM     Author: Shira Haider RN Service: -- Author Type: Registered Nurse    Filed: 7/7/2021 10:51 AM Encounter Date: 7/7/2021 Status: Signed    : Shira Haider RN (Registered Nurse)       Refilled per Rheum RN refill protocol

## 2021-07-07 NOTE — TELEPHONE ENCOUNTER
Telephone Encounter by Gerhardt, Judy at 7/7/2021  7:19 AM     Author: Gerhardt, Judy Service: -- Author Type: --    Filed: 7/7/2021  7:21 AM Encounter Date: 7/7/2021 Status: Addendum    : Gerhardt, Judy    Related Notes: Original Note by Gerhardt, Judy filed at 7/7/2021  7:20 AM       Refill request from [place], [place]    Medication: Hydroxychloroquine 200mg  Last Refill: 10/1/20    Medication: Celecoxib 100mg  Last Refill 3/30/21    Last OV: 3/4/21  Last Lab: 11/27/20  Future OV: 9/1/21

## 2021-07-20 ENCOUNTER — TELEPHONE (OUTPATIENT)
Dept: FAMILY MEDICINE | Facility: CLINIC | Age: 71
End: 2021-07-20

## 2021-07-20 NOTE — TELEPHONE ENCOUNTER
PIYUSH JORDAN  : 1953  ACCOUNT:  [id number]  [phone]  PCP: Dr. Tina Barroso   None   TODAY'S DATE: 2018  DICTATED BY:  [Dr. Arnoldo Raygoza]      CHIEF COMPLAINT: [Followup of chronic afib, [employer] pacer.]    HPI:    [On 2018, Piyush Jordan, a 65 year old female, presented with no interim cardiac complaints.]    [Mrs Jordan is a 65-year-old female well-known to me.  She has chronic A. fib.  She has a [employer] pacemaker in place.  Her pacemaker is starting to show some battery decline.  When it was checked in the spring she had about 2 years remaining on her battery.    She remains on Coumadin and has not had any bleeding issues.  She had an echo 2 months ago and her ejection fraction is 55-60%.  She has mild mitral regurgitation.    She denies chest pain, shortness of breath, blood in the stool or blood in her urine.  She is feeling well overall.]        NYHA Class: 1  RISK FACTORS:  CAD - Hypertension Family  CAD Equivalent - Diabetes    REVIEW OF SYSTEMS:    CONS: doing well and . EYES: denies significant visual changes. ENMT: denies difficulties with hearing, otherwise negative. CV: Denies chest pain, dizziness, palpitations and see HPI. RESP: denies dyspnea, cough or wheezing. GI: denies melena, hematochezia. : no hematuria. INTEG: no new rashes, lesions. MS: no limiting arthritis. NEURO: no localized deficits. HEM/LYMPH: easy bruising.     PAST HISTORY:     PAST CV HISTORY: diastolic cardiomyopathy, dyslipidemia, hypertension, ICD ([employer])  and ICD gen changed to pacer only ([employer]) 4/10    FAMILY HISTORY: Significant for premature CAD. Negative for AAA.  SOCIAL HISTORY: SMOKING: Never used tobacco. denies smoking. CAFFEINE: occ. tea. ALCOHOL: drinks rarely. EXERCISE: no regular exercise. DIET: low fat, low cholesterol. MARITAL STATUS: . LIFESTYLE: moderate stress lifestyle and active lifestyle. 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 He got both of his Covid shots in [place] and is asking if he can be sent a card in the mail  documenting this. She says he told her he never got one.     Phone Number Patient can be reached at: His mom is Meena and she wants a call back to make sure this is taken care of [phone]    Best Time: anytime    Can we leave a detailed message on this number? It didn't sound like she has a voice mail.    Call taken on 7/20/2021 at 2:30 PM by Karina Chapa       history. EDUCATION: high school graduate. RESIDENCE: homeowner. ILLICIT DRUG USE: denies use of street drugs.     ALLERGIES: No Known Allergies    MEDICATIONS: Selected prescriptions see below    VITAL SIGNS: [B/P - 132/70 , Pulse - 68, Weight -  151, Height -   61 , BMI - 28.5 ]    CONS: well developed, well nourished. EYES: sclera non-icteric. ENT: mucosa pink and moist. NECK: JVD not raised. RESP: clear to auscultation. LYMPHATIC:  no lymphedema and nodes non-palpable. MS: normal gait. EXT: varicosities and more prominent in the L heel.  SKIN: pacemaker incision well healed.  NEURO/PSYCH: alert and oriented to time, place and person and normal affect.      CV: PALP: no lifts, thrills or rub and PMI not displaced. AUSC:  irregularly irregular rhythm, normal S1 and S2; no pathologic murmurs. CAROTIDS: carotid pulses normal. ABD AORTA: abdominal aorta not enlarged. EXT: no peripheral edema.     LABORATORY DATA: [00]    DECISION MAKING:    [Mrs Jordan is a 65-year-old female with chronic A. fib who has a [employer] pacemaker in place.  Her pacemaker is starting to show some battery decline.  She is can get a full pacemaker checked later this week.  We talked about a generator change.  I think she is 1-2 years away from that.  Her echo showed preserved ejection fraction and only mild mitral regurgitation.  She does have some varicose veins and I am going to refer her to Dr. Diego for further evaluation.]    ASSESSMENT:  1. Varicose vein w/pain, edema, swelling  2. Chronic atrial fibrillation  3. Coumadin Management,MHS  4. Coumadin Management,MHS  5. DM, Type II/Unspec, uncontrolled  6. Hypercholesteremia, pure  7. Hypertension, benign  8. Pacemaker [employer] 4/2010  9. Pacemaker, intermittently dependent  10. Pacer reprogramming  11. Sick sinus syndrome  12. Venous insufficiency, chronic  13. _epistaxis mild      PLAN:  [  1.  COntinue pacer clinic FU  2.  Continue current meds  3.  Referral to Dr Diego  for varicose veins  4.  WADE LO one year    MD Daniella]    PRESCRIPTIONS:   08/09/18 *Carvedilol           12.5MG    1 TABLET BY MOUTH TWICE DAILY.           08/09/18 *Digoxin              125MCG    1 TABLET BY MOUTH DAILY.                 08/09/18 *DilTIAZem HCl ER [id number]     1 CAPSULE BY MOUTH TWICE DAILY.          08/09/18 *Furosemide           40MG      1 TABLET BY MOUTH ONCE DAILY.            08/09/18 *Pravachol            40MG      1 TABLET BY MOUTH AT BEDTIME labwork     08/09/18 *Warfarin Sodium      5MG       1/2 OR 1 TABLET BY MOUTH DAILY OR AS     08/01/17 *Pravastatin Sodium   40MG      1 TABLET BY MOUTH AT BEDTIME

## 2021-07-21 ENCOUNTER — TELEPHONE (OUTPATIENT)
Dept: FAMILY MEDICINE | Facility: CLINIC | Age: 71
End: 2021-07-21

## 2021-08-03 PROBLEM — M06.00 RHEUMATOID ARTHRITIS WITH NEGATIVE RHEUMATOID FACTOR, INVOLVING UNSPECIFIED SITE (H): Status: RESOLVED | Noted: 2019-07-01 | Resolved: 2020-12-03

## 2021-08-09 DIAGNOSIS — M06.1 ADULT STILL'S DISEASE (H): ICD-10-CM

## 2021-08-09 NOTE — TELEPHONE ENCOUNTER
Refill request from [facility] [address] [place], [place]    Medication: Hydroxychloroquine 200mg  Last Refill: 4/12/21  Last OV: 3/4/21  Last lab: 11/27/21  Future OV: 9/1/21

## 2021-08-10 RX ORDER — HYDROXYCHLOROQUINE SULFATE 200 MG/1
200 TABLET, FILM COATED ORAL 2 TIMES DAILY
Qty: 180 TABLET | Refills: 0 | Status: SHIPPED | OUTPATIENT
Start: 2021-08-10 | End: 2021-09-01

## 2021-08-24 ENCOUNTER — OFFICE VISIT (OUTPATIENT)
Dept: FAMILY MEDICINE | Facility: CLINIC | Age: 71
End: 2021-08-24
Payer: MEDICARE

## 2021-08-24 VITALS
WEIGHT: 257 LBS | DIASTOLIC BLOOD PRESSURE: 60 MMHG | BODY MASS INDEX: 35.1 KG/M2 | OXYGEN SATURATION: 96 % | SYSTOLIC BLOOD PRESSURE: 120 MMHG | HEART RATE: 81 BPM

## 2021-08-24 DIAGNOSIS — I10 ESSENTIAL HYPERTENSION: ICD-10-CM

## 2021-08-24 DIAGNOSIS — M54.42 ACUTE LEFT-SIDED LOW BACK PAIN WITH LEFT-SIDED SCIATICA: ICD-10-CM

## 2021-08-24 DIAGNOSIS — E11.9 TYPE 2 DIABETES MELLITUS WITHOUT COMPLICATION, WITHOUT LONG-TERM CURRENT USE OF INSULIN (H): Primary | ICD-10-CM

## 2021-08-24 DIAGNOSIS — Z23 ENCOUNTER FOR IMMUNIZATION: ICD-10-CM

## 2021-08-24 DIAGNOSIS — E78.5 HYPERLIPIDEMIA, UNSPECIFIED HYPERLIPIDEMIA TYPE: ICD-10-CM

## 2021-08-24 DIAGNOSIS — M06.00 SERONEGATIVE RHEUMATOID ARTHRITIS (H): ICD-10-CM

## 2021-08-24 DIAGNOSIS — E66.01 MORBID OBESITY (H): ICD-10-CM

## 2021-08-24 DIAGNOSIS — R19.8 ABNORMAL BOWEL MOVEMENT: ICD-10-CM

## 2021-08-24 PROBLEM — M65.321 TRIGGER FINGER, RIGHT INDEX FINGER: Status: ACTIVE | Noted: 2018-09-26

## 2021-08-24 PROBLEM — N13.8 BPH WITH URINARY OBSTRUCTION: Status: ACTIVE | Noted: 2020-02-20

## 2021-08-24 PROBLEM — M15.0 PRIMARY OSTEOARTHRITIS INVOLVING MULTIPLE JOINTS: Status: ACTIVE | Noted: 2019-10-08

## 2021-08-24 PROBLEM — R60.0 PERIPHERAL EDEMA: Status: ACTIVE | Noted: 2017-07-14

## 2021-08-24 PROBLEM — R63.4 UNINTENTIONAL WEIGHT LOSS: Status: ACTIVE | Noted: 2018-06-01

## 2021-08-24 PROBLEM — E66.09 NON MORBID OBESITY DUE TO EXCESS CALORIES: Status: ACTIVE | Noted: 2017-07-14

## 2021-08-24 PROBLEM — G56.02 LEFT CARPAL TUNNEL SYNDROME: Status: ACTIVE | Noted: 2018-12-27

## 2021-08-24 PROBLEM — M54.50 LOWER BACK PAIN: Status: ACTIVE | Noted: 2021-08-24

## 2021-08-24 PROBLEM — N40.1 BPH WITH URINARY OBSTRUCTION: Status: ACTIVE | Noted: 2020-02-20

## 2021-08-24 PROBLEM — M08.3 CHRONIC POLYARTICULAR JUVENILE RHEUMATOID ARTHRITIS (H): Status: ACTIVE | Noted: 2021-08-24

## 2021-08-24 PROBLEM — R45.86 EMOTIONAL LABILITY: Status: ACTIVE | Noted: 2021-08-24

## 2021-08-24 PROBLEM — M25.50 POLYARTHRALGIA: Status: ACTIVE | Noted: 2018-07-20

## 2021-08-24 LAB
ALBUMIN SERPL-MCNC: 4 G/DL (ref 3.5–5)
ALP SERPL-CCNC: 116 U/L (ref 45–120)
ALT SERPL W P-5'-P-CCNC: 18 U/L (ref 0–45)
ANION GAP SERPL CALCULATED.3IONS-SCNC: 9 MMOL/L (ref 5–18)
AST SERPL W P-5'-P-CCNC: 15 U/L (ref 0–40)
BILIRUB SERPL-MCNC: 0.5 MG/DL (ref 0–1)
BUN SERPL-MCNC: 13 MG/DL (ref 8–28)
C REACTIVE PROTEIN LHE: 0.2 MG/DL (ref 0–0.8)
CALCIUM SERPL-MCNC: 9.3 MG/DL (ref 8.5–10.5)
CHLORIDE BLD-SCNC: 103 MMOL/L (ref 98–107)
CO2 SERPL-SCNC: 27 MMOL/L (ref 22–31)
CREAT SERPL-MCNC: 0.85 MG/DL (ref 0.7–1.3)
CREAT UR-MCNC: 40 MG/DL
ERYTHROCYTE [DISTWIDTH] IN BLOOD BY AUTOMATED COUNT: 13.1 % (ref 10–15)
ERYTHROCYTE [SEDIMENTATION RATE] IN BLOOD BY WESTERGREN METHOD: 2 MM/HR (ref 0–15)
GFR SERPL CREATININE-BSD FRML MDRD: 88 ML/MIN/1.73M2
GLUCOSE BLD-MCNC: 122 MG/DL (ref 70–125)
HBA1C MFR BLD: 6.5 % (ref 0–5.6)
HCT VFR BLD AUTO: 41.9 % (ref 40–53)
HGB BLD-MCNC: 14.2 G/DL (ref 13.3–17.7)
MCH RBC QN AUTO: 28.2 PG (ref 26.5–33)
MCHC RBC AUTO-ENTMCNC: 33.9 G/DL (ref 31.5–36.5)
MCV RBC AUTO: 83 FL (ref 78–100)
MICROALBUMIN UR-MCNC: <0.5 MG/DL (ref 0–1.99)
MICROALBUMIN/CREAT UR: NORMAL MG/G{CREAT}
PLATELET # BLD AUTO: 209 10E3/UL (ref 150–450)
POTASSIUM BLD-SCNC: 4 MMOL/L (ref 3.5–5)
PROT SERPL-MCNC: 6.6 G/DL (ref 6–8)
RBC # BLD AUTO: 5.03 10E6/UL (ref 4.4–5.9)
SODIUM SERPL-SCNC: 139 MMOL/L (ref 136–145)
WBC # BLD AUTO: 6.2 10E3/UL (ref 4–11)

## 2021-08-24 PROCEDURE — 80053 COMPREHEN METABOLIC PANEL: CPT | Performed by: FAMILY MEDICINE

## 2021-08-24 PROCEDURE — 86141 C-REACTIVE PROTEIN HS: CPT | Performed by: FAMILY MEDICINE

## 2021-08-24 PROCEDURE — 85027 COMPLETE CBC AUTOMATED: CPT | Performed by: FAMILY MEDICINE

## 2021-08-24 PROCEDURE — 99214 OFFICE O/P EST MOD 30 MIN: CPT | Mod: 25 | Performed by: FAMILY MEDICINE

## 2021-08-24 PROCEDURE — 82043 UR ALBUMIN QUANTITATIVE: CPT | Performed by: FAMILY MEDICINE

## 2021-08-24 PROCEDURE — 90714 TD VACC NO PRESV 7 YRS+ IM: CPT | Performed by: FAMILY MEDICINE

## 2021-08-24 PROCEDURE — 85652 RBC SED RATE AUTOMATED: CPT | Performed by: FAMILY MEDICINE

## 2021-08-24 PROCEDURE — 90471 IMMUNIZATION ADMIN: CPT | Performed by: FAMILY MEDICINE

## 2021-08-24 PROCEDURE — 36415 COLL VENOUS BLD VENIPUNCTURE: CPT | Performed by: FAMILY MEDICINE

## 2021-08-24 PROCEDURE — 83036 HEMOGLOBIN GLYCOSYLATED A1C: CPT | Performed by: FAMILY MEDICINE

## 2021-08-24 RX ORDER — LISINOPRIL 10 MG/1
10 TABLET ORAL DAILY
Qty: 90 TABLET | Refills: 3 | COMMUNITY
Start: 2021-08-24 | End: 2022-03-03

## 2021-08-24 RX ORDER — AMLODIPINE BESYLATE 5 MG/1
5 TABLET ORAL DAILY
Qty: 90 TABLET | Refills: 3 | COMMUNITY
Start: 2021-08-24 | End: 2022-03-03

## 2021-08-24 RX ORDER — FUROSEMIDE 40 MG
40 TABLET ORAL
COMMUNITY
Start: 2021-01-19 | End: 2022-05-03

## 2021-08-24 RX ORDER — TAMSULOSIN HYDROCHLORIDE 0.4 MG/1
CAPSULE ORAL
COMMUNITY
Start: 2021-04-16 | End: 2022-06-03

## 2021-08-24 NOTE — PROGRESS NOTES
Assessment/Plan:    Type 2 diabetes mellitus without complication, without long-term current use of insulin (H)  Type 2 diabetes with improved control A1c decreasing from 7.5% to 6.5% today.  Continues Metformin  mg using 4 tablets daily.  Maintain weight less than 250 pounds initially, less than 240 pounds ideally.  Microalbumin screen to be updated.  Patient is seen through [facility] for annual eye exams and will schedule follow-up.  - Albumin Random Urine Quantitative with Creat Ratio  - Albumin Random Urine Quantitative with Creat Ratio  - Hemoglobin A1c  - Comprehensive metabolic panel    Acute left-sided low back pain with left-sided sciatica  Lower back pain historically has improved.  No current concerns.  Avoidance of exacerbating triggers.    Seronegative rheumatoid arthritis (H)  Hand stiffness described especially when trying to unplug a hate Giovanni and pulling hard etc.  Fingers seem to lock up some.  Continues Plaquenil 200 mg twice daily and Celebrex 100 mg twice daily.  CRP, ESR and CBC updated today as well.  Med monitoring completed.  - Comprehensive metabolic panel  - CBC with platelets  - CRP inflammation  - Erythrocyte sedimentation rate auto    Essential hypertension  Pretension appears well controlled on lisinopril 10 mg daily and amlodipine 5 mg daily.  - Comprehensive metabolic panel    Hyperlipidemia, unspecified hyperlipidemia type  Atorvastatin 20 mg daily for lipid management.    Morbid obesity (H)  Morbid obesity history.  Weight goal as noted above initially less than 250 pounds, less than 240 pounds ideally.    Encounter for immunization  Tetanus booster provided today.  - TD PRESERV FREE, IM (7+ YRS)    Abnormal bowel movement  Patient describes some change in bowel movements.  Had colonoscopy November 14, 2016 and recommended 10-year follow-up with diverticulosis noted.  Patient hesitant to wait 10 years and would like to have completed no later than 5-year  "follow-up.  \"If it was up to me I would have a yearly\".  - Adult Gastro Ref - Procedure Only      The following high BMI interventions were performed this visit: encouragement to exercise, weight monitoring, weight loss from baseline weight and lifestyle education regarding diet .  Ensure ongoing efforts to achieve weight goal < 250 pounds initially, < 240 pounds ideally.         Subjective:    Thiago Hein is seen today for follow-up assessment.  Type 2 diabetes.  A1c as high as 7.9% June 26, 2019 and most recently 7.5% April 30, 2021.  1 pound weight loss since that visit.  Continues Metformin  mg using 4 tablets daily.  Some change in bowel movements.  No significant blood etc. described or melena apparently.  Did have colonoscopy November 14, 2016 with 10-year follow-up recommendation with diverticulosis history.  Patient is interested in completing sooner than this.  Atorvastatin 20 mg daily for lipid management.  Plaquenil 200 mg twice daily and Celebrex 100 mg twice daily for seronegative rheumatoid arthritis.  Hand stiffness noted and discomfort.  Seems to be worse when using hands to  or unplug his hay Giovanni etc.  Denies recent illness.  Had Moderna COVID-19 vaccine series completed April 16, 2021.  Comprehensive review of systems as above otherwise all negative.    Single   1 son - 32 \"Kraig\"   Tobacco: none   EtOH: none   Mom - Meena   Dad - currently 86 Yovanny - DM, CAD, HTN, high cholesterol, back problems, arthritis, spinal stenosis, pacemaker/defibrillator   1 bro - Be - HTN   Surgeries: \"lumbar back surgery for cyst removal\" - September, 2008 (Dr. 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[place], [place])   Hobbies:    Past Surgical History:   Procedure Laterality Date     ANESTHESIA OUT OF OR MRI N/A 10/2/2015    Procedure: ANESTHESIA OUT OF OR MRI;  Surgeon: GENERIC " ANESTHESIA PROVIDER;  Location: [facility] OR     BACK SURGERY  2008    lumbar spine surgery-- unclear details     SOFT TISSUE SURGERY  2012    skin cancer removed from L shoulder, chest, right neck        Family History   Problem Relation Age of Onset     Coronary Artery Disease Father      Hypertension Father         Past Medical History:   Diagnosis Date     Diabetes (H)      Hypertension         Social History     Tobacco Use     Smoking status: Never Smoker     Smokeless tobacco: Former User   Substance Use Topics     Alcohol use: No     Comment: Quit in 1990     Drug use: No        Current Outpatient Medications   Medication Sig Dispense Refill     amLODIPine (NORVASC) 10 MG tablet Take 1 tablet (10 mg) by mouth daily 30 tablet      celecoxib (CELEBREX) 200 MG capsule Take 1 capsule (200 mg) by mouth 2 times daily 60 capsule      furosemide (LASIX) 40 MG tablet Take 40 mg by mouth       hydroxychloroquine (PLAQUENIL) 200 MG tablet Take 1 tablet (200 mg) by mouth 2 times daily 180 tablet 0     lisinopril (PRINIVIL,ZESTRIL) 40 MG tablet Take 1 tablet (40 mg) by mouth daily 30 tablet      metFORMIN (GLUCOPHAGE-XR) 500 MG 24 hr tablet Take 500 mg by mouth daily (with breakfast)       multivitamin, therapeutic with minerals (THERA-VIT-M) TABS Take 1 tablet by mouth daily 30 each 0     tamsulosin (FLOMAX) 0.4 MG capsule TAKE 1 CAPSULE(0.4 MG) BY MOUTH DAILY AFTER BREAKFAST       acetaminophen 650 MG TABS Take 650 mg by mouth every 6 hours as needed for mild pain (Patient not taking: Reported on 8/24/2021) 100 tablet      cetirizine (ZYRTEC) 10 MG tablet Take 1 tablet (10 mg) by mouth daily (Patient not taking: Reported on 8/24/2021) 30 tablet 0     diclofenac (VOLTAREN) 1 % topical gel Apply 2 g topically 4 times daily (Patient not taking: Reported on 8/24/2021) 50 g 0     fluticasone (FLONASE) 50 MCG/ACT nasal spray Spray 1 spray into both nostrils daily (Patient not taking: Reported on 8/24/2021) 15.8 mL 0     metoprolol  (LOPRESSOR) 100 MG tablet Take 1 tablet (100 mg) by mouth 2 times daily (Patient not taking: Reported on 8/24/2021) 60 tablet      oxyCODONE (ROXICODONE) 5 MG tablet Take 1-2 tablets (5-10 mg) by mouth every 6 hours as needed for pain Do not exceed 6 tablets over the course of 24 hours. (Patient not taking: Reported on 8/24/2021) 12 tablet 0     tetrahydrozoline-Zn sulfate (VISINE-AC) 0.05-0.25 % ophthalmic solution Place 1 to 2 drops in each eye 4 times a day for symptom relief (Patient not taking: Reported on 8/24/2021) 15 mL 0          Objective:    Vitals:    08/24/21 1031   BP: 120/60   Pulse: 81   SpO2: 96%   Weight: 116.6 kg (257 lb)      Body mass index is 35.1 kg/m .    Alert.  No apparent distress.  Some loss of full extension at PIP joint left index finger.  Some joint thickening with question of mild synovitis present without rash.  Cardiac exam regular.  Chest clear.      This note has been dictated using voice recognition software and as a result may contain minor grammatical errors and unintended word substitutions.

## 2021-08-24 NOTE — LETTER
"August 24, 2021      Thiago Hein  [address] [place] [address]        Dear ,    We are writing to inform you of your test results.    Your urine screen was normal.  No evidence for significant protein in your urine.  We will plan to repeat this test on a yearly basis.     Continue your current diabetes management as discussed in order to further improve.  Goal A1c remains < 7.0% ideally.     Your kidney and liver tests were normal.     Your \"inflammation test\" (i.e. CRP) was normal.     Your complete blood count results were normal.  No evidence for anemia, etc.     Your \"inflammation test\" (i.e. Sed Rate) was normal.      Resulted Orders   Albumin Random Urine Quantitative with Creat Ratio   Result Value Ref Range    Microalbumin Urine mg/dL <0.50 0.00 - 1.99 mg/dL    Creatinine Urine mg/dL 40 mg/dL    Microalbumin Urine mg/g Cr        Comment:      Unable to calculate:  Urine creatinine or microalbumin value below detectable level    Narrative    Microalbumin, Random Urine   <2.0 mg/dL . . . . . . . . Normal   3.0-30.0 mg/dL . . . . . . Microalbuminuria   >30.0 mg/dL . . . . . .  . Clinical Proteinuria     Microalbumin/Creatinine Ratio, Random Urine   <20 mg/g . . . . .. . . . Normal    mg/g . . . . . . . Microalbuminuria   >300 mg/g . . . . . . . . Clinical Proteinuria   Hemoglobin A1c   Result Value Ref Range    Hemoglobin A1C 6.5 (H) 0.0 - 5.6 %      Comment:      Normal <5.7%   Prediabetes 5.7-6.4%    Diabetes 6.5% or higher     Note: Adopted from ADA consensus guidelines.   Comprehensive metabolic panel   Result Value Ref Range    Sodium 139 136 - 145 mmol/L    Potassium 4.0 3.5 - 5.0 mmol/L    Chloride 103 98 - 107 mmol/L    Carbon Dioxide (CO2) 27 22 - 31 mmol/L    Anion Gap 9 5 - 18 mmol/L    Urea Nitrogen 13 8 - 28 mg/dL    Creatinine 0.85 0.70 - 1.30 mg/dL    Calcium 9.3 8.5 - 10.5 mg/dL    Glucose 122 70 - 125 mg/dL    Alkaline Phosphatase 116 45 - 120 U/L    AST 15 0 - " 40 U/L    ALT 18 0 - 45 U/L    Protein Total 6.6 6.0 - 8.0 g/dL    Albumin 4.0 3.5 - 5.0 g/dL    Bilirubin Total 0.5 0.0 - 1.0 mg/dL    GFR Estimate 88 >60 mL/min/1.73m2      Comment:      As of July 11, 2021, eGFR is calculated by the CKD-EPI creatinine equation, without race adjustment. eGFR can be influenced by muscle mass, exercise, and diet. The reported eGFR is an estimation only and is only applicable if the renal function is stable.   CRP inflammation   Result Value Ref Range    CRP 0.2 0.0-<0.8 mg/dL   CBC with platelets   Result Value Ref Range    WBC Count 6.2 4.0 - 11.0 10e3/uL    RBC Count 5.03 4.40 - 5.90 10e6/uL    Hemoglobin 14.2 13.3 - 17.7 g/dL    Hematocrit 41.9 40.0 - 53.0 %    MCV 83 78 - 100 fL    MCH 28.2 26.5 - 33.0 pg    MCHC 33.9 31.5 - 36.5 g/dL    RDW 13.1 10.0 - 15.0 %    Platelet Count 209 150 - 450 10e3/uL   Erythrocyte sedimentation rate auto   Result Value Ref Range    Erythrocyte Sedimentation Rate 2 0 - 15 mm/hr       If you have any questions or concerns, please call the clinic at the number listed above.       Sincerely,      Don Armijo MD

## 2021-09-01 ENCOUNTER — OFFICE VISIT (OUTPATIENT)
Dept: RHEUMATOLOGY | Facility: CLINIC | Age: 71
End: 2021-09-01
Payer: MEDICARE

## 2021-09-01 VITALS
SYSTOLIC BLOOD PRESSURE: 134 MMHG | HEART RATE: 80 BPM | WEIGHT: 256.4 LBS | BODY MASS INDEX: 35.02 KG/M2 | DIASTOLIC BLOOD PRESSURE: 80 MMHG

## 2021-09-01 DIAGNOSIS — M25.50 POLYARTHRALGIA: ICD-10-CM

## 2021-09-01 DIAGNOSIS — M06.00 SERONEGATIVE RHEUMATOID ARTHRITIS (H): Primary | ICD-10-CM

## 2021-09-01 DIAGNOSIS — Z79.899 HIGH RISK MEDICATION USE: ICD-10-CM

## 2021-09-01 DIAGNOSIS — M06.1 ADULT STILL'S DISEASE (H): ICD-10-CM

## 2021-09-01 DIAGNOSIS — M15.0 PRIMARY OSTEOARTHRITIS INVOLVING MULTIPLE JOINTS: ICD-10-CM

## 2021-09-01 PROCEDURE — 99214 OFFICE O/P EST MOD 30 MIN: CPT | Performed by: INTERNAL MEDICINE

## 2021-09-01 RX ORDER — HYDROXYCHLOROQUINE SULFATE 200 MG/1
200 TABLET, FILM COATED ORAL 2 TIMES DAILY
Qty: 180 TABLET | Refills: 0
Start: 2021-09-01 | End: 2022-02-02

## 2021-09-01 NOTE — PROGRESS NOTES
Rheumatology follow-up visit note     Thiago is a 71 year old male presents today for follow-up.    Thiago was seen today for recheck.    Diagnoses and all orders for this visit:    Seronegative rheumatoid arthritis (H)  -     XR Hand Bilateral G/E 3 Views; Future  -     XR Knee AP/Lat Standing Bilateral; Future  -     XR Hand Bilateral G/E 3 Views  -     XR Knee AP/Lat Standing Bilateral    Polyarthralgia  -     XR Hand Bilateral G/E 3 Views; Future  -     XR Knee AP/Lat Standing Bilateral; Future  -     XR Hand Bilateral G/E 3 Views  -     XR Knee AP/Lat Standing Bilateral    Primary osteoarthritis involving multiple joints    High risk medication use    Adult Still's disease (H)  -     hydroxychloroquine (PLAQUENIL) 200 MG tablet; Take 1 tablet (200 mg) by mouth 2 times daily        This patient with seronegative rheumatoid arthritis, osteoarthritis, is on hydroxychloroquine, has noted worsening pain in his left hand and MCP #3 essentially 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  He has evidence of degenerative changes in that joint.  We will take x-rays of the hands.  We discussed options.  He is going to try Tylenol arthritis if that does not work consider corticosteroid injections.    Follow up in 3 months.    HPI    Thiago Hein is a 71 year old male is here for follow-up of This is for follow-up.  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  First thing in the morning he noted stiffness is no more than 20 to 30 minutes.  And at that time most of his  symptoms are in his knees rather than in his hands.  He is on not all that happy with the results of carpal tunnel release done on his right hand for example using his right hand such as counting money the thumb does not seem to be as dexterous, as it was before.      DETAILED EXAMINATION  09/01/21  :    Vitals:    09/01/21 0954   BP: 134/80   Pulse: 80   Weight: 116.3 kg (256 lb 6.4 oz)     Alert oriented. Head including the face is examined for malar rash, heliotropes, scarring, lupus pernio. Eyes examined for redness such as in episcleritis/scleritis, periorbital lesions.   Neck/ Face examined for parotid gland swelling, range of motion of neck.  Left upper and lower and right upper and lower extremities examined for tenderness, swelling, warmth of the appendicular joints, range of motion, edema, rash.  Some of the important findings included: he does not have evidence of synovitis in the palpable joints of the upper extremities however he has bony hypertrophy and tenderness at his left third MCP..  No significant deformities of the digits.  + Heberden nodes.  Range of motion of the shoulders  show full abduction.  No JLT effusion or warmth of the knees.  he does not have dactylitis of the digits.     Patient Active Problem List    Diagnosis Date Noted     Chronic polyarticular juvenile rheumatoid arthritis (H) 08/24/2021     Priority: Medium     Formatting of this note might be different from the original.  Created by Conversion    Replacement Utility updated for latest IMO load       Emotional lability 08/24/2021     Priority: Medium     Formatting of this note might be different from the original.  Created by Conversion       Lower back pain 08/24/2021     Priority: Medium     Formatting of this note might be different from the original.  Created by Conversion       Morbid obesity (H) 08/24/2021     Priority: Medium     BPH with urinary obstruction 02/20/2020     Priority: Medium     Primary osteoarthritis  involving multiple joints 10/08/2019     Priority: Medium     Left carpal tunnel syndrome 12/27/2018     Priority: Medium     Trigger finger, right index finger 09/26/2018     Priority: Medium     Polyarthralgia 07/20/2018     Priority: Medium     Unintentional weight loss 06/01/2018     Priority: Medium     Non morbid obesity due to excess calories 07/14/2017     Priority: Medium     Peripheral edema 07/14/2017     Priority: Medium     Diverticulosis 11/15/2016     Priority: Medium     Hyperlipidemia 08/04/2016     Priority: Medium     Need for pneumococcal vaccination 01/11/2016     Priority: Medium     Arm DVT (deep venous thromboembolism), acute, left (H) 11/27/2015     Priority: Medium     Osteomyelitis of lumbar spine (H) 11/20/2015     Priority: Medium     Need for vaccination 11/20/2015     Priority: Medium     Type 2 diabetes mellitus (H) 11/11/2015     Priority: Medium     Formatting of this note might be different from the original.  Created by Conversion       Major depressive disorder, single episode, moderate (H) 11/09/2015     Priority: Medium     Acute respiratory failure (H) 11/06/2015     Priority: Medium     Bacteremia due to Staphylococcus aureus 11/06/2015     Priority: Medium     Deep venous thrombosis of upper extremity (H) 11/06/2015     Priority: Medium     Dysphagia 11/06/2015     Priority: Medium     Epidural abscess 11/06/2015     Priority: Medium     Essential hypertension 11/06/2015     Priority: Medium     Formatting of this note might be different from the original.  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/2015     Priority: Medium     Leukocytosis 09/29/2015     Priority: Medium     Sepsis (H) 09/29/2015     Priority: Medium     Past Surgical History:   Procedure  Laterality Date     ANESTHESIA OUT OF OR MRI N/A 10/2/2015    Procedure: ANESTHESIA OUT OF OR MRI;  Surgeon: GENERIC ANESTHESIA PROVIDER;  Location: [facility] OR     BACK SURGERY  2008    lumbar spine surgery-- unclear details     SOFT TISSUE SURGERY  2012    skin cancer removed from L shoulder, chest, right neck      Past Medical History:   Diagnosis Date     Diabetes (H)      Hypertension      No Known Allergies  Current Outpatient Medications   Medication Sig Dispense Refill     amLODIPine (NORVASC) 5 MG tablet Take 1 tablet (5 mg) by mouth daily 90 tablet 3     celecoxib (CELEBREX) 200 MG capsule Take 1 capsule (200 mg) by mouth 2 times daily 60 capsule      furosemide (LASIX) 40 MG tablet Take 40 mg by mouth       hydroxychloroquine (PLAQUENIL) 200 MG tablet Take 1 tablet (200 mg) by mouth 2 times daily 180 tablet 0     lisinopril (ZESTRIL) 10 MG tablet Take 1 tablet (10 mg) by mouth daily 90 tablet 3     metFORMIN (GLUCOPHAGE-XR) 500 MG 24 hr tablet Take 500 mg by mouth daily (with breakfast)       multivitamin, therapeutic with minerals (THERA-VIT-M) TABS Take 1 tablet by mouth daily 30 each 0     tamsulosin (FLOMAX) 0.4 MG capsule TAKE 1 CAPSULE(0.4 MG) BY MOUTH DAILY AFTER BREAKFAST       family history includes Coronary Artery Disease in his father; Hypertension in his father.     Social Connections:      Frequency of Communication with Friends and Family:      Frequency of Social Gatherings with Friends and Family:      Attends Voodoo Services:      Active Member of Clubs or Organizations:      Attends Club or Organization Meetings:      Marital Status:           WBC   Date Value Ref Range Status   04/23/2021 8.2 4.0 - 11.0 10e9/L Final     WBC Count   Date Value Ref Range Status   08/24/2021 6.2 4.0 - 11.0 10e3/uL Final     RBC Count   Date Value Ref Range Status   08/24/2021 5.03 4.40 - 5.90 10e6/uL Final   04/23/2021 5.25 4.4 - 5.9 10e12/L Final     Hemoglobin   Date Value Ref Range Status   08/24/2021  14.2 13.3 - 17.7 g/dL Final   04/23/2021 15.2 13.3 - 17.7 g/dL Final     Hematocrit   Date Value Ref Range Status   08/24/2021 41.9 40.0 - 53.0 % Final   04/23/2021 44.8 40.0 - 53.0 % Final     MCV   Date Value Ref Range Status   08/24/2021 83 78 - 100 fL Final   04/23/2021 85 78 - 100 fl Final     MCH   Date Value Ref Range Status   08/24/2021 28.2 26.5 - 33.0 pg Final   04/23/2021 29.0 26.5 - 33.0 pg Final     Platelet Count   Date Value Ref Range Status   08/24/2021 209 150 - 450 10e3/uL Final   04/23/2021 222 150 - 450 10e9/L Final     % Lymphocytes   Date Value Ref Range Status   04/23/2021 20.9 % Final     AST   Date Value Ref Range Status   08/24/2021 15 0 - 40 U/L Final   10/12/2015 18 0 - 45 U/L Final     ALT   Date Value Ref Range Status   08/24/2021 18 0 - 45 U/L Final   10/12/2015 30 0 - 70 U/L Final     Albumin   Date Value Ref Range Status   08/24/2021 4.0 3.5 - 5.0 g/dL Final   10/12/2015 2.2 (L) 3.4 - 5.0 g/dL Final     Alkaline Phosphatase   Date Value Ref Range Status   08/24/2021 116 45 - 120 U/L Final   10/17/2015 133 40 - 150 U/L Final     Creatinine   Date Value Ref Range Status   08/24/2021 0.85 0.70 - 1.30 mg/dL Final   04/23/2021 0.86 0.66 - 1.25 mg/dL Final     GFR Estimate   Date Value Ref Range Status   08/24/2021 88 >60 mL/min/1.73m2 Final     Comment:     As of July 11, 2021, eGFR is calculated by the CKD-EPI creatinine equation, without race adjustment. eGFR can be influenced by muscle mass, exercise, and diet. The reported eGFR is an estimation only and is only applicable if the renal function is stable.   04/23/2021 87 >60 mL/min/[1.73_m2] Final     Comment:     Non  GFR Calc  Starting 12/18/2018, serum creatinine based estimated GFR (eGFR) will be   calculated using the Chronic Kidney Disease Epidemiology Collaboration   (CKD-EPI) equation.       GFR Estimate If Black   Date Value Ref Range Status   04/23/2021 >90 >60 mL/min/[1.73_m2] Final     Comment:       American GFR Calc  Starting 12/18/2018, serum creatinine based estimated GFR (eGFR) will be   calculated using the Chronic Kidney Disease Epidemiology Collaboration   (CKD-EPI) equation.       Creatinine Urine mg/dL   Date Value Ref Range Status   08/24/2021 40 mg/dL Final     Sed Rate   Date Value Ref Range Status   04/23/2021 9 0 - 20 mm/h Final     Erythrocyte Sedimentation Rate   Date Value Ref Range Status   08/24/2021 2 0 - 15 mm/hr Final     CRP Inflammation   Date Value Ref Range Status   04/23/2021 3.6 0.0 - 8.0 mg/L Final     CRP   Date Value Ref Range Status   08/24/2021 0.2 0.0-<0.8 mg/dL Final

## 2021-09-27 ENCOUNTER — TRANSFERRED RECORDS (OUTPATIENT)
Dept: HEALTH INFORMATION MANAGEMENT | Facility: CLINIC | Age: 71
End: 2021-09-27

## 2021-09-27 LAB — RETINOPATHY: NEGATIVE

## 2021-10-10 DIAGNOSIS — M15.0 PRIMARY OSTEOARTHRITIS INVOLVING MULTIPLE JOINTS: Primary | ICD-10-CM

## 2021-10-11 RX ORDER — CELECOXIB 100 MG/1
CAPSULE ORAL
Qty: 180 CAPSULE | Refills: 0 | Status: SHIPPED | OUTPATIENT
Start: 2021-10-11 | End: 2022-01-14

## 2021-11-11 ENCOUNTER — IMMUNIZATION (OUTPATIENT)
Dept: FAMILY MEDICINE | Facility: CLINIC | Age: 71
End: 2021-11-11
Payer: MEDICARE

## 2021-11-11 PROCEDURE — 91301 PR COVID VAC MODERNA 100 MCG/0.5 ML IM: CPT

## 2021-11-11 PROCEDURE — 0013A PR COVID VAC MODERNA 100 MCG/0.5 ML IM: CPT

## 2021-12-09 ENCOUNTER — TRANSFERRED RECORDS (OUTPATIENT)
Dept: HEALTH INFORMATION MANAGEMENT | Facility: CLINIC | Age: 71
End: 2021-12-09
Payer: MEDICARE

## 2021-12-28 ENCOUNTER — OFFICE VISIT (OUTPATIENT)
Dept: FAMILY MEDICINE | Facility: CLINIC | Age: 71
End: 2021-12-28
Payer: MEDICARE

## 2021-12-28 VITALS
OXYGEN SATURATION: 95 % | BODY MASS INDEX: 35.24 KG/M2 | SYSTOLIC BLOOD PRESSURE: 120 MMHG | WEIGHT: 258 LBS | DIASTOLIC BLOOD PRESSURE: 60 MMHG | HEART RATE: 68 BPM

## 2021-12-28 DIAGNOSIS — M17.0 PRIMARY OSTEOARTHRITIS OF BOTH KNEES: ICD-10-CM

## 2021-12-28 DIAGNOSIS — I10 ESSENTIAL HYPERTENSION: ICD-10-CM

## 2021-12-28 DIAGNOSIS — E66.01 MORBID OBESITY (H): ICD-10-CM

## 2021-12-28 DIAGNOSIS — E11.9 TYPE 2 DIABETES MELLITUS WITHOUT COMPLICATION, WITHOUT LONG-TERM CURRENT USE OF INSULIN (H): Primary | ICD-10-CM

## 2021-12-28 DIAGNOSIS — M06.00 RHEUMATOID ARTHRITIS WITH NEGATIVE RHEUMATOID FACTOR, INVOLVING UNSPECIFIED SITE (H): ICD-10-CM

## 2021-12-28 DIAGNOSIS — Z23 ENCOUNTER FOR IMMUNIZATION: ICD-10-CM

## 2021-12-28 DIAGNOSIS — E78.5 HYPERLIPIDEMIA, UNSPECIFIED HYPERLIPIDEMIA TYPE: ICD-10-CM

## 2021-12-28 LAB
ANION GAP SERPL CALCULATED.3IONS-SCNC: 10 MMOL/L (ref 5–18)
BUN SERPL-MCNC: 13 MG/DL (ref 8–28)
CALCIUM SERPL-MCNC: 9.4 MG/DL (ref 8.5–10.5)
CHLORIDE BLD-SCNC: 101 MMOL/L (ref 98–107)
CO2 SERPL-SCNC: 28 MMOL/L (ref 22–31)
CREAT SERPL-MCNC: 0.91 MG/DL (ref 0.7–1.3)
GFR SERPL CREATININE-BSD FRML MDRD: 90 ML/MIN/1.73M2
GLUCOSE BLD-MCNC: 121 MG/DL (ref 70–125)
HBA1C MFR BLD: 7 % (ref 0–5.6)
HOLD SPECIMEN: NORMAL
HOLD SPECIMEN: NORMAL
POTASSIUM BLD-SCNC: 4 MMOL/L (ref 3.5–5)
SODIUM SERPL-SCNC: 139 MMOL/L (ref 136–145)

## 2021-12-28 PROCEDURE — 80048 BASIC METABOLIC PNL TOTAL CA: CPT | Performed by: FAMILY MEDICINE

## 2021-12-28 PROCEDURE — G0008 ADMIN INFLUENZA VIRUS VAC: HCPCS | Performed by: FAMILY MEDICINE

## 2021-12-28 PROCEDURE — 83036 HEMOGLOBIN GLYCOSYLATED A1C: CPT | Performed by: FAMILY MEDICINE

## 2021-12-28 PROCEDURE — 90662 IIV NO PRSV INCREASED AG IM: CPT | Performed by: FAMILY MEDICINE

## 2021-12-28 PROCEDURE — 99214 OFFICE O/P EST MOD 30 MIN: CPT | Mod: 25 | Performed by: FAMILY MEDICINE

## 2021-12-28 PROCEDURE — 36415 COLL VENOUS BLD VENIPUNCTURE: CPT | Performed by: FAMILY MEDICINE

## 2021-12-28 ASSESSMENT — ANXIETY QUESTIONNAIRES
GAD7 TOTAL SCORE: 0
5. BEING SO RESTLESS THAT IT IS HARD TO SIT STILL: NOT AT ALL
3. WORRYING TOO MUCH ABOUT DIFFERENT THINGS: NOT AT ALL
GAD7 TOTAL SCORE: 0
6. BECOMING EASILY ANNOYED OR IRRITABLE: NOT AT ALL
7. FEELING AFRAID AS IF SOMETHING AWFUL MIGHT HAPPEN: NOT AT ALL
7. FEELING AFRAID AS IF SOMETHING AWFUL MIGHT HAPPEN: NOT AT ALL
2. NOT BEING ABLE TO STOP OR CONTROL WORRYING: NOT AT ALL
1. FEELING NERVOUS, ANXIOUS, OR ON EDGE: NOT AT ALL
GAD7 TOTAL SCORE: 0
4. TROUBLE RELAXING: NOT AT ALL

## 2021-12-28 ASSESSMENT — PATIENT HEALTH QUESTIONNAIRE - PHQ9
10. IF YOU CHECKED OFF ANY PROBLEMS, HOW DIFFICULT HAVE THESE PROBLEMS MADE IT FOR YOU TO DO YOUR WORK, TAKE CARE OF THINGS AT HOME, OR GET ALONG WITH OTHER PEOPLE: NOT DIFFICULT AT ALL
SUM OF ALL RESPONSES TO PHQ QUESTIONS 1-9: 0
SUM OF ALL RESPONSES TO PHQ QUESTIONS 1-9: 0

## 2021-12-28 NOTE — PROGRESS NOTES
Assessment/Plan:    Type 2 diabetes mellitus without complication, without long-term current use of insulin (H)  Type 2 diabetes with slight worsening with A1c increasing from 6.5% up to 7% while on Metformin  mg using 3 tablets each morning 1 tablet before bed.  Tolerating well.  Recent diabetic eye exam October 14, 2021 without retinopathy findings.  Microalbumin screen was normal September 27, 2021 as well.  Reassess at follow-up annual wellness visit in early May 2022 with monofilament testing to be updated at that time.  - Hemoglobin A1c  - Hemoglobin A1c  - Basic metabolic panel  - Basic metabolic panel    Essential hypertension  Hypertension well-controlled.  Utilizing lisinopril 10 mg daily and amlodipine 5 mg daily.  - Basic metabolic panel  - Basic metabolic panel    Hyperlipidemia, unspecified hyperlipidemia type  Atorvastatin 20 mg daily continues.  Continue dietary and exercise modification for weight goal less than 250 pounds initially, less than 240 pounds ideally.    Morbid obesity (H)  Dietary and exercise modification for weight goal less than 250 pounds initially, less than 240 pounds ideally.    Rheumatoid arthritis with negative rheumatoid factor, involving unspecified site (H)  Follows with Dr. Bundy.  Continues use of hydroxychloroquine 200 mg twice daily.    Primary osteoarthritis of both knees  Mild to moderate right knee osteoarthritis and mild left knee osteoarthritis and recent x-ray exam with Dr. Bundy.    Encounter for immunization  High-dose flu shot provided today.  Immunizations reviewed and otherwise up-to-date.  - INFLUENZA, QUAD, HD, PF, 65+ (FLUZONE HD)      The following high BMI interventions were performed this visit: encouragement to exercise, weight monitoring, weight loss from baseline weight and lifestyle education regarding diet     Answers for HPI/ROS submitted by the patient on 12/28/2021  If you checked off any problems, how difficult have these problems made  "it for you to do your work, take care of things at home, or get along with other people?: Not difficult at all  PHQ9 TOTAL SCORE: 0  OTONIEL 7 TOTAL SCORE: 0          Subjective:    Thiago Hein is seen today for follow-up assessment.  Type 2 diabetes.  Metformin  mg using 3 tablets in the morning 1 tablet in the evening.  Tolerates well without side effect.  Plaquenil 200 mg twice daily for seronegative rheumatoid arthritis and is followed by Dr. Bundy with upcoming follow-up in about a month.  Lisinopril 10 mg daily and amlodipine 5 mg daily for hypertension while continuing atorvastatin 20 mg daily for lipid management.  Right more so than left osteoarthritis described as mild to moderate on the right and mild on the left based on recent x-ray findings from earlier this year.  Needs flu shot today.  Has completed Moderna Covid-19 third shot booster November 11, 2021.  Microalbumin screen was normal August 24, 2021.  A1c had improved from 7.5% down to 6.5 at that time.  Comprehensive review of systems as above otherwise all negative.    Single   1 son - 32 \"Kraig\"   Tobacco: none   EtOH: none   Mom - Meena   Dad - currently 86 Yovanny - DM, CAD, HTN, high cholesterol, back problems, arthritis, spinal stenosis, pacemaker/defibrillator   1 bro - Be - HTN   Surgeries: \"lumbar back surgery for cyst removal\" - September, 2008 (Dr. 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[place], [place])   Hobbies:    Past Surgical History:   Procedure Laterality Date     ANESTHESIA OUT OF OR MRI N/A 10/2/2015    Procedure: ANESTHESIA OUT OF OR MRI;  Surgeon: GENERIC ANESTHESIA PROVIDER;  Location: [facility] OR     BACK SURGERY  2008    lumbar spine surgery-- unclear details     SOFT TISSUE SURGERY  2012    skin cancer removed from L shoulder, chest, right neck        Family History   Problem Relation Age of Onset     " Coronary Artery Disease Father      Hypertension Father         Past Medical History:   Diagnosis Date     Diabetes (H)      Hypertension         Social History     Tobacco Use     Smoking status: Never Smoker     Smokeless tobacco: Former User   Substance Use Topics     Alcohol use: No     Comment: Quit in 1990     Drug use: No        Current Outpatient Medications   Medication Sig Dispense Refill     amLODIPine (NORVASC) 5 MG tablet Take 1 tablet (5 mg) by mouth daily 90 tablet 3     celecoxib (CELEBREX) 100 MG capsule TAKE 1 CAPSULE(100 MG) BY MOUTH TWICE DAILY 180 capsule 0     celecoxib (CELEBREX) 200 MG capsule Take 1 capsule (200 mg) by mouth 2 times daily 60 capsule      furosemide (LASIX) 40 MG tablet Take 40 mg by mouth       hydroxychloroquine (PLAQUENIL) 200 MG tablet Take 1 tablet (200 mg) by mouth 2 times daily 180 tablet 0     lisinopril (ZESTRIL) 10 MG tablet Take 1 tablet (10 mg) by mouth daily 90 tablet 3     metFORMIN (GLUCOPHAGE-XR) 500 MG 24 hr tablet Take 500 mg by mouth daily (with breakfast)       tamsulosin (FLOMAX) 0.4 MG capsule TAKE 1 CAPSULE(0.4 MG) BY MOUTH DAILY AFTER BREAKFAST            Objective:    Vitals:    12/28/21 1031   BP: 120/60   Pulse: 68   SpO2: 95%   Weight: 117 kg (258 lb)      Body mass index is 35.24 kg/m .    Alert.  No apparent distress.  BMI 35.24.  Cardiac exam regular.  Chest clear.  Extremities warm and dry.      This note has been dictated using voice recognition software and as a result may contain minor grammatical errors and unintended word substitutions.

## 2021-12-28 NOTE — LETTER
December 28, 2021      Thiago Hein  [address] [place] [address]        Dear ,    We are writing to inform you of your test results.    Continue your current diabetes management as discussed in order to further improve.  Goal A1c remains < 7.0% ideally.     Your kidney function tests (i.e. Basic Metabolic Panel) were normal.     Resulted Orders   Hemoglobin A1c   Result Value Ref Range    Hemoglobin A1C 7.0 (H) 0.0 - 5.6 %      Comment:      Normal <5.7%   Prediabetes 5.7-6.4%    Diabetes 6.5% or higher     Note: Adopted from ADA consensus guidelines.   Basic metabolic panel   Result Value Ref Range    Sodium 139 136 - 145 mmol/L    Potassium 4.0 3.5 - 5.0 mmol/L    Chloride 101 98 - 107 mmol/L    Carbon Dioxide (CO2) 28 22 - 31 mmol/L    Anion Gap 10 5 - 18 mmol/L    Urea Nitrogen 13 8 - 28 mg/dL    Creatinine 0.91 0.70 - 1.30 mg/dL    Calcium 9.4 8.5 - 10.5 mg/dL    Glucose 121 70 - 125 mg/dL    GFR Estimate 90 >60 mL/min/1.73m2      Comment:      Effective December 21, 2021 eGFRcr in adults is calculated using the 2021 CKD-EPI creatinine equation which includes age and gender ( et al., NEJM, DOI: 10.1056/ATXSdq1325763)       If you have any questions or concerns, please call the clinic at the number listed above.       Sincerely,      Don Armijo MD

## 2021-12-29 ASSESSMENT — ANXIETY QUESTIONNAIRES: GAD7 TOTAL SCORE: 0

## 2021-12-29 ASSESSMENT — PATIENT HEALTH QUESTIONNAIRE - PHQ9: SUM OF ALL RESPONSES TO PHQ QUESTIONS 1-9: 0

## 2022-01-06 ENCOUNTER — TELEPHONE (OUTPATIENT)
Dept: RHEUMATOLOGY | Facility: CLINIC | Age: 72
End: 2022-01-06
Payer: MEDICARE

## 2022-01-06 DIAGNOSIS — M15.0 PRIMARY OSTEOARTHRITIS INVOLVING MULTIPLE JOINTS: ICD-10-CM

## 2022-01-06 NOTE — TELEPHONE ENCOUNTER
Attempted to call patient to inform Pt that labs need to be done, as per Protocol labs to be done every 4 months.     Pt's mailbox is not set up, unable to leave voicemail.

## 2022-01-06 NOTE — TELEPHONE ENCOUNTER
"Called and spoke to pt, appt with Dr. Bundy scheduled for 2/2/22. Pt reports that he usually has his labs \"on the way out the door after seeing Dr. Bundy\". Is wondering if he needs to have his labs done beforehand?     If he's ok to have his labs tests done the same day he sees Dr. Bundy, then no further action is needed. If he needs to have these done prior to his f/u appt, then we will need to call him back and let him know.     FYI-pt thought he was already scheduled for an appt with Dr. Bundy. Informed pt that he was actually scheduled for 12/7/21 and it looks like he no-showed for that appt. Pt wanted Dr. Bundy to know that was completely unintentional, he did not mean to miss that appointment.   "

## 2022-01-12 ENCOUNTER — LAB (OUTPATIENT)
Dept: LAB | Facility: CLINIC | Age: 72
End: 2022-01-12
Payer: MEDICARE

## 2022-01-12 DIAGNOSIS — M15.0 PRIMARY OSTEOARTHRITIS INVOLVING MULTIPLE JOINTS: ICD-10-CM

## 2022-01-12 LAB
ALBUMIN SERPL-MCNC: 4.1 G/DL (ref 3.5–5)
ALT SERPL W P-5'-P-CCNC: 18 U/L (ref 0–45)
CREAT SERPL-MCNC: 0.93 MG/DL (ref 0.7–1.3)
ERYTHROCYTE [DISTWIDTH] IN BLOOD BY AUTOMATED COUNT: 13 % (ref 10–15)
GFR SERPL CREATININE-BSD FRML MDRD: 88 ML/MIN/1.73M2
HCT VFR BLD AUTO: 44.7 % (ref 40–53)
HGB BLD-MCNC: 15.3 G/DL (ref 13.3–17.7)
MCH RBC QN AUTO: 28.7 PG (ref 26.5–33)
MCHC RBC AUTO-ENTMCNC: 34.2 G/DL (ref 31.5–36.5)
MCV RBC AUTO: 84 FL (ref 78–100)
PLATELET # BLD AUTO: 233 10E3/UL (ref 150–450)
RBC # BLD AUTO: 5.33 10E6/UL (ref 4.4–5.9)
WBC # BLD AUTO: 8.7 10E3/UL (ref 4–11)

## 2022-01-12 PROCEDURE — 84460 ALANINE AMINO (ALT) (SGPT): CPT

## 2022-01-12 PROCEDURE — 36415 COLL VENOUS BLD VENIPUNCTURE: CPT

## 2022-01-12 PROCEDURE — 85027 COMPLETE CBC AUTOMATED: CPT

## 2022-01-12 PROCEDURE — 82565 ASSAY OF CREATININE: CPT

## 2022-01-12 PROCEDURE — 82040 ASSAY OF SERUM ALBUMIN: CPT

## 2022-01-14 RX ORDER — CELECOXIB 100 MG/1
CAPSULE ORAL
Qty: 180 CAPSULE | Refills: 0 | Status: SHIPPED | OUTPATIENT
Start: 2022-01-14 | End: 2022-05-02

## 2022-02-02 ENCOUNTER — OFFICE VISIT (OUTPATIENT)
Dept: RHEUMATOLOGY | Facility: CLINIC | Age: 72
End: 2022-02-02
Payer: MEDICARE

## 2022-02-02 VITALS
HEART RATE: 64 BPM | WEIGHT: 266.3 LBS | DIASTOLIC BLOOD PRESSURE: 72 MMHG | BODY MASS INDEX: 36.07 KG/M2 | HEIGHT: 72 IN | SYSTOLIC BLOOD PRESSURE: 136 MMHG

## 2022-02-02 DIAGNOSIS — M06.00 SERONEGATIVE RHEUMATOID ARTHRITIS (H): Primary | ICD-10-CM

## 2022-02-02 DIAGNOSIS — M15.0 PRIMARY OSTEOARTHRITIS INVOLVING MULTIPLE JOINTS: ICD-10-CM

## 2022-02-02 DIAGNOSIS — M25.50 POLYARTHRALGIA: ICD-10-CM

## 2022-02-02 DIAGNOSIS — Z79.899 HIGH RISK MEDICATION USE: ICD-10-CM

## 2022-02-02 PROCEDURE — 99214 OFFICE O/P EST MOD 30 MIN: CPT | Performed by: INTERNAL MEDICINE

## 2022-02-02 RX ORDER — FOLIC ACID 1 MG/1
1 TABLET ORAL DAILY
Qty: 100 TABLET | Refills: 3 | Status: SHIPPED | OUTPATIENT
Start: 2022-02-02 | End: 2022-04-18

## 2022-02-02 RX ORDER — HYDROXYCHLOROQUINE SULFATE 200 MG/1
200 TABLET, FILM COATED ORAL 2 TIMES DAILY
Qty: 180 TABLET | Refills: 0 | Status: SHIPPED | OUTPATIENT
Start: 2022-02-02 | End: 2022-05-17

## 2022-02-02 ASSESSMENT — MIFFLIN-ST. JEOR: SCORE: 1996.96

## 2022-02-02 NOTE — PROGRESS NOTES
Rheumatology follow-up visit note     Thiago is a 71 year old male presents today for follow-up.    Thiaog was seen today for recheck.    Diagnoses and all orders for this visit:    Seronegative rheumatoid arthritis (H)  -     methotrexate 2.5 MG tablet; Take 4 tablets (10 mg) by mouth every 7 days  -     folic acid (FOLVITE) 1 MG tablet; Take 1 tablet (1 mg) by mouth daily  -     hydroxychloroquine (PLAQUENIL) 200 MG tablet; Take 1 tablet (200 mg) by mouth 2 times daily    Primary osteoarthritis involving multiple joints    Polyarthralgia    High risk medication use  -     folic acid (FOLVITE) 1 MG tablet; Take 1 tablet (1 mg) by mouth daily  -     ALT; Standing  -     Albumin level; Standing  -     CBC with platelets; Standing  -     Creatinine; Standing        This patient's polyarthralgias are more troublesome this is likely combination of uncontrolled rheumatoid arthritis and osteoarthritis this is discussed with him.  He is going to add methotrexate that he recalls many many years ago he used to get from elsewhere when he will take all 10 pills together in the morning once a week.  We will start off at 10 mg/week with folic acid literature provided need for labs discussed.  Continue hydroxychloroquine he had his eyes checked in October last year and was reassured.  He would rather not take prednisone at this stage.  He is aware of the management principles of OA including pharmacologic and nonpharmacologic and would implement those.    Follow up in 1 month.  Labs prior.    HPI    Thiago Hein is a 71 year old male is here for follow-up. he has rheumatoid arthritis, osteoarthritis, intermittent pain in his knees still troubled him with activity and sometimes at nighttime, he is on hydroxychloroquine, is going for his eye exam in the next weeks, has noted worsening pain.  This is in his hands, knees, the hands are worse first thing in the morning, he has difficult time flexing to make fist.  At  "this interferes with day-to-day activities it takes an hour or longer before this improves even by midday there is some residual tightness.  The knee is troubling with activity.  He has noted moderate pain.  He has taken Tylenol without benefit.  He had his eyes examined recently.  He reports no other joint areas similarly affected.      DETAILED EXAMINATION  02/02/22  :    Vitals:    02/02/22 1119   BP: 136/72   BP Location: Right arm   Patient Position: Sitting   Cuff Size: Adult Large   Pulse: 64   Weight: 120.8 kg (266 lb 4.8 oz)   Height: 1.822 m (5' 11.75\")     Alert oriented. Head including the face is examined for malar rash, heliotropes, scarring, lupus pernio. Eyes examined for redness such as in episcleritis/scleritis, periorbital lesions.   Neck/ Face examined for parotid gland swelling, range of motion of neck.  Left upper and lower and right upper and lower extremities examined for tenderness, swelling, warmth of the appendicular joints, range of motion, edema, rash.  Some of the important findings included: he does not have evidence of synovitis in the palpable joints of the upper extremities with the exception of synovial thickening of multiple MCPs more so on the right hand.  No significant deformities of the digits.  + Heberden nodes.  Range of motion of the shoulders  show full abduction.  No JLT effusion or warmth of the knees.  he does not have dactylitis of the digits.  He has edema of the lower extremities.     Patient Active Problem List    Diagnosis Date Noted     Chronic polyarticular juvenile rheumatoid arthritis (H) 08/24/2021     Priority: Medium     Formatting of this note might be different from the original.  Created by Conversion    Replacement Utility updated for latest IMO load       Emotional lability 08/24/2021     Priority: Medium     Formatting of this note might be different from the original.  Created by Conversion       Lower back pain 08/24/2021     Priority: Medium     " Formatting of this note might be different from the original.  Created by Conversion       Morbid obesity (H) 08/24/2021     Priority: Medium     BPH with urinary obstruction 02/20/2020     Priority: Medium     Primary osteoarthritis involving multiple joints 10/08/2019     Priority: Medium     Left carpal tunnel syndrome 12/27/2018     Priority: Medium     Trigger finger, right index finger 09/26/2018     Priority: Medium     Polyarthralgia 07/20/2018     Priority: Medium     Unintentional weight loss 06/01/2018     Priority: Medium     Non morbid obesity due to excess calories 07/14/2017     Priority: Medium     Peripheral edema 07/14/2017     Priority: Medium     Diverticulosis 11/15/2016     Priority: Medium     Hyperlipidemia 08/04/2016     Priority: Medium     Need for pneumococcal vaccination 01/11/2016     Priority: Medium     Need for vaccination 11/20/2015     Priority: Medium     Type 2 diabetes mellitus (H) 11/11/2015     Priority: Medium     Formatting of this note might be different from the original.  Created by Conversion       Bacteremia due to Staphylococcus aureus 11/06/2015     Priority: Medium     Dysphagia 11/06/2015     Priority: Medium     Epidural abscess 11/06/2015     Priority: Medium     Essential hypertension 11/06/2015     Priority: Medium     Formatting of this note might be different from the original.  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/2015     Priority: Medium     Leukocytosis 09/29/2015     Priority: Medium     Sepsis (H) 09/29/2015     Priority: Medium     Past Surgical History:   Procedure Laterality Date     ANESTHESIA OUT OF OR MRI N/A 10/2/2015    Procedure: ANESTHESIA OUT OF OR MRI;  Surgeon: GENERIC ANESTHESIA PROVIDER;  Location: [facility] OR     BACK  SURGERY  2008    lumbar spine surgery-- unclear details     SOFT TISSUE SURGERY  2012    skin cancer removed from L shoulder, chest, right neck      Past Medical History:   Diagnosis Date     Diabetes (H)      Hypertension      No Known Allergies  Current Outpatient Medications   Medication Sig Dispense Refill     amLODIPine (NORVASC) 5 MG tablet Take 1 tablet (5 mg) by mouth daily 90 tablet 3     celecoxib (CELEBREX) 100 MG capsule TAKE 1 CAPSULE(100 MG) BY MOUTH TWICE DAILY 180 capsule 0     celecoxib (CELEBREX) 200 MG capsule Take 1 capsule (200 mg) by mouth 2 times daily 60 capsule      furosemide (LASIX) 40 MG tablet Take 40 mg by mouth       hydroxychloroquine (PLAQUENIL) 200 MG tablet Take 1 tablet (200 mg) by mouth 2 times daily 180 tablet 0     lisinopril (ZESTRIL) 10 MG tablet Take 1 tablet (10 mg) by mouth daily 90 tablet 3     metFORMIN (GLUCOPHAGE-XR) 500 MG 24 hr tablet Take 500 mg by mouth daily (with breakfast)       tamsulosin (FLOMAX) 0.4 MG capsule TAKE 1 CAPSULE(0.4 MG) BY MOUTH DAILY AFTER BREAKFAST       family history includes Coronary Artery Disease in his father; Hypertension in his father.  Social Connections: Not on file          WBC   Date Value Ref Range Status   04/23/2021 8.2 4.0 - 11.0 10e9/L Final     WBC Count   Date Value Ref Range Status   01/12/2022 8.7 4.0 - 11.0 10e3/uL Final     RBC Count   Date Value Ref Range Status   01/12/2022 5.33 4.40 - 5.90 10e6/uL Final   04/23/2021 5.25 4.4 - 5.9 10e12/L Final     Hemoglobin   Date Value Ref Range Status   01/12/2022 15.3 13.3 - 17.7 g/dL Final   04/23/2021 15.2 13.3 - 17.7 g/dL Final     Hematocrit   Date Value Ref Range Status   01/12/2022 44.7 40.0 - 53.0 % Final   04/23/2021 44.8 40.0 - 53.0 % Final     MCV   Date Value Ref Range Status   01/12/2022 84 78 - 100 fL Final   04/23/2021 85 78 - 100 fl Final     MCH   Date Value Ref Range Status   01/12/2022 28.7 26.5 - 33.0 pg Final   04/23/2021 29.0 26.5 - 33.0 pg Final     Platelet  Count   Date Value Ref Range Status   01/12/2022 233 150 - 450 10e3/uL Final   04/23/2021 222 150 - 450 10e9/L Final     % Lymphocytes   Date Value Ref Range Status   04/23/2021 20.9 % Final     AST   Date Value Ref Range Status   08/24/2021 15 0 - 40 U/L Final   10/12/2015 18 0 - 45 U/L Final     ALT   Date Value Ref Range Status   01/12/2022 18 0 - 45 U/L Final   10/12/2015 30 0 - 70 U/L Final     Albumin   Date Value Ref Range Status   01/12/2022 4.1 3.5 - 5.0 g/dL Final   10/12/2015 2.2 (L) 3.4 - 5.0 g/dL Final     Alkaline Phosphatase   Date Value Ref Range Status   08/24/2021 116 45 - 120 U/L Final   10/17/2015 133 40 - 150 U/L Final     Creatinine   Date Value Ref Range Status   01/12/2022 0.93 0.70 - 1.30 mg/dL Final   04/23/2021 0.86 0.66 - 1.25 mg/dL Final     GFR Estimate   Date Value Ref Range Status   01/12/2022 88 >60 mL/min/1.73m2 Final     Comment:     Effective December 21, 2021 eGFRcr in adults is calculated using the 2021 CKD-EPI creatinine equation which includes age and gender (Barb et al., NEJ, DOI: 10.1056/GYNGct9679808)   04/23/2021 87 >60 mL/min/[1.73_m2] Final     Comment:     Non  GFR Calc  Starting 12/18/2018, serum creatinine based estimated GFR (eGFR) will be   calculated using the Chronic Kidney Disease Epidemiology Collaboration   (CKD-EPI) equation.       GFR Estimate If Black   Date Value Ref Range Status   04/23/2021 >90 >60 mL/min/[1.73_m2] Final     Comment:      GFR Calc  Starting 12/18/2018, serum creatinine based estimated GFR (eGFR) will be   calculated using the Chronic Kidney Disease Epidemiology Collaboration   (CKD-EPI) equation.       Creatinine Urine mg/dL   Date Value Ref Range Status   08/24/2021 40 mg/dL Final     Sed Rate   Date Value Ref Range Status   04/23/2021 9 0 - 20 mm/h Final     Erythrocyte Sedimentation Rate   Date Value Ref Range Status   08/24/2021 2 0 - 15 mm/hr Final     CRP Inflammation   Date Value Ref Range Status    04/23/2021 3.6 0.0 - 8.0 mg/L Final     CRP   Date Value Ref Range Status   08/24/2021 0.2 0.0-<0.8 mg/dL Final

## 2022-02-15 ENCOUNTER — TELEPHONE (OUTPATIENT)
Dept: FAMILY MEDICINE | Facility: CLINIC | Age: 72
End: 2022-02-15
Payer: MEDICARE

## 2022-02-15 NOTE — TELEPHONE ENCOUNTER
Reason for call:  Patient reporting a symptom    Symptom or request: infection in mouth    Duration (how long have symptoms been present): unsure    Have you been treated for this before? No     Additional comments: Recvd call from pt/Thiago, he was seen by his dental clinic today 02.15.2022. After looking in his mouth they informed Thiago that he has a lot of infection in his mouth. Thiago would like to know if Kelin or Dr Don Armijo could call him as he feels he is a patient that should not be walking around with any infection going on.    Thiago is scheduled to see Dr Don Armijo on Friday 02.18.2022    Phone Number patient can be reached at:  Home number on file [phone] (home)    Best Time:  anytime    Can we leave a detailed message on this number:  YES    Call taken on 2/15/2022 at 1:45 PM by Shannan Mera

## 2022-02-15 NOTE — TELEPHONE ENCOUNTER
Pt concerned of the infection and wants to know how serious this is.  Pt is stating that he needs to have a bunch of teeth pulled.  Pt has an appt. On Friday with dr. Armijo to discuss further.

## 2022-02-18 ENCOUNTER — OFFICE VISIT (OUTPATIENT)
Dept: FAMILY MEDICINE | Facility: CLINIC | Age: 72
End: 2022-02-18
Payer: MEDICARE

## 2022-02-18 VITALS
DIASTOLIC BLOOD PRESSURE: 80 MMHG | BODY MASS INDEX: 36.19 KG/M2 | WEIGHT: 265 LBS | SYSTOLIC BLOOD PRESSURE: 140 MMHG | HEART RATE: 92 BPM | OXYGEN SATURATION: 95 %

## 2022-02-18 DIAGNOSIS — M06.00 RHEUMATOID ARTHRITIS WITH NEGATIVE RHEUMATOID FACTOR, INVOLVING UNSPECIFIED SITE (H): ICD-10-CM

## 2022-02-18 DIAGNOSIS — I10 ESSENTIAL HYPERTENSION: ICD-10-CM

## 2022-02-18 DIAGNOSIS — E11.9 TYPE 2 DIABETES MELLITUS WITHOUT COMPLICATION, WITHOUT LONG-TERM CURRENT USE OF INSULIN (H): ICD-10-CM

## 2022-02-18 DIAGNOSIS — K04.7 DENTAL INFECTION: Primary | ICD-10-CM

## 2022-02-18 PROCEDURE — 99214 OFFICE O/P EST MOD 30 MIN: CPT | Performed by: FAMILY MEDICINE

## 2022-02-18 NOTE — PROGRESS NOTES
Assessment/Plan:    Dental infection  Described dental infection following recent dental visit for consultation regarding need for multiple implants etc. Did treat empirically with Augmentin 875/125 twice daily x10 days. We will have available refill for antibiotic prior to scheduled dental work beginning around mid April once the snow season is done due to patient's business responsibilities etc.  - amoxicillin-clavulanate (AUGMENTIN) 875-125 MG tablet  Dispense: 20 tablet; Refill: 2    Type 2 diabetes mellitus without complication, without long-term current use of insulin (H)  Prior A1c at 7% December 28, 2021 and anticipate follow-up office visit for diabetes recheck at annual wellness visit May 3, 2022.  - REVIEW OF HEALTH MAINTENANCE PROTOCOL ORDERS    Essential hypertension  Continues amlodipine 5 mg daily as well as lisinopril 10 mg daily for hypertension management with goal less than 130/80 ideally.    Rheumatoid arthritis with negative rheumatoid factor, involving unspecified site (H)  Follows with Dr. Bundy for her RA management which patient describes as progressively worsening. Methotrexate 10 mg weekly in addition to Plaquenil 200 mg twice daily and folic acid 1 mg daily. Celebrex 100 mg twice daily continues.      Subjective:    Thiago Hein is seen today for dental infection. Has poor dentition. Has understood that he needs dental work and did have consultation recently for likely multiple implants etc. 3D x-ray completed. Unclear if specific area of concern or generalized inflammation and gingivitis findings etc. Patient has been feeling fine without fever. Worries because a history of sepsis associated with infection and lower back area historically. Known history of type 2 diabetes continuing management with Metformin  mg using 3 tablets every morning 1 tablet before bed. Last diabetic eye exam October 14, 2021 without retinopathy. With prior A1c of 7% December 28, 2021. Lisinopril  "10 mg daily and amlodipine 5 mg daily for hypertension while using atorvastatin 20 mg daily for lipid management. Comprehensive review of systems as above otherwise all negative.    Single   1 son - 32 \"Kraig\"   Tobacco: none   EtOH: none   Mom - Meena   Dad - currently 86 Yovanny - DM, CAD, HTN, high cholesterol, back problems, arthritis, spinal stenosis, pacemaker/defibrillator   1 bro - Be - HTN   Surgeries: \"lumbar back surgery for cyst removal\" - September, 2008 (Dr. 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:    Past Surgical History:   Procedure Laterality Date     ANESTHESIA OUT OF OR MRI N/A 10/2/2015    Procedure: ANESTHESIA OUT OF OR MRI;  Surgeon: GENERIC ANESTHESIA PROVIDER;  Location: [facility] OR     BACK SURGERY  2008    lumbar spine surgery-- unclear details     SOFT TISSUE SURGERY  2012    skin cancer removed from L shoulder, chest, right neck        Family History   Problem Relation Age of Onset     Coronary Artery Disease Father      Hypertension Father         Past Medical History:   Diagnosis Date     Diabetes (H)      Hypertension         Social History     Tobacco Use     Smoking status: Never Smoker     Smokeless tobacco: Former User   Substance Use Topics     Alcohol use: No     Comment: Quit in 1990     Drug use: No        Current Outpatient Medications   Medication Sig Dispense Refill     amoxicillin-clavulanate (AUGMENTIN) 875-125 MG tablet Take 1 tablet by mouth 2 times daily for 10 days 20 tablet 2     furosemide (LASIX) 40 MG tablet Take 40 mg by mouth       hydroxychloroquine (PLAQUENIL) 200 MG tablet Take 1 tablet (200 mg) by mouth 2 times daily 180 tablet 0     lisinopril (ZESTRIL) 10 MG tablet Take 1 tablet (10 mg) by mouth daily 90 tablet 3     metFORMIN (GLUCOPHAGE-XR) 500 MG 24 hr tablet Take 500 mg by mouth daily (with breakfast)       tamsulosin " (FLOMAX) 0.4 MG capsule TAKE 1 CAPSULE(0.4 MG) BY MOUTH DAILY AFTER BREAKFAST       amLODIPine (NORVASC) 5 MG tablet Take 1 tablet (5 mg) by mouth daily 90 tablet 3     celecoxib (CELEBREX) 100 MG capsule TAKE 1 CAPSULE(100 MG) BY MOUTH TWICE DAILY 180 capsule 0     folic acid (FOLVITE) 1 MG tablet Take 1 tablet (1 mg) by mouth daily (Patient not taking: Reported on 2/18/2022) 100 tablet 3     methotrexate 2.5 MG tablet Take 4 tablets (10 mg) by mouth every 7 days (Patient not taking: Reported on 2/18/2022) 16 tablet 0          Objective:    Vitals:    02/18/22 1046   BP: (!) 140/80   Pulse: 92   SpO2: 95%   Weight: 120.2 kg (265 lb)      Body mass index is 36.19 kg/m .    Alert. No apparent distress. Poor dentition with multiple missing teeth and local gingivitis findings. No purulent drainage. Neck is supple without significant cervical lymphadenopathy. Extremities warm and dry.      This note has been dictated using voice recognition software and as a result may contain minor grammatical errors and unintended word substitutions.

## 2022-03-03 DIAGNOSIS — K04.7 DENTAL INFECTION: Primary | ICD-10-CM

## 2022-03-03 DIAGNOSIS — I10 ESSENTIAL HYPERTENSION: ICD-10-CM

## 2022-03-03 RX ORDER — LISINOPRIL 10 MG/1
10 TABLET ORAL DAILY
Qty: 90 TABLET | Refills: 3 | Status: SHIPPED | OUTPATIENT
Start: 2022-03-03 | End: 2023-01-26

## 2022-03-03 RX ORDER — AMLODIPINE BESYLATE 5 MG/1
5 TABLET ORAL DAILY
Qty: 90 TABLET | Refills: 3 | Status: SHIPPED | OUTPATIENT
Start: 2022-03-03 | End: 2023-03-27

## 2022-03-03 NOTE — TELEPHONE ENCOUNTER
Patient called to state that medication was not sent at his last OV on 2/18/2022.     Please send prescriptions ASAP.     Pending Prescriptions:                       Disp   Refills    amoxicillin-clavulanate (AUGMENTIN) 875-1*20 tab*2            Sig: Take 1 tablet by mouth 2 times daily for 10 days    amLODIPine (NORVASC) 5 MG tablet          90 tab*3            Sig: Take 1 tablet (5 mg) by mouth daily    lisinopril (ZESTRIL) 10 MG tablet         90 tab*3            Sig: Take 1 tablet (10 mg) by mouth daily

## 2022-04-18 ENCOUNTER — OFFICE VISIT (OUTPATIENT)
Dept: RHEUMATOLOGY | Facility: CLINIC | Age: 72
End: 2022-04-18
Payer: MEDICARE

## 2022-04-18 VITALS
WEIGHT: 265.4 LBS | BODY MASS INDEX: 35.95 KG/M2 | SYSTOLIC BLOOD PRESSURE: 144 MMHG | HEART RATE: 80 BPM | HEIGHT: 72 IN | DIASTOLIC BLOOD PRESSURE: 72 MMHG

## 2022-04-18 DIAGNOSIS — M25.50 POLYARTHRALGIA: ICD-10-CM

## 2022-04-18 DIAGNOSIS — Z79.899 HIGH RISK MEDICATION USE: ICD-10-CM

## 2022-04-18 DIAGNOSIS — M15.0 PRIMARY OSTEOARTHRITIS INVOLVING MULTIPLE JOINTS: ICD-10-CM

## 2022-04-18 DIAGNOSIS — M06.00 SERONEGATIVE RHEUMATOID ARTHRITIS (H): Primary | ICD-10-CM

## 2022-04-18 PROCEDURE — 99214 OFFICE O/P EST MOD 30 MIN: CPT | Performed by: INTERNAL MEDICINE

## 2022-04-18 RX ORDER — FOLIC ACID 1 MG/1
1 TABLET ORAL DAILY
Qty: 100 TABLET | Refills: 3 | Status: SHIPPED | OUTPATIENT
Start: 2022-04-18 | End: 2022-10-10

## 2022-04-18 RX ORDER — PREDNISONE 2.5 MG/1
TABLET ORAL
Qty: 90 TABLET | Refills: 2 | Status: SHIPPED | OUTPATIENT
Start: 2022-04-18 | End: 2022-10-25

## 2022-04-18 NOTE — PROGRESS NOTES
"      Rheumatology follow-up visit note     Thiago is a 72 year old male presents today for follow-up.    Thiago was seen today for recheck.    Diagnoses and all orders for this visit:    Seronegative rheumatoid arthritis (H)  -     predniSONE (DELTASONE) 2.5 MG tablet; 7.5 mg daily for 4 weeks, reduce by 2.5 mg every 4 weeks to 5 milligrams daily, and to 2.5 mg daily and stop  -     folic acid (FOLVITE) 1 MG tablet; Take 1 tablet (1 mg) by mouth daily  -     methotrexate 2.5 MG tablet; Take 4 tablets (10 mg) by mouth every 7 days    Primary osteoarthritis involving multiple joints    Polyarthralgia    High risk medication use  -     folic acid (FOLVITE) 1 MG tablet; Take 1 tablet (1 mg) by mouth daily        This patient with severe seronegative rheumatoid arthritis is finally going to start methotrexate have also asked him to take prednisone, major side effects including ocular metabolic bone related reviewed.  Management of OA outlined.  We will meet here in 4 weeks with labs prior.    HPI    Thiago Hein is a 72 year old male is here for follow-up.  he has rheumatoid arthritis, osteoarthritis, intermittent pain in his knees still troubled him with activity and sometimes at nighttime, he is on hydroxychloroquine, is going for his eye exam in the next weeks, has noted worsening pain.  This is in his hands, knees, the hands are worse first thing in the morning, he has difficult time flexing to make fist.  On his previous visit we had talked about starting methotrexate.  He was concerned that if there are any side effects starting a new medication and away his job situation was that would not work for him therefore he deferred.  He is going to take his first dose of methotrexate today.  He recalls that he had a \"bad\" experience with prednisone many years ago when he would have a such strong hunger that he would easily take the 2 servings of dinner for example.        DETAILED EXAMINATION  04/18/22  " ":    Vitals:    04/18/22 1236   BP: (!) 144/72   BP Location: Right arm   Patient Position: Sitting   Cuff Size: Adult Large   Pulse: 80   Weight: 120.4 kg (265 lb 6.4 oz)   Height: 1.822 m (5' 11.75\")     Alert oriented. Head including the face is examined for malar rash, heliotropes, scarring, lupus pernio. Eyes examined for redness such as in episcleritis/scleritis, periorbital lesions.   Neck/ Face examined for parotid gland swelling, range of motion of neck.  Left upper and lower and right upper and lower extremities examined for tenderness, swelling, warmth of the appendicular joints, range of motion, edema, rash.  Some of the important findings included: He has marked synovitis of MCPs and PIPs especially in the right hand, joint line tenderness of the knees.  [place] nodes.  Range of motion of the shoulders  show full abduction.   Patient Active Problem List    Diagnosis Date Noted     Chronic polyarticular juvenile rheumatoid arthritis (H) 08/24/2021     Priority: Medium     Formatting of this note might be different from the original.  Created by Conversion    Replacement Utility updated for latest IMO load       Emotional lability 08/24/2021     Priority: Medium     Formatting of this note might be different from the original.  Created by Conversion       Lower back pain 08/24/2021     Priority: Medium     Formatting of this note might be different from the original.  Created by Conversion       Morbid obesity (H) 08/24/2021     Priority: Medium     BPH with urinary obstruction 02/20/2020     Priority: Medium     Primary osteoarthritis involving multiple joints 10/08/2019     Priority: Medium     Left carpal tunnel syndrome 12/27/2018     Priority: Medium     Trigger finger, right index finger 09/26/2018     Priority: Medium     Polyarthralgia 07/20/2018     Priority: Medium     Unintentional weight loss 06/01/2018     Priority: Medium     Non morbid obesity due to excess calories 07/14/2017     " Priority: Medium     Peripheral edema 07/14/2017     Priority: Medium     Diverticulosis 11/15/2016     Priority: Medium     Hyperlipidemia 08/04/2016     Priority: Medium     Need for pneumococcal vaccination 01/11/2016     Priority: Medium     Need for vaccination 11/20/2015     Priority: Medium     Type 2 diabetes mellitus (H) 11/11/2015     Priority: Medium     Formatting of this note might be different from the original.  Created by Conversion       Bacteremia due to Staphylococcus aureus 11/06/2015     Priority: Medium     Dysphagia 11/06/2015     Priority: Medium     Epidural abscess 11/06/2015     Priority: Medium     Essential hypertension 11/06/2015     Priority: Medium     Formatting of this note might be different from the original.  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/2015     Priority: Medium     Leukocytosis 09/29/2015     Priority: Medium     Sepsis (H) 09/29/2015     Priority: Medium     Past Surgical History:   Procedure Laterality Date     ANESTHESIA OUT OF OR MRI N/A 10/2/2015    Procedure: ANESTHESIA OUT OF OR MRI;  Surgeon: GENERIC ANESTHESIA PROVIDER;  Location: [facility] OR     BACK SURGERY  2008    lumbar spine surgery-- unclear details     SOFT TISSUE SURGERY  2012    skin cancer removed from L shoulder, chest, right neck      Past Medical History:   Diagnosis Date     Diabetes (H)      Hypertension      No Known Allergies  Current Outpatient Medications   Medication Sig Dispense Refill     amLODIPine (NORVASC) 5 MG tablet Take 1 tablet (5 mg) by mouth daily 90 tablet 3     celecoxib (CELEBREX) 100 MG capsule TAKE 1 CAPSULE(100 MG) BY MOUTH TWICE DAILY 180 capsule 0     furosemide (LASIX) 40 MG tablet Take 40 mg by mouth       hydroxychloroquine (PLAQUENIL) 200 MG tablet  Take 1 tablet (200 mg) by mouth 2 times daily 180 tablet 0     lisinopril (ZESTRIL) 10 MG tablet Take 1 tablet (10 mg) by mouth daily 90 tablet 3     metFORMIN (GLUCOPHAGE-XR) 500 MG 24 hr tablet Take 500 mg by mouth daily (with breakfast)       tamsulosin (FLOMAX) 0.4 MG capsule TAKE 1 CAPSULE(0.4 MG) BY MOUTH DAILY AFTER BREAKFAST       amoxicillin-clavulanate (AUGMENTIN) 875-125 MG tablet Take 1 tablet by mouth 2 times daily for 10 days 20 tablet 2     folic acid (FOLVITE) 1 MG tablet Take 1 tablet (1 mg) by mouth daily (Patient not taking: Reported on 4/18/2022) 100 tablet 3     methotrexate 2.5 MG tablet Take 4 tablets (10 mg) by mouth every 7 days (Patient not taking: No sig reported) 16 tablet 0     family history includes Coronary Artery Disease in his father; Hypertension in his father.  Social Connections: Not on file          WBC   Date Value Ref Range Status   04/23/2021 8.2 4.0 - 11.0 10e9/L Final     WBC Count   Date Value Ref Range Status   01/12/2022 8.7 4.0 - 11.0 10e3/uL Final     RBC Count   Date Value Ref Range Status   01/12/2022 5.33 4.40 - 5.90 10e6/uL Final   04/23/2021 5.25 4.4 - 5.9 10e12/L Final     Hemoglobin   Date Value Ref Range Status   01/12/2022 15.3 13.3 - 17.7 g/dL Final   04/23/2021 15.2 13.3 - 17.7 g/dL Final     Hematocrit   Date Value Ref Range Status   01/12/2022 44.7 40.0 - 53.0 % Final   04/23/2021 44.8 40.0 - 53.0 % Final     MCV   Date Value Ref Range Status   01/12/2022 84 78 - 100 fL Final   04/23/2021 85 78 - 100 fl Final     MCH   Date Value Ref Range Status   01/12/2022 28.7 26.5 - 33.0 pg Final   04/23/2021 29.0 26.5 - 33.0 pg Final     Platelet Count   Date Value Ref Range Status   01/12/2022 233 150 - 450 10e3/uL Final   04/23/2021 222 150 - 450 10e9/L Final     % Lymphocytes   Date Value Ref Range Status   04/23/2021 20.9 % Final     AST   Date Value Ref Range Status   08/24/2021 15 0 - 40 U/L Final   10/12/2015 18 0 - 45 U/L Final     ALT   Date Value Ref Range  Status   01/12/2022 18 0 - 45 U/L Final   10/12/2015 30 0 - 70 U/L Final     Albumin   Date Value Ref Range Status   01/12/2022 4.1 3.5 - 5.0 g/dL Final   10/12/2015 2.2 (L) 3.4 - 5.0 g/dL Final     Alkaline Phosphatase   Date Value Ref Range Status   08/24/2021 116 45 - 120 U/L Final   10/17/2015 133 40 - 150 U/L Final     Creatinine   Date Value Ref Range Status   01/12/2022 0.93 0.70 - 1.30 mg/dL Final   04/23/2021 0.86 0.66 - 1.25 mg/dL Final     GFR Estimate   Date Value Ref Range Status   01/12/2022 88 >60 mL/min/1.73m2 Final     Comment:     Effective December 21, 2021 eGFRcr in adults is calculated using the 2021 CKD-EPI creatinine equation which includes age and gender (Barb et al., [place], DOI: 10.1056/UFFNcy5126685)   04/23/2021 87 >60 mL/min/[1.73_m2] Final     Comment:     Non  GFR Calc  Starting 12/18/2018, serum creatinine based estimated GFR (eGFR) will be   calculated using the Chronic Kidney Disease Epidemiology Collaboration   (CKD-EPI) equation.       GFR Estimate If Black   Date Value Ref Range Status   04/23/2021 >90 >60 mL/min/[1.73_m2] Final     Comment:      GFR Calc  Starting 12/18/2018, serum creatinine based estimated GFR (eGFR) will be   calculated using the Chronic Kidney Disease Epidemiology Collaboration   (CKD-EPI) equation.       Creatinine Urine mg/dL   Date Value Ref Range Status   08/24/2021 40 mg/dL Final     Sed Rate   Date Value Ref Range Status   04/23/2021 9 0 - 20 mm/h Final     Erythrocyte Sedimentation Rate   Date Value Ref Range Status   08/24/2021 2 0 - 15 mm/hr Final     CRP Inflammation   Date Value Ref Range Status   04/23/2021 3.6 0.0 - 8.0 mg/L Final     CRP   Date Value Ref Range Status   08/24/2021 0.2 0.0-<0.8 mg/dL Final

## 2022-05-02 DIAGNOSIS — M15.0 PRIMARY OSTEOARTHRITIS INVOLVING MULTIPLE JOINTS: ICD-10-CM

## 2022-05-02 RX ORDER — CELECOXIB 100 MG/1
CAPSULE ORAL
Qty: 180 CAPSULE | Refills: 0 | Status: SHIPPED | OUTPATIENT
Start: 2022-05-02 | End: 2022-07-28

## 2022-05-03 ENCOUNTER — OFFICE VISIT (OUTPATIENT)
Dept: FAMILY MEDICINE | Facility: CLINIC | Age: 72
End: 2022-05-03
Payer: MEDICARE

## 2022-05-03 VITALS
BODY MASS INDEX: 35.98 KG/M2 | SYSTOLIC BLOOD PRESSURE: 120 MMHG | TEMPERATURE: 97.7 F | OXYGEN SATURATION: 94 % | HEIGHT: 71 IN | DIASTOLIC BLOOD PRESSURE: 60 MMHG | WEIGHT: 257 LBS | HEART RATE: 95 BPM

## 2022-05-03 DIAGNOSIS — M06.00 RHEUMATOID ARTHRITIS WITH NEGATIVE RHEUMATOID FACTOR, INVOLVING UNSPECIFIED SITE (H): ICD-10-CM

## 2022-05-03 DIAGNOSIS — R60.0 PERIPHERAL EDEMA: ICD-10-CM

## 2022-05-03 DIAGNOSIS — Z13.220 SCREENING FOR HYPERLIPIDEMIA: ICD-10-CM

## 2022-05-03 DIAGNOSIS — Z79.899 HIGH RISK MEDICATION USE: ICD-10-CM

## 2022-05-03 DIAGNOSIS — E11.9 TYPE 2 DIABETES MELLITUS WITHOUT COMPLICATION, WITHOUT LONG-TERM CURRENT USE OF INSULIN (H): ICD-10-CM

## 2022-05-03 DIAGNOSIS — Z23 HIGH PRIORITY FOR 2019-NCOV VACCINE: ICD-10-CM

## 2022-05-03 DIAGNOSIS — B35.1 ONYCHOMYCOSIS: ICD-10-CM

## 2022-05-03 DIAGNOSIS — I10 ESSENTIAL HYPERTENSION: ICD-10-CM

## 2022-05-03 DIAGNOSIS — E78.5 HYPERLIPIDEMIA, UNSPECIFIED HYPERLIPIDEMIA TYPE: ICD-10-CM

## 2022-05-03 DIAGNOSIS — Z00.00 ENCOUNTER FOR MEDICARE ANNUAL WELLNESS EXAM: Primary | ICD-10-CM

## 2022-05-03 LAB
ALBUMIN SERPL-MCNC: 4 G/DL (ref 3.5–5)
ALT SERPL W P-5'-P-CCNC: 22 U/L (ref 0–45)
CHOLEST SERPL-MCNC: 106 MG/DL
CREAT SERPL-MCNC: 0.86 MG/DL (ref 0.7–1.3)
ERYTHROCYTE [DISTWIDTH] IN BLOOD BY AUTOMATED COUNT: 13.3 % (ref 10–15)
GFR SERPL CREATININE-BSD FRML MDRD: >90 ML/MIN/1.73M2
HBA1C MFR BLD: 7.5 % (ref 0–5.6)
HCT VFR BLD AUTO: 43.4 % (ref 40–53)
HDLC SERPL-MCNC: 31 MG/DL
HGB BLD-MCNC: 15 G/DL (ref 13.3–17.7)
HOLD SPECIMEN: NORMAL
LDLC SERPL CALC-MCNC: 38 MG/DL
MCH RBC QN AUTO: 28.6 PG (ref 26.5–33)
MCHC RBC AUTO-ENTMCNC: 34.6 G/DL (ref 31.5–36.5)
MCV RBC AUTO: 83 FL (ref 78–100)
PLATELET # BLD AUTO: 206 10E3/UL (ref 150–450)
RBC # BLD AUTO: 5.24 10E6/UL (ref 4.4–5.9)
TRIGL SERPL-MCNC: 183 MG/DL
WBC # BLD AUTO: 8.4 10E3/UL (ref 4–11)

## 2022-05-03 PROCEDURE — 85027 COMPLETE CBC AUTOMATED: CPT | Performed by: FAMILY MEDICINE

## 2022-05-03 PROCEDURE — 82565 ASSAY OF CREATININE: CPT | Performed by: FAMILY MEDICINE

## 2022-05-03 PROCEDURE — 91306 COVID-19,PF,MODERNA (18+ YRS BOOSTER .25ML): CPT | Performed by: FAMILY MEDICINE

## 2022-05-03 PROCEDURE — 82040 ASSAY OF SERUM ALBUMIN: CPT | Performed by: FAMILY MEDICINE

## 2022-05-03 PROCEDURE — 80061 LIPID PANEL: CPT | Performed by: FAMILY MEDICINE

## 2022-05-03 PROCEDURE — 0064A COVID-19,PF,MODERNA (18+ YRS BOOSTER .25ML): CPT | Performed by: FAMILY MEDICINE

## 2022-05-03 PROCEDURE — 36415 COLL VENOUS BLD VENIPUNCTURE: CPT | Performed by: FAMILY MEDICINE

## 2022-05-03 PROCEDURE — 83036 HEMOGLOBIN GLYCOSYLATED A1C: CPT | Performed by: FAMILY MEDICINE

## 2022-05-03 PROCEDURE — 99214 OFFICE O/P EST MOD 30 MIN: CPT | Mod: 25 | Performed by: FAMILY MEDICINE

## 2022-05-03 PROCEDURE — G0439 PPPS, SUBSEQ VISIT: HCPCS | Performed by: FAMILY MEDICINE

## 2022-05-03 PROCEDURE — 84460 ALANINE AMINO (ALT) (SGPT): CPT | Performed by: FAMILY MEDICINE

## 2022-05-03 RX ORDER — METFORMIN HCL 500 MG
TABLET, EXTENDED RELEASE 24 HR ORAL
Qty: 360 TABLET | Refills: 3 | Status: SHIPPED | OUTPATIENT
Start: 2022-05-03 | End: 2023-04-23

## 2022-05-03 RX ORDER — FUROSEMIDE 40 MG
40 TABLET ORAL DAILY
Qty: 90 TABLET | Refills: 3 | Status: SHIPPED | OUTPATIENT
Start: 2022-05-03 | End: 2023-05-07

## 2022-05-03 ASSESSMENT — ANXIETY QUESTIONNAIRES
6. BECOMING EASILY ANNOYED OR IRRITABLE: NOT AT ALL
3. WORRYING TOO MUCH ABOUT DIFFERENT THINGS: NOT AT ALL
7. FEELING AFRAID AS IF SOMETHING AWFUL MIGHT HAPPEN: NOT AT ALL
2. NOT BEING ABLE TO STOP OR CONTROL WORRYING: NOT AT ALL
7. FEELING AFRAID AS IF SOMETHING AWFUL MIGHT HAPPEN: NOT AT ALL
GAD7 TOTAL SCORE: 0
5. BEING SO RESTLESS THAT IT IS HARD TO SIT STILL: NOT AT ALL
4. TROUBLE RELAXING: NOT AT ALL
GAD7 TOTAL SCORE: 0
GAD7 TOTAL SCORE: 0
1. FEELING NERVOUS, ANXIOUS, OR ON EDGE: NOT AT ALL

## 2022-05-03 ASSESSMENT — PATIENT HEALTH QUESTIONNAIRE - PHQ9
10. IF YOU CHECKED OFF ANY PROBLEMS, HOW DIFFICULT HAVE THESE PROBLEMS MADE IT FOR YOU TO DO YOUR WORK, TAKE CARE OF THINGS AT HOME, OR GET ALONG WITH OTHER PEOPLE: NOT DIFFICULT AT ALL
SUM OF ALL RESPONSES TO PHQ QUESTIONS 1-9: 1
SUM OF ALL RESPONSES TO PHQ QUESTIONS 1-9: 1

## 2022-05-03 ASSESSMENT — ACTIVITIES OF DAILY LIVING (ADL)
CURRENT_FUNCTION: LAUNDRY REQUIRES ASSISTANCE
CURRENT_FUNCTION: NO ASSISTANCE NEEDED

## 2022-05-03 NOTE — LETTER
May 4, 2022      Thiago Hein  [address] [place] [address]        Dear ,    We are writing to inform you of your test results.    Continue your current diabetes management as discussed in order to further improve.  Goal A1c < 7.5% initially, < 7.0% ideally.     Your cholesterol results were elevated.  Ensure regular exercise, healthy diet, and weight loss modifications in order to further improve.  Weight goal < 250 pounds initially, < 240 pounds ideally.  We will plan to recheck your labs while fasting in the next 6-12 months to ensure desired improvement.         Resulted Orders   Hemoglobin A1c   Result Value Ref Range    Hemoglobin A1C 7.5 (H) 0.0 - 5.6 %      Comment:      Normal <5.7%   Prediabetes 5.7-6.4%    Diabetes 6.5% or higher     Note: Adopted from ADA consensus guidelines.   Lipid panel reflex to direct LDL Fasting   Result Value Ref Range    Cholesterol 106 <=199 mg/dL    Triglycerides 183 (H) <=149 mg/dL    Direct Measure HDL 31 (L) >=40 mg/dL      Comment:      HDL Cholesterol Reference Range:     0-2 years:   No reference ranges established for patients under 2 years old  [facility].    2-8 years:  Greater than 45 mg/dL     18 years and older:   Female: Greater than or equal to 50 mg/dL   Male:   Greater than or equal to 40 mg/dL    LDL Cholesterol Calculated 38 <=129 mg/dL       If you have any questions or concerns, please call the clinic at the number listed above.       Sincerely,      Don Armijo MD

## 2022-05-03 NOTE — PROGRESS NOTES
"SUBJECTIVE:     Thiago Hein is a 72 year old male who presents for Preventive Visit.      Annual wellness visit completed.  Risk questionnaire reviewed in detail.  Underlying history of type 2 diabetes and continues metformin  mg using 3 tablets in the morning and 1 tablet in the evening.  Tolerating well.  Due to rheumatoid arthritis had restarted methotrexate 2.5 mg currently 4 tablets/week while using prednisone taper 7.5 mg daily x4 weeks then 5 mg daily x4 weeks then 2.5 mg daily x4 weeks before discontinuing.  Continues Plaquenil 200 mg twice daily.  Feels like he is missing a medication in his pillbox and we did review his medication list and likely not having used furosemide 40 mg each morning over past several months.  Patient would like to restart this with ankle swelling noted.  Does have toenail and fingernail fungus issues and would like to see dermatologist.  Tamsulosin 0.4 mg daily for BPH management as well with described benefit.  Amlodipine 5 mg daily and lisinopril 10 mg daily for hypertension.  Prior A1c had having increased from 6.5% up to 7% at office visit December 28, 2021 with dietary indiscretions.  Working with his  on his will and healthcare directives etc.  Previously had described being on methotrexate 10 tablets/week many years ago then transition to Celebrex 100 mg twice daily which she had used for about 18 years before establishing care with Dr. Bundy rheumatologist.  Comprehensive review of systems as above otherwise all negative.      Single   1 son - 32 \"Kraig\"   Tobacco: none   EtOH: none   Mom - Meena   Dad - currently 86 Yovanny - DM, CAD, HTN, high cholesterol, back problems, arthritis, spinal stenosis, pacemaker/defibrillator   1 bro - Be - HTN   Surgeries: \"lumbar back surgery for cyst removal\" - September, 2008 (Dr. 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  No    Healthy Habits:     In general, how would you rate your overall health?  Fair    Frequency of exercise:  2-3 days/week    Duration of exercise:  Less than 15 minutes    Do you usually eat at least 4 servings of fruit and vegetables a day, include whole grains    & fiber and avoid regularly eating high fat or \"junk\" foods?  No    Taking medications regularly:  Yes    Ability to successfully perform activities of daily living:  Laundry requires assistance and no assistance needed    Home Safety:  No safety concerns identified    Hearing Impairment:  Difficulty following a conversation in a noisy restaurant or crowded room and no hearing concerns    In the past 6 months, have you been bothered by leaking of urine?  No    In general, how would you rate your overall mental or emotional health?  Good      PHQ-2 Total Score: 0    Additional concerns today:  No    Do you feel safe in your environment? Yes    Have you ever done Advance Care Planning? (For example, a Health Directive, POLST, or a discussion with a medical provider or your loved ones about your wishes): No, advance care planning information given to patient to review.  Advanced care planning was discussed at today's visit.       Fall risk  Fallen 2 or more times in the past year?: No  Any fall with injury in the past year?: No    Cognitive Screening   1) Repeat 3 items (Leader, Season, Table)    2) Clock draw: NORMAL  3) 3 item recall: Recalls 2 objects   Results: NORMAL clock, 1-2 items recalled: COGNITIVE IMPAIRMENT LESS LIKELY    Mini-CogTM Copyright LANCE Brar. Licensed by the author for use in [facility]; reprinted with permission (varinder@.[facility]). All rights reserved.      Do you have sleep apnea, excessive snoring or daytime " drowsiness?: no    Reviewed and updated as needed this visit by clinical staff   Tobacco   Meds                Reviewed and updated as needed this visit by Provider                   Social History     Tobacco Use     Smoking status: Never Smoker     Smokeless tobacco: Former User   Substance Use Topics     Alcohol use: No     Comment: Quit in 1990     If you drink alcohol do you typically have >3 drinks per day or >7 drinks per week? No    Alcohol Use 5/3/2022   Prescreen: >3 drinks/day or >7 drinks/week? No   Prescreen: >3 drinks/day or >7 drinks/week? -               Current providers sharing in care for this patient include:   Patient Care Team:  Don Armijo MD as PCP - General (Family Practice)  Gill Love MD as MD (Infectious Diseases)  Yamilka Mercedes MD as MD (Neurological Surgery)  Cheng Hatfield MD as MD (Physical Medicine & Rehabilitation - Pain Medicine)  Don Armijo MD as Assigned PCP  Elias Bundy MBBS as Assigned Rheumatology Provider    The following health maintenance items are reviewed in Epic and correct as of today:  Health Maintenance Due   Topic Date Due     COVID-19 Vaccine (4 - Booster for Moderna series) 02/11/2022     LIPID  04/30/2022     ZOSTER IMMUNIZATION (3 of 3) 06/25/2021     Lab work is in process  Labs reviewed in EPIC  BP Readings from Last 3 Encounters:   05/03/22 120/60   04/18/22 (!) 144/72   02/18/22 (!) 140/80    Wt Readings from Last 3 Encounters:   05/03/22 116.6 kg (257 lb)   04/18/22 120.4 kg (265 lb 6.4 oz)   02/18/22 120.2 kg (265 lb)                  Patient Active Problem List   Diagnosis     Leukocytosis     Sepsis (H)     Adult Still's disease (H)     Discitis     Need for vaccination     Need for pneumococcal vaccination     Bacteremia due to Staphylococcus aureus     BPH with urinary obstruction     Chronic polyarticular juvenile rheumatoid arthritis (H)     Diverticulosis     Dysphagia     Emotional lability     Epidural  abscess     Essential hypertension     Hyperlipidemia     Left carpal tunnel syndrome     Lesion of salivary gland     Lower back pain     Non morbid obesity due to excess calories     Peripheral edema     Polyarthralgia     Primary osteoarthritis involving multiple joints     Somnolence     Trigger finger, right index finger     Type 2 diabetes mellitus (H)     Unintentional weight loss     Morbid obesity (H)     Past Surgical History:   Procedure Laterality Date     ANESTHESIA OUT OF OR MRI N/A 10/2/2015    Procedure: ANESTHESIA OUT OF OR MRI;  Surgeon: GENERIC ANESTHESIA PROVIDER;  Location: [facility] OR     BACK SURGERY  2008    lumbar spine surgery-- unclear details     SOFT TISSUE SURGERY  2012    skin cancer removed from L shoulder, chest, right neck       Social History     Tobacco Use     Smoking status: Never Smoker     Smokeless tobacco: Former User   Substance Use Topics     Alcohol use: No     Comment: Quit in 1990     Family History   Problem Relation Age of Onset     Coronary Artery Disease Father      Hypertension Father          Current Outpatient Medications   Medication Sig Dispense Refill     amLODIPine (NORVASC) 5 MG tablet Take 1 tablet (5 mg) by mouth daily 90 tablet 3     celecoxib (CELEBREX) 100 MG capsule TAKE 1 CAPSULE(100 MG) BY MOUTH TWICE DAILY 180 capsule 0     folic acid (FOLVITE) 1 MG tablet Take 1 tablet (1 mg) by mouth daily 100 tablet 3     furosemide (LASIX) 40 MG tablet Take 1 tablet (40 mg) by mouth daily 90 tablet 3     hydroxychloroquine (PLAQUENIL) 200 MG tablet Take 1 tablet (200 mg) by mouth 2 times daily 180 tablet 0     lisinopril (ZESTRIL) 10 MG tablet Take 1 tablet (10 mg) by mouth daily 90 tablet 3     metFORMIN (GLUCOPHAGE-XR) 500 MG 24 hr tablet Take 3 tablets (1,500 mg) by mouth each AM and take 1 tablet (500 mg) by mouth each evening 360 tablet 3     methotrexate 2.5 MG tablet Take 4 tablets (10 mg) by mouth every 7 days 16 tablet 0     predniSONE (DELTASONE) 2.5 MG  "tablet 7.5 mg daily for 4 weeks, reduce by 2.5 mg every 4 weeks to 5 milligrams daily, and to 2.5 mg daily and stop 90 tablet 2     tamsulosin (FLOMAX) 0.4 MG capsule TAKE 1 CAPSULE(0.4 MG) BY MOUTH DAILY AFTER BREAKFAST       amoxicillin-clavulanate (AUGMENTIN) 875-125 MG tablet Take 1 tablet by mouth 2 times daily for 10 days 20 tablet 2     No Known Allergies  Recent Labs   Lab Test 05/03/22  0906 01/12/22  1035 12/28/21  1037 08/24/21  1045 04/30/21  1054 04/30/21  1007 04/23/21  0711 03/04/21  1802 03/04/21  1802 05/07/20  0922 02/20/20  0920 02/20/20  0835 10/29/19  1030 07/05/18  1002 06/01/18  1043   A1C 7.5*  --  7.0* 6.5*  --    < >  --   --   --    < >  --    < >  --    < >  --    LDL  --   --   --   --  48  --   --   --   --   --  76  --  54   < >  --    HDL  --   --   --   --  31*  --   --   --   --   --  37*  --  32*   < >  --    TRIG  --   --   --   --  203*  --   --   --   --   --  141  --  156*   < >  --    ALT  --  18  --  18  --   --   --   --  19   < > 23  --  21   < > 19   CR  --  0.93 0.91 0.85  --   --  0.86  --  0.97   < > 0.88  --  1.03   < > 1.25   GFRESTIMATED  --  88 90 88  --   --  87   < > >60   < > >60  --  >60   < > 57*   GFRESTBLACK  --   --   --   --   --   --  >90  --  >60   < > >60  --  >60   < > >60   POTASSIUM  --   --  4.0 4.0  --   --  4.1  --  3.6   < > 4.3  --  4.5   < > 4.7   TSH  --   --   --   --   --   --   --   --   --   --   --   --   --   --  1.87    < > = values in this interval not displayed.      Moderna COVID-19 2nd booster        Review of Systems  Constitutional, HEENT, cardiovascular, pulmonary, GI, , musculoskeletal, neuro, skin, endocrine and psych systems are negative, except as otherwise noted.    OBJECTIVE:   /60   Pulse 95   Temp 97.7  F (36.5  C)   Ht 1.81 m (5' 11.25\")   Wt 116.6 kg (257 lb)   SpO2 94%   BMI 35.59 kg/m   Estimated body mass index is 35.59 kg/m  as calculated from the following:    Height as of this encounter: 1.81 m (5' " "11.25\").    Weight as of this encounter: 116.6 kg (257 lb).  Physical Exam  GENERAL: healthy, alert and no distress  EYES: Eyes grossly normal to inspection, PERRL and conjunctivae and sclerae normal  HENT: ear canals and TM's normal, nose and mouth without ulcers or lesions  NECK: no adenopathy, no asymmetry, masses, or scars and thyroid normal to palpation  RESP: lungs clear to auscultation - no rales, rhonchi or wheezes  CV: regular rate and rhythm, normal S1 S2, no S3 or S4, no murmur, click or rub, no peripheral edema and peripheral pulses strong  ABDOMEN: soft, nontender, no hepatosplenomegaly, no masses and bowel sounds normal   (male): normal male genitalia without lesions or urethral discharge, no hernia  RECTAL: normal sphincter tone, no rectal masses, prostate normal size, smooth, nontender without nodules or masses  MS: no gross musculoskeletal defects noted, no edema  SKIN: no suspicious lesions or rashes  NEURO: Normal strength and tone, mentation intact and speech normal  PSYCH: mentation appears normal, affect normal/bright    Diagnostic Test Results:  Labs reviewed in Epic  Results for orders placed or performed in visit on 05/03/22 (from the past 24 hour(s))   Hemoglobin A1c   Result Value Ref Range    Hemoglobin A1C 7.5 (H) 0.0 - 5.6 %   CBC with platelets   Result Value Ref Range    WBC Count 8.4 4.0 - 11.0 10e3/uL    RBC Count 5.24 4.40 - 5.90 10e6/uL    Hemoglobin 15.0 13.3 - 17.7 g/dL    Hematocrit 43.4 40.0 - 53.0 %    MCV 83 78 - 100 fL    MCH 28.6 26.5 - 33.0 pg    MCHC 34.6 31.5 - 36.5 g/dL    RDW 13.3 10.0 - 15.0 %    Platelet Count 206 150 - 450 10e3/uL   Extra Tube    Narrative    The following orders were created for panel order Extra Tube.  Procedure                               Abnormality         Status                     ---------                               -----------         ------                     Extra Red Top Tube[[id number]]                               In process   "               Extra Green Top (Lithium...[[id number]]                      In process                 Extra Purple Top Tube[[id number]]                            In process                   Please view results for these tests on the individual orders.       ASSESSMENT / PLAN:     Encounter for Medicare annual wellness exam  Annual wellness visit completed.  Risk questionnaire reviewed in detail.  Annual wellness visits to continue.    Type 2 diabetes mellitus without complication, without long-term current use of insulin (H)  Type 2 diabetes suboptimal control with A1c having increased previously from 6.5% up to 7% December 28, 2021 and today up to 7.5%.  Most recent diabetic eye exam October 14, 2021 without retinopathy.  No history of peripheral neuropathy.  We will continue metformin  mg using 3 tablets in the morning 1 tablet in the evening and anticipate reassessment at follow-up in 4 months.  Anticipate glycemic improvement after discontinuing current prednisone taper as described below.  - Hemoglobin A1c  - metFORMIN (GLUCOPHAGE-XR) 500 MG 24 hr tablet  Dispense: 360 tablet; Refill: 3    Essential hypertension  Blood pressure at goal with lisinopril 10 mg daily and amlodipine 5 mg daily.    Rheumatoid arthritis with negative rheumatoid factor, involving unspecified site (H)  Continues to follow with Dr. Bundy and using methotrexate 2.5 mg using 4 tablets/week plus prednisone taper 7.5 mg daily x4 weeks, 5 mg daily x4 weeks, 2.5 mg daily x4 weeks then discontinue.  Continues Plaquenil 200 mg twice daily as well as Celebrex 100 mg twice daily.    Hyperlipidemia, unspecified hyperlipidemia type  Check lipid cascade today with LDL goal remaining less than 70.  - Lipid panel reflex to direct LDL Fasting    Screening for hyperlipidemia  As above.    High risk medication use  Med monitoring completed.  - ALT  - Albumin level  - CBC with platelets  - Creatinine    High priority for 2019-nCoV vaccine  Moderna  "COVID-19 second booster provided today.  - COVID-19,PF,MODERNA (18+ Yrs BOOSTER .25mL)    Peripheral edema  Patient would like to resume furosemide 40 mg every morning for edema management currently trace edema with recommendation for compression stockings in place of furosemide if able.  - furosemide (LASIX) 40 MG tablet  Dispense: 90 tablet; Refill: 3    Onychomycosis  Onychomycosis findings of fingernails and toenails.  Would like to see dermatologist and referral was placed  - Adult Dermatology Referral       Patient has been advised of split billing requirements and indicates understanding: No    COUNSELING:  Reviewed preventive health counseling, as reflected in patient instructions       Regular exercise       Healthy diet/nutrition       Vision screening       Hearing screening       Dental care       Bladder control       Fall risk prevention       Aspirin prophylaxis        Colon cancer screening    Estimated body mass index is 35.59 kg/m  as calculated from the following:    Height as of this encounter: 1.81 m (5' 11.25\").    Weight as of this encounter: 116.6 kg (257 lb).    Weight management plan: Discussed healthy diet and exercise guidelines.  Ensure ongoing efforts to achieve weight goal < 250 pounds initially, < 240 pounds ideally.     He reports that he has never smoked. He has quit using smokeless tobacco.      Appropriate preventive services were discussed with this patient, including applicable screening as appropriate for cardiovascular disease, diabetes, osteopenia/osteoporosis, and glaucoma.  As appropriate for age/gender, discussed screening for colorectal cancer, prostate cancer, breast cancer, and cervical cancer. Checklist reviewing preventive services available has been given to the patient.    Reviewed patients plan of care and provided an AVS. The Intermediate Care Plan ( asthma action plan, low back pain action plan, and migraine action plan) for Thiago meets the Care Plan " requirement. This Care Plan has been established and reviewed with the Patient.    Counseling Resources:  ATP IV Guidelines  Pooled Cohorts Equation Calculator  Breast Cancer Risk Calculator  Breast Cancer: Medication to Reduce Risk  FRAX Risk Assessment  ICSI Preventive Guidelines  Dietary Guidelines for Americans, 2010  [employer]'s MyPlate  ASA Prophylaxis  Lung CA Screening    Don Armijo MD  [facility]    Identified Health Risks:  Answers for HPI/ROS submitted by the patient on 5/3/2022  If you checked off any problems, how difficult have these problems made it for you to do your work, take care of things at home, or get along with other people?: Not difficult at all  PHQ9 TOTAL SCORE: 1  OTONIEL 7 TOTAL SCORE: 0

## 2022-05-03 NOTE — PATIENT INSTRUCTIONS
Patient Education   Personalized Prevention Plan  You are due for the preventive services outlined below.  Your care team is available to assist you in scheduling these services.  If you have already completed any of these items, please share that information with your care team to update in your medical record.  Health Maintenance Due   Topic Date Due     Diabetic Foot Exam  Never done     Diptheria Tetanus Pertussis (DTAP/TDAP/TD) Vaccine (1 - Tdap) 08/24/2021     COVID-19 Vaccine (4 - Booster for Moderna series) 02/11/2022     Cholesterol Lab  04/30/2022     FALL RISK ASSESSMENT  04/30/2022     Zoster (Shingles) Vaccine (3 of 3) 06/25/2021

## 2022-05-04 ASSESSMENT — PATIENT HEALTH QUESTIONNAIRE - PHQ9: SUM OF ALL RESPONSES TO PHQ QUESTIONS 1-9: 1

## 2022-05-04 ASSESSMENT — ANXIETY QUESTIONNAIRES: GAD7 TOTAL SCORE: 0

## 2022-05-17 ENCOUNTER — TELEPHONE (OUTPATIENT)
Dept: RHEUMATOLOGY | Facility: CLINIC | Age: 72
End: 2022-05-17
Payer: MEDICARE

## 2022-05-17 DIAGNOSIS — M06.00 SERONEGATIVE RHEUMATOID ARTHRITIS (H): ICD-10-CM

## 2022-05-17 RX ORDER — HYDROXYCHLOROQUINE SULFATE 200 MG/1
200 TABLET, FILM COATED ORAL 2 TIMES DAILY
Qty: 180 TABLET | Refills: 0 | Status: SHIPPED | OUTPATIENT
Start: 2022-05-17 | End: 2022-07-07

## 2022-05-17 NOTE — TELEPHONE ENCOUNTER
Called pt back, follow-up appt scheduled with Dr. Bundy for 7/7/22.     Pt reports he has very bad problems with memory, and that it would be very helpful if there was a way for him to get an appt reminder the day before the appt. He acknowledges that he DOES get appt reminders sometimes, but they're so far before the appt (can be 3-4 days ahead of time) that it's not helpful for him.     Pt will need refills of his methotrexate and hydroxychloroquine in order to have enough to last until his 7/7/22 appt (is almost out of both right now). Would [facility] [place].   He just had lab tests done 5/3/22.   He reports he has not had an eye exam within the last year. Says he actually had this scheduled for this last week, and due to his memory issues, he forgot about that appt as well. He is working on getting this rescheduled and will have them send over the results once it's been taken care of.     Routed to RHEUMATOLOGY SUPPORT POOL to help facilitate refills.

## 2022-05-17 NOTE — TELEPHONE ENCOUNTER
Please call pt schedule lab appt early June and July prior to f/u appt with Dr Bundy    Refilled per Rheum RN refill protocol    Eye exam 9/2021 in chart.

## 2022-05-17 NOTE — LETTER
5/17/2022        RE: Thiago Hein  [address]  [facility] [facility] [id number]        Dear ,     Attached are the list of future appointments you have scheduled for labs and Doctor appointments. If you have any questions please feel free to reach out to us at [phone].        Sincerely,    [facility] Rheumatology Team

## 2022-05-17 NOTE — TELEPHONE ENCOUNTER
Tried calling pt, it sounded like he may have been having trouble with his phone. Pt answered, but was unable to hear me. Will need to try again later.

## 2022-05-17 NOTE — TELEPHONE ENCOUNTER
Call received from pt stating that he accidentally missed his appointment today with Dr. Bundy, was wondering if he could reschedule this? Informed him that yes, we can get this rescheduled.     Pt then had something come up to where he couldn't stay on the phone at that moment, asked if we could please call him back in 30-60 minutes to reschedule this appt.

## 2022-05-19 NOTE — TELEPHONE ENCOUNTER
Pt was contacted to schedule lab appointment in June and July.    Pt would like a letter mailed to him regarding these two lab appointments.

## 2022-05-31 DIAGNOSIS — M06.00 SERONEGATIVE RHEUMATOID ARTHRITIS (H): ICD-10-CM

## 2022-05-31 RX ORDER — HYDROXYCHLOROQUINE SULFATE 200 MG/1
200 TABLET, FILM COATED ORAL 2 TIMES DAILY
Qty: 180 TABLET | Refills: 0 | Status: CANCELLED | OUTPATIENT
Start: 2022-05-31

## 2022-05-31 NOTE — TELEPHONE ENCOUNTER
Refill request from [facility] [address] [place], [place]    Medication: Hydroxychloroquine 200mg  Last Refill: 3/1/22  Last OV: 4/18/22  Last lab: 5/3/22  Future lab: 6/3/22, 7/1/22  Future OV: 7/7/22

## 2022-06-03 DIAGNOSIS — N40.1 BPH WITH URINARY OBSTRUCTION: Primary | ICD-10-CM

## 2022-06-03 DIAGNOSIS — N13.8 BPH WITH URINARY OBSTRUCTION: Primary | ICD-10-CM

## 2022-06-03 DIAGNOSIS — E78.5 HYPERLIPIDEMIA, UNSPECIFIED HYPERLIPIDEMIA TYPE: ICD-10-CM

## 2022-06-03 RX ORDER — TAMSULOSIN HYDROCHLORIDE 0.4 MG/1
CAPSULE ORAL
Qty: 90 CAPSULE | Refills: 3 | Status: SHIPPED | OUTPATIENT
Start: 2022-06-03 | End: 2022-10-25

## 2022-06-03 RX ORDER — ATORVASTATIN CALCIUM 20 MG/1
20 TABLET, FILM COATED ORAL DAILY
Qty: 90 TABLET | Refills: 3 | Status: SHIPPED | OUTPATIENT
Start: 2022-06-03 | End: 2023-07-06

## 2022-06-03 NOTE — TELEPHONE ENCOUNTER
Refill Request  Medication name: Pending Prescriptions:                       Disp   Refills    tamsulosin (FLOMAX) 0.4 MG capsule        90 cap*             Sig: TAKE 1 CAPSULE(0.4 MG) BY MOUTH DAILY AFTER           BREAKFAST    Who prescribed the medication: Don Armijo  Last refill on medication: 4/16/21  Requested Pharmacy: [facility]  Last appointment with PCP: 5/3/22  Next appointment: NILESH

## 2022-07-06 ENCOUNTER — LAB (OUTPATIENT)
Dept: LAB | Facility: CLINIC | Age: 72
End: 2022-07-06
Payer: MEDICARE

## 2022-07-06 DIAGNOSIS — Z79.899 HIGH RISK MEDICATION USE: ICD-10-CM

## 2022-07-06 LAB
ALBUMIN SERPL BCG-MCNC: 4.3 G/DL (ref 3.5–5.2)
ALT SERPL W P-5'-P-CCNC: 20 U/L (ref 10–50)
CREAT SERPL-MCNC: 0.92 MG/DL (ref 0.67–1.17)
ERYTHROCYTE [DISTWIDTH] IN BLOOD BY AUTOMATED COUNT: 14.3 % (ref 10–15)
GFR SERPL CREATININE-BSD FRML MDRD: 88 ML/MIN/1.73M2
HCT VFR BLD AUTO: 40.3 % (ref 40–53)
HGB BLD-MCNC: 14 G/DL (ref 13.3–17.7)
MCH RBC QN AUTO: 29.7 PG (ref 26.5–33)
MCHC RBC AUTO-ENTMCNC: 34.7 G/DL (ref 31.5–36.5)
MCV RBC AUTO: 85 FL (ref 78–100)
PLATELET # BLD AUTO: 206 10E3/UL (ref 150–450)
RBC # BLD AUTO: 4.72 10E6/UL (ref 4.4–5.9)
WBC # BLD AUTO: 7.7 10E3/UL (ref 4–11)

## 2022-07-06 PROCEDURE — 82040 ASSAY OF SERUM ALBUMIN: CPT

## 2022-07-06 PROCEDURE — 36415 COLL VENOUS BLD VENIPUNCTURE: CPT

## 2022-07-06 PROCEDURE — 85027 COMPLETE CBC AUTOMATED: CPT

## 2022-07-06 PROCEDURE — 82565 ASSAY OF CREATININE: CPT

## 2022-07-06 PROCEDURE — 84460 ALANINE AMINO (ALT) (SGPT): CPT

## 2022-07-07 ENCOUNTER — OFFICE VISIT (OUTPATIENT)
Dept: RHEUMATOLOGY | Facility: CLINIC | Age: 72
End: 2022-07-07
Payer: MEDICARE

## 2022-07-07 VITALS
WEIGHT: 253.9 LBS | HEART RATE: 84 BPM | DIASTOLIC BLOOD PRESSURE: 66 MMHG | SYSTOLIC BLOOD PRESSURE: 128 MMHG | BODY MASS INDEX: 35.55 KG/M2 | HEIGHT: 71 IN

## 2022-07-07 DIAGNOSIS — M06.00 SERONEGATIVE RHEUMATOID ARTHRITIS (H): Primary | ICD-10-CM

## 2022-07-07 DIAGNOSIS — M25.50 POLYARTHRALGIA: ICD-10-CM

## 2022-07-07 DIAGNOSIS — Z79.899 HIGH RISK MEDICATION USE: ICD-10-CM

## 2022-07-07 DIAGNOSIS — M15.0 PRIMARY OSTEOARTHRITIS INVOLVING MULTIPLE JOINTS: ICD-10-CM

## 2022-07-07 PROCEDURE — 99214 OFFICE O/P EST MOD 30 MIN: CPT | Performed by: INTERNAL MEDICINE

## 2022-07-07 RX ORDER — HYDROXYCHLOROQUINE SULFATE 200 MG/1
200 TABLET, FILM COATED ORAL 2 TIMES DAILY
Qty: 180 TABLET | Refills: 0 | Status: SHIPPED | OUTPATIENT
Start: 2022-07-07 | End: 2022-09-14

## 2022-07-07 NOTE — PROGRESS NOTES
"      Rheumatology follow-up visit note     Thiago is a 72 year old male presents today for follow-up.    Thiago was seen today for recheck.    Diagnoses and all orders for this visit:    Seronegative rheumatoid arthritis (H)  -     hydroxychloroquine (PLAQUENIL) 200 MG tablet; Take 1 tablet (200 mg) by mouth 2 times daily  -     methotrexate 2.5 MG tablet; Take 8 tablets (20 mg) by mouth every 7 days for 30 days Take 4 tablets in the morning and 4 at night on Mondays.    Primary osteoarthritis involving multiple joints    Polyarthralgia    High risk medication use        His rheumatoid arthritis controlled is improving  he is just finished prednisone, increase methotrexate as now continue hydroxychloroquine, folic acid, labs every 4 weeks, follow-up in 3 months.  He is aware of the management symptoms of OA.    Follow up in 3 months.    HPI    Thiago Hein is a 72 year old male is here for follow-up. He has rheumatoid arthritis, osteoarthritis, on methotrexate 10 mg/week that he has tolerated well clinically as well as by his labs.  He is on hydroxychloroquine.  He just finished a course of tapering prednisone.  He noted that there has been significant improvement mild discomfort he had in his right hand, and right shoulder the rest he is doing so much better.  There is no sustained stiffness in the morning.  After the first 15 minutes.          DETAILED EXAMINATION  07/07/22  :    Vitals:    07/07/22 1138   BP: 128/66   BP Location: Right arm   Patient Position: Sitting   Cuff Size: Adult Regular   Pulse: 84   Weight: 115.2 kg (253 lb 14.4 oz)   Height: 1.81 m (5' 11.25\")     Alert oriented. Head including the face is examined for malar rash, heliotropes, scarring, lupus pernio. Eyes examined for redness such as in episcleritis/scleritis, periorbital lesions.   Neck/ Face examined for parotid gland swelling, range of motion of neck.  Left upper and lower and right upper and lower extremities examined for " tenderness, swelling, warmth of the appendicular joints, range of motion, edema, rash.  Some of the important findings included: he does not have evidence of synovitis in the palpable joints of the upper extremities.  No significant deformities of the digits.  + Heberden nodes.  Range of motion of the shoulders  show full abduction.  No JLT effusion or warmth of the knees.  he does not have dactylitis of the digits.     Patient Active Problem List    Diagnosis Date Noted     Chronic polyarticular juvenile rheumatoid arthritis (H) 08/24/2021     Priority: Medium     Formatting of this note might be different from the original.  Created by Conversion    Replacement Utility updated for latest IMO load       Emotional lability 08/24/2021     Priority: Medium     Formatting of this note might be different from the original.  Created by Conversion       Lower back pain 08/24/2021     Priority: Medium     Formatting of this note might be different from the original.  Created by Conversion       Morbid obesity (H) 08/24/2021     Priority: Medium     BPH with urinary obstruction 02/20/2020     Priority: Medium     Primary osteoarthritis involving multiple joints 10/08/2019     Priority: Medium     Left carpal tunnel syndrome 12/27/2018     Priority: Medium     Trigger finger, right index finger 09/26/2018     Priority: Medium     Polyarthralgia 07/20/2018     Priority: Medium     Unintentional weight loss 06/01/2018     Priority: Medium     Non morbid obesity due to excess calories 07/14/2017     Priority: Medium     Peripheral edema 07/14/2017     Priority: Medium     Diverticulosis 11/15/2016     Priority: Medium     Hyperlipidemia 08/04/2016     Priority: Medium     Need for pneumococcal vaccination 01/11/2016     Priority: Medium     Need for vaccination 11/20/2015     Priority: Medium     Type 2 diabetes mellitus (H) 11/11/2015     Priority: Medium     Formatting of this note might be different from the  original.  Created by Conversion       Bacteremia due to Staphylococcus aureus 11/06/2015     Priority: Medium     Dysphagia 11/06/2015     Priority: Medium     Epidural abscess 11/06/2015     Priority: Medium     Essential hypertension 11/06/2015     Priority: Medium     Formatting of this note might be different from the original.  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/2015     Priority: Medium     Leukocytosis 09/29/2015     Priority: Medium     Sepsis (H) 09/29/2015     Priority: Medium     Past Surgical History:   Procedure Laterality Date     ANESTHESIA OUT OF OR MRI N/A 10/2/2015    Procedure: ANESTHESIA OUT OF OR MRI;  Surgeon: GENERIC ANESTHESIA PROVIDER;  Location: [facility] OR     BACK SURGERY  2008    lumbar spine surgery-- unclear details     SOFT TISSUE SURGERY  2012    skin cancer removed from L shoulder, chest, right neck      Past Medical History:   Diagnosis Date     Diabetes (H)      Hypertension      No Known Allergies  Current Outpatient Medications   Medication Sig Dispense Refill     amLODIPine (NORVASC) 5 MG tablet Take 1 tablet (5 mg) by mouth daily 90 tablet 3     atorvastatin (LIPITOR) 20 MG tablet Take 1 tablet (20 mg) by mouth daily 90 tablet 3     celecoxib (CELEBREX) 100 MG capsule TAKE 1 CAPSULE(100 MG) BY MOUTH TWICE DAILY 180 capsule 0     folic acid (FOLVITE) 1 MG tablet Take 1 tablet (1 mg) by mouth daily 100 tablet 3     furosemide (LASIX) 40 MG tablet Take 1 tablet (40 mg) by mouth daily 90 tablet 3     hydroxychloroquine (PLAQUENIL) 200 MG tablet Take 1 tablet (200 mg) by mouth 2 times daily 180 tablet 0     lisinopril (ZESTRIL) 10 MG tablet Take 1 tablet (10 mg) by mouth daily 90 tablet 3     metFORMIN (GLUCOPHAGE-XR) 500 MG 24 hr tablet Take 3 tablets (1,500  mg) by mouth each AM and take 1 tablet (500 mg) by mouth each evening 360 tablet 3     methotrexate 2.5 MG tablet Take 4 tablets (10 mg) by mouth every 7 days 16 tablet 0     predniSONE (DELTASONE) 2.5 MG tablet 7.5 mg daily for 4 weeks, reduce by 2.5 mg every 4 weeks to 5 milligrams daily, and to 2.5 mg daily and stop 90 tablet 2     tamsulosin (FLOMAX) 0.4 MG capsule TAKE 1 CAPSULE(0.4 MG) BY MOUTH DAILY AFTER BREAKFAST 90 capsule 3     amoxicillin-clavulanate (AUGMENTIN) 875-125 MG tablet Take 1 tablet by mouth 2 times daily for 10 days 20 tablet 2     family history includes Coronary Artery Disease in his father; Hypertension in his father.  Social Connections: Not on file          WBC   Date Value Ref Range Status   04/23/2021 8.2 4.0 - 11.0 10e9/L Final     WBC Count   Date Value Ref Range Status   07/06/2022 7.7 4.0 - 11.0 10e3/uL Final     RBC Count   Date Value Ref Range Status   07/06/2022 4.72 4.40 - 5.90 10e6/uL Final   04/23/2021 5.25 4.4 - 5.9 10e12/L Final     Hemoglobin   Date Value Ref Range Status   07/06/2022 14.0 13.3 - 17.7 g/dL Final   04/23/2021 15.2 13.3 - 17.7 g/dL Final     Hematocrit   Date Value Ref Range Status   07/06/2022 40.3 40.0 - 53.0 % Final   04/23/2021 44.8 40.0 - 53.0 % Final     MCV   Date Value Ref Range Status   07/06/2022 85 78 - 100 fL Final   04/23/2021 85 78 - 100 fl Final     MCH   Date Value Ref Range Status   07/06/2022 29.7 26.5 - 33.0 pg Final   04/23/2021 29.0 26.5 - 33.0 pg Final     Platelet Count   Date Value Ref Range Status   07/06/2022 206 150 - 450 10e3/uL Final   04/23/2021 222 150 - 450 10e9/L Final     % Lymphocytes   Date Value Ref Range Status   04/23/2021 20.9 % Final     AST   Date Value Ref Range Status   08/24/2021 15 0 - 40 U/L Final   10/12/2015 18 0 - 45 U/L Final     ALT   Date Value Ref Range Status   07/06/2022 20 10 - 50 U/L Final   10/12/2015 30 0 - 70 U/L Final     Albumin   Date Value Ref Range Status   07/06/2022 4.3 3.5 - 5.2 g/dL Final    05/03/2022 4.0 3.5 - 5.0 g/dL Final   10/12/2015 2.2 (L) 3.4 - 5.0 g/dL Final     Alkaline Phosphatase   Date Value Ref Range Status   08/24/2021 116 45 - 120 U/L Final   10/17/2015 133 40 - 150 U/L Final     Creatinine   Date Value Ref Range Status   07/06/2022 0.92 0.67 - 1.17 mg/dL Final   04/23/2021 0.86 0.66 - 1.25 mg/dL Final     GFR Estimate   Date Value Ref Range Status   07/06/2022 88 >60 mL/min/1.73m2 Final     Comment:     Effective December 21, 2021 eGFRcr in adults is calculated using the 2021 CKD-EPI creatinine equation which includes age and gender (Barb et al., NEJM, DOI: 10.1056/QJCTqg5735429)   04/23/2021 87 >60 mL/min/[1.73_m2] Final     Comment:     Non  GFR Calc  Starting 12/18/2018, serum creatinine based estimated GFR (eGFR) will be   calculated using the Chronic Kidney Disease Epidemiology Collaboration   (CKD-EPI) equation.       GFR Estimate If Black   Date Value Ref Range Status   04/23/2021 >90 >60 mL/min/[1.73_m2] Final     Comment:      GFR Calc  Starting 12/18/2018, serum creatinine based estimated GFR (eGFR) will be   calculated using the Chronic Kidney Disease Epidemiology Collaboration   (CKD-EPI) equation.       Creatinine Urine mg/dL   Date Value Ref Range Status   08/24/2021 40 mg/dL Final     Sed Rate   Date Value Ref Range Status   04/23/2021 9 0 - 20 mm/h Final     Erythrocyte Sedimentation Rate   Date Value Ref Range Status   08/24/2021 2 0 - 15 mm/hr Final     CRP Inflammation   Date Value Ref Range Status   04/23/2021 3.6 0.0 - 8.0 mg/L Final     CRP   Date Value Ref Range Status   08/24/2021 0.2 0.0-<0.8 mg/dL Final

## 2022-07-28 DIAGNOSIS — M15.0 PRIMARY OSTEOARTHRITIS INVOLVING MULTIPLE JOINTS: ICD-10-CM

## 2022-07-28 RX ORDER — CELECOXIB 100 MG/1
CAPSULE ORAL
Qty: 180 CAPSULE | Refills: 0 | Status: SHIPPED | OUTPATIENT
Start: 2022-07-28 | End: 2022-10-10

## 2022-08-09 ENCOUNTER — HOSPITAL ENCOUNTER (EMERGENCY)
Facility: CLINIC | Age: 72
Discharge: HOME | End: 2022-08-09
Attending: PHYSICIAN ASSISTANT | Admitting: PHYSICIAN ASSISTANT
Payer: MEDICARE

## 2022-08-09 ENCOUNTER — NURSE TRIAGE (OUTPATIENT)
Dept: NURSING | Facility: CLINIC | Age: 72
End: 2022-08-09

## 2022-08-09 ENCOUNTER — TELEPHONE (OUTPATIENT)
Dept: FAMILY MEDICINE | Facility: CLINIC | Age: 72
End: 2022-08-09

## 2022-08-09 VITALS
OXYGEN SATURATION: 96 % | TEMPERATURE: 98.6 F | DIASTOLIC BLOOD PRESSURE: 75 MMHG | SYSTOLIC BLOOD PRESSURE: 140 MMHG | RESPIRATION RATE: 16 BRPM | HEART RATE: 92 BPM

## 2022-08-09 DIAGNOSIS — N40.0 BPH (BENIGN PROSTATIC HYPERPLASIA): ICD-10-CM

## 2022-08-09 DIAGNOSIS — R30.0 DYSURIA: ICD-10-CM

## 2022-08-09 LAB
ALBUMIN UR-MCNC: 30 MG/DL
APPEARANCE UR: CLEAR
BILIRUB UR QL STRIP: NEGATIVE
COLOR UR AUTO: ABNORMAL
GLUCOSE UR STRIP-MCNC: NEGATIVE MG/DL
HGB UR QL STRIP: NEGATIVE
HYALINE CASTS: 4 /LPF
KETONES UR STRIP-MCNC: NEGATIVE MG/DL
LEUKOCYTE ESTERASE UR QL STRIP: NEGATIVE
MUCOUS THREADS #/AREA URNS LPF: PRESENT /LPF
NITRATE UR QL: NEGATIVE
PH UR STRIP: 6 [PH] (ref 5–7)
RBC URINE: 8 /HPF
SP GR UR STRIP: 1.03 (ref 1–1.03)
SQUAMOUS EPITHELIAL: 1 /HPF
UROBILINOGEN UR STRIP-MCNC: NORMAL MG/DL
WBC URINE: 3 /HPF

## 2022-08-09 PROCEDURE — 81001 URINALYSIS AUTO W/SCOPE: CPT | Performed by: PHYSICIAN ASSISTANT

## 2022-08-09 PROCEDURE — G0463 HOSPITAL OUTPT CLINIC VISIT: HCPCS | Performed by: PHYSICIAN ASSISTANT

## 2022-08-09 PROCEDURE — 99213 OFFICE O/P EST LOW 20 MIN: CPT | Performed by: PHYSICIAN ASSISTANT

## 2022-08-09 PROCEDURE — 87086 URINE CULTURE/COLONY COUNT: CPT | Performed by: PHYSICIAN ASSISTANT

## 2022-08-09 NOTE — TELEPHONE ENCOUNTER
Please fax patient's most recent A1c result of 7.5% from May 3, 2022 per request to Dr. Polk.  Fax number listed on initial message ([phone] fax)

## 2022-08-09 NOTE — TELEPHONE ENCOUNTER
Dr. Polk called with pt in his office to check on last A1c result. He said if MD or MA have any questions to send him a message.

## 2022-08-10 ASSESSMENT — ENCOUNTER SYMPTOMS
FREQUENCY: 1
COUGH: 0
HEMATURIA: 0
DIFFICULTY URINATING: 1
WHEEZING: 0
SHORTNESS OF BREATH: 0
ACTIVITY CHANGE: 0
CHILLS: 0
VOMITING: 0
NAUSEA: 0
DIARRHEA: 0
DYSURIA: 1
ABDOMINAL PAIN: 1
FEVER: 0

## 2022-08-10 NOTE — RESULT ENCOUNTER NOTE
[facility] Emergency Dept discharge antibiotic (if prescribed): None  No changes in treatment per [facility] ED Lab Result Urine culture protocol.

## 2022-08-10 NOTE — TELEPHONE ENCOUNTER
Nurse Triage SBAR    Is this a 2nd Level Triage? NO    Situation: Patient calling with inability to urinate.  Consent: not needed    Pt states for the past couple of weeks he has been experiencing the urge to urinate but is unable to go. If he does urinate it is only a few drops.   States in the AM, pt is able to empty bladder but as day goes on pt experiences the urge to urinate but is unable to go more than a few drops  Pt states in the AM, he will experience flank pain.   States he will have a little bit of burning when urinating.   Denies fever, STIs    Protocol Recommended Disposition:   Go to ED Now    Recommendation: Advised patient to Go to ED now . Reviewed concerning symptoms and when to call back.       Nona Rothman RN [facility] 8/9/2022 7:45 PM    Reason for Disposition    [1] Unable to urinate (or only a few drops) > 4 hours AND [2] bladder feels very full (e.g., palpable bladder or strong urge to urinate)    Additional Information    Negative: Shock suspected (e.g., cold/pale/clammy skin, too weak to stand, low BP, rapid pulse)    Negative: Sounds like a life-threatening emergency to the triager    Protocols used: URINARY SYMPTOMS-A-AH

## 2022-08-10 NOTE — ED PROVIDER NOTES
"  History     Chief Complaint   Patient presents with     Urinary Retention     Bladder pressure, pain, burning and urine retention for past 2-3 weeks. Patient states when he urinates, it is only a \"few drops.\" Worse back pain than usual. Patient reports he has been drinking a lot of liquids but unable to relieve himself.      HPI  Thiago Hein is a 72 year old male who presents to urgent care with concern over dysuria which been present for at least the last 2 to 3 weeks.  Patient reports that he has had some dysuria.  He has at least 1 episode of nocturia nightly, frequent urination upon waking in the morning however reports decreased urinary output throughout the day.  He reports that he last urinated for physical approximately noon today. He states concern that he does not believe that his fluid intake corresponds with his urine output.  He does have chronic low back pain which has not changed in severity.  He denies any fever, chills, myalgias, dizziness, lightheadedness, nausea, vomiting, flank pain.      Allergies:  No Known Allergies    Problem List:    Patient Active Problem List    Diagnosis Date Noted     Chronic polyarticular juvenile rheumatoid arthritis (H) 08/24/2021     Priority: Medium     Formatting of this note might be different from the original.  Created by Conversion    Replacement Utility updated for latest IMO load       Emotional lability 08/24/2021     Priority: Medium     Formatting of this note might be different from the original.  Created by Conversion       Lower back pain 08/24/2021     Priority: Medium     Formatting of this note might be different from the original.  Created by Conversion       Morbid obesity (H) 08/24/2021     Priority: Medium     BPH with urinary obstruction 02/20/2020     Priority: Medium     Primary osteoarthritis involving multiple joints 10/08/2019     Priority: Medium     Left carpal tunnel syndrome 12/27/2018     Priority: Medium     Trigger " finger, right index finger 09/26/2018     Priority: Medium     Polyarthralgia 07/20/2018     Priority: Medium     Unintentional weight loss 06/01/2018     Priority: Medium     Non morbid obesity due to excess calories 07/14/2017     Priority: Medium     Peripheral edema 07/14/2017     Priority: Medium     Diverticulosis 11/15/2016     Priority: Medium     Hyperlipidemia 08/04/2016     Priority: Medium     Need for pneumococcal vaccination 01/11/2016     Priority: Medium     Need for vaccination 11/20/2015     Priority: Medium     Type 2 diabetes mellitus (H) 11/11/2015     Priority: Medium     Formatting of this note might be different from the original.  Created by Conversion       Bacteremia due to Staphylococcus aureus 11/06/2015     Priority: Medium     Dysphagia 11/06/2015     Priority: Medium     Epidural abscess 11/06/2015     Priority: Medium     Essential hypertension 11/06/2015     Priority: Medium     Formatting of this note might be different from the original.  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/2015     Priority: Medium     Leukocytosis 09/29/2015     Priority: Medium     Sepsis (H) 09/29/2015     Priority: Medium        Past Medical History:    Past Medical History:   Diagnosis Date     Diabetes (H)      Hypertension        Past Surgical History:    Past Surgical History:   Procedure Laterality Date     ANESTHESIA OUT OF OR MRI N/A 10/2/2015    Procedure: ANESTHESIA OUT OF OR MRI;  Surgeon: GENERIC ANESTHESIA PROVIDER;  Location: [facility] OR     BACK SURGERY  2008    lumbar spine surgery-- unclear details     SOFT TISSUE SURGERY  2012    skin cancer removed from L shoulder, chest, right neck       Family History:    Family History   Problem Relation Age of Onset     Coronary Artery  Disease Father      Hypertension Father        Social History:  Marital Status:  Single [1]  Social History     Tobacco Use     Smoking status: Never Smoker     Smokeless tobacco: Former User   Substance Use Topics     Alcohol use: No     Comment: Quit in 1990     Drug use: No        Medications:    amLODIPine (NORVASC) 5 MG tablet  amoxicillin-clavulanate (AUGMENTIN) 875-125 MG tablet  atorvastatin (LIPITOR) 20 MG tablet  celecoxib (CELEBREX) 100 MG capsule  folic acid (FOLVITE) 1 MG tablet  furosemide (LASIX) 40 MG tablet  hydroxychloroquine (PLAQUENIL) 200 MG tablet  lisinopril (ZESTRIL) 10 MG tablet  metFORMIN (GLUCOPHAGE-XR) 500 MG 24 hr tablet  methotrexate 2.5 MG tablet  predniSONE (DELTASONE) 2.5 MG tablet  tamsulosin (FLOMAX) 0.4 MG capsule      Review of Systems   Constitutional: Negative for activity change, chills and fever.   Respiratory: Negative for cough, shortness of breath and wheezing.    Gastrointestinal: Positive for abdominal pain. Negative for diarrhea, nausea and vomiting.   Genitourinary: Positive for decreased urine volume, difficulty urinating, dysuria, frequency and urgency. Negative for hematuria, penile pain, penile swelling, scrotal swelling and testicular pain.     Physical Exam   BP: (!) 140/75  Pulse: 92  Temp: 98.6  F (37  C)  Resp: 16  SpO2: 96 %      Physical Exam  Constitutional:       General: He is not in acute distress.     Appearance: Normal appearance. He is not ill-appearing or toxic-appearing.   HENT:      Head: Normocephalic and atraumatic.   Cardiovascular:      Rate and Rhythm: Normal rate.      Heart sounds: No murmur heard.    No friction rub. No gallop.   Pulmonary:      Effort: Pulmonary effort is normal. No respiratory distress.      Breath sounds: No wheezing, rhonchi or rales.   Abdominal:      General: There is no distension.      Palpations: Abdomen is soft.      Tenderness: There is no right CVA tenderness, left CVA tenderness, guarding or rebound.    Genitourinary:     Comments: Deferred  Neurological:      Mental Status: He is alert.       ED Course           Procedures       Critical Care time:  none           Results for orders placed or performed during the hospital encounter of 08/09/22   UA reflex to Microscopic     Status: Abnormal   Result Value Ref Range    Color Urine Dark Yellow (A) Colorless, Straw, Light Yellow, Yellow    Appearance Urine Clear Clear    Glucose Urine Negative Negative mg/dL    Bilirubin Urine Negative Negative    Ketones Urine Negative Negative mg/dL    Specific Gravity Urine 1.030 1.003 - 1.035    Blood Urine Negative Negative    pH Urine 6.0 5.0 - 7.0    Protein Albumin Urine 30  (A) Negative mg/dL    Urobilinogen Urine Normal Normal, 2.0 mg/dL    Nitrite Urine Negative Negative    Leukocyte Esterase Urine Negative Negative    RBC Urine 8 (H) <=2 /HPF    WBC Urine 3 <=5 /HPF    Squamous Epithelials Urine 1 <=1 /HPF    Mucus Urine Present (A) None Seen /LPF    Hyaline Casts Urine 4 (H) <=2 /LPF       Medications - No data to display    Assessments & Plan (with Medical Decision Making)     I have reviewed the nursing notes.  I have reviewed the findings, diagnosis, plan and need for follow up with the patient.       New Prescriptions    No medications on file       Final diagnoses:   BPH (benign prostatic hyperplasia)   Dysuria     72-year-old male presents to the urgent care with concern over 2 to 3-week history of dysuria with nocturia and increased urinary frequency upon waking in the morning and decreased urinary output throughout the day.  He had elevated blood pressure upon arrival.  Physical exam findings as described above were benign.  He had post urine residual void range between 0 and 7 mL on several attempts.  No significant retention.  Urinalysis was negative for evidence of infection with culture pending.  Symptoms most consistent with BPH, would consider timing of patient's diuretic contributing to his concerns for  urinary frequency in the morning.  He was given a referral to urology clinic.  Worrisome reasons to return to ER/UC sooner discussed.     Disclaimer: This note consists of symbols derived from keyboarding, dictation, and/or voice recognition software. As a result, there may be errors in the script that have gone undetected.  Please consider this when interpreting information found in the chart.      8/9/2022   [facility] EMERGENCY DEPT     Shannan Bueno PA-C  08/10/22 [id number]

## 2022-08-11 LAB — BACTERIA UR CULT: NO GROWTH

## 2022-08-11 NOTE — RESULT ENCOUNTER NOTE
"Final urine culture report shows \"NO GROWTH\" and is NEGATIVE.  [facility] Emergency Dept discharge antibiotic: None  Recommendations in treatment per [facility] ED Lab result Urine culture protocol.  "

## 2022-08-26 ENCOUNTER — OFFICE VISIT (OUTPATIENT)
Dept: UROLOGY | Facility: CLINIC | Age: 72
End: 2022-08-26
Attending: PHYSICIAN ASSISTANT
Payer: MEDICARE

## 2022-08-26 VITALS
SYSTOLIC BLOOD PRESSURE: 122 MMHG | DIASTOLIC BLOOD PRESSURE: 68 MMHG | OXYGEN SATURATION: 95 % | TEMPERATURE: 98.2 F | HEART RATE: 76 BPM

## 2022-08-26 DIAGNOSIS — R35.0 BENIGN PROSTATIC HYPERPLASIA WITH URINARY FREQUENCY: ICD-10-CM

## 2022-08-26 DIAGNOSIS — N40.1 BENIGN PROSTATIC HYPERPLASIA WITH URINARY FREQUENCY: ICD-10-CM

## 2022-08-26 LAB
ALBUMIN UR-MCNC: NEGATIVE MG/DL
APPEARANCE UR: CLEAR
BACTERIA #/AREA URNS HPF: ABNORMAL /HPF
BILIRUB UR QL STRIP: NEGATIVE
COLOR UR AUTO: YELLOW
GLUCOSE UR STRIP-MCNC: NEGATIVE MG/DL
HGB UR QL STRIP: NEGATIVE
KETONES UR STRIP-MCNC: ABNORMAL MG/DL
LEUKOCYTE ESTERASE UR QL STRIP: NEGATIVE
MUCOUS THREADS #/AREA URNS LPF: PRESENT /LPF
NITRATE UR QL: NEGATIVE
PH UR STRIP: 5.5 [PH] (ref 5–7)
RBC #/AREA URNS AUTO: ABNORMAL /HPF
SP GR UR STRIP: >=1.03 (ref 1–1.03)
SQUAMOUS #/AREA URNS AUTO: ABNORMAL /LPF
UROBILINOGEN UR STRIP-ACNC: 0.2 E.U./DL
WBC #/AREA URNS AUTO: ABNORMAL /HPF

## 2022-08-26 PROCEDURE — 51798 US URINE CAPACITY MEASURE: CPT | Performed by: STUDENT IN AN ORGANIZED HEALTH CARE EDUCATION/TRAINING PROGRAM

## 2022-08-26 PROCEDURE — 99203 OFFICE O/P NEW LOW 30 MIN: CPT | Mod: 25 | Performed by: STUDENT IN AN ORGANIZED HEALTH CARE EDUCATION/TRAINING PROGRAM

## 2022-08-26 PROCEDURE — 87086 URINE CULTURE/COLONY COUNT: CPT | Performed by: STUDENT IN AN ORGANIZED HEALTH CARE EDUCATION/TRAINING PROGRAM

## 2022-08-26 PROCEDURE — 81001 URINALYSIS AUTO W/SCOPE: CPT | Performed by: STUDENT IN AN ORGANIZED HEALTH CARE EDUCATION/TRAINING PROGRAM

## 2022-08-26 RX ORDER — TROSPIUM CHLORIDE 20 MG/1
20 TABLET, FILM COATED ORAL 2 TIMES DAILY
Qty: 90 TABLET | Refills: 1 | Status: SHIPPED | OUTPATIENT
Start: 2022-08-26 | End: 2022-09-23

## 2022-08-26 NOTE — NURSING NOTE
Active order to obtain bladder scan? Yes   Name of ordering provider:  Flakita Malloy  Bladder scan preformed post void Yes.  Bladder scan reveled 79ML  Provider notified?  Yes    Audrey Galindo CMA

## 2022-08-26 NOTE — PROGRESS NOTES
Chief Complaint:   LUTS         History of Present Illness:   Thiago Hein is a 72 year old male with a history of rheumatoid arthritis, osteoarthritis, HLD, T2 DM, HTN, and Still's disease who presents for evaluation of LUTS.     Patient presented to the ED on 8/09/2022 for two weeks of dysuria, nocturia, and increased daytime frequency. PVRs in the ED were 0 mL to 7 mL. UA was notable for 8 RBCs, urine culture was negative.     The patient does take Lasix 40 mg daily (he takes this right before bed) and tamsulosin 0.4 mg daily.     Today, he reports intermittent dysuria, urinary urgency, daytime frequency, urge incontinence.     He denies gross hematuria and nocturia.     He reports loose bowel movements.          Past Medical History:     Past Medical History:   Diagnosis Date     Diabetes (H)      Hypertension             Past Surgical History:     Past Surgical History:   Procedure Laterality Date     ANESTHESIA OUT OF OR MRI N/A 10/2/2015    Procedure: ANESTHESIA OUT OF OR MRI;  Surgeon: GENERIC ANESTHESIA PROVIDER;  Location: [facility] OR     BACK SURGERY  2008    lumbar spine surgery-- unclear details     SOFT TISSUE SURGERY  2012    skin cancer removed from L shoulder, chest, right neck            Medications     Current Outpatient Medications   Medication     tamsulosin (FLOMAX) 0.4 MG capsule     amLODIPine (NORVASC) 5 MG tablet     amoxicillin-clavulanate (AUGMENTIN) 875-125 MG tablet     atorvastatin (LIPITOR) 20 MG tablet     celecoxib (CELEBREX) 100 MG capsule     folic acid (FOLVITE) 1 MG tablet     furosemide (LASIX) 40 MG tablet     hydroxychloroquine (PLAQUENIL) 200 MG tablet     lisinopril (ZESTRIL) 10 MG tablet     metFORMIN (GLUCOPHAGE-XR) 500 MG 24 hr tablet     methotrexate 2.5 MG tablet     predniSONE (DELTASONE) 2.5 MG tablet     No current facility-administered medications for this visit.            Allergies:   Patient has no known allergies.         Review of Systems:  From  intake questionnaire   Negative 14 system review except as noted on HPI, nurse's note.         Physical Exam:   Patient is a 72 year old male   Vitals: Blood pressure 122/68, pulse 76, temperature 98.2  F (36.8  C), temperature source Tympanic, SpO2 95 %.  General Appearance Adult: Alert, no acute distress, oriented.  Lungs: Non-labored breathing.  Heart: No obvious jugular venous distension present.  Neuro: Alert, oriented, speech and mentation normal    PVR: 79mL      Labs and Pathology:    I personally reviewed all applicable laboratory data and went over findings with patient  Significant for:    CBC RESULTS:  Recent Labs   Lab Test 07/06/22  1254 05/03/22  0906 01/12/22  1035 08/24/21  1044   WBC 7.7 8.4 8.7 6.2   HGB 14.0 15.0 15.3 14.2    206 233 209        BMP RESULTS:  Recent Labs   Lab Test 07/06/22  1254 05/03/22  0906 01/12/22  1035 12/28/21  1037 08/24/21  1045 04/23/21  0711 03/04/21  1802 03/04/21  1802 03/04/21  1751 12/29/20  1154 11/27/20  1403   NA  --   --   --  139 139 138  --  140  --  141  --    POTASSIUM  --   --   --  4.0 4.0 4.1  --  3.6  --  4.5  --    CHLORIDE  --   --   --  101 103 106  --  102  --  101  --    CO2  --   --   --  28 27 25  --  25  --  31  --    ANIONGAP  --   --   --  10 9 7  --  13  --  9  --    GLC  --   --   --  121 122 176*  --  160*   < > 129*  --    BUN  --   --   --  13 13 20  --  15  --  19  --    CR 0.92 0.86 0.93 0.91 0.85 0.86  --  0.97  --  0.94 0.91   GFRESTIMATED 88 >90 88 90 88 87   < > >60  --  >60 >60   GFRESTBLACK  --   --   --   --   --  >90  --  >60  --  >60 >60   LOLA  --   --   --  9.4 9.3 8.6  --  8.7  --  9.8  --     < > = values in this interval not displayed.       UA RESULTS:   Recent Labs   Lab Test 08/09/22  2009 10/19/15  1200 10/16/15  1810   SG 1.030 1.025 1.021   URINEPH 6.0 6.5 5.5   NITRITE Negative Negative Negative   RBCU 8* 172* 15*   WBCU 3 22* 1       PSA RESULTS  Prostate Specific Antigen Screen   Date Value Ref Range Status    02/20/2020 2.5 0.0 - 4.5 ng/mL Final              Assessment and Plan:     Assessment: 72 year old male who presents with urinary frequency, urgency, and occasional urge incontinence. He has taken tamsulosin 0.4 mg daily for several years.     He will wake once early in the morning to urinate, but otherwise denies nocturia.  He takes Lasix 40 mg daily before bed.    We discussed possible management options for his symptoms including increasing his dose of tamsulosin and starting an anticholinergic medication.  Since his primary symptoms are urinary frequency and urgency, I do think an anticholinergic medication would be better targeted towards his symptoms.    Due to insurance coverage, will start trospium 20 mg twice daily.    Plan:  1.  Start trospium 20 mg twice daily.  Side effects reviewed.  2.  Continue tamsulosin 0.4 mg daily.  3.  Follow-up in 2 months.    RENNY AUSTIN PA-C  Department of Urology

## 2022-08-26 NOTE — NURSING NOTE
"Initial /68 (BP Location: Left arm, Patient Position: Chair, Cuff Size: Adult Large)   Pulse 76   Temp 98.2  F (36.8  C) (Tympanic)   SpO2 95%  Estimated body mass index is 35.16 kg/m  as calculated from the following:    Height as of 7/7/22: 1.81 m (5' 11.25\").    Weight as of 7/7/22: 115.2 kg (253 lb 14.4 oz). .    Audrey Galindo MA on 8/26/2022 at 3:07 PM  "

## 2022-08-28 LAB — BACTERIA UR CULT: NO GROWTH

## 2022-09-01 DIAGNOSIS — M06.00 SERONEGATIVE RHEUMATOID ARTHRITIS (H): ICD-10-CM

## 2022-09-06 DIAGNOSIS — M06.00 SERONEGATIVE RHEUMATOID ARTHRITIS (H): ICD-10-CM

## 2022-09-14 DIAGNOSIS — M06.00 SERONEGATIVE RHEUMATOID ARTHRITIS (H): ICD-10-CM

## 2022-09-14 RX ORDER — HYDROXYCHLOROQUINE SULFATE 200 MG/1
TABLET, FILM COATED ORAL
Qty: 180 TABLET | Refills: 0 | Status: SHIPPED | OUTPATIENT
Start: 2022-09-14 | End: 2022-12-20

## 2022-09-23 ENCOUNTER — OFFICE VISIT (OUTPATIENT)
Dept: FAMILY MEDICINE | Facility: CLINIC | Age: 72
End: 2022-09-23
Payer: MEDICARE

## 2022-09-23 VITALS
SYSTOLIC BLOOD PRESSURE: 136 MMHG | BODY MASS INDEX: 35.45 KG/M2 | WEIGHT: 256 LBS | OXYGEN SATURATION: 96 % | HEART RATE: 89 BPM | DIASTOLIC BLOOD PRESSURE: 66 MMHG

## 2022-09-23 DIAGNOSIS — N40.1 BENIGN PROSTATIC HYPERPLASIA WITH URINARY FREQUENCY: ICD-10-CM

## 2022-09-23 DIAGNOSIS — N13.8 BPH WITH URINARY OBSTRUCTION: ICD-10-CM

## 2022-09-23 DIAGNOSIS — E66.01 MORBID OBESITY (H): ICD-10-CM

## 2022-09-23 DIAGNOSIS — E78.5 HYPERLIPIDEMIA, UNSPECIFIED HYPERLIPIDEMIA TYPE: ICD-10-CM

## 2022-09-23 DIAGNOSIS — R35.0 BENIGN PROSTATIC HYPERPLASIA WITH URINARY FREQUENCY: ICD-10-CM

## 2022-09-23 DIAGNOSIS — N40.1 BPH WITH URINARY OBSTRUCTION: ICD-10-CM

## 2022-09-23 DIAGNOSIS — E11.9 TYPE 2 DIABETES MELLITUS WITHOUT COMPLICATION, WITHOUT LONG-TERM CURRENT USE OF INSULIN (H): Primary | ICD-10-CM

## 2022-09-23 DIAGNOSIS — Z23 HIGH PRIORITY FOR 2019-NCOV VACCINE: ICD-10-CM

## 2022-09-23 DIAGNOSIS — Z23 ENCOUNTER FOR IMMUNIZATION: ICD-10-CM

## 2022-09-23 DIAGNOSIS — I10 ESSENTIAL HYPERTENSION: ICD-10-CM

## 2022-09-23 DIAGNOSIS — M06.00 RHEUMATOID ARTHRITIS WITH NEGATIVE RHEUMATOID FACTOR, INVOLVING UNSPECIFIED SITE (H): ICD-10-CM

## 2022-09-23 LAB
ALBUMIN SERPL BCG-MCNC: 4.4 G/DL (ref 3.5–5.2)
ALP SERPL-CCNC: 113 U/L (ref 40–129)
ALT SERPL W P-5'-P-CCNC: 17 U/L (ref 10–50)
ANION GAP SERPL CALCULATED.3IONS-SCNC: 10 MMOL/L (ref 7–15)
AST SERPL W P-5'-P-CCNC: 17 U/L (ref 10–50)
BILIRUB SERPL-MCNC: 0.4 MG/DL
BUN SERPL-MCNC: 14.1 MG/DL (ref 8–23)
CALCIUM SERPL-MCNC: 9.4 MG/DL (ref 8.8–10.2)
CHLORIDE SERPL-SCNC: 104 MMOL/L (ref 98–107)
CHOLEST SERPL-MCNC: 100 MG/DL
CREAT SERPL-MCNC: 0.8 MG/DL (ref 0.67–1.17)
CREAT UR-MCNC: 154 MG/DL
DEPRECATED HCO3 PLAS-SCNC: 24 MMOL/L (ref 22–29)
ERYTHROCYTE [DISTWIDTH] IN BLOOD BY AUTOMATED COUNT: 12.9 % (ref 10–15)
GFR SERPL CREATININE-BSD FRML MDRD: >90 ML/MIN/1.73M2
GLUCOSE SERPL-MCNC: 177 MG/DL (ref 70–99)
HBA1C MFR BLD: 6.8 % (ref 0–5.6)
HCT VFR BLD AUTO: 41.3 % (ref 40–53)
HDLC SERPL-MCNC: 27 MG/DL
HGB BLD-MCNC: 14.4 G/DL (ref 13.3–17.7)
HOLD SPECIMEN: NORMAL
LDLC SERPL CALC-MCNC: 35 MG/DL
MCH RBC QN AUTO: 29.8 PG (ref 26.5–33)
MCHC RBC AUTO-ENTMCNC: 34.9 G/DL (ref 31.5–36.5)
MCV RBC AUTO: 86 FL (ref 78–100)
MICROALBUMIN UR-MCNC: 25.9 MG/L
MICROALBUMIN/CREAT UR: 16.82 MG/G CR (ref 0–17)
NONHDLC SERPL-MCNC: 73 MG/DL
PLATELET # BLD AUTO: 219 10E3/UL (ref 150–450)
POTASSIUM SERPL-SCNC: 4.2 MMOL/L (ref 3.4–5.3)
PROT SERPL-MCNC: 6.9 G/DL (ref 6.4–8.3)
RBC # BLD AUTO: 4.83 10E6/UL (ref 4.4–5.9)
SODIUM SERPL-SCNC: 138 MMOL/L (ref 136–145)
TRIGL SERPL-MCNC: 191 MG/DL
WBC # BLD AUTO: 7.5 10E3/UL (ref 4–11)

## 2022-09-23 PROCEDURE — 82043 UR ALBUMIN QUANTITATIVE: CPT | Performed by: FAMILY MEDICINE

## 2022-09-23 PROCEDURE — 90662 IIV NO PRSV INCREASED AG IM: CPT | Performed by: FAMILY MEDICINE

## 2022-09-23 PROCEDURE — 91312 COVID-19,PF,PFIZER BOOSTER BIVALENT: CPT | Performed by: FAMILY MEDICINE

## 2022-09-23 PROCEDURE — 85027 COMPLETE CBC AUTOMATED: CPT | Performed by: FAMILY MEDICINE

## 2022-09-23 PROCEDURE — 80061 LIPID PANEL: CPT | Performed by: FAMILY MEDICINE

## 2022-09-23 PROCEDURE — 36415 COLL VENOUS BLD VENIPUNCTURE: CPT | Performed by: FAMILY MEDICINE

## 2022-09-23 PROCEDURE — 83036 HEMOGLOBIN GLYCOSYLATED A1C: CPT | Performed by: FAMILY MEDICINE

## 2022-09-23 PROCEDURE — 80053 COMPREHEN METABOLIC PANEL: CPT | Performed by: FAMILY MEDICINE

## 2022-09-23 PROCEDURE — 99214 OFFICE O/P EST MOD 30 MIN: CPT | Mod: 25 | Performed by: FAMILY MEDICINE

## 2022-09-23 PROCEDURE — 0124A COVID-19,PF,PFIZER BOOSTER BIVALENT: CPT | Performed by: FAMILY MEDICINE

## 2022-09-23 PROCEDURE — G0008 ADMIN INFLUENZA VIRUS VAC: HCPCS | Performed by: FAMILY MEDICINE

## 2022-09-23 RX ORDER — TROSPIUM CHLORIDE 20 MG/1
20 TABLET, FILM COATED ORAL 2 TIMES DAILY
Qty: 180 TABLET | Refills: 3 | Status: SHIPPED | OUTPATIENT
Start: 2022-09-23 | End: 2022-10-25

## 2022-09-23 ASSESSMENT — PATIENT HEALTH QUESTIONNAIRE - PHQ9
SUM OF ALL RESPONSES TO PHQ QUESTIONS 1-9: 0
SUM OF ALL RESPONSES TO PHQ QUESTIONS 1-9: 0
10. IF YOU CHECKED OFF ANY PROBLEMS, HOW DIFFICULT HAVE THESE PROBLEMS MADE IT FOR YOU TO DO YOUR WORK, TAKE CARE OF THINGS AT HOME, OR GET ALONG WITH OTHER PEOPLE: NOT DIFFICULT AT ALL

## 2022-09-23 ASSESSMENT — PAIN SCALES - GENERAL: PAINLEVEL: NO PAIN (0)

## 2022-09-23 NOTE — LETTER
September 24, 2022      Thiago Hein  [address] [place] [address]        Dear ,    We are writing to inform you of your test results.    Continue your current diabetes management as discussed in order to further improve.  Goal A1c < 8.0% initially, < 7.0% ideally.     Your complete blood count results were normal.  No evidence for anemia, etc.     Your cholesterol results were mildly elevated.  Continue your current medication as discussed.  Ensure regular exercise, healthy diet, and weight loss modifications in order to further improve.  We will plan to recheck your labs while fasting in the next 6-12 months to ensure desired improvement.       Your kidney and liver tests were otherwise normal.    Your urine screen was normal.  No evidence for significant protein in your urine.  We will plan to repeat this test on a yearly basis.     Resulted Orders   Hemoglobin A1c   Result Value Ref Range    Hemoglobin A1C 6.8 (H) 0.0 - 5.6 %      Comment:      Normal <5.7%   Prediabetes 5.7-6.4%    Diabetes 6.5% or higher     Note: Adopted from ADA consensus guidelines.   CBC with platelets   Result Value Ref Range    WBC Count 7.5 4.0 - 11.0 10e3/uL    RBC Count 4.83 4.40 - 5.90 10e6/uL    Hemoglobin 14.4 13.3 - 17.7 g/dL    Hematocrit 41.3 40.0 - 53.0 %    MCV 86 78 - 100 fL    MCH 29.8 26.5 - 33.0 pg    MCHC 34.9 31.5 - 36.5 g/dL    RDW 12.9 10.0 - 15.0 %    Platelet Count 219 150 - 450 10e3/uL   Lipid panel reflex to direct LDL Fasting   Result Value Ref Range    Cholesterol 100 <200 mg/dL    Triglycerides 191 (H) <150 mg/dL    Direct Measure HDL 27 (L) >=40 mg/dL    LDL Cholesterol Calculated 35 <=100 mg/dL    Non HDL Cholesterol 73 <130 mg/dL    Narrative    Cholesterol  Desirable:  <200 mg/dL    Triglycerides  Normal:  Less than 150 mg/dL  Borderline High:  150-199 mg/dL  High:  200-499 mg/dL  Very High:  Greater than or equal to 500 mg/dL    Direct Measure HDL  Female:  Greater than or equal  to 50 mg/dL   Male:  Greater than or equal to 40 mg/dL    LDL Cholesterol  Desirable:  <100mg/dL  Above Desirable:  100-129 mg/dL   Borderline High:  130-159 mg/dL   High:  160-189 mg/dL   Very High:  >= 190 mg/dL    Non HDL Cholesterol  Desirable:  130 mg/dL  Above Desirable:  130-159 mg/dL  Borderline High:  160-189 mg/dL  High:  190-219 mg/dL  Very High:  Greater than or equal to 220 mg/dL   Comprehensive metabolic panel   Result Value Ref Range    Sodium 138 136 - 145 mmol/L    Potassium 4.2 3.4 - 5.3 mmol/L    Chloride 104 98 - 107 mmol/L    Carbon Dioxide (CO2) 24 22 - 29 mmol/L    Anion Gap 10 7 - 15 mmol/L    Urea Nitrogen 14.1 8.0 - 23.0 mg/dL    Creatinine 0.80 0.67 - 1.17 mg/dL    Calcium 9.4 8.8 - 10.2 mg/dL    Glucose 177 (H) 70 - 99 mg/dL    Alkaline Phosphatase 113 40 - 129 U/L    AST 17 10 - 50 U/L    ALT 17 10 - 50 U/L    Protein Total 6.9 6.4 - 8.3 g/dL    Albumin 4.4 3.5 - 5.2 g/dL    Bilirubin Total 0.4 <=1.2 mg/dL    GFR Estimate >90 >60 mL/min/1.73m2      Comment:      Effective December 21, 2021 eGFRcr in adults is calculated using the 2021 CKD-EPI creatinine equation which includes age and gender (Barb borrego al., [place], DOI: 10.1056/ZTMEon4085761)   Albumin Random Urine Quantitative with Creat Ratio   Result Value Ref Range    Albumin Urine mg/L 25.9 mg/L      Comment:      The reference ranges have not been established in urine albumin. The results should be integrated into the clinical context for interpretation.    Albumin Urine mg/g Cr 16.82 0.00 - 17.00 mg/g Cr      Comment:      Microalbuminuria is defined as an albumin:creatinine ratio of 17 to 299 for males and 25 to 299 for females. A ratio of albumin:creatinine of 300 or higher is indicative of overt proteinuria.  Due to biologic variability, positive results should be confirmed by a second, first-morning random or 24-hour timed urine specimen. If there is discrepancy, a third specimen is recommended. When 2 out of 3 results are in the  microalbuminuria range, this is evidence for incipient nephropathy and warrants increased efforts at glucose control, blood pressure control, and institution of therapy with an angiotensin-converting-enzyme (ACE) inhibitor (if the patient can tolerate it).      Creatinine Urine mg/dL 154.0 mg/dL      Comment:      The reference ranges have not been established in urine creatinine. The results should be integrated into the clinical context for interpretation.       If you have any questions or concerns, please call the clinic at the number listed above.       Sincerely,      Don Armijo MD

## 2022-09-23 NOTE — PROGRESS NOTES
Assessment/Plan:    Type 2 diabetes mellitus without complication, without long-term current use of insulin (H)  Type 2 diabetes reviewed with improved control with A1c decreasing from 7.5% May 3, 2022 down to 6.8% today.  Using metformin  mg using 3 tablets in the morning 1 tablet in the evening.  Patient will complete updated eye exam with Plaquenil use for med monitoring as well as ensuring no evidence for diabetic retinopathy.  No history of peripheral neuropathy.  Microalbumin screen updated today.  Diabetes recheck anticipated in 4 months.  - Albumin Random Urine Quantitative with Creat Ratio  - Hemoglobin A1c    Morbid obesity (H)  Morbid obesity with BMI greater than 35 with underlying history of type 2 diabetes hypertension hyperlipidemia.    BPH with urinary obstruction  BPH managed with tamsulosin 0.4 mg daily.    Essential hypertension  Amlodipine 5 mg daily and lisinopril 10 mg daily currently.  Blood pressure 136/66 on recheck.  Patient wants to continue to monitor at current dosing and reassess at follow-up in 4 months.    Hyperlipidemia, unspecified hyperlipidemia type  Atorvastatin 20 mg daily.  Update lipid cascade.  - Lipid panel reflex to direct LDL Fasting    Rheumatoid arthritis with negative rheumatoid factor, involving unspecified site (H)  History of rheumatoid arthritis.  Continues Plaquenil 200 mg twice daily.  Methotrexate 2.5 mg typically 4 tablets in the morning and 4 tablets in the evening on Mondays.  States was not able to get a refill previously and has been out for about 1 month and feels worsening hand stiffness with attempts to  especially.  Celebrex 100 mg twice daily.  Had completed prednisone taper of 7.5 mg daily x4 weeks, 5 mg daily x4 weeks then 2.5 mg daily x4 weeks before discontinuing about 6 weeks ago.  Lab evaluation completed for med monitoring today in order to have Dr. Bundy provide 90-day supply with refill.  Will complete upcoming eye exam for Plaquenil  "monitoring.  - Comprehensive metabolic panel  - CBC with platelets    Benign prostatic hyperplasia with urinary frequency  Trospium 20 mg twice daily for question overactive bladder with urinary urgency described.  Had been prescribed previously but not filled.  - trospium (SANCTURA) 20 MG tablet  Dispense: 180 tablet; Refill: 3    Encounter for immunization  High-dose flu shot provided.  - INFLUENZA, QUAD, HD, PF, 65+ (FLUZONE HD)    High priority for 2019-nCoV vaccine  COVID-19 Moderna bivalent.  - COVID-19,PF,MODERNA BIVALENT 18+Yrs        Subjective:    Thiago Hein is seen today for follow-up assessment.  Type 2 diabetes reviewed.  Metformin  mg using 3 tablets in the morning 1 tablet in the evening.  Had completed annual wellness visit May 3, 2022.  Prior A1c having increased from 7% up to 7.5% May 3, 2022.  Feels that arthritis is worsened.  Recognizes that he has been out of methotrexate for the last month after Dr. Bundy declined refill apparently.  Needed med monitoring.  It does look like a 30-day supply had been sent to local pharmacy however has not taken in the past 4 weeks.  Celebrex 100 mg twice daily, Plaquenil 200 mg twice daily and has completed prednisone taper as noted above which was helpful.  Amlodipine 5 mg daily and lisinopril 10 mg daily for hypertension.  Tamsulosin 0.4 mg daily for BPH with ongoing urinary retention at times perhaps as well as urinary urgency.  Had been prescribed trospium 20 mg twice daily for overactive bladder however has not tried this.  Atorvastatin 20 mg daily for lipid management.  Needs annual eye exam updated for Plaquenil monitoring as well.  Comprehensive review of systems as above otherwise all negative.    Single   1 son - 32 \"Kraig\"   Tobacco: none   EtOH: none   Mom - Meena   Dad - currently 86 Yovanny - DM, CAD, HTN, high cholesterol, back problems, arthritis, spinal stenosis, pacemaker/defibrillator   1 bro - Be - HTN   Surgeries: \"lumbar " "back surgery for cyst removal\" - September, 2008 (Dr. 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[place], [place])   Hobbies:    Past Surgical History:   Procedure Laterality Date     ANESTHESIA OUT OF OR MRI N/A 10/2/2015    Procedure: ANESTHESIA OUT OF OR MRI;  Surgeon: GENERIC ANESTHESIA PROVIDER;  Location: [facility] OR     BACK SURGERY  2008    lumbar spine surgery-- unclear details     SOFT TISSUE SURGERY  2012    skin cancer removed from L shoulder, chest, right neck        Family History   Problem Relation Age of Onset     Coronary Artery Disease Father      Hypertension Father         Past Medical History:   Diagnosis Date     Diabetes (H)      Hypertension         Social History     Tobacco Use     Smoking status: Never Smoker     Smokeless tobacco: Former User   Substance Use Topics     Alcohol use: No     Comment: Quit in 1990     Drug use: No        Current Outpatient Medications   Medication Sig Dispense Refill     amLODIPine (NORVASC) 5 MG tablet Take 1 tablet (5 mg) by mouth daily 90 tablet 3     atorvastatin (LIPITOR) 20 MG tablet Take 1 tablet (20 mg) by mouth daily 90 tablet 3     celecoxib (CELEBREX) 100 MG capsule TAKE 1 CAPSULE(100 MG) BY MOUTH TWICE DAILY 180 capsule 0     folic acid (FOLVITE) 1 MG tablet Take 1 tablet (1 mg) by mouth daily 100 tablet 3     furosemide (LASIX) 40 MG tablet Take 1 tablet (40 mg) by mouth daily 90 tablet 3     hydroxychloroquine (PLAQUENIL) 200 MG tablet TAKE 1 TABLET(200 MG) BY MOUTH TWICE DAILY 180 tablet 0     lisinopril (ZESTRIL) 10 MG tablet Take 1 tablet (10 mg) by mouth daily 90 tablet 3     metFORMIN (GLUCOPHAGE-XR) 500 MG 24 hr tablet Take 3 tablets (1,500 mg) by mouth each AM and take 1 tablet (500 mg) by mouth each evening 360 tablet 3     methotrexate 2.5 MG tablet TAKE 4 TABLETS BY MOUTH IN THE MORNING AND 4 TABLETS AT BEDTIME ON MONDAYS 32 tablet 0 "     predniSONE (DELTASONE) 2.5 MG tablet 7.5 mg daily for 4 weeks, reduce by 2.5 mg every 4 weeks to 5 milligrams daily, and to 2.5 mg daily and stop 90 tablet 2     tamsulosin (FLOMAX) 0.4 MG capsule TAKE 1 CAPSULE(0.4 MG) BY MOUTH DAILY AFTER BREAKFAST 90 capsule 3     trospium (SANCTURA) 20 MG tablet Take 1 tablet (20 mg) by mouth 2 times daily 180 tablet 3     amoxicillin-clavulanate (AUGMENTIN) 875-125 MG tablet Take 1 tablet by mouth 2 times daily for 10 days 20 tablet 2          Objective:    Vitals:    09/23/22 1053 09/23/22 1115   BP: (!) 140/70 136/66   Pulse: 89    SpO2: 96%    Weight: 116.1 kg (256 lb)       Body mass index is 35.45 kg/m .    Alert.  No apparent distress.  Blood pressure 136/66 on recheck.  Chest clear.  Cardiac exam regular.  Photosensitivity with sunburn likely left forearm.  Multiple scaly lesions also noted.  We did discussed need for appropriate sunscreen while on Plaquenil.      This note has been dictated using voice recognition software and as a result may contain minor grammatical errors and unintended word substitutions.       Answers for HPI/ROS submitted by the patient on 9/23/2022  If you checked off any problems, how difficult have these problems made it for you to do your work, take care of things at home, or get along with other people?: Not difficult at all  PHQ9 TOTAL SCORE: 0

## 2022-10-07 ENCOUNTER — LAB (OUTPATIENT)
Dept: LAB | Facility: CLINIC | Age: 72
End: 2022-10-07
Payer: MEDICARE

## 2022-10-07 DIAGNOSIS — Z79.899 HIGH RISK MEDICATION USE: ICD-10-CM

## 2022-10-07 LAB
ALBUMIN SERPL BCG-MCNC: 4.2 G/DL (ref 3.5–5.2)
ALT SERPL W P-5'-P-CCNC: 20 U/L (ref 10–50)
CREAT SERPL-MCNC: 0.9 MG/DL (ref 0.67–1.17)
ERYTHROCYTE [DISTWIDTH] IN BLOOD BY AUTOMATED COUNT: 12.7 % (ref 10–15)
GFR SERPL CREATININE-BSD FRML MDRD: >90 ML/MIN/1.73M2
HCT VFR BLD AUTO: 42.7 % (ref 40–53)
HGB BLD-MCNC: 14.7 G/DL (ref 13.3–17.7)
MCH RBC QN AUTO: 29.6 PG (ref 26.5–33)
MCHC RBC AUTO-ENTMCNC: 34.4 G/DL (ref 31.5–36.5)
MCV RBC AUTO: 86 FL (ref 78–100)
PLATELET # BLD AUTO: 220 10E3/UL (ref 150–450)
RBC # BLD AUTO: 4.96 10E6/UL (ref 4.4–5.9)
WBC # BLD AUTO: 7.8 10E3/UL (ref 4–11)

## 2022-10-07 PROCEDURE — 84460 ALANINE AMINO (ALT) (SGPT): CPT

## 2022-10-07 PROCEDURE — 36415 COLL VENOUS BLD VENIPUNCTURE: CPT

## 2022-10-07 PROCEDURE — 82565 ASSAY OF CREATININE: CPT

## 2022-10-07 PROCEDURE — 82040 ASSAY OF SERUM ALBUMIN: CPT

## 2022-10-07 PROCEDURE — 85027 COMPLETE CBC AUTOMATED: CPT

## 2022-10-10 ENCOUNTER — OFFICE VISIT (OUTPATIENT)
Dept: RHEUMATOLOGY | Facility: CLINIC | Age: 72
End: 2022-10-10
Payer: MEDICARE

## 2022-10-10 VITALS
BODY MASS INDEX: 35.45 KG/M2 | WEIGHT: 256 LBS | DIASTOLIC BLOOD PRESSURE: 70 MMHG | SYSTOLIC BLOOD PRESSURE: 110 MMHG | HEART RATE: 80 BPM

## 2022-10-10 DIAGNOSIS — M25.50 POLYARTHRALGIA: ICD-10-CM

## 2022-10-10 DIAGNOSIS — M06.00 SERONEGATIVE RHEUMATOID ARTHRITIS (H): Primary | ICD-10-CM

## 2022-10-10 DIAGNOSIS — M15.0 PRIMARY OSTEOARTHRITIS INVOLVING MULTIPLE JOINTS: ICD-10-CM

## 2022-10-10 DIAGNOSIS — Z79.899 HIGH RISK MEDICATION USE: ICD-10-CM

## 2022-10-10 PROCEDURE — 99214 OFFICE O/P EST MOD 30 MIN: CPT | Performed by: INTERNAL MEDICINE

## 2022-10-10 RX ORDER — CELECOXIB 100 MG/1
CAPSULE ORAL
Qty: 180 CAPSULE | Refills: 0 | Status: SHIPPED | OUTPATIENT
Start: 2022-10-10 | End: 2023-02-16

## 2022-10-10 RX ORDER — FOLIC ACID 1 MG/1
1 TABLET ORAL DAILY
Qty: 90 TABLET | Refills: 1 | Status: SHIPPED | OUTPATIENT
Start: 2022-10-10 | End: 2023-01-10

## 2022-10-10 NOTE — PROGRESS NOTES
Rheumatology follow-up visit note     Thiago is a 72 year old male presents today for follow-up.    Thiago was seen today for recheck.    Diagnoses and all orders for this visit:    Seronegative rheumatoid arthritis (H)  -     methotrexate 2.5 MG tablet; Take 6 tablets (15 mg) by mouth every 7 days for 90 days  -     folic acid (FOLVITE) 1 MG tablet; Take 1 tablet (1 mg) by mouth daily for 90 days    Polyarthralgia    Primary osteoarthritis involving multiple joints  -     celecoxib (CELEBREX) 100 MG capsule; TAKE 1 CAPSULE(100 MG) BY MOUTH TWICE DAILY Strength: 100 mg    High risk medication use  -     folic acid (FOLVITE) 1 MG tablet; Take 1 tablet (1 mg) by mouth daily for 90 days        This patient with seronegative rheumatoid arthritis, osteoarthritis been off methotrexate as the pharmacy Telling him that the have not received the prescription while it was sent, and they had and returned this acknowledged receiving the prescription on the same date.  He is going to check with them.  Previously 60-day supply was given and now another 1 sent for 90 days.  Recent labs are within acceptable range.  He is trying to get off Celebrex we discussed again the downsides of mixing Celebrex and methotrexate.  He will try acetaminophen instead.  He gets his eyes examined regularly.  We will meet here in 3 months.     Follow up in 3 months.    HPI    Thiago Hein is a 72 year old male is here for follow-up.  He has rheumatoid arthritis, osteoarthritis, on methotrexate 10 mg/week that he has not been able to get from the pharmacy despite the prescription being sent from here.  He has noted achiness in his hands such as PIPs MCPs which can last all day interfering with his day-to-day activities cannot make a fist.  He is also noted back discomfort.  This is worse in the morning for about 15 minutes or so.  He is no longer on prednisone.      DETAILED EXAMINATION  10/10/22  :    Vitals:    10/10/22 0930   BP:  110/70   Weight: 116.1 kg (256 lb)     Alert oriented. Head including the face is examined for malar rash, heliotropes, scarring, lupus pernio. Eyes examined for redness such as in episcleritis/scleritis, periorbital lesions.   Neck/ Face examined for parotid gland swelling, range of motion of neck.  Left upper and lower and right upper and lower extremities examined for tenderness, swelling, warmth of the appendicular joints, range of motion, edema, rash.  Some of the important findings included: he does not have evidence of synovitis in the palpable joints of the upper extremities.  No significant deformities of the digits.  + Heberden nodes.  Range of motion of the shoulders  show full abduction.  No JLT effusion or warmth of the knees.  he does not have dactylitis of the digits.     Patient Active Problem List    Diagnosis Date Noted     Chronic polyarticular juvenile rheumatoid arthritis (H) 08/24/2021     Priority: Medium     Formatting of this note might be different from the original.  Created by Conversion    Replacement Utility updated for latest IMO load       Emotional lability 08/24/2021     Priority: Medium     Formatting of this note might be different from the original.  Created by Conversion       Lower back pain 08/24/2021     Priority: Medium     Formatting of this note might be different from the original.  Created by Conversion       Morbid obesity (H) 08/24/2021     Priority: Medium     BPH with urinary obstruction 02/20/2020     Priority: Medium     Primary osteoarthritis involving multiple joints 10/08/2019     Priority: Medium     Left carpal tunnel syndrome 12/27/2018     Priority: Medium     Trigger finger, right index finger 09/26/2018     Priority: Medium     Polyarthralgia 07/20/2018     Priority: Medium     Unintentional weight loss 06/01/2018     Priority: Medium     Non morbid obesity due to excess calories 07/14/2017     Priority: Medium     Peripheral edema 07/14/2017     Priority:  Medium     Diverticulosis 11/15/2016     Priority: Medium     Hyperlipidemia 08/04/2016     Priority: Medium     Need for pneumococcal vaccination 01/11/2016     Priority: Medium     Need for vaccination 11/20/2015     Priority: Medium     Type 2 diabetes mellitus (H) 11/11/2015     Priority: Medium     Formatting of this note might be different from the original.  Created by Conversion       Bacteremia due to Staphylococcus aureus 11/06/2015     Priority: Medium     Dysphagia 11/06/2015     Priority: Medium     Epidural abscess 11/06/2015     Priority: Medium     Essential hypertension 11/06/2015     Priority: Medium     Formatting of this note might be different from the original.  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/2015     Priority: Medium     Leukocytosis 09/29/2015     Priority: Medium     Sepsis (H) 09/29/2015     Priority: Medium     Past Surgical History:   Procedure Laterality Date     ANESTHESIA OUT OF OR MRI N/A 10/2/2015    Procedure: ANESTHESIA OUT OF OR MRI;  Surgeon: GENERIC ANESTHESIA PROVIDER;  Location: [facility] OR     BACK SURGERY  2008    lumbar spine surgery-- unclear details     SOFT TISSUE SURGERY  2012    skin cancer removed from L shoulder, chest, right neck      Past Medical History:   Diagnosis Date     Diabetes (H)      Hypertension      No Known Allergies  Current Outpatient Medications   Medication Sig Dispense Refill     amLODIPine (NORVASC) 5 MG tablet Take 1 tablet (5 mg) by mouth daily 90 tablet 3     atorvastatin (LIPITOR) 20 MG tablet Take 1 tablet (20 mg) by mouth daily 90 tablet 3     celecoxib (CELEBREX) 100 MG capsule TAKE 1 CAPSULE(100 MG) BY MOUTH TWICE DAILY 180 capsule 0     folic acid (FOLVITE) 1 MG tablet Take 1 tablet (1 mg) by mouth daily 100 tablet  3     furosemide (LASIX) 40 MG tablet Take 1 tablet (40 mg) by mouth daily 90 tablet 3     hydroxychloroquine (PLAQUENIL) 200 MG tablet TAKE 1 TABLET(200 MG) BY MOUTH TWICE DAILY 180 tablet 0     lisinopril (ZESTRIL) 10 MG tablet Take 1 tablet (10 mg) by mouth daily 90 tablet 3     metFORMIN (GLUCOPHAGE-XR) 500 MG 24 hr tablet Take 3 tablets (1,500 mg) by mouth each AM and take 1 tablet (500 mg) by mouth each evening 360 tablet 3     predniSONE (DELTASONE) 2.5 MG tablet 7.5 mg daily for 4 weeks, reduce by 2.5 mg every 4 weeks to 5 milligrams daily, and to 2.5 mg daily and stop 90 tablet 2     tamsulosin (FLOMAX) 0.4 MG capsule TAKE 1 CAPSULE(0.4 MG) BY MOUTH DAILY AFTER BREAKFAST 90 capsule 3     trospium (SANCTURA) 20 MG tablet Take 1 tablet (20 mg) by mouth 2 times daily 180 tablet 3     amoxicillin-clavulanate (AUGMENTIN) 875-125 MG tablet Take 1 tablet by mouth 2 times daily for 10 days 20 tablet 2     methotrexate 2.5 MG tablet TAKE 4 TABLETS BY MOUTH IN THE MORNING AND 4 TABLETS AT BEDTIME ON MONDAYS (Patient not taking: Reported on 10/10/2022) 32 tablet 0     family history includes Coronary Artery Disease in his father; Hypertension in his father.  Social Connections: Not on file          WBC   Date Value Ref Range Status   04/23/2021 8.2 4.0 - 11.0 10e9/L Final     WBC Count   Date Value Ref Range Status   10/07/2022 7.8 4.0 - 11.0 10e3/uL Final     RBC Count   Date Value Ref Range Status   10/07/2022 4.96 4.40 - 5.90 10e6/uL Final   04/23/2021 5.25 4.4 - 5.9 10e12/L Final     Hemoglobin   Date Value Ref Range Status   10/07/2022 14.7 13.3 - 17.7 g/dL Final   04/23/2021 15.2 13.3 - 17.7 g/dL Final     Hematocrit   Date Value Ref Range Status   10/07/2022 42.7 40.0 - 53.0 % Final   04/23/2021 44.8 40.0 - 53.0 % Final     MCV   Date Value Ref Range Status   10/07/2022 86 78 - 100 fL Final   04/23/2021 85 78 - 100 fl Final     MCH   Date Value Ref Range Status   10/07/2022 29.6 26.5 - 33.0 pg Final    04/23/2021 29.0 26.5 - 33.0 pg Final     Platelet Count   Date Value Ref Range Status   10/07/2022 220 150 - 450 10e3/uL Final   04/23/2021 222 150 - 450 10e9/L Final     % Lymphocytes   Date Value Ref Range Status   04/23/2021 20.9 % Final     AST   Date Value Ref Range Status   09/23/2022 17 10 - 50 U/L Final   10/12/2015 18 0 - 45 U/L Final     ALT   Date Value Ref Range Status   10/07/2022 20 10 - 50 U/L Final   10/12/2015 30 0 - 70 U/L Final     Albumin   Date Value Ref Range Status   10/07/2022 4.2 3.5 - 5.2 g/dL Final   05/03/2022 4.0 3.5 - 5.0 g/dL Final   10/12/2015 2.2 (L) 3.4 - 5.0 g/dL Final     Alkaline Phosphatase   Date Value Ref Range Status   09/23/2022 113 40 - 129 U/L Final   10/17/2015 133 40 - 150 U/L Final     Creatinine   Date Value Ref Range Status   10/07/2022 0.90 0.67 - 1.17 mg/dL Final   04/23/2021 0.86 0.66 - 1.25 mg/dL Final     GFR Estimate   Date Value Ref Range Status   10/07/2022 >90 >60 mL/min/1.73m2 Final     Comment:     Effective December 21, 2021 eGFRcr in adults is calculated using the 2021 CKD-EPI creatinine equation which includes age and gender (Barb et al., NEJ, DOI: 10.1056/CCQXlx9838372)   04/23/2021 87 >60 mL/min/[1.73_m2] Final     Comment:     Non  GFR Calc  Starting 12/18/2018, serum creatinine based estimated GFR (eGFR) will be   calculated using the Chronic Kidney Disease Epidemiology Collaboration   (CKD-EPI) equation.       GFR Estimate If Black   Date Value Ref Range Status   04/23/2021 >90 >60 mL/min/[1.73_m2] Final     Comment:      GFR Calc  Starting 12/18/2018, serum creatinine based estimated GFR (eGFR) will be   calculated using the Chronic Kidney Disease Epidemiology Collaboration   (CKD-EPI) equation.       Creatinine Urine mg/dL   Date Value Ref Range Status   09/23/2022 154.0 mg/dL Final     Comment:     The reference ranges have not been established in urine creatinine. The results should be integrated into the  clinical context for interpretation.   08/24/2021 40 mg/dL Final     Sed Rate   Date Value Ref Range Status   04/23/2021 9 0 - 20 mm/h Final     Erythrocyte Sedimentation Rate   Date Value Ref Range Status   08/24/2021 2 0 - 15 mm/hr Final     CRP Inflammation   Date Value Ref Range Status   04/23/2021 3.6 0.0 - 8.0 mg/L Final     CRP   Date Value Ref Range Status   08/24/2021 0.2 0.0-<0.8 mg/dL Final

## 2022-10-13 ENCOUNTER — TRANSFERRED RECORDS (OUTPATIENT)
Dept: HEALTH INFORMATION MANAGEMENT | Facility: CLINIC | Age: 72
End: 2022-10-13

## 2022-10-13 LAB
RETINOPATHY: NEGATIVE
RETINOPATHY: NEGATIVE

## 2022-10-17 ENCOUNTER — TRANSFERRED RECORDS (OUTPATIENT)
Dept: HEALTH INFORMATION MANAGEMENT | Facility: CLINIC | Age: 72
End: 2022-10-17

## 2022-10-25 ENCOUNTER — OFFICE VISIT (OUTPATIENT)
Dept: UROLOGY | Facility: CLINIC | Age: 72
End: 2022-10-25
Payer: MEDICARE

## 2022-10-25 VITALS — DIASTOLIC BLOOD PRESSURE: 75 MMHG | HEART RATE: 88 BPM | SYSTOLIC BLOOD PRESSURE: 135 MMHG | OXYGEN SATURATION: 95 %

## 2022-10-25 DIAGNOSIS — N13.8 BPH WITH URINARY OBSTRUCTION: ICD-10-CM

## 2022-10-25 DIAGNOSIS — N40.1 BPH WITH URINARY OBSTRUCTION: ICD-10-CM

## 2022-10-25 DIAGNOSIS — N41.1 CHRONIC PROSTATITIS: Primary | ICD-10-CM

## 2022-10-25 PROCEDURE — 99213 OFFICE O/P EST LOW 20 MIN: CPT | Performed by: STUDENT IN AN ORGANIZED HEALTH CARE EDUCATION/TRAINING PROGRAM

## 2022-10-25 RX ORDER — TAMSULOSIN HYDROCHLORIDE 0.4 MG/1
0.8 CAPSULE ORAL EVERY EVENING
Qty: 180 CAPSULE | Refills: 3 | Status: SHIPPED | OUTPATIENT
Start: 2022-10-25 | End: 2023-12-27

## 2022-10-25 NOTE — PROGRESS NOTES
"    UROLOGY FOLLOW-UP NOTE          Chief Complaint:   Today I had the pleasure of seeing Mr. Thiago Hein in follow-up for a chief complaint of urinary frequency.          Interval Update   Thiago Hein is a very pleasant 72 year old male with a history of rheumatoid arthritis, osteoarthritis, HLD, T2 DM, HTN, and Still's disease.     Brief History: Mr. Thiago Heinwas last seen on 8/26/2022 for urinary frequency, urgency, and urge incontinence. He takes Lasix 40 mg daily. He had been taking tamsulosin 0.4 mg daily for several years. He was started on trospium 20 mg BID.     His last PSA was 2.5 on 2/20/2020.     Today's notes: He has been taking trospium for one month and did not notice a change in his symptoms. He reports urinary urgency and a sensation of incomplete bladder emptying. He reports nocturia x 1-2. He describes a \"pressure\" in his urethra. He denies groin and suprapubic pain. He denies dysuria and gross hematuria.          Physical Exam:   Patient is a 72 year old  male   Vitals: Blood pressure 135/75, pulse 88, SpO2 95 %.  General: Alert and oriented x 3, no acute distress.  Respiratory: Non-labored breathing.  Cardiac: Regular rate.  : NEGRITA anodular, symmetric    PVR: 41 mL        Labs and Pathology:    I personally reviewed all applicable laboratory data and went over findings with patient  Significant for:    CBC RESULTS:  Recent Labs   Lab Test 10/07/22  1034 09/23/22  1107 07/06/22  1254 05/03/22  0906   WBC 7.8 7.5 7.7 8.4   HGB 14.7 14.4 14.0 15.0    219 206 206        BMP RESULTS:  Recent Labs   Lab Test 10/07/22  1034 09/23/22  1107 07/06/22  1254 05/03/22  0906 01/12/22  1035 12/28/21  1037 08/24/21  1045 04/23/21  0711 03/04/21  1802 03/04/21  1802 03/04/21  1751 12/29/20  1154 11/27/20  1403   NA  --  138  --   --   --  139 139 138  --  140  --  141  --    POTASSIUM  --  4.2  --   --   --  4.0 4.0 4.1  --  3.6  --  4.5  --    CHLORIDE  --  104  --   --  " " --  101 103 106  --  102  --  101  --    CO2  --  24  --   --   --  28 27 25  --  25  --  31  --    ANIONGAP  --  10  --   --   --  10 9 7  --  13  --  9  --    GLC  --  177*  --   --   --  121 122 176*  --  160*   < > 129*  --    BUN  --  14.1  --   --   --  13 13 20  --  15  --  19  --    CR 0.90 0.80 0.92 0.86   < > 0.91 0.85 0.86  --  0.97  --  0.94 0.91   GFRESTIMATED >90 >90 88 >90   < > 90 88 87   < > >60  --  >60 >60   GFRESTBLACK  --   --   --   --   --   --   --  >90  --  >60  --  >60 >60   LOLA  --  9.4  --   --   --  9.4 9.3 8.6  --  8.7  --  9.8  --     < > = values in this interval not displayed.       UA RESULTS:   Recent Labs   Lab Test 08/26/22  1541 08/09/22  2009 10/19/15  1200   SG >=1.030 1.030 1.025   URINEPH 5.5 6.0 6.5   NITRITE Negative Negative Negative   RBCU 0-2 8* 172*   WBCU 0-5 3 22*       PSA RESULTS  Prostate Specific Antigen Screen   Date Value Ref Range Status   02/20/2020 2.5 0.0 - 4.5 ng/mL Final              Assessment/Plan   72 year old male seen in follow up for urinary frequency and urgency. He has taken tamsulosin 0.4 mg daily for several years. He has taken trospium BID for one month and did not notice any change in his symptoms.     Today, he reports continued urinary frequency and urgency with a \"pressure\" sensation in his urethra. He denies groin and suprapubic pain. He denies dysuria.     We discussed the possibility of prostatitis. He seemed uncomfortable during NEGRITA but states he did not have pain with palpation. We discussed a trial of a two week course of antibiotics. Bactrim cannot be used as it interacts with methotrexate. The patient is hesitant about ciprofloxacin as he has been experiencing muscle pain.     He instead elected to try increasing his tamsulosin dose to 0.8 mg daily.     Plan:  1. Increase tamsulosin dose to 0.8 mg daily. Take in the evening.   2. Patient will call with an update on his symptoms in about two weeks. If he does not have improvement " with the increased dose of tamsulosin, we could consider another anticholinergic vs finasteride.            Past Medical History:     Past Medical History:   Diagnosis Date     Diabetes (H)      Hypertension             Past Surgical History:     Past Surgical History:   Procedure Laterality Date     ANESTHESIA OUT OF OR MRI N/A 10/2/2015    Procedure: ANESTHESIA OUT OF OR MRI;  Surgeon: GENERIC ANESTHESIA PROVIDER;  Location: [facility] OR     BACK SURGERY  2008    lumbar spine surgery-- unclear details     SOFT TISSUE SURGERY  2012    skin cancer removed from L shoulder, chest, right neck            Medications     Current Outpatient Medications   Medication     amLODIPine (NORVASC) 5 MG tablet     atorvastatin (LIPITOR) 20 MG tablet     celecoxib (CELEBREX) 100 MG capsule     folic acid (FOLVITE) 1 MG tablet     furosemide (LASIX) 40 MG tablet     hydroxychloroquine (PLAQUENIL) 200 MG tablet     lisinopril (ZESTRIL) 10 MG tablet     metFORMIN (GLUCOPHAGE-XR) 500 MG 24 hr tablet     methotrexate 2.5 MG tablet     tamsulosin (FLOMAX) 0.4 MG capsule     amoxicillin-clavulanate (AUGMENTIN) 875-125 MG tablet     No current facility-administered medications for this visit.            Family History:     Family History   Problem Relation Age of Onset     Coronary Artery Disease Father      Hypertension Father             Social History:     Social History     Socioeconomic History     Marital status: Single     Spouse name: Not on file     Number of children: Not on file     Years of education: Not on file     Highest education level: Not on file   Occupational History     Not on file   Tobacco Use     Smoking status: Never     Smokeless tobacco: Former   Substance and Sexual Activity     Alcohol use: No     Comment: Quit in 1990     Drug use: No     Sexual activity: Not on file   Other Topics Concern     Parent/sibling w/ CABG, MI or angioplasty before 65F 55M? 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      Social Determinants of Health     Financial Resource Strain: Not on file   Food Insecurity: Not on file   Transportation Needs: Not on file   Physical Activity: Not on file   Stress: Not on file   Social Connections: Not on file   Intimate Partner Violence: Not on file   Housing Stability: Not on file            Allergies:   Patient has no known allergies.         Review of Systems:  From intake questionnaire   Negative 14 system review except as noted on HPI, nurse's note.        RENNY AUSTIN PA-C  Department of Urology

## 2022-10-25 NOTE — NURSING NOTE
Active order to obtain bladder scan? Yes   Name of ordering provider:  Flakita Malloy  Bladder scan preformed post void Yes.  Bladder scan reveled 41ML  Provider notified?  Yes    Audrey Galindo CMA

## 2022-10-28 DIAGNOSIS — M15.0 PRIMARY OSTEOARTHRITIS INVOLVING MULTIPLE JOINTS: ICD-10-CM

## 2022-10-28 RX ORDER — CELECOXIB 100 MG/1
CAPSULE ORAL
Qty: 180 CAPSULE | Refills: 0 | OUTPATIENT
Start: 2022-10-28

## 2022-12-12 DIAGNOSIS — M06.00 SERONEGATIVE RHEUMATOID ARTHRITIS (H): ICD-10-CM

## 2022-12-12 NOTE — TELEPHONE ENCOUNTER
Last eye exam on file from 9/2021    Spoke to pt and states he had eye exam 6 weeks ago.     Patient will call [facility] eye clinic and have them fax results over.

## 2022-12-20 RX ORDER — HYDROXYCHLOROQUINE SULFATE 200 MG/1
TABLET, FILM COATED ORAL
Qty: 180 TABLET | Refills: 1 | Status: SHIPPED | OUTPATIENT
Start: 2022-12-20 | End: 2023-04-24

## 2022-12-21 NOTE — TELEPHONE ENCOUNTER
Pt states he did a partial eye exam last month- [facility] eye clinic. He would like for Dr. Bundy's care team to reach out to them.

## 2022-12-21 NOTE — TELEPHONE ENCOUNTER
Pt notified that we received eye exam notes from 10/13/22 from [facility] and sent refills on HCQ to [facility]. Pt thinks he was told that he needed to go back to have more testing done or that they were only able to do partial testing on 10/13, he will call [facility] to clarify this and if more testing needs to be done.

## 2023-01-10 ENCOUNTER — OFFICE VISIT (OUTPATIENT)
Dept: RHEUMATOLOGY | Facility: CLINIC | Age: 73
End: 2023-01-10
Payer: MEDICARE

## 2023-01-10 VITALS
HEART RATE: 80 BPM | WEIGHT: 258.2 LBS | SYSTOLIC BLOOD PRESSURE: 130 MMHG | BODY MASS INDEX: 35.76 KG/M2 | DIASTOLIC BLOOD PRESSURE: 78 MMHG

## 2023-01-10 DIAGNOSIS — M06.00 SERONEGATIVE RHEUMATOID ARTHRITIS (H): Primary | ICD-10-CM

## 2023-01-10 DIAGNOSIS — Z79.899 HIGH RISK MEDICATION USE: ICD-10-CM

## 2023-01-10 DIAGNOSIS — M15.0 PRIMARY OSTEOARTHRITIS INVOLVING MULTIPLE JOINTS: ICD-10-CM

## 2023-01-10 DIAGNOSIS — M25.50 POLYARTHRALGIA: ICD-10-CM

## 2023-01-10 PROCEDURE — 99214 OFFICE O/P EST MOD 30 MIN: CPT | Performed by: INTERNAL MEDICINE

## 2023-01-10 RX ORDER — FOLIC ACID 1 MG/1
1 TABLET ORAL DAILY
Qty: 90 TABLET | Refills: 1 | Status: SHIPPED | OUTPATIENT
Start: 2023-01-10 | End: 2023-04-24

## 2023-01-10 NOTE — PROGRESS NOTES
"      Rheumatology follow-up visit note     Thiago is a 72 year old male presents today for follow-up.    Thiago was seen today for recheck.    Diagnoses and all orders for this visit:    Seronegative rheumatoid arthritis (H)  -     methotrexate 2.5 MG tablet; Take 6 tablets (15 mg) by mouth every 7 days  -     folic acid (FOLVITE) 1 MG tablet; Take 1 tablet (1 mg) by mouth daily    High risk medication use  -     folic acid (FOLVITE) 1 MG tablet; Take 1 tablet (1 mg) by mouth daily  -     ALT; Standing  -     Albumin level; Standing  -     CBC with platelets; Standing  -     Creatinine; Standing    Primary osteoarthritis involving multiple joints    Polyarthralgia        While his rheumatoid arthritis is well controlled with the current regimen he has noted stiffness after he has sat for a while in his truck or elsewhere that is making him stiff for the initial 4 to 5 minutes as he begins to walk.  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  He has had carpal tunnel release done in the past.  He has diabetes.  We discussed however variety of situations can be associated with this however unlikely to be due to RA.  We will meet here in 3 months he will do labs today or tomorrow and then just prior to next visit.    Follow up in 3 months.    HPI    Thiago Hein is a 72 year old male is here for follow-up.   He has rheumatoid arthritis, osteoarthritis, on methotrexate 10 mg/week that he has not been able to get from the pharmacy despite the prescription being sent from here.  He has noted achiness in his hands such as PIPs MCPs which can last all day interfering with his day-to-day activities cannot make a fist.  He is also noted back discomfort.  This is worse in the morning for about 15 minutes or so.  He is no longer on prednisone.    DETAILED EXAMINATION  01/10/23  :  Right  Vitals:    01/10/23 1355   BP: " 130/78   Pulse: 80   Weight: 117.1 kg (258 lb 3.2 oz)     Alert oriented. Head including the face is examined for malar rash, heliotropes, scarring, lupus pernio. Eyes examined for redness such as in episcleritis/scleritis, periorbital lesions.   Neck/ Face examined for parotid gland swelling, range of motion of neck.  Left upper and lower and right upper and lower extremities examined for tenderness, swelling, warmth of the appendicular joints, range of motion, edema, rash.  Some of the important findings included: he does not have evidence of synovitis in the palpable joints of the upper extremities.  No significant deformities of the digits.  + Heber[place] nodes.  Range of motion of the shoulders  show full abduction.  No JLT effusion or warmth of the knees.  he does not have dactylitis of the digits.  His Tinel's was negative on both the wrist volar aspect.  He has hint of [place] arthropathy.     Patient Active Problem List    Diagnosis Date Noted     Chronic polyarticular juvenile rheumatoid arthritis (H) 08/24/2021     Priority: Medium     Formatting of this note might be different from the original.  Created by Conversion    Replacement Utility updated for latest IMO load       Emotional lability 08/24/2021     Priority: Medium     Formatting of this note might be different from the original.  Created by Conversion       Lower back pain 08/24/2021     Priority: Medium     Formatting of this note might be different from the original.  Created by Conversion       Morbid obesity (H) 08/24/2021     Priority: Medium     BPH with urinary obstruction 02/20/2020     Priority: Medium     Primary osteoarthritis involving multiple joints 10/08/2019     Priority: Medium     Left carpal tunnel syndrome 12/27/2018     Priority: Medium     Trigger finger, right index finger 09/26/2018     Priority: Medium     Polyarthralgia 07/20/2018     Priority: Medium     Unintentional weight loss 06/01/2018     Priority: Medium     Non  morbid obesity due to excess calories 07/14/2017     Priority: Medium     Peripheral edema 07/14/2017     Priority: Medium     Diverticulosis 11/15/2016     Priority: Medium     Hyperlipidemia 08/04/2016     Priority: Medium     Need for pneumococcal vaccination 01/11/2016     Priority: Medium     Need for vaccination 11/20/2015     Priority: Medium     Type 2 diabetes mellitus (H) 11/11/2015     Priority: Medium     Formatting of this note might be different from the original.  Created by Conversion       Bacteremia due to Staphylococcus aureus 11/06/2015     Priority: Medium     Dysphagia 11/06/2015     Priority: Medium     Epidural abscess 11/06/2015     Priority: Medium     Essential hypertension 11/06/2015     Priority: Medium     Formatting of this note might be different from the original.  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/2015     Priority: Medium     Leukocytosis 09/29/2015     Priority: Medium     Sepsis (H) 09/29/2015     Priority: Medium     Past Surgical History:   Procedure Laterality Date     ANESTHESIA OUT OF OR MRI N/A 10/2/2015    Procedure: ANESTHESIA OUT OF OR MRI;  Surgeon: GENERIC ANESTHESIA PROVIDER;  Location: [facility] OR     BACK SURGERY  2008    lumbar spine surgery-- unclear details     SOFT TISSUE SURGERY  2012    skin cancer removed from L shoulder, chest, right neck      Past Medical History:   Diagnosis Date     Diabetes (H)      Hypertension      No Known Allergies  Current Outpatient Medications   Medication Sig Dispense Refill     amLODIPine (NORVASC) 5 MG tablet Take 1 tablet (5 mg) by mouth daily 90 tablet 3     atorvastatin (LIPITOR) 20 MG tablet Take 1 tablet (20 mg) by mouth daily 90 tablet 3     celecoxib (CELEBREX) 100 MG capsule TAKE 1 CAPSULE(100 MG) BY  MOUTH TWICE DAILY Strength: 100 mg 180 capsule 0     furosemide (LASIX) 40 MG tablet Take 1 tablet (40 mg) by mouth daily 90 tablet 3     hydroxychloroquine (PLAQUENIL) 200 MG tablet TAKE 1 TABLET(200 MG) BY MOUTH TWICE DAILY 180 tablet 1     lisinopril (ZESTRIL) 10 MG tablet Take 1 tablet (10 mg) by mouth daily 90 tablet 3     metFORMIN (GLUCOPHAGE-XR) 500 MG 24 hr tablet Take 3 tablets (1,500 mg) by mouth each AM and take 1 tablet (500 mg) by mouth each evening 360 tablet 3     tamsulosin (FLOMAX) 0.4 MG capsule Take 2 capsules (0.8 mg) by mouth every evening 180 capsule 3     amoxicillin-clavulanate (AUGMENTIN) 875-125 MG tablet Take 1 tablet by mouth 2 times daily for 10 days 20 tablet 2     folic acid (FOLVITE) 1 MG tablet Take 1 tablet (1 mg) by mouth daily for 90 days 90 tablet 1     methotrexate 2.5 MG tablet Take 6 tablets (15 mg) by mouth every 7 days for 90 days 72 tablet 0     family history includes Coronary Artery Disease in his father; Hypertension in his father.  Social Connections: Not on file          WBC   Date Value Ref Range Status   04/23/2021 8.2 4.0 - 11.0 10e9/L Final     WBC Count   Date Value Ref Range Status   10/07/2022 7.8 4.0 - 11.0 10e3/uL Final     RBC Count   Date Value Ref Range Status   10/07/2022 4.96 4.40 - 5.90 10e6/uL Final   04/23/2021 5.25 4.4 - 5.9 10e12/L Final     Hemoglobin   Date Value Ref Range Status   10/07/2022 14.7 13.3 - 17.7 g/dL Final   04/23/2021 15.2 13.3 - 17.7 g/dL Final     Hematocrit   Date Value Ref Range Status   10/07/2022 42.7 40.0 - 53.0 % Final   04/23/2021 44.8 40.0 - 53.0 % Final     MCV   Date Value Ref Range Status   10/07/2022 86 78 - 100 fL Final   04/23/2021 85 78 - 100 fl Final     MCH   Date Value Ref Range Status   10/07/2022 29.6 26.5 - 33.0 pg Final   04/23/2021 29.0 26.5 - 33.0 pg Final     Platelet Count   Date Value Ref Range Status   10/07/2022 220 150 - 450 10e3/uL Final   04/23/2021 222 150 - 450 10e9/L Final     % Lymphocytes    Date Value Ref Range Status   04/23/2021 20.9 % Final     AST   Date Value Ref Range Status   09/23/2022 17 10 - 50 U/L Final   10/12/2015 18 0 - 45 U/L Final     ALT   Date Value Ref Range Status   10/07/2022 20 10 - 50 U/L Final   10/12/2015 30 0 - 70 U/L Final     Albumin   Date Value Ref Range Status   10/07/2022 4.2 3.5 - 5.2 g/dL Final   05/03/2022 4.0 3.5 - 5.0 g/dL Final   10/12/2015 2.2 (L) 3.4 - 5.0 g/dL Final     Alkaline Phosphatase   Date Value Ref Range Status   09/23/2022 113 40 - 129 U/L Final   10/17/2015 133 40 - 150 U/L Final     Creatinine   Date Value Ref Range Status   10/07/2022 0.90 0.67 - 1.17 mg/dL Final   04/23/2021 0.86 0.66 - 1.25 mg/dL Final     GFR Estimate   Date Value Ref Range Status   10/07/2022 >90 >60 mL/min/1.73m2 Final     Comment:     Effective December 21, 2021 eGFRcr in adults is calculated using the 2021 CKD-EPI creatinine equation which includes age and gender (Barb et al., NE, DOI: 10.1056/HFAOno0451064)   04/23/2021 87 >60 mL/min/[1.73_m2] Final     Comment:     Non  GFR Calc  Starting 12/18/2018, serum creatinine based estimated GFR (eGFR) will be   calculated using the Chronic Kidney Disease Epidemiology Collaboration   (CKD-EPI) equation.       GFR Estimate If Black   Date Value Ref Range Status   04/23/2021 >90 >60 mL/min/[1.73_m2] Final     Comment:      GFR Calc  Starting 12/18/2018, serum creatinine based estimated GFR (eGFR) will be   calculated using the Chronic Kidney Disease Epidemiology Collaboration   (CKD-EPI) equation.       Creatinine Urine mg/dL   Date Value Ref Range Status   09/23/2022 154.0 mg/dL Final     Comment:     The reference ranges have not been established in urine creatinine. The results should be integrated into the clinical context for interpretation.   08/24/2021 40 mg/dL Final     Sed Rate   Date Value Ref Range Status   04/23/2021 9 0 - 20 mm/h Final     Erythrocyte Sedimentation Rate   Date Value  Ref Range Status   08/24/2021 2 0 - 15 mm/hr Final     CRP Inflammation   Date Value Ref Range Status   04/23/2021 3.6 0.0 - 8.0 mg/L Final     CRP   Date Value Ref Range Status   08/24/2021 0.2 0.0-<0.8 mg/dL Final

## 2023-01-26 ENCOUNTER — OFFICE VISIT (OUTPATIENT)
Dept: FAMILY MEDICINE | Facility: CLINIC | Age: 73
End: 2023-01-26
Payer: MEDICARE

## 2023-01-26 VITALS
OXYGEN SATURATION: 98 % | SYSTOLIC BLOOD PRESSURE: 120 MMHG | HEART RATE: 91 BPM | BODY MASS INDEX: 35.59 KG/M2 | WEIGHT: 257 LBS | DIASTOLIC BLOOD PRESSURE: 70 MMHG | RESPIRATION RATE: 18 BRPM

## 2023-01-26 DIAGNOSIS — E78.5 HYPERLIPIDEMIA, UNSPECIFIED HYPERLIPIDEMIA TYPE: ICD-10-CM

## 2023-01-26 DIAGNOSIS — G56.03 BILATERAL CARPAL TUNNEL SYNDROME: ICD-10-CM

## 2023-01-26 DIAGNOSIS — E66.01 MORBID OBESITY (H): ICD-10-CM

## 2023-01-26 DIAGNOSIS — E11.9 TYPE 2 DIABETES MELLITUS WITHOUT COMPLICATION, WITHOUT LONG-TERM CURRENT USE OF INSULIN (H): Primary | ICD-10-CM

## 2023-01-26 DIAGNOSIS — I10 ESSENTIAL HYPERTENSION: ICD-10-CM

## 2023-01-26 LAB
ANION GAP SERPL CALCULATED.3IONS-SCNC: 15 MMOL/L (ref 7–15)
BUN SERPL-MCNC: 12.1 MG/DL (ref 8–23)
CALCIUM SERPL-MCNC: 9.3 MG/DL (ref 8.8–10.2)
CHLORIDE SERPL-SCNC: 100 MMOL/L (ref 98–107)
CREAT SERPL-MCNC: 0.83 MG/DL (ref 0.67–1.17)
DEPRECATED HCO3 PLAS-SCNC: 24 MMOL/L (ref 22–29)
GFR SERPL CREATININE-BSD FRML MDRD: >90 ML/MIN/1.73M2
GLUCOSE SERPL-MCNC: 200 MG/DL (ref 70–99)
HBA1C MFR BLD: 7.8 % (ref 0–5.6)
HOLD SPECIMEN: NORMAL
POTASSIUM SERPL-SCNC: 4 MMOL/L (ref 3.4–5.3)
SODIUM SERPL-SCNC: 139 MMOL/L (ref 136–145)

## 2023-01-26 PROCEDURE — 83036 HEMOGLOBIN GLYCOSYLATED A1C: CPT | Performed by: FAMILY MEDICINE

## 2023-01-26 PROCEDURE — 99214 OFFICE O/P EST MOD 30 MIN: CPT | Performed by: FAMILY MEDICINE

## 2023-01-26 PROCEDURE — 80048 BASIC METABOLIC PNL TOTAL CA: CPT | Performed by: FAMILY MEDICINE

## 2023-01-26 PROCEDURE — 36415 COLL VENOUS BLD VENIPUNCTURE: CPT | Performed by: FAMILY MEDICINE

## 2023-01-26 RX ORDER — LISINOPRIL 10 MG/1
10 TABLET ORAL DAILY
Qty: 90 TABLET | Refills: 3 | Status: SHIPPED | OUTPATIENT
Start: 2023-01-26 | End: 2024-03-25

## 2023-01-26 ASSESSMENT — PAIN SCALES - GENERAL: PAINLEVEL: NO PAIN (0)

## 2023-01-26 NOTE — PROGRESS NOTES
Assessment/Plan:    Type 2 diabetes mellitus without complication, without long-term current use of insulin (H)  Type 2 diabetes reviewed with worsening noted.  A1c increasing from 6.8% up to 7.8% today.  Nonfasting.  Patient continues metformin  mg using 3 tablets in the morning 1 tablet in the evening.  Declines further medication initiation and wants to focus on diet and exercise with weight goal less than 250 pounds initially, less than 240 pounds ideally.  Patient should complete diabetic eye exam at earliest convenience with [facility].  Was told last fall to come back apparently.  Reassess diabetes control at annual wellness visit in 4 months.  - Hemoglobin A1c  - Hemoglobin A1c  - Adult Eye  Referral  - Basic metabolic panel    Essential hypertension  Refill provided for lisinopril 10 mg daily which he ran out of about 7 to 10 days ago apparently.  Continuing amlodipine 5 mg daily.  Blood pressure 120/70 noted today.  - lisinopril (ZESTRIL) 10 MG tablet  Dispense: 90 tablet; Refill: 3  - Basic metabolic panel    Hyperlipidemia, unspecified hyperlipidemia type  Continues atorvastatin 20 mg daily for lipid management.  Lipid cascade reviewed from September 23, 2022 with results near goal.  Weight goal less than 250 pounds initially, less than 240 pounds ideally.    Morbid obesity (H)  Severe obesity with BMI greater than 35 with underlying history of type 2 diabetes, hypertension and hyperlipidemia.  Benefits of weight loss with weight goal less than 250 pounds initially, less than 240 pounds ideally noted.    Bilateral carpal tunnel syndrome  Right greater than left carpal tunnel syndrome present on exam today.  Had left carpal tunnel release in 2019 and was followed by Dr. Suhail Combs with [facility].  We will follow-up with [facility] regarding likely right carpal tunnel release recommendation initially  - Orthopedic  Referral          Subjective:    Thiago Boggs  "Angel Luis is seen today for follow-up assessment.  Type 2 diabetes using metformin  mg using 3 tablets in the morning 1 tablet in the evening with meals.  Prior A1c had improved from 7.5% down to 6.8% September 23, 2022.  Dietary indiscretions and less active over the holiday and winter season.  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  Utilizes amlodipine 5 mg daily and lisinopril 10 mg daily for hypertension.  Ran out of lisinopril 7 to 10 days ago.  Needs refill.  Atorvastatin 20 mg daily for lipid management.  Follows with Dr. Bundy regarding history of rheumatoid arthritis.  Right greater than left hand paresthesias with tingling and buzzing sensation predominantly in thumb index and middle finger with some and ring finger as well.  Had left carpal tunnel release back in 2019 however did not have any significant improvement described following.  Orthopedic specialist is no longer with [facility] apparently.  Had been seen by Dr. Suhail Combs with [facility] for his care previously.    Single   1 son - 32 \"Kraig\"   Tobacco: none   EtOH: none   Mom - Meena   Dad - currently 86 Yovanny - DM, CAD, HTN, high cholesterol, back problems, arthritis, spinal stenosis, pacemaker/defibrillator   1 bro - Be - HTN   Surgeries: \"lumbar back surgery for cyst removal\" - September, 2008 (Dr. 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[place], [place])   Hobbies:    Past Surgical History:   Procedure Laterality Date     ANESTHESIA OUT OF OR MRI N/A 10/2/2015    Procedure: ANESTHESIA OUT OF OR MRI;  Surgeon: GENERIC ANESTHESIA PROVIDER;  Location: [facility] OR     BACK SURGERY  2008    lumbar spine surgery-- unclear details     SOFT TISSUE SURGERY  2012    skin cancer removed from L shoulder, " chest, right neck        Family History   Problem Relation Age of Onset     Coronary Artery Disease Father      Hypertension Father         Past Medical History:   Diagnosis Date     Diabetes (H)      Hypertension         Social History     Tobacco Use     Smoking status: Never     Smokeless tobacco: Former   Substance Use Topics     Alcohol use: No     Comment: Quit in 1990     Drug use: No        Current Outpatient Medications   Medication Sig Dispense Refill     amLODIPine (NORVASC) 5 MG tablet Take 1 tablet (5 mg) by mouth daily 90 tablet 3     atorvastatin (LIPITOR) 20 MG tablet Take 1 tablet (20 mg) by mouth daily 90 tablet 3     celecoxib (CELEBREX) 100 MG capsule TAKE 1 CAPSULE(100 MG) BY MOUTH TWICE DAILY Strength: 100 mg 180 capsule 0     folic acid (FOLVITE) 1 MG tablet Take 1 tablet (1 mg) by mouth daily 90 tablet 1     furosemide (LASIX) 40 MG tablet Take 1 tablet (40 mg) by mouth daily 90 tablet 3     hydroxychloroquine (PLAQUENIL) 200 MG tablet TAKE 1 TABLET(200 MG) BY MOUTH TWICE DAILY 180 tablet 1     lisinopril (ZESTRIL) 10 MG tablet Take 1 tablet (10 mg) by mouth daily 90 tablet 3     metFORMIN (GLUCOPHAGE-XR) 500 MG 24 hr tablet Take 3 tablets (1,500 mg) by mouth each AM and take 1 tablet (500 mg) by mouth each evening 360 tablet 3     methotrexate 2.5 MG tablet Take 6 tablets (15 mg) by mouth every 7 days 72 tablet 0     tamsulosin (FLOMAX) 0.4 MG capsule Take 2 capsules (0.8 mg) by mouth every evening 180 capsule 3          Objective:    Vitals:    01/26/23 0923   BP: 120/70   Pulse: 91   Resp: 18   SpO2: 98%   Weight: 116.6 kg (257 lb)      Body mass index is 35.59 kg/m .    Alert.  No apparent distress.  Neurologic exam consistent with right greater than left carpal tunnel syndrome symptoms and paresthesias distally.  Cardiac exam regular.  Chest clear.  BMI 35.59.      This note has been dictated using voice recognition software and as a result may contain minor grammatical errors and  unintended word substitutions.

## 2023-02-02 DIAGNOSIS — M15.0 PRIMARY OSTEOARTHRITIS INVOLVING MULTIPLE JOINTS: ICD-10-CM

## 2023-02-02 RX ORDER — CELECOXIB 100 MG/1
CAPSULE ORAL
Qty: 180 CAPSULE | Refills: 0 | OUTPATIENT
Start: 2023-02-02

## 2023-02-02 NOTE — TELEPHONE ENCOUNTER
Per notes He is trying to get off Celebrex we discussed again the downsides of mixing Celebrex and methotrexate.  He will try acetaminophen instead.

## 2023-02-13 DIAGNOSIS — M15.0 PRIMARY OSTEOARTHRITIS INVOLVING MULTIPLE JOINTS: ICD-10-CM

## 2023-02-13 NOTE — TELEPHONE ENCOUNTER
Per provider patient needs labs now.     Based on labs may consider refill with dosage adjustment.

## 2023-02-13 NOTE — TELEPHONE ENCOUNTER
"Diagnoses and all orders for this visit:     Seronegative rheumatoid arthritis (H)  -     methotrexate 2.5 MG tablet; Take 6 tablets (15 mg) by mouth every 7 days  -     folic acid (FOLVITE) 1 MG tablet; Take 1 tablet (1 mg) by mouth daily  High risk medication use  -     folic acid (FOLVITE) 1 MG tablet; Take 1 tablet (1 mg) by mouth daily  -     ALT; Standing  -     Albumin level; Standing  -     CBC with platelets; Standing  -     Creatinine; Standing    Primary osteoarthritis involving multiple joints  Polyarthralgia    While his rheumatoid arthritis is well controlled with the current regimen he has noted stiffness after he has sat for a while in his truck or elsewhere that is making him stiff for the initial 4 to 5 minutes as he begins to walk.  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  He has had carpal tunnel release done in the past.  He has diabetes.  We discussed however variety of situations can be associated with this however unlikely to be due to RA.  We will meet here in 3 months he will do labs today or tomorrow and then just prior to next visit.  Notes from 10/10/22:  He is trying to get off Celebrex we discussed again the downsides of mixing Celebrex and methotrexate.  He will try acetaminophen instead    LOV:1/10/23  FOV:4/24/23    Last labs 10/7/22 WNL   "

## 2023-02-15 ENCOUNTER — LAB (OUTPATIENT)
Dept: LAB | Facility: CLINIC | Age: 73
End: 2023-02-15
Payer: MEDICARE

## 2023-02-15 DIAGNOSIS — Z79.899 HIGH RISK MEDICATION USE: ICD-10-CM

## 2023-02-15 LAB
ALBUMIN SERPL BCG-MCNC: 4.5 G/DL (ref 3.5–5.2)
ALT SERPL W P-5'-P-CCNC: 31 U/L (ref 10–50)
CREAT SERPL-MCNC: 0.97 MG/DL (ref 0.67–1.17)
ERYTHROCYTE [DISTWIDTH] IN BLOOD BY AUTOMATED COUNT: 14.1 % (ref 10–15)
GFR SERPL CREATININE-BSD FRML MDRD: 83 ML/MIN/1.73M2
HCT VFR BLD AUTO: 41.5 % (ref 40–53)
HGB BLD-MCNC: 14.5 G/DL (ref 13.3–17.7)
MCH RBC QN AUTO: 30 PG (ref 26.5–33)
MCHC RBC AUTO-ENTMCNC: 34.9 G/DL (ref 31.5–36.5)
MCV RBC AUTO: 86 FL (ref 78–100)
PLATELET # BLD AUTO: 253 10E3/UL (ref 150–450)
RBC # BLD AUTO: 4.84 10E6/UL (ref 4.4–5.9)
WBC # BLD AUTO: 8.2 10E3/UL (ref 4–11)

## 2023-02-15 PROCEDURE — 82040 ASSAY OF SERUM ALBUMIN: CPT

## 2023-02-15 PROCEDURE — 85027 COMPLETE CBC AUTOMATED: CPT

## 2023-02-15 PROCEDURE — 82565 ASSAY OF CREATININE: CPT

## 2023-02-15 PROCEDURE — 36415 COLL VENOUS BLD VENIPUNCTURE: CPT

## 2023-02-15 PROCEDURE — 84460 ALANINE AMINO (ALT) (SGPT): CPT

## 2023-02-15 RX ORDER — CELECOXIB 100 MG/1
CAPSULE ORAL
Qty: 180 CAPSULE | Refills: 0 | OUTPATIENT
Start: 2023-02-15

## 2023-02-15 NOTE — TELEPHONE ENCOUNTER
Pt on [facility] schedule for today. Pt states he would really like his medication today if possible.

## 2023-02-16 ENCOUNTER — NURSE TRIAGE (OUTPATIENT)
Dept: NURSING | Facility: CLINIC | Age: 73
End: 2023-02-16
Payer: MEDICARE

## 2023-02-16 RX ORDER — CELECOXIB 100 MG/1
100 CAPSULE ORAL DAILY
Qty: 90 CAPSULE | Refills: 0 | Status: SHIPPED | OUTPATIENT
Start: 2023-02-16 | End: 2023-05-31

## 2023-02-16 NOTE — TELEPHONE ENCOUNTER
Patient is calling to follow up on medication refill request for his celebrex. Please notify patient if anything more needs to be done to receive refill.    No sports/gym/No weight bearing/No heavy lifting/No excercise

## 2023-02-17 NOTE — TELEPHONE ENCOUNTER
[facility] Pharmacist [facility] calling for clarification of prescription for Celecoxib sent today.     Writer reviewed chart note from FREEMAN Bhandari with Jodee:        February 16, 2023  Elisabet Donahue, RN     [facility]     4:15 PM  Note     Per provider reduce to once daily. rx sent.         Jodee verbalizes understanding, no further questions or concerns at this time. Message to clinic to correct prescription in Epic.     Reason for Disposition    Pharmacy calling with prescription question and triager answers question    Protocols used: MEDICATION QUESTION CALL-A-

## 2023-03-08 ENCOUNTER — TRANSFERRED RECORDS (OUTPATIENT)
Dept: HEALTH INFORMATION MANAGEMENT | Facility: CLINIC | Age: 73
End: 2023-03-08

## 2023-03-27 DIAGNOSIS — I10 ESSENTIAL HYPERTENSION: ICD-10-CM

## 2023-03-27 RX ORDER — AMLODIPINE BESYLATE 5 MG/1
TABLET ORAL
Qty: 90 TABLET | Refills: 3 | Status: SHIPPED | OUTPATIENT
Start: 2023-03-27 | End: 2024-05-24

## 2023-03-27 NOTE — TELEPHONE ENCOUNTER
"Last Written Prescription Date:  3/3/2022  Last Fill Quantity: 90,  # refills: 3   Last office visit provider:  1/26/2023     Requested Prescriptions   Pending Prescriptions Disp Refills     amLODIPine (NORVASC) 5 MG tablet [Pharmacy Med Name: AMLODIPINE BESYLATE 5MG TABLETS] 90 tablet 3     Sig: TAKE 1 TABLET(5 MG) BY MOUTH DAILY       Calcium Channel Blockers Protocol  Passed - 3/27/2023  3:56 PM        Passed - Blood pressure under 140/90 in past 12 months     BP Readings from Last 3 Encounters:   01/26/23 120/70   01/10/23 130/78   10/25/22 135/75                 Passed - Recent (12 mo) or future (30 days) visit within the authorizing provider's specialty     Patient has had an office visit with the authorizing provider or a provider within the authorizing providers department within the previous 12 mos or has a future within next 30 days. See \"Patient Info\" tab in inbasket, or \"Choose Columns\" in Meds & Orders section of the refill encounter.              Passed - Medication is active on med list        Passed - Patient is age 18 or older        Passed - Normal serum creatinine on file in past 12 months     Recent Labs   Lab Test 02/15/23  0937   CR 0.97       Ok to refill medication if creatinine is low               Annalise Chen RN 03/27/23 3:56 PM  "

## 2023-04-10 DIAGNOSIS — M15.0 PRIMARY OSTEOARTHRITIS INVOLVING MULTIPLE JOINTS: ICD-10-CM

## 2023-04-10 RX ORDER — CELECOXIB 100 MG/1
CAPSULE ORAL
Qty: 90 CAPSULE | Refills: 0 | OUTPATIENT
Start: 2023-04-10

## 2023-04-23 DIAGNOSIS — E11.9 TYPE 2 DIABETES MELLITUS WITHOUT COMPLICATION, WITHOUT LONG-TERM CURRENT USE OF INSULIN (H): ICD-10-CM

## 2023-04-23 RX ORDER — METFORMIN HCL 500 MG
TABLET, EXTENDED RELEASE 24 HR ORAL
Qty: 360 TABLET | Refills: 2 | Status: SHIPPED | OUTPATIENT
Start: 2023-04-23 | End: 2024-03-05

## 2023-04-23 NOTE — TELEPHONE ENCOUNTER
"Last Written Prescription Date:  5/3/22  Last Fill Quantity: 360,  # refills: 3   Last office visit provider:  1/26/23     Requested Prescriptions   Pending Prescriptions Disp Refills     metFORMIN (GLUCOPHAGE XR) 500 MG 24 hr tablet [Pharmacy Med Name: METFORMIN ER 500MG 24HR TABS] 360 tablet 3     Sig: TAKE 3 TABLETS BY MOUTH EVERY MORNING AND 1 TABLET EVERY EVENING       Biguanide Agents Passed - 4/23/2023  4:27 AM        Passed - Patient is age 10 or older        Passed - Patient has documented A1c within the specified period of time.     If HgbA1C is 8 or greater, it needs to be on file within the past 3 months.  If less than 8, must be on file within the past 6 months.     Recent Labs   Lab Test 01/26/23  0932   A1C 7.8*             Passed - Patient's CR is NOT>1.4 OR Patient's EGFR is NOT<45 within past 12 mos.     Recent Labs   Lab Test 02/15/23  0937 08/24/21  1045 04/23/21  0711   GFRESTIMATED 83   < > 87   GFRESTBLACK  --   --  >90    < > = values in this interval not displayed.       Recent Labs   Lab Test 02/15/23  0937   CR 0.97             Passed - Patient does NOT have a diagnosis of CHF.        Passed - Medication is active on med list        Passed - Recent (6 mo) or future (30 days) visit within the authorizing provider's specialty     Patient had office visit in the last 6 months or has a visit in the next 30 days with authorizing provider or within the authorizing provider's specialty.  See \"Patient Info\" tab in inbasket, or \"Choose Columns\" in Meds & Orders section of the refill encounter.                 Daisy Sequeira 04/23/23 1:46 PM  "

## 2023-04-24 ENCOUNTER — OFFICE VISIT (OUTPATIENT)
Dept: RHEUMATOLOGY | Facility: CLINIC | Age: 73
End: 2023-04-24
Payer: MEDICARE

## 2023-04-24 VITALS — HEART RATE: 88 BPM | SYSTOLIC BLOOD PRESSURE: 124 MMHG | DIASTOLIC BLOOD PRESSURE: 60 MMHG

## 2023-04-24 DIAGNOSIS — M15.0 PRIMARY OSTEOARTHRITIS INVOLVING MULTIPLE JOINTS: ICD-10-CM

## 2023-04-24 DIAGNOSIS — Z79.899 HIGH RISK MEDICATION USE: ICD-10-CM

## 2023-04-24 DIAGNOSIS — M25.50 POLYARTHRALGIA: ICD-10-CM

## 2023-04-24 DIAGNOSIS — M06.00 SERONEGATIVE RHEUMATOID ARTHRITIS (H): Primary | ICD-10-CM

## 2023-04-24 PROCEDURE — 99214 OFFICE O/P EST MOD 30 MIN: CPT | Performed by: INTERNAL MEDICINE

## 2023-04-24 RX ORDER — FOLIC ACID 1 MG/1
1 TABLET ORAL DAILY
Qty: 90 TABLET | Refills: 1 | Status: SHIPPED | OUTPATIENT
Start: 2023-04-24 | End: 2023-09-15

## 2023-04-24 RX ORDER — HYDROXYCHLOROQUINE SULFATE 200 MG/1
200 TABLET, FILM COATED ORAL 2 TIMES DAILY
Qty: 180 TABLET | Refills: 1 | Status: SHIPPED | OUTPATIENT
Start: 2023-04-24 | End: 2023-07-03

## 2023-04-24 NOTE — PROGRESS NOTES
"      Rheumatology follow-up visit note     Thiago is a 73 year old male presents today for follow-up.    Thiago was seen today for recheck.    Diagnoses and all orders for this visit:    Seronegative rheumatoid arthritis (H)  -     methotrexate 2.5 MG tablet; Take 6 tablets (15 mg) by mouth every 7 days  -     folic acid (FOLVITE) 1 MG tablet; Take 1 tablet (1 mg) by mouth daily  -     hydroxychloroquine (PLAQUENIL) 200 MG tablet; Take 1 tablet (200 mg) by mouth 2 times daily    Primary osteoarthritis involving multiple joints    High risk medication use  -     folic acid (FOLVITE) 1 MG tablet; Take 1 tablet (1 mg) by mouth daily    Polyarthralgia        This patient seronegative rheumatoid arthritis appears to be well controlled however his knee pain is likely due to osteoarthritis management principles were online.  Pharmacologic and nonpharmacologic measures discussed.  He is going to take acetaminophen sustained-release.  He has the option of corticosteroid injections, physical therapy.  With regards to his right hand symptoms he is already due to be seen in orthopedics and 2 days.  He gets his eyes examined shortly.    Follow up in 3 months.    HPI    Thiago Hein is a 73 year old male is here for follow-up of   rheumatoid arthritis, osteoarthritis, on methotrexate 15 mg/week and hydroxychloroquine.  He is here sooner than originally planned.  This is because of bilateral knee pain he feels like he is aged for many years during the past 4 weeks.  He is having difficulty walking because of the knee pain this is bilateral this is especially true after he has been sitting for a length of time bursting in the morning is the best time for him he has not observed swelling.  He has been taking his medication on a regular basis.  Although he most recently he did run out and took only 7.5 mg of methotrexate.  He is also experiencing more \"buzzing\" in his right hand where he has had carpal tunnel release and is " due to be seen by surgeons again.  He is known to have osteoarthritis besides rheumatoid arthritis reviewed the recent x-rays of the knees.       DETAILED EXAMINATION  04/24/23  :    Vitals:    04/24/23 0848   BP: 124/60   Pulse: 88     Alert oriented. Head including the face is examined for malar rash, heliotropes, scarring, lupus pernio. Eyes examined for redness such as in episcleritis/scleritis, periorbital lesions.   Neck/ Face examined for parotid gland swelling, range of motion of neck.  Left upper and lower and right upper and lower extremities examined for tenderness, swelling, warmth of the appendicular joints, range of motion, edema, rash.  Some of the important findings included: he does not have evidence of synovitis in the palpable joints of the upper extremities.  He has joint line tenderness of the knees both sides medially, without effusion that is detectable, no warmth.  Patient Active Problem List    Diagnosis Date Noted     Chronic polyarticular juvenile rheumatoid arthritis (H) 08/24/2021     Priority: Medium     Formatting of this note might be different from the original.  Created by Conversion    Replacement Utility updated for latest IMO load       Emotional lability 08/24/2021     Priority: Medium     Formatting of this note might be different from the original.  Created by Conversion       Lower back pain 08/24/2021     Priority: Medium     Formatting of this note might be different from the original.  Created by Conversion       Morbid obesity (H) 08/24/2021     Priority: Medium     BPH with urinary obstruction 02/20/2020     Priority: Medium     Primary osteoarthritis involving multiple joints 10/08/2019     Priority: Medium     Left carpal tunnel syndrome 12/27/2018     Priority: Medium     Trigger finger, right index finger 09/26/2018     Priority: Medium     Polyarthralgia 07/20/2018     Priority: Medium     Unintentional weight loss 06/01/2018     Priority: Medium     Non morbid  obesity due to excess calories 07/14/2017     Priority: Medium     Peripheral edema 07/14/2017     Priority: Medium     Diverticulosis 11/15/2016     Priority: Medium     Hyperlipidemia 08/04/2016     Priority: Medium     Need for pneumococcal vaccination 01/11/2016     Priority: Medium     Need for vaccination 11/20/2015     Priority: Medium     Type 2 diabetes mellitus (H) 11/11/2015     Priority: Medium     Formatting of this note might be different from the original.  Created by Conversion       Bacteremia due to Staphylococcus aureus 11/06/2015     Priority: Medium     Dysphagia 11/06/2015     Priority: Medium     Epidural abscess 11/06/2015     Priority: Medium     Essential hypertension 11/06/2015     Priority: Medium     Formatting of this note might be different from the original.  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/2015     Priority: Medium     Leukocytosis 09/29/2015     Priority: Medium     Sepsis (H) 09/29/2015     Priority: Medium     Past Surgical History:   Procedure Laterality Date     ANESTHESIA OUT OF OR MRI N/A 10/2/2015    Procedure: ANESTHESIA OUT OF OR MRI;  Surgeon: GENERIC ANESTHESIA PROVIDER;  Location: [facility] OR     BACK SURGERY  2008    lumbar spine surgery-- unclear details     SOFT TISSUE SURGERY  2012    skin cancer removed from L shoulder, chest, right neck      Past Medical History:   Diagnosis Date     Diabetes (H)      Hypertension      No Known Allergies  Current Outpatient Medications   Medication Sig Dispense Refill     amLODIPine (NORVASC) 5 MG tablet TAKE 1 TABLET(5 MG) BY MOUTH DAILY 90 tablet 3     atorvastatin (LIPITOR) 20 MG tablet Take 1 tablet (20 mg) by mouth daily 90 tablet 3     folic acid (FOLVITE) 1 MG tablet Take 1 tablet (1 mg) by mouth daily  90 tablet 1     furosemide (LASIX) 40 MG tablet Take 1 tablet (40 mg) by mouth daily 90 tablet 3     hydroxychloroquine (PLAQUENIL) 200 MG tablet TAKE 1 TABLET(200 MG) BY MOUTH TWICE DAILY 180 tablet 1     lisinopril (ZESTRIL) 10 MG tablet Take 1 tablet (10 mg) by mouth daily 90 tablet 3     metFORMIN (GLUCOPHAGE XR) 500 MG 24 hr tablet TAKE 3 TABLETS BY MOUTH EVERY MORNING AND 1 TABLET EVERY EVENING 360 tablet 2     methotrexate 2.5 MG tablet Take 6 tablets (15 mg) by mouth every 7 days 72 tablet 0     tamsulosin (FLOMAX) 0.4 MG capsule Take 2 capsules (0.8 mg) by mouth every evening 180 capsule 3     celecoxib (CELEBREX) 100 MG capsule Take 1 capsule (100 mg) by mouth daily TAKE 1 CAPSULE(100 MG) BY MOUTH TWICE DAILY Strength: 100 mg (Patient not taking: Reported on 4/24/2023) 90 capsule 0     family history includes Coronary Artery Disease in his father; Hypertension in his father.  Social Connections: Not on file          WBC   Date Value Ref Range Status   04/23/2021 8.2 4.0 - 11.0 10e9/L Final     WBC Count   Date Value Ref Range Status   02/15/2023 8.2 4.0 - 11.0 10e3/uL Final     RBC Count   Date Value Ref Range Status   02/15/2023 4.84 4.40 - 5.90 10e6/uL Final   04/23/2021 5.25 4.4 - 5.9 10e12/L Final     Hemoglobin   Date Value Ref Range Status   02/15/2023 14.5 13.3 - 17.7 g/dL Final   04/23/2021 15.2 13.3 - 17.7 g/dL Final     Hematocrit   Date Value Ref Range Status   02/15/2023 41.5 40.0 - 53.0 % Final   04/23/2021 44.8 40.0 - 53.0 % Final     MCV   Date Value Ref Range Status   02/15/2023 86 78 - 100 fL Final   04/23/2021 85 78 - 100 fl Final     MCH   Date Value Ref Range Status   02/15/2023 30.0 26.5 - 33.0 pg Final   04/23/2021 29.0 26.5 - 33.0 pg Final     Platelet Count   Date Value Ref Range Status   02/15/2023 253 150 - 450 10e3/uL Final   04/23/2021 222 150 - 450 10e9/L Final     % Lymphocytes   Date Value Ref Range Status   04/23/2021 20.9 % Final     AST   Date Value Ref Range Status    09/23/2022 17 10 - 50 U/L Final   10/12/2015 18 0 - 45 U/L Final     ALT   Date Value Ref Range Status   02/15/2023 31 10 - 50 U/L Final   10/12/2015 30 0 - 70 U/L Final     Albumin   Date Value Ref Range Status   02/15/2023 4.5 3.5 - 5.2 g/dL Final   05/03/2022 4.0 3.5 - 5.0 g/dL Final   10/12/2015 2.2 (L) 3.4 - 5.0 g/dL Final     Alkaline Phosphatase   Date Value Ref Range Status   09/23/2022 113 40 - 129 U/L Final   10/17/2015 133 40 - 150 U/L Final     Creatinine   Date Value Ref Range Status   02/15/2023 0.97 0.67 - 1.17 mg/dL Final   04/23/2021 0.86 0.66 - 1.25 mg/dL Final     GFR Estimate   Date Value Ref Range Status   02/15/2023 83 >60 mL/min/1.73m2 Final     Comment:     eGFR calculated using 2021 CKD-EPI equation.   04/23/2021 87 >60 mL/min/[1.73_m2] Final     Comment:     Non  GFR Calc  Starting 12/18/2018, serum creatinine based estimated GFR (eGFR) will be   calculated using the Chronic Kidney Disease Epidemiology Collaboration   (CKD-EPI) equation.       GFR Estimate If Black   Date Value Ref Range Status   04/23/2021 >90 >60 mL/min/[1.73_m2] Final     Comment:      GFR Calc  Starting 12/18/2018, serum creatinine based estimated GFR (eGFR) will be   calculated using the Chronic Kidney Disease Epidemiology Collaboration   (CKD-EPI) equation.       Creatinine Urine mg/dL   Date Value Ref Range Status   09/23/2022 154.0 mg/dL Final     Comment:     The reference ranges have not been established in urine creatinine. The results should be integrated into the clinical context for interpretation.   08/24/2021 40 mg/dL Final     Sed Rate   Date Value Ref Range Status   04/23/2021 9 0 - 20 mm/h Final     Erythrocyte Sedimentation Rate   Date Value Ref Range Status   08/24/2021 2 0 - 15 mm/hr Final     CRP Inflammation   Date Value Ref Range Status   04/23/2021 3.6 0.0 - 8.0 mg/L Final     CRP   Date Value Ref Range Status   08/24/2021 0.2 0.0-<0.8 mg/dL Final

## 2023-04-26 ENCOUNTER — TRANSFERRED RECORDS (OUTPATIENT)
Dept: HEALTH INFORMATION MANAGEMENT | Facility: CLINIC | Age: 73
End: 2023-04-26
Payer: MEDICARE

## 2023-05-01 ENCOUNTER — TRANSFERRED RECORDS (OUTPATIENT)
Dept: HEALTH INFORMATION MANAGEMENT | Facility: CLINIC | Age: 73
End: 2023-05-01
Payer: MEDICARE

## 2023-05-07 DIAGNOSIS — R60.0 PERIPHERAL EDEMA: ICD-10-CM

## 2023-05-07 RX ORDER — FUROSEMIDE 40 MG
TABLET ORAL
Qty: 90 TABLET | Refills: 2 | Status: SHIPPED | OUTPATIENT
Start: 2023-05-07 | End: 2024-03-25

## 2023-05-07 NOTE — TELEPHONE ENCOUNTER
"Last Written Prescription Date:  5/3/2022  Last Fill Quantity: 90,  # refills: 3   Last office visit provider:  1/26/2023     Requested Prescriptions   Pending Prescriptions Disp Refills     furosemide (LASIX) 40 MG tablet [Pharmacy Med Name: FUROSEMIDE 40MG TABLETS] 90 tablet 3     Sig: TAKE 1 TABLET(40 MG) BY MOUTH DAILY       Diuretics (Including Combos) Protocol Passed - 5/7/2023 12:03 PM        Passed - Blood pressure under 140/90 in past 12 months     BP Readings from Last 3 Encounters:   04/24/23 124/60   01/26/23 120/70   01/10/23 130/78                 Passed - Recent (12 mo) or future (30 days) visit within the authorizing provider's specialty     Patient has had an office visit with the authorizing provider or a provider within the authorizing providers department within the previous 12 mos or has a future within next 30 days. See \"Patient Info\" tab in inbasket, or \"Choose Columns\" in Meds & Orders section of the refill encounter.              Passed - Medication is active on med list        Passed - Patient is age 18 or older        Passed - Normal serum creatinine on file in past 12 months     Recent Labs   Lab Test 02/15/23  0937   CR 0.97              Passed - Normal serum potassium on file in past 12 months     Recent Labs   Lab Test 01/26/23  0932   POTASSIUM 4.0                    Passed - Normal serum sodium on file in past 12 months     Recent Labs   Lab Test 01/26/23  0932                      CHLOÉ RODRIGUEZ RN 05/07/23 12:04 PM  " No

## 2023-05-09 DIAGNOSIS — Z11.3 ROUTINE SCREENING FOR STI (SEXUALLY TRANSMITTED INFECTION): Primary | ICD-10-CM

## 2023-05-10 ENCOUNTER — LAB (OUTPATIENT)
Dept: LAB | Facility: CLINIC | Age: 73
End: 2023-05-10
Payer: MEDICARE

## 2023-05-10 DIAGNOSIS — Z11.3 ROUTINE SCREENING FOR STI (SEXUALLY TRANSMITTED INFECTION): ICD-10-CM

## 2023-05-10 DIAGNOSIS — E11.9 TYPE 2 DIABETES MELLITUS WITHOUT COMPLICATION, WITHOUT LONG-TERM CURRENT USE OF INSULIN (H): ICD-10-CM

## 2023-05-10 DIAGNOSIS — Z79.899 HIGH RISK MEDICATION USE: ICD-10-CM

## 2023-05-10 LAB
ALBUMIN SERPL BCG-MCNC: 4.5 G/DL (ref 3.5–5.2)
ALT SERPL W P-5'-P-CCNC: 24 U/L (ref 10–50)
CREAT SERPL-MCNC: 0.87 MG/DL (ref 0.67–1.17)
ERYTHROCYTE [DISTWIDTH] IN BLOOD BY AUTOMATED COUNT: 13.1 % (ref 10–15)
GFR SERPL CREATININE-BSD FRML MDRD: >90 ML/MIN/1.73M2
HBA1C MFR BLD: 7.8 %
HCT VFR BLD AUTO: 43 % (ref 40–53)
HGB BLD-MCNC: 14.8 G/DL (ref 13.3–17.7)
HIV 1+2 AB+HIV1 P24 AG SERPL QL IA: NONREACTIVE
MCH RBC QN AUTO: 29.7 PG (ref 26.5–33)
MCHC RBC AUTO-ENTMCNC: 34.4 G/DL (ref 31.5–36.5)
MCV RBC AUTO: 86 FL (ref 78–100)
PLATELET # BLD AUTO: 235 10E3/UL (ref 150–450)
RBC # BLD AUTO: 4.98 10E6/UL (ref 4.4–5.9)
WBC # BLD AUTO: 8.1 10E3/UL (ref 4–11)

## 2023-05-10 PROCEDURE — 82040 ASSAY OF SERUM ALBUMIN: CPT | Performed by: PATHOLOGY

## 2023-05-10 PROCEDURE — 87389 HIV-1 AG W/HIV-1&-2 AB AG IA: CPT | Performed by: INTERNAL MEDICINE

## 2023-05-10 PROCEDURE — 82565 ASSAY OF CREATININE: CPT | Performed by: PATHOLOGY

## 2023-05-10 PROCEDURE — 36415 COLL VENOUS BLD VENIPUNCTURE: CPT | Performed by: PATHOLOGY

## 2023-05-10 PROCEDURE — 84460 ALANINE AMINO (ALT) (SGPT): CPT | Performed by: PATHOLOGY

## 2023-05-10 PROCEDURE — 85027 COMPLETE CBC AUTOMATED: CPT | Performed by: PATHOLOGY

## 2023-05-10 PROCEDURE — 83036 HEMOGLOBIN GLYCOSYLATED A1C: CPT | Performed by: FAMILY MEDICINE

## 2023-05-18 ENCOUNTER — TRANSFERRED RECORDS (OUTPATIENT)
Dept: HEALTH INFORMATION MANAGEMENT | Facility: CLINIC | Age: 73
End: 2023-05-18
Payer: MEDICARE

## 2023-05-30 ENCOUNTER — TRANSFERRED RECORDS (OUTPATIENT)
Dept: HEALTH INFORMATION MANAGEMENT | Facility: CLINIC | Age: 73
End: 2023-05-30
Payer: MEDICARE

## 2023-05-31 ENCOUNTER — OFFICE VISIT (OUTPATIENT)
Dept: FAMILY MEDICINE | Facility: CLINIC | Age: 73
End: 2023-05-31
Payer: MEDICARE

## 2023-05-31 VITALS
OXYGEN SATURATION: 98 % | TEMPERATURE: 97.4 F | WEIGHT: 253 LBS | HEIGHT: 71 IN | RESPIRATION RATE: 19 BRPM | BODY MASS INDEX: 35.42 KG/M2 | HEART RATE: 95 BPM | DIASTOLIC BLOOD PRESSURE: 70 MMHG | SYSTOLIC BLOOD PRESSURE: 128 MMHG

## 2023-05-31 DIAGNOSIS — E66.01 MORBID OBESITY (H): ICD-10-CM

## 2023-05-31 DIAGNOSIS — M06.00 RHEUMATOID ARTHRITIS WITH NEGATIVE RHEUMATOID FACTOR, INVOLVING UNSPECIFIED SITE (H): ICD-10-CM

## 2023-05-31 DIAGNOSIS — E11.9 TYPE 2 DIABETES MELLITUS WITHOUT COMPLICATION, WITHOUT LONG-TERM CURRENT USE OF INSULIN (H): ICD-10-CM

## 2023-05-31 DIAGNOSIS — L30.1 DYSHIDROTIC ECZEMA: ICD-10-CM

## 2023-05-31 DIAGNOSIS — Z00.00 ENCOUNTER FOR MEDICARE ANNUAL WELLNESS EXAM: Primary | ICD-10-CM

## 2023-05-31 DIAGNOSIS — E78.5 HYPERLIPIDEMIA, UNSPECIFIED HYPERLIPIDEMIA TYPE: ICD-10-CM

## 2023-05-31 DIAGNOSIS — Z23 HIGH PRIORITY FOR 2019-NCOV VACCINE: ICD-10-CM

## 2023-05-31 DIAGNOSIS — N40.1 BPH WITH URINARY OBSTRUCTION: ICD-10-CM

## 2023-05-31 DIAGNOSIS — M15.0 PRIMARY OSTEOARTHRITIS INVOLVING MULTIPLE JOINTS: ICD-10-CM

## 2023-05-31 DIAGNOSIS — N13.8 BPH WITH URINARY OBSTRUCTION: ICD-10-CM

## 2023-05-31 DIAGNOSIS — I10 ESSENTIAL HYPERTENSION: ICD-10-CM

## 2023-05-31 PROCEDURE — 99214 OFFICE O/P EST MOD 30 MIN: CPT | Mod: 25 | Performed by: FAMILY MEDICINE

## 2023-05-31 PROCEDURE — G0439 PPPS, SUBSEQ VISIT: HCPCS | Performed by: FAMILY MEDICINE

## 2023-05-31 PROCEDURE — 91312 COVID-19 BIVALENT 12+ (PFIZER): CPT | Performed by: FAMILY MEDICINE

## 2023-05-31 PROCEDURE — 0124A COVID-19 BIVALENT 12+ (PFIZER): CPT | Performed by: FAMILY MEDICINE

## 2023-05-31 RX ORDER — BETAMETHASONE DIPROPIONATE 0.5 MG/G
OINTMENT, AUGMENTED TOPICAL 2 TIMES DAILY PRN
Qty: 15 G | Refills: 1 | Status: SHIPPED | OUTPATIENT
Start: 2023-05-31 | End: 2023-10-03

## 2023-05-31 RX ORDER — CELECOXIB 100 MG/1
100 CAPSULE ORAL 2 TIMES DAILY
Qty: 180 CAPSULE | Refills: 3 | Status: SHIPPED | OUTPATIENT
Start: 2023-05-31 | End: 2024-07-03

## 2023-05-31 ASSESSMENT — ENCOUNTER SYMPTOMS
DIARRHEA: 0
MYALGIAS: 0
ABDOMINAL PAIN: 0
PALPITATIONS: 0
CHILLS: 0
NAUSEA: 0
FEVER: 0
PARESTHESIAS: 0
JOINT SWELLING: 1
HEMATOCHEZIA: 0
FREQUENCY: 1
WEAKNESS: 0
SORE THROAT: 0
HEARTBURN: 0
DIZZINESS: 0
COUGH: 0
CONSTIPATION: 0
SHORTNESS OF BREATH: 0
DYSURIA: 0
HEMATURIA: 0
EYE PAIN: 0
NERVOUS/ANXIOUS: 0
HEADACHES: 0
ARTHRALGIAS: 1

## 2023-05-31 ASSESSMENT — PAIN SCALES - GENERAL: PAINLEVEL: NO PAIN (0)

## 2023-05-31 ASSESSMENT — ACTIVITIES OF DAILY LIVING (ADL): CURRENT_FUNCTION: NO ASSISTANCE NEEDED

## 2023-05-31 NOTE — PROGRESS NOTES
"SUBJECTIVE:     Thiago is a 73 year old who presents for Preventive Visit.      8/24/2021    10:31 AM   Additional Questions   Roomed by      Are you in the first 12 months of your Medicare coverage?  No      Annual wellness visit completed.  Risk questionnaire reviewed.  Risk associate with suboptimal health and suboptimal diet.  Drinking Coca-Cola which he understands he needs to stop due to underlying diabetes.  Using metformin  mg using 3 tablets in the morning 1 tablet in the evening.  Lisinopril 10 mg daily and amlodipine 5 mg daily for hypertension.  Atorvastatin 20 mg daily for lipid management.  Methotrexate 2.5 mg using 6 tablets/week plus folic acid 1 mg daily.  Had been on Celebrex 100 mg twice daily previously but apparently had stopped the medication per Dr. Bundy.  Feels like he is age 30 years in the past 30 days.  Knees and hips hurt.  Hard to walk.  Gets very stiff after sitting.  Tamsulosin 0.4 mg using 2 tablets a day for BPH management.  Needs second bivalent COVID-19 Pfizer booster.  Has lost 4 pounds since prior office visit January 26, 2023.  Recent lab assessment with normal renal function.  A1c remains stable at 7.8% since January 26, 2023.  Had previously been followed by Dr. Patel and now saw Dr. Bundy regarding rheumatoid arthritis issues.  Has rash that itches involving palmar aspect of left hand.  Pruritic in nature.  No drainage.  Comprehensive review of systems as above otherwise all negative.      Single   1 son - 32 \"Kraig\"   Tobacco: none   EtOH: none   Mom - Meena   Dad - currently 86 Yovanny - DM, CAD, HTN, high cholesterol, back problems, arthritis, spinal stenosis, pacemaker/defibrillator   1 bro - Be - HTN   Surgeries: \"lumbar back surgery for cyst removal\" - September, 2008 (Dr. MARY Ahn)   Hospitalizations: sepsis 9/29/15 [place], transferred to 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  Fair    Frequency of exercise:  4-5 days/week    Duration of exercise:  15-30 minutes    Do you usually eat at least 4 servings of fruit and vegetables a day, include whole grains    & fiber and avoid regularly eating high fat or \"junk\" foods?  No    Taking medications regularly:  Yes    Ability to successfully perform activities of daily living:  No assistance needed    Home Safety:  Throw rugs in the hallway and lack of grab bars in the bathroom    Hearing Impairment:  No hearing concerns    In the past 6 months, have you been bothered by leaking of urine?  No    In general, how would you rate your overall mental or emotional health?  Good      PHQ-2 Total Score: 0    Additional concerns today:  No        Have you ever done Advance Care Planning? (For example, a Health Directive, POLST, or a discussion with a medical provider or your loved ones about your wishes): No, advance care planning information given to patient to review.  Advanced care planning was discussed at today's visit.       Fall risk  Fallen 2 or more times in the past year?: No  Any fall with injury in the past year?: No    Cognitive Screening   1) Repeat 3 items (Leader, Season, Table)    2) Clock draw: NORMAL  3) 3 item recall: Recalls 2 objects   Results: NORMAL clock, 1-2 items recalled: COGNITIVE IMPAIRMENT LESS LIKELY    Mini-CogTM Copyright S Maykel. Licensed by the author for use in [facility]; reprinted with permission (varinder@.[facility]). All rights reserved.      Do you have sleep apnea, excessive snoring or daytime drowsiness?: no    Reviewed and updated as needed this visit by clinical staff   Tobacco  Allergies  Meds  Problems             Reviewed and updated as needed this visit by Provider    Allergies  Meds  Problems            Social History     Tobacco Use     Smoking status: Never     Passive exposure: Never     Smokeless " tobacco: Former   Vaping Use     Vaping status: Not on file   Substance Use Topics     Alcohol use: No     Comment: Quit in 1990 5/31/2023    11:51 AM   Alcohol Use   Prescreen: >3 drinks/day or >7 drinks/week? No     Do you have a current opioid prescription? No  Do you use any other controlled substances or medications that are not prescribed by a provider? None              Current providers sharing in care for this patient include:   Patient Care Team:  Don Armijo MD as PCP - General (Family Practice)  Gill Love MD as MD (Infectious Diseases)  Yamilka Mercedes MD as MD (Neurological Surgery)  Cheng Hatfield MD as MD (Physical Medicine & Rehabilitation - Pain Medicine)  Don Armijo MD as Assigned PCP  Elias Bundy MBBS as Assigned Rheumatology Provider  Don Armijo MD as Referring Physician (Family Medicine)  Mariel Alston PA-C as Physician Assistant (Dermatology)  Mariel Alston PA-C as Physician Assistant (Dermatology)  Flakita Malloy PA-C as Physician Assistant (Urology)  Flakita Malloy PA-C as Assigned Surgical Provider    The following health maintenance items are reviewed in Epic and correct as of today:  Health Maintenance   Topic Date Due     ZOSTER IMMUNIZATION (2 of 2) 06/25/2021     DIABETIC FOOT EXAM  05/03/2023     LIPID  09/23/2023     MICROALBUMIN  09/23/2023     EYE EXAM  10/13/2023     A1C  11/10/2023     BMP  01/26/2024     ANNUAL REVIEW OF HM ORDERS  01/26/2024     MEDICARE ANNUAL WELLNESS VISIT  05/31/2024     FALL RISK ASSESSMENT  05/31/2024     ADVANCE CARE PLANNING  05/31/2028     COLORECTAL CANCER SCREENING  09/30/2031     HEPATITIS C SCREENING  Completed     PHQ-2 (once per calendar year)  Completed     INFLUENZA VACCINE  Completed     Pneumococcal Vaccine: 65+ Years  Completed     AORTIC ANEURYSM SCREENING (SYSTEM ASSIGNED)  Completed     COVID-19 Vaccine  Completed     IPV IMMUNIZATION  Aged Out     MENINGITIS  IMMUNIZATION  Aged Out     DTAP/TDAP/TD IMMUNIZATION  Discontinued     Lab work is in process  Labs reviewed in EPIC  BP Readings from Last 3 Encounters:   05/31/23 128/70   04/24/23 124/60   01/26/23 120/70    Wt Readings from Last 3 Encounters:   05/31/23 114.8 kg (253 lb)   01/26/23 116.6 kg (257 lb)   01/10/23 117.1 kg (258 lb 3.2 oz)                  Patient Active Problem List   Diagnosis     Leukocytosis     Sepsis (H)     Adult Still's disease (H)     Discitis     Need for vaccination     Need for pneumococcal vaccination     Bacteremia due to Staphylococcus aureus     BPH with urinary obstruction     Chronic polyarticular juvenile rheumatoid arthritis (H)     Diverticulosis     Dysphagia     Emotional lability     Epidural abscess     Essential hypertension     Hyperlipidemia     Left carpal tunnel syndrome     Lesion of salivary gland     Lower back pain     Non morbid obesity due to excess calories     Peripheral edema     Polyarthralgia     Primary osteoarthritis involving multiple joints     Somnolence     Trigger finger, right index finger     Type 2 diabetes mellitus (H)     Unintentional weight loss     Morbid obesity (H)     Past Surgical History:   Procedure Laterality Date     ANESTHESIA OUT OF OR MRI N/A 10/2/2015    Procedure: ANESTHESIA OUT OF OR MRI;  Surgeon: GENERIC ANESTHESIA PROVIDER;  Location: [facility] OR     BACK SURGERY  2008    lumbar spine surgery-- unclear details     SOFT TISSUE SURGERY  2012    skin cancer removed from L shoulder, chest, right neck       Social History     Tobacco Use     Smoking status: Never     Passive exposure: Never     Smokeless tobacco: Former   Vaping Use     Vaping status: Not on file   Substance Use Topics     Alcohol use: No     Comment: Quit in 1990     Family History   Problem Relation Age of Onset     Coronary Artery Disease Father      Hypertension Father          Current Outpatient Medications   Medication Sig Dispense Refill     amLODIPine (NORVASC) 5  MG tablet TAKE 1 TABLET(5 MG) BY MOUTH DAILY 90 tablet 3     atorvastatin (LIPITOR) 20 MG tablet Take 1 tablet (20 mg) by mouth daily 90 tablet 3     augmented betamethasone dipropionate (DIPROLENE-AF) 0.05 % external ointment Apply topically 2 times daily as needed (palmar rash) 15 g 1     celecoxib (CELEBREX) 100 MG capsule Take 1 capsule (100 mg) by mouth 2 times daily 180 capsule 3     folic acid (FOLVITE) 1 MG tablet Take 1 tablet (1 mg) by mouth daily 90 tablet 1     furosemide (LASIX) 40 MG tablet TAKE 1 TABLET(40 MG) BY MOUTH DAILY 90 tablet 2     hydroxychloroquine (PLAQUENIL) 200 MG tablet Take 1 tablet (200 mg) by mouth 2 times daily 180 tablet 1     lisinopril (ZESTRIL) 10 MG tablet Take 1 tablet (10 mg) by mouth daily 90 tablet 3     metFORMIN (GLUCOPHAGE XR) 500 MG 24 hr tablet TAKE 3 TABLETS BY MOUTH EVERY MORNING AND 1 TABLET EVERY EVENING 360 tablet 2     methotrexate 2.5 MG tablet Take 6 tablets (15 mg) by mouth every 7 days 72 tablet 0     tamsulosin (FLOMAX) 0.4 MG capsule Take 2 capsules (0.8 mg) by mouth every evening 180 capsule 3     No Known Allergies  Recent Labs   Lab Test 05/10/23  1208 02/15/23  0937 01/26/23  0932 10/07/22  1034 09/23/22  1107 07/06/22  1254 05/03/22  0906 08/24/21  1045 04/30/21  1054 04/30/21  1007 04/23/21  0711 03/04/21  1802 07/05/18  1002 06/01/18  1043   A1C 7.8*  --  7.8*  --  6.8*  --  7.5*   < >  --    < >  --   --    < >  --    LDL  --   --   --   --  35  --  38  --  48  --   --   --    < >  --    HDL  --   --   --   --  27*  --  31*  --  31*  --   --   --    < >  --    TRIG  --   --   --   --  191*  --  183*  --  203*  --   --   --    < >  --    ALT 24 31  --  20 17   < > 22   < >  --   --   --  19   < > 19   CR 0.87 0.97 0.83 0.90 0.80   < > 0.86   < >  --   --  0.86 0.97   < > 1.25   GFRESTIMATED >90 83 >90 >90 >90   < > >90   < >  --   --  87 >60   < > 57*   GFRESTBLACK  --   --   --   --   --   --   --   --   --   --  >90 >60   < > >60   POTASSIUM  --    "--  4.0  --  4.2  --   --    < >  --   --  4.1 3.6   < > 4.7   TSH  --   --   --   --   --   --   --   --   --   --   --   --   --  1.87    < > = values in this interval not displayed.        Consider second bivalent COVID-19 booster.  Immunizations reviewed and otherwise up-to-date.      Review of Systems   Constitutional: Negative for chills and fever.   HENT: Negative for congestion, ear pain, hearing loss and sore throat.    Eyes: Negative for pain and visual disturbance.   Respiratory: Negative for cough and shortness of breath.    Cardiovascular: Positive for peripheral edema. Negative for chest pain and palpitations.   Gastrointestinal: Negative for abdominal pain, constipation, diarrhea, heartburn, hematochezia and nausea.   Genitourinary: Positive for frequency and urgency. Negative for dysuria, genital sores, hematuria, impotence and penile discharge.   Musculoskeletal: Positive for arthralgias and joint swelling. Negative for myalgias.   Skin: Negative for rash.   Neurological: Negative for dizziness, weakness, headaches and paresthesias.   Psychiatric/Behavioral: Negative for mood changes. The patient is not nervous/anxious.      Constitutional, HEENT, cardiovascular, pulmonary, GI, , musculoskeletal, neuro, skin, endocrine and psych systems are negative, except as otherwise noted.    OBJECTIVE:   /70   Pulse 95   Temp 97.4  F (36.3  C)   Resp 19   Ht 1.81 m (5' 11.25\")   Wt 114.8 kg (253 lb)   SpO2 98%   BMI 35.04 kg/m   Estimated body mass index is 35.04 kg/m  as calculated from the following:    Height as of this encounter: 1.81 m (5' 11.25\").    Weight as of this encounter: 114.8 kg (253 lb).     Physical Exam  GENERAL: healthy, alert and no distress  EYES: Eyes grossly normal to inspection, PERRL and conjunctivae and sclerae normal  HENT: ear canals and TM's normal, nose and mouth without ulcers or lesions  NECK: no adenopathy, no asymmetry, masses, or scars and thyroid normal to " palpation  RESP: lungs clear to auscultation - no rales, rhonchi or wheezes  CV: regular rate and rhythm, normal S1 S2, no S3 or S4, no murmur, click or rub, no peripheral edema and peripheral pulses strong  ABDOMEN: soft, nontender, no hepatosplenomegaly, no masses and bowel sounds normal   (male): normal male genitalia without lesions or urethral discharge, no hernia  RECTAL: normal sphincter tone, no rectal masses, prostate normal size, smooth, nontender without nodules or masses  MS: no gross musculoskeletal defects noted, no edema  SKIN: no suspicious lesions or rashes  NEURO: Normal strength and tone, mentation intact and speech normal  PSYCH: mentation appears normal, affect normal/bright    Diagnostic Test Results:  Labs reviewed in Epic  No results found for this or any previous visit (from the past 24 hour(s)).    ASSESSMENT / PLAN:     Encounter for Medicare annual wellness exam  Annual wellness visit completed.  Risk associate with suboptimal health and diet.  Annual wellness visit to continue.    Morbid obesity (H)  Severe obesity with BMI greater than 35 with underlying history of type 2 diabetes, hypertension and hyperlipidemia reviewed.  Weight goal less than 240 pounds initially, less than 230 pounds ideally.    Type 2 diabetes mellitus without complication, without long-term current use of insulin (H)  Hemoglobin A1c stable at 7.8% since January 26, 2023.  Needs to cut out the Coca-Cola in his diet etc. and exercise more consistently with other dietary improvements discussed.  Metformin  mg using 3 tablets each morning 1 tablet each evening.  Reassess at follow-up in 4 months.    Essential hypertension  Continuing use of lisinopril 10 mg daily and amlodipine 5 mg daily.    Hyperlipidemia, unspecified hyperlipidemia type  Atorvastatin 20 mg daily for lipid management.    BPH with urinary obstruction  BPH with urinary obstruction.  Tamsulosin 0.4 mg using 2 tablets daily.  Digital rectal exam  "with mild prostate enlargement, smooth, symmetric without induration or nodularity.    Rheumatoid arthritis with negative rheumatoid factor, involving unspecified site (H)  History of RA follows with Dr. Bundy currently utilizing methotrexate 2.5 mg using 6 tablets weekly plus folic acid 1 mg daily.  Will resume Celebrex 100 mg daily which is helped patient significantly in the past and feels like he is age 30 years in the past 30 days while not using Celebrex.  Normal renal function.  Denies concerns for gastritis etc.    Primary osteoarthritis involving multiple joints  As above.  - celecoxib (CELEBREX) 100 MG capsule  Dispense: 180 capsule; Refill: 3    Dyshidrotic eczema  Dyshidrotic eczema and will use Diprolene AF ointment sparingly twice daily as needed to affected areas for palmar dermatitis consistent with dyshidrotic eczema.  - augmented betamethasone dipropionate (DIPROLENE-AF) 0.05 % external ointment  Dispense: 15 g; Refill: 1    High priority for 2019-nCoV vaccine  2nd COVID-19 bivalent Pfizer booster provided.  - COVID-19 BIVALENT 12+ (PFIZER)       Patient has been advised of split billing requirements and indicates understanding: Yes      COUNSELING:  Reviewed preventive health counseling, as reflected in patient instructions       Regular exercise       Healthy diet/nutrition       Vision screening       Hearing screening       Dental care       Bladder control       Fall risk prevention       Colon cancer screening       Prostate cancer screening      BMI:   Estimated body mass index is 35.04 kg/m  as calculated from the following:    Height as of this encounter: 1.81 m (5' 11.25\").    Weight as of this encounter: 114.8 kg (253 lb).   Weight management plan: Discussed healthy diet and exercise guidelines      He reports that he has never smoked. He has never been exposed to tobacco smoke. He has quit using smokeless tobacco.      Appropriate preventive services were discussed with this patient, " including applicable screening as appropriate for cardiovascular disease, diabetes, osteopenia/osteoporosis, and glaucoma.  As appropriate for age/gender, discussed screening for colorectal cancer, prostate cancer, breast cancer, and cervical cancer. Checklist reviewing preventive services available has been given to the patient.    Reviewed patients plan of care and provided an AVS. The Intermediate Care Plan ( asthma action plan, low back pain action plan, and migraine action plan) for Thiago meets the Care Plan requirement. This Care Plan has been established and reviewed with the Patient.          Don Armijo MD  [facility]    Identified Health Risks:    I have reviewed Opioid Use Disorder and Substance Use Disorder risk factors and made any needed referrals.       The patient was provided with suggestions to help him develop a healthy physical lifestyle.  The patient was counseled and encouraged to consider modifying their diet and eating habits. He was provided with information on recommended healthy diet options.

## 2023-05-31 NOTE — PATIENT INSTRUCTIONS
Patient Education   Personalized Prevention Plan  You are due for the preventive services outlined below.  Your care team is available to assist you in scheduling these services.  If you have already completed any of these items, please share that information with your care team to update in your medical record.  Health Maintenance Due   Topic Date Due     Zoster (Shingles) Vaccine (2 of 2) 06/25/2021     Diabetic Foot Exam  05/03/2023     Your Health Risk Assessment indicates you feel you are not in good health    A healthy lifestyle helps keep the body fit and the mind alert. It helps protect you from disease, helps you fight disease, and helps prevent chronic disease (disease that doesn't go away) from getting worse. This is important as you get older and begin to notice twinges in muscles and joints and a decline in the strength and stamina you once took for granted. A healthy lifestyle includes good healthcare, good nutrition, weight control, recreation, and regular exercise. Avoid harmful substances and do what you can to keep safe. Another part of a healthy lifestyle is stay mentally active and socially involved.    Good healthcare     Have a wellness visit every year.     If you have new symptoms, let us know right away. Don't wait until the next checkup.     Take medicines exactly as prescribed and keep your medicines in a safe place. Tell us if your medicine causes problems.   Healthy diet and weight control     Eat 3 or 4 small, nutritious, low-fat, high-fiber meals a day. Include a variety of fruits, vegetables, and whole-grain foods.     Make sure you get enough calcium in your diet. Calcium, vitamin D, and exercise help prevent osteoporosis (bone thinning).     If you live alone, try eating with others when you can. That way you get a good meal and have company while you eat it.     Try to keep a healthy weight. If you eat more calories than your body uses for energy, it will be stored as fat and you  will gain weight.     Recreation   Recreation is not limited to sports and team events. It includes any activity that provides relaxation, interest, enjoyment, and exercise. Recreation provides an outlet for physical, mental, and social energy. It can give a sense of worth and achievement. It can help you stay healthy.    Mental Exercise and Social Involvement  Mental and emotional health is as important as physical health. Keep in touch with friends and family. Stay as active as possible. Continue to learn and challenge yourself.   Things you can do to stay mentally active are:    Learn something new, like a foreign language or musical instrument.     Play SCRABBLE or do crossword puzzles. If you cannot find people to play these games with you at home, you can play them with others on your computer through the Internet.     Join a games club--anything from card games to chess or checkers or lawn bowling.     Start a new hobby.     Go back to school.     Volunteer.     Read.   Keep up with world events.    Understanding USDA MyPlate  The USDA has guidelines to help you make healthy food choices. These are called MyPlate. MyPlate shows the food groups that make up healthy meals using the image of a place setting. Before you eat, think about the healthiest choices for what to put on your plate or in your cup or bowl. To learn more about building a healthy plate, visit www.choosemyplate.gov.     The food groups    Fruits. Any fruit or 100% fruit juice counts as part of the Fruit Group. Fruits may be fresh, canned, frozen, or dried, and may be whole, cut-up, or pureed. Make 1/2 of your plate fruits and vegetables.    Vegetables. Any vegetable or 100% vegetable juice counts as a member of the Vegetable Group. Vegetables may be fresh, frozen, canned, or dried. They can be served raw or cooked and may be whole, cut-up, or mashed. Make 1/2 of your plate fruits and vegetables.    Grains. All foods made from grains are part  of the Grains Group. These include wheat, rice, oats, cornmeal, and barley. Grains are often used to make foods such as bread, pasta, oatmeal, cereal, tortillas, and grits. Grains should be no more than 1/4 of your plate. At least half of your grains should be whole grains.    Protein. This group includes meat, poultry, seafood, beans and peas, eggs, processed soy products (such as tofu), nuts (including nut butters), and seeds. Make protein choices no more than 1/4 of your plate. Meat and poultry choices should be lean or low fat.    Dairy. The Dairy Group includes all fluid milk products and foods made from milk that contain calcium, such as yogurt and cheese. (Foods that have little calcium, such as cream, butter, and cream cheese, are not part of this group.) Most dairy choices should be low-fat or fat-free.    Oils. Oils aren't a food group, but they do contain essential nutrients. However it's important to watch your intake of oils. These are fats that are liquid at room temperature. They include canola, corn, olive, soybean, vegetable, and sunflower oil. Foods that are mainly oil include mayonnaise, certain salad dressings, and soft margarines. You likely already get your daily oil allowance from the foods you eat.  Things to limit  Eating healthy also means limiting these things in your diet:    Salt (sodium). Many processed foods have a lot of sodium. To keep sodium intake down, eat fresh vegetables, meats, poultry, and seafood when possible. Purchase low-sodium, reduced-sodium, or no-salt-added food products at the store. And don't add salt to your meals at home. Instead, season them with herbs and spices such as dill, oregano, cumin, and paprika. Or try adding flavor with lemon or lime zest and juice.    Saturated fat. Saturated fats are most often found in animal products such as beef, pork, and chicken. They are often solid at room temperature, such as butter. To reduce your saturated fat intake, choose  leaner cuts of meat and poultry. And try healthier cooking methods such as grilling, broiling, roasting, or baking. For a simple lower-fat swap, use plain nonfat yogurt instead of mayonnaise when making potato salad or macaroni salad.    Added sugars. These are sugars added to foods. They are in foods such as ice cream, candy, soda, fruit drinks, sports drinks, energy drinks, cookies, pastries, jams, and syrups. Cut down on added sugars by sharing sweet treats with a family member or friend. You can also choose fruit for dessert, and drink water or other unsweetened beverages.  [employer] last reviewed this educational content on 6/1/2020 2000-2022 [employer]. All rights reserved. This information is not intended as a substitute for professional medical care. Always follow your healthcare professional's instructions.

## 2023-07-03 DIAGNOSIS — M06.00 SERONEGATIVE RHEUMATOID ARTHRITIS (H): ICD-10-CM

## 2023-07-03 RX ORDER — HYDROXYCHLOROQUINE SULFATE 200 MG/1
TABLET, FILM COATED ORAL
Qty: 180 TABLET | Refills: 0 | Status: SHIPPED | OUTPATIENT
Start: 2023-07-03 | End: 2023-09-15

## 2023-07-06 DIAGNOSIS — E78.5 HYPERLIPIDEMIA, UNSPECIFIED HYPERLIPIDEMIA TYPE: ICD-10-CM

## 2023-07-06 RX ORDER — ATORVASTATIN CALCIUM 20 MG/1
TABLET, FILM COATED ORAL
Qty: 90 TABLET | Refills: 0 | Status: SHIPPED | OUTPATIENT
Start: 2023-07-06 | End: 2023-11-01

## 2023-07-06 NOTE — TELEPHONE ENCOUNTER
"Last Written Prescription Date:  6/3/2022  Last Fill Quantity: 90,  # refills: 3   Last office visit provider:  5/31/2023     Requested Prescriptions   Pending Prescriptions Disp Refills     atorvastatin (LIPITOR) 20 MG tablet [Pharmacy Med Name: ATORVASTATIN 20MG TABLETS] 90 tablet 3     Sig: TAKE 1 TABLET(20 MG) BY MOUTH DAILY       Statins Protocol Passed - 7/6/2023  3:36 AM        Passed - LDL on file in past 12 months     Recent Labs   Lab Test 09/23/22  1107   LDL 35             Passed - No abnormal creatine kinase in past 12 months     Recent Labs   Lab Test 10/02/15  0500                   Passed - Recent (12 mo) or future (30 days) visit within the authorizing provider's specialty     Patient has had an office visit with the authorizing provider or a provider within the authorizing providers department within the previous 12 mos or has a future within next 30 days. See \"Patient Info\" tab in inbasket, or \"Choose Columns\" in Meds & Orders section of the refill encounter.              Passed - Medication is active on med list        Passed - Patient is age 18 or older             Annalise Chen RN 07/06/23 11:11 AM  "

## 2023-08-09 ENCOUNTER — OFFICE VISIT (OUTPATIENT)
Dept: FAMILY MEDICINE | Facility: CLINIC | Age: 73
End: 2023-08-09
Payer: MEDICARE

## 2023-08-09 VITALS
DIASTOLIC BLOOD PRESSURE: 60 MMHG | RESPIRATION RATE: 17 BRPM | HEART RATE: 90 BPM | TEMPERATURE: 98.3 F | SYSTOLIC BLOOD PRESSURE: 110 MMHG | WEIGHT: 245 LBS | BODY MASS INDEX: 34.3 KG/M2 | HEIGHT: 71 IN | OXYGEN SATURATION: 98 %

## 2023-08-09 DIAGNOSIS — R94.31 ABNORMAL ELECTROCARDIOGRAM: Primary | ICD-10-CM

## 2023-08-09 DIAGNOSIS — I10 ESSENTIAL HYPERTENSION: ICD-10-CM

## 2023-08-09 DIAGNOSIS — E11.9 TYPE 2 DIABETES MELLITUS WITHOUT COMPLICATION, WITHOUT LONG-TERM CURRENT USE OF INSULIN (H): ICD-10-CM

## 2023-08-09 PROCEDURE — 93010 ELECTROCARDIOGRAM REPORT: CPT | Mod: OFF | Performed by: GENERAL ACUTE CARE HOSPITAL

## 2023-08-09 PROCEDURE — 93005 ELECTROCARDIOGRAM TRACING: CPT | Performed by: FAMILY MEDICINE

## 2023-08-09 PROCEDURE — 99214 OFFICE O/P EST MOD 30 MIN: CPT | Performed by: FAMILY MEDICINE

## 2023-08-09 ASSESSMENT — PAIN SCALES - GENERAL: PAINLEVEL: NO PAIN (0)

## 2023-08-09 NOTE — LETTER
"August 9, 2023      Thiago Hein  [address]  [place] [address]        To Whom It May Concern,     Patient was seen today per request of DOT Physical provider (Kari Dimas) due to concern with \"abnormal heart sounds\".  Request for further evaluation noted.      No cardiac murmur noted on exam.  Infrequent cardiac ectopy consistent with known history of premature atrial contractions.  Otherwise non-focal cardiac exam identified.    Repeat EKG today with sinus rhythm with 1st degree AV block.  Infrequent PVCs noted.  No acute findings described.  80 bpm.    Review of prior echocardiograms from 9/30/15 and 10/9/15 failed to show valvular abnormality.    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      Sincerely,        Don Armijo MD          "

## 2023-08-09 NOTE — PROGRESS NOTES
"Assessment/Plan:    Abnormal electrocardiogram  Patient seen today for further evaluation regarding abnormal DOT physical exam finding suggestive of \"abnormal heart sounds\".  Further evaluation required with EKG for \"possible heart blockage\".  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  I did review echocardiogram results from September 30, 2015 and October 9, 2015.  No valvular abnormalities noted.  EKG repeated today showing sinus rhythm with noted premature ventricular contractions.  Ventricular rate 80 bpm.  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  - EKG 12-lead, tracing only    Essential hypertension  Lisinopril 10 mg daily continuing along with amlodipine 5 mg daily.    Type 2 diabetes mellitus without complication, without long-term current use of insulin (H)  Type 2 diabetes reviewed.  Continues metformin  mg using 3 tablets in the morning 1 tablet in the evening.               Subjective:    Thiago Hein is seen today for follow-up assessment.  Patient was seen through DOT physical yesterday and requested that he has further evaluation regarding \"abnormal heart sounds\".  Recommendation for EKG and echocardiogram with sign letter from cardiologist stating that it is in the professional opinion of the provider that patient is safe to operate a commercial vehicle.  Patient has not had any issues with presyncope.  No chest pain.  No palpitations described.  Patient does have underlying history of type 2 diabetes continue metformin use currently as well as underlying hypertension treated with lisinopril 10 mg daily and amlodipine 5 mg daily.  We did review prior history of sepsis concerns back in 2015.  No valvular vegetations or abnormalities noted at that time regarding etiology for Staph " "aureus bacteremia.  Comprehensive review of systems as above otherwise all negative.      Single   1 son - 32 \"Kraig\"   Tobacco: none   EtOH: none   Mom - Meena   Dad - currently 86 Yovanny - DM, CAD, HTN, high cholesterol, back problems, arthritis, spinal stenosis, pacemaker/defibrillator   1 bro - Be - HTN   Surgeries: \"lumbar back surgery for cyst removal\" - September, 2008 (Dr. 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[place], [place])   Hobbies:       Past Surgical History:   Procedure Laterality Date    ANESTHESIA OUT OF OR MRI N/A 10/2/2015    Procedure: ANESTHESIA OUT OF OR MRI;  Surgeon: GENERIC ANESTHESIA PROVIDER;  Location: [facility] OR    BACK SURGERY  2008    lumbar spine surgery-- unclear details    SOFT TISSUE SURGERY  2012    skin cancer removed from L shoulder, chest, right neck        Family History   Problem Relation Age of Onset    Coronary Artery Disease Father     Hypertension Father         Past Medical History:   Diagnosis Date    Diabetes (H)     Hypertension         Social History     Tobacco Use    Smoking status: Never     Passive exposure: Never    Smokeless tobacco: Former   Substance Use Topics    Alcohol use: No     Comment: Quit in 1990    Drug use: No        Current Outpatient Medications   Medication Sig Dispense Refill    amLODIPine (NORVASC) 5 MG tablet TAKE 1 TABLET(5 MG) BY MOUTH DAILY 90 tablet 3    atorvastatin (LIPITOR) 20 MG tablet TAKE 1 TABLET(20 MG) BY MOUTH DAILY 90 tablet 0    augmented betamethasone dipropionate (DIPROLENE-AF) 0.05 % external ointment Apply topically 2 times daily as needed (palmar rash) 15 g 1    celecoxib (CELEBREX) 100 MG capsule Take 1 capsule (100 mg) by mouth 2 times daily 180 capsule 3    folic acid (FOLVITE) 1 MG tablet Take 1 tablet (1 mg) by mouth daily 90 tablet 1    furosemide (LASIX) 40 MG tablet TAKE 1 TABLET(40 MG) BY MOUTH DAILY " "90 tablet 2    hydroxychloroquine (PLAQUENIL) 200 MG tablet TAKE 1 TABLET(200 MG) BY MOUTH TWICE DAILY 180 tablet 0    lisinopril (ZESTRIL) 10 MG tablet Take 1 tablet (10 mg) by mouth daily 90 tablet 3    metFORMIN (GLUCOPHAGE XR) 500 MG 24 hr tablet TAKE 3 TABLETS BY MOUTH EVERY MORNING AND 1 TABLET EVERY EVENING 360 tablet 2    methotrexate 2.5 MG tablet Take 6 tablets (15 mg) by mouth every 7 days 72 tablet 0    tamsulosin (FLOMAX) 0.4 MG capsule Take 2 capsules (0.8 mg) by mouth every evening 180 capsule 3          Objective:    Vitals:    08/09/23 1513   BP: 110/60   Pulse: 90   Resp: 17   Temp: 98.3  F (36.8  C)   SpO2: 98%   Weight: 111.1 kg (245 lb)   Height: 1.803 m (5' 11\")      Body mass index is 34.17 kg/m .    Alert.  No apparent distress.  Chest clear.  Cardiac exam regular.  No cardiac murmur noted.  Occasional cardiac ectopy.  No noted increased peripheral edema etc.  Cooperative and forthcoming.      This note has been dictated using voice recognition software and as a result may contain minor grammatical errors and unintended word substitutions.   "

## 2023-08-10 LAB
ATRIAL RATE - MUSE: 80 BPM
DIASTOLIC BLOOD PRESSURE - MUSE: NORMAL MMHG
INTERPRETATION ECG - MUSE: NORMAL
P AXIS - MUSE: 63 DEGREES
PR INTERVAL - MUSE: 214 MS
QRS DURATION - MUSE: 84 MS
QT - MUSE: 380 MS
QTC - MUSE: 438 MS
R AXIS - MUSE: 36 DEGREES
SYSTOLIC BLOOD PRESSURE - MUSE: NORMAL MMHG
T AXIS - MUSE: 56 DEGREES
VENTRICULAR RATE- MUSE: 80 BPM

## 2023-09-15 ENCOUNTER — OFFICE VISIT (OUTPATIENT)
Dept: RHEUMATOLOGY | Facility: CLINIC | Age: 73
End: 2023-09-15
Payer: MEDICARE

## 2023-09-15 VITALS
BODY MASS INDEX: 34.41 KG/M2 | DIASTOLIC BLOOD PRESSURE: 78 MMHG | WEIGHT: 246.7 LBS | HEART RATE: 80 BPM | SYSTOLIC BLOOD PRESSURE: 122 MMHG

## 2023-09-15 DIAGNOSIS — M06.00 SERONEGATIVE RHEUMATOID ARTHRITIS (H): Primary | ICD-10-CM

## 2023-09-15 DIAGNOSIS — Z79.899 HIGH RISK MEDICATION USE: ICD-10-CM

## 2023-09-15 DIAGNOSIS — M15.0 PRIMARY OSTEOARTHRITIS INVOLVING MULTIPLE JOINTS: ICD-10-CM

## 2023-09-15 DIAGNOSIS — M25.50 POLYARTHRALGIA: ICD-10-CM

## 2023-09-15 PROCEDURE — 99214 OFFICE O/P EST MOD 30 MIN: CPT | Performed by: INTERNAL MEDICINE

## 2023-09-15 RX ORDER — HYDROXYCHLOROQUINE SULFATE 200 MG/1
200 TABLET, FILM COATED ORAL 2 TIMES DAILY
Qty: 180 TABLET | Refills: 0 | Status: SHIPPED | OUTPATIENT
Start: 2023-09-15 | End: 2024-01-15

## 2023-09-15 NOTE — PROGRESS NOTES
Rheumatology follow-up visit note     Thiago is a 73 year old male presents today for follow-up.    Thiago was seen today for recheck.    Diagnoses and all orders for this visit:    Seronegative rheumatoid arthritis (H)  -     hydroxychloroquine (PLAQUENIL) 200 MG tablet; Take 1 tablet (200 mg) by mouth 2 times daily    Primary osteoarthritis involving multiple joints    High risk medication use    Polyarthralgia        This patient with rheumatoid arthritis seronegative polyarticular is finding hydroxychloroquine by itself to be a better option with Celebrex instead of taking methotrexate.  He will stay the course.  Reminded of eye examination.  Management of OA reviewed.  He can add acetaminophen.  In addition he likely has sequelae of triggering of the left index finger management principles were outlined he would like to defer corticosteroid injection.  We will meet here in 4 months or sooner.    Follow up in 4 months.    HPI    Thiago Hein is a 73 year old male is here for follow-up of seronegative rheumatoid arthritis, osteoarthritis, on his previous visit he was on methotrexate 15 mg/week and hydroxychloroquine.  I have asked him to stop taking Celebrex in view of the methotrexate nonsteroidal interaction.  He did not feel good with that option.  He continued to have pain progressively involving multiple joints when he stopped taking Celebrex.  He decided not to go for methotrexate and go back on Celebrex from his primary physician.  This combination with hydroxychloroquine he feels gives him the best relief.  He does have some residual pain that is mild.  If he hardly takes Tylenol if however.  His morning stiffness is 30 minutes.  Since his previous visit he has had carpal tunnel release.      DETAILED EXAMINATION  09/15/23  :    Vitals:    09/15/23 1036   BP: 122/78   Pulse: 80   Weight: 111.9 kg (246 lb 11.2 oz)     Alert oriented. Head including the face is examined for malar rash,  heliotropes, scarring, lupus pernio. Eyes examined for redness such as in episcleritis/scleritis, periorbital lesions.   Neck/ Face examined for parotid gland swelling, range of motion of neck.  Left upper and lower and right upper and lower extremities examined for tenderness, swelling, warmth of the appendicular joints, range of motion, edema, rash.  Some of the important findings included: he does not have evidence of synovitis in the palpable joints of the upper extremities.   He has tenderness at his left index finger A1 nodularity and associated flexion deformity.     Patient Active Problem List    Diagnosis Date Noted    Chronic polyarticular juvenile rheumatoid arthritis (H) 08/24/2021     Priority: Medium     Formatting of this note might be different from the original.  Created by Conversion    Replacement Utility updated for latest IMO load      Emotional lability 08/24/2021     Priority: Medium     Formatting of this note might be different from the original.  Created by Conversion      Lower back pain 08/24/2021     Priority: Medium     Formatting of this note might be different from the original.  Created by Conversion      Morbid obesity (H) 08/24/2021     Priority: Medium    BPH with urinary obstruction 02/20/2020     Priority: Medium    Primary osteoarthritis involving multiple joints 10/08/2019     Priority: Medium    Left carpal tunnel syndrome 12/27/2018     Priority: Medium    Trigger finger, right index finger 09/26/2018     Priority: Medium    Polyarthralgia 07/20/2018     Priority: Medium    Unintentional weight loss 06/01/2018     Priority: Medium    Non morbid obesity due to excess calories 07/14/2017     Priority: Medium    Peripheral edema 07/14/2017     Priority: Medium    Diverticulosis 11/15/2016     Priority: Medium    Hyperlipidemia 08/04/2016     Priority: Medium    Need for pneumococcal vaccination 01/11/2016     Priority: Medium    Need for vaccination 11/20/2015     Priority:  Medium    Type 2 diabetes mellitus (H) 11/11/2015     Priority: Medium     Formatting of this note might be different from the original.  Created by Conversion      Bacteremia due to Staphylococcus aureus 11/06/2015     Priority: Medium    Dysphagia 11/06/2015     Priority: Medium    Epidural abscess 11/06/2015     Priority: Medium    Essential hypertension 11/06/2015     Priority: Medium     Formatting of this note might be different from the original.  Created by Conversion    Replacement Utility updated for latest IMO load      Lesion of salivary gland 11/06/2015     Priority: Medium    Somnolence 11/06/2015     Priority: Medium    Discitis 10/04/2015     Priority: Medium    Adult Still's disease (H) 10/01/2015     Priority: Medium    Leukocytosis 09/29/2015     Priority: Medium    Sepsis (H) 09/29/2015     Priority: Medium     Past Surgical History:   Procedure Laterality Date    ANESTHESIA OUT OF OR MRI N/A 10/2/2015    Procedure: ANESTHESIA OUT OF OR MRI;  Surgeon: GENERIC ANESTHESIA PROVIDER;  Location: [facility] OR    BACK SURGERY  2008    lumbar spine surgery-- unclear details    SOFT TISSUE SURGERY  2012    skin cancer removed from L shoulder, chest, right neck      Past Medical History:   Diagnosis Date    Diabetes (H)     Hypertension      No Known Allergies  Current Outpatient Medications   Medication Sig Dispense Refill    amLODIPine (NORVASC) 5 MG tablet TAKE 1 TABLET(5 MG) BY MOUTH DAILY 90 tablet 3    atorvastatin (LIPITOR) 20 MG tablet TAKE 1 TABLET(20 MG) BY MOUTH DAILY 90 tablet 0    augmented betamethasone dipropionate (DIPROLENE-AF) 0.05 % external ointment Apply topically 2 times daily as needed (palmar rash) 15 g 1    celecoxib (CELEBREX) 100 MG capsule Take 1 capsule (100 mg) by mouth 2 times daily 180 capsule 3    folic acid (FOLVITE) 1 MG tablet Take 1 tablet (1 mg) by mouth daily 90 tablet 1    furosemide (LASIX) 40 MG tablet TAKE 1 TABLET(40 MG) BY MOUTH DAILY 90 tablet 2    hydroxychloroquine  (PLAQUENIL) 200 MG tablet TAKE 1 TABLET(200 MG) BY MOUTH TWICE DAILY 180 tablet 0    lisinopril (ZESTRIL) 10 MG tablet Take 1 tablet (10 mg) by mouth daily 90 tablet 3    metFORMIN (GLUCOPHAGE XR) 500 MG 24 hr tablet TAKE 3 TABLETS BY MOUTH EVERY MORNING AND 1 TABLET EVERY EVENING 360 tablet 2    methotrexate 2.5 MG tablet Take 6 tablets (15 mg) by mouth every 7 days 72 tablet 0    tamsulosin (FLOMAX) 0.4 MG capsule Take 2 capsules (0.8 mg) by mouth every evening 180 capsule 3     family history includes Coronary Artery Disease in his father; Hypertension in his father.  Social Connections: Not on file          WBC   Date Value Ref Range Status   04/23/2021 8.2 4.0 - 11.0 10e9/L Final     WBC Count   Date Value Ref Range Status   05/10/2023 8.1 4.0 - 11.0 10e3/uL Final     RBC Count   Date Value Ref Range Status   05/10/2023 4.98 4.40 - 5.90 10e6/uL Final   04/23/2021 5.25 4.4 - 5.9 10e12/L Final     Hemoglobin   Date Value Ref Range Status   05/10/2023 14.8 13.3 - 17.7 g/dL Final   04/23/2021 15.2 13.3 - 17.7 g/dL Final     Hematocrit   Date Value Ref Range Status   05/10/2023 43.0 40.0 - 53.0 % Final   04/23/2021 44.8 40.0 - 53.0 % Final     MCV   Date Value Ref Range Status   05/10/2023 86 78 - 100 fL Final   04/23/2021 85 78 - 100 fl Final     MCH   Date Value Ref Range Status   05/10/2023 29.7 26.5 - 33.0 pg Final   04/23/2021 29.0 26.5 - 33.0 pg Final     Platelet Count   Date Value Ref Range Status   05/10/2023 235 150 - 450 10e3/uL Final   04/23/2021 222 150 - 450 10e9/L Final     % Lymphocytes   Date Value Ref Range Status   04/23/2021 20.9 % Final     AST   Date Value Ref Range Status   09/23/2022 17 10 - 50 U/L Final   10/12/2015 18 0 - 45 U/L Final     ALT   Date Value Ref Range Status   05/10/2023 24 10 - 50 U/L Final   10/12/2015 30 0 - 70 U/L Final     Albumin   Date Value Ref Range Status   05/10/2023 4.5 3.5 - 5.2 g/dL Final   05/03/2022 4.0 3.5 - 5.0 g/dL Final   10/12/2015 2.2 (L) 3.4 - 5.0 g/dL  Final     Alkaline Phosphatase   Date Value Ref Range Status   09/23/2022 113 40 - 129 U/L Final   10/17/2015 133 40 - 150 U/L Final     Creatinine   Date Value Ref Range Status   05/10/2023 0.87 0.67 - 1.17 mg/dL Final   04/23/2021 0.86 0.66 - 1.25 mg/dL Final     GFR Estimate   Date Value Ref Range Status   05/10/2023 >90 >60 mL/min/1.73m2 Final     Comment:     eGFR calculated using 2021 CKD-EPI equation.   04/23/2021 87 >60 mL/min/[1.73_m2] Final     Comment:     Non  GFR Calc  Starting 12/18/2018, serum creatinine based estimated GFR (eGFR) will be   calculated using the Chronic Kidney Disease Epidemiology Collaboration   (CKD-EPI) equation.       GFR Estimate If Black   Date Value Ref Range Status   04/23/2021 >90 >60 mL/min/[1.73_m2] Final     Comment:      GFR Calc  Starting 12/18/2018, serum creatinine based estimated GFR (eGFR) will be   calculated using the Chronic Kidney Disease Epidemiology Collaboration   (CKD-EPI) equation.       Creatinine Urine mg/dL   Date Value Ref Range Status   09/23/2022 154.0 mg/dL Final     Comment:     The reference ranges have not been established in urine creatinine. The results should be integrated into the clinical context for interpretation.   08/24/2021 40 mg/dL Final     Sed Rate   Date Value Ref Range Status   04/23/2021 9 0 - 20 mm/h Final     Erythrocyte Sedimentation Rate   Date Value Ref Range Status   08/24/2021 2 0 - 15 mm/hr Final     CRP Inflammation   Date Value Ref Range Status   04/23/2021 3.6 0.0 - 8.0 mg/L Final     CRP   Date Value Ref Range Status   08/24/2021 0.2 0.0-<0.8 mg/dL Final

## 2023-09-16 DIAGNOSIS — Z79.899 HIGH RISK MEDICATION USE: ICD-10-CM

## 2023-09-16 DIAGNOSIS — M06.00 SERONEGATIVE RHEUMATOID ARTHRITIS (H): ICD-10-CM

## 2023-09-18 RX ORDER — FOLIC ACID 1 MG/1
1 TABLET ORAL DAILY
Qty: 90 TABLET | Refills: 1 | OUTPATIENT
Start: 2023-09-18

## 2023-10-03 ENCOUNTER — OFFICE VISIT (OUTPATIENT)
Dept: FAMILY MEDICINE | Facility: CLINIC | Age: 73
End: 2023-10-03
Payer: MEDICARE

## 2023-10-03 VITALS
BODY MASS INDEX: 32.64 KG/M2 | OXYGEN SATURATION: 97 % | HEIGHT: 72 IN | HEART RATE: 91 BPM | SYSTOLIC BLOOD PRESSURE: 126 MMHG | DIASTOLIC BLOOD PRESSURE: 70 MMHG | WEIGHT: 241 LBS | RESPIRATION RATE: 19 BRPM | TEMPERATURE: 97.5 F

## 2023-10-03 DIAGNOSIS — Z23 ENCOUNTER FOR IMMUNIZATION: ICD-10-CM

## 2023-10-03 DIAGNOSIS — I10 ESSENTIAL HYPERTENSION: ICD-10-CM

## 2023-10-03 DIAGNOSIS — N40.1 BPH WITH URINARY OBSTRUCTION: ICD-10-CM

## 2023-10-03 DIAGNOSIS — E11.9 TYPE 2 DIABETES MELLITUS WITHOUT COMPLICATION, WITHOUT LONG-TERM CURRENT USE OF INSULIN (H): Primary | ICD-10-CM

## 2023-10-03 DIAGNOSIS — M06.00 RHEUMATOID ARTHRITIS WITH NEGATIVE RHEUMATOID FACTOR, INVOLVING UNSPECIFIED SITE (H): ICD-10-CM

## 2023-10-03 DIAGNOSIS — E78.5 HYPERLIPIDEMIA, UNSPECIFIED HYPERLIPIDEMIA TYPE: ICD-10-CM

## 2023-10-03 DIAGNOSIS — N13.8 BPH WITH URINARY OBSTRUCTION: ICD-10-CM

## 2023-10-03 LAB
CREAT UR-MCNC: 19 MG/DL
ERYTHROCYTE [DISTWIDTH] IN BLOOD BY AUTOMATED COUNT: 13.1 % (ref 10–15)
HBA1C MFR BLD: 7.3 % (ref 0–5.6)
HCT VFR BLD AUTO: 45 % (ref 40–53)
HGB BLD-MCNC: 15.3 G/DL (ref 13.3–17.7)
HOLD SPECIMEN: NORMAL
MCH RBC QN AUTO: 28.8 PG (ref 26.5–33)
MCHC RBC AUTO-ENTMCNC: 34 G/DL (ref 31.5–36.5)
MCV RBC AUTO: 85 FL (ref 78–100)
MICROALBUMIN UR-MCNC: <12 MG/L
MICROALBUMIN/CREAT UR: NORMAL MG/G{CREAT}
PLATELET # BLD AUTO: 193 10E3/UL (ref 150–450)
RBC # BLD AUTO: 5.31 10E6/UL (ref 4.4–5.9)
WBC # BLD AUTO: 7 10E3/UL (ref 4–11)

## 2023-10-03 PROCEDURE — 82043 UR ALBUMIN QUANTITATIVE: CPT | Performed by: FAMILY MEDICINE

## 2023-10-03 PROCEDURE — 80053 COMPREHEN METABOLIC PANEL: CPT | Performed by: FAMILY MEDICINE

## 2023-10-03 PROCEDURE — 85027 COMPLETE CBC AUTOMATED: CPT | Performed by: FAMILY MEDICINE

## 2023-10-03 PROCEDURE — G0008 ADMIN INFLUENZA VIRUS VAC: HCPCS | Performed by: FAMILY MEDICINE

## 2023-10-03 PROCEDURE — 82570 ASSAY OF URINE CREATININE: CPT | Performed by: FAMILY MEDICINE

## 2023-10-03 PROCEDURE — 83036 HEMOGLOBIN GLYCOSYLATED A1C: CPT | Performed by: FAMILY MEDICINE

## 2023-10-03 PROCEDURE — 80061 LIPID PANEL: CPT | Performed by: FAMILY MEDICINE

## 2023-10-03 PROCEDURE — 90662 IIV NO PRSV INCREASED AG IM: CPT | Performed by: FAMILY MEDICINE

## 2023-10-03 PROCEDURE — 36415 COLL VENOUS BLD VENIPUNCTURE: CPT | Performed by: FAMILY MEDICINE

## 2023-10-03 PROCEDURE — 99214 OFFICE O/P EST MOD 30 MIN: CPT | Mod: 25 | Performed by: FAMILY MEDICINE

## 2023-10-03 ASSESSMENT — PAIN SCALES - GENERAL: PAINLEVEL: NO PAIN (0)

## 2023-10-03 NOTE — COMMUNITY RESOURCES LIST (ENGLISH)
10/03/2023   [facility] - Outpatient Clinics  N/A  For additional resource needs, please contact your health insurance member services or your primary care team.  Phone: [phone]   Email: N/A   Address: [facility] [place] [address]   Hours: N/A        Hotlines and Helplines       Hotline - Housing crisis  1  [facility] Distance: 14.67 miles      Phone/Virtual   [address] [place] [address]  Language: English  Hours: Mon - Sun Open 24 Hours   Phone: (966) 429-4660 Email: communications@[facility].org Website: https://[facility].org/[facility]avioursh[facility]/     2  [facility] Distance: 16.27 miles      Phone/Virtual   [address] [place] [address]  Language: English  Hours: Mon - Sun Open 24 Hours   Phone: (322) 738-4826 Email: info@[facility].[employer]Card Website: http://www.[facility].org          Housing       Coordinated Entry access point  3  [facility] Distance: 7.34 miles      In-Person, Phone/Virtual   [address] [place] [address]  Language: English, Armenian  Hours: Mon - Fri 8:30 AM - 4:30 PM  Fees: Free   Phone: (251) 384-8764 Email: info@[facility].org Website: https://www.[facility].org/locations/downSt. [facility]-clinic/     4  [facility] ([facility] - Housing Services Distance: 14.86 miles      In-Person   [address] [place] [address]  Language: English  Hours: Mon - Fri 9:00 AM - 5:00 PM  Fees: Free   Phone: (178) 456-3340 Email: housing@[facility].org Website: http://www.[facility].org/housing     Drop-in center or day shelter  5  [facility] Distance: 5.8 miles      In-Person   [address] [place] [address]  Language: English  Hours: Mon - Fri 9:00 AM - 4:00 PM  Fees: Free   Phone: (700) 354-5342 Email: frontmarlenak@[employer].org Website: http://[employer].org     6  [facility] [place]  Shelter Distance: 7.39 miles      In-Person   [address] [place] [address]  Language: English  Hours: Mon - Sun 9:00 AM - 5:30 PM  Fees: Free, Self Pay   Phone: (919) 772-1173 Email: info@[employer] Website: https://www.[employer]/locations/[facility]/     Housing search assistance  7  [employer] - https://[employer]/ Distance: 14.69 miles      Phone/Virtual   [address] [place] [address]  Language: English  Hours: Mon - Sun Open 24 Hours   Email: info@[employer] Website: https://[employer]     8  [employer]/ - Online housing search assistance Distance: 15.82 miles      Phone/Virtual   [address] [address]  Language: English, Hmong, Omani, Jamaican  Hours: Mon - Sun Open 24 Hours   Phone: (273) 891-1356 Email: info@[employer].org Website: http://www.housinglink.org/     Shelter for families  9  [facility] Distance: 11.52 miles      In-Person   [address] [place] [address]  Language: English  Hours: Mon - Fri 3:00 PM - 9:00 AM , Sat - Sun Open 24 Hours  Fees: Free   Phone: (390) 501-5484 Ext.1 Website: https://www.[facility].org/2020/07/03/emergency-family-shelter/     10  [facility] Distance: 22.25 miles      In-Person   [address] [place] [address]  Language: English  Hours: Mon - Sun Open 24 Hours  Fees: Free, Self Pay   Phone: (211) 364-6661 Email: Marleni@[facility].org Website: http://www.[facility].org/     Shelter for individuals  11  [facility] [place] [facility] Distance: 7.39 miles      In-Person   [address] [place] [address]  Language: English  Hours: Mon - Sun 5:00 PM - 10:00 AM  Fees: Free, Self Pay   Phone: (113) 235-3520 Email: info@[employer].org Website:  https://www.[facility].org/locations/[facility]-[place]/     12  [facility] Distance: 14.67 miles      In-Person   [address] [place] [address]  Language: English  Hours: Mon - Sun Open 24 Hours  Fees: Free   Phone: (297) 157-4875 Email: communications@[facility].org Website: https://[facility].org/oursaviourshousing/          Important Numbers & Websites       [facility]   [address] 211itedway.org  Poison Control   (213) 580-2702 [place]  Suicide and Crisis Lifeline   [address] [place], [place] [address]line.org  Childhelp [place] Child Abuse Hotline   289.233.6836 Childhelphotline.org  National Sexual Assault Hotline   (359) 641-7044 (HOPE) Rainn.org  [place] Runaway Safeline   (752) 204-6767 (RUNAWAY) [facility]ics[facility].org  Pregnancy & Postpartum Support [place]   Call/text 372-700-8097 Ppsupport[place].org  Substance Abuse National Helpline ([facility]   432-667-HELP (7858) Findtreatment.gov  Emergency Services   911

## 2023-10-03 NOTE — COMMUNITY RESOURCES LIST (ENGLISH)
10/03/2023   [facility]  N/A  For questions about this resource list or additional care needs, please contact your primary care clinic or care manager.  Phone: [phone]   Email: N/A   Address: [address] [place] [address]   Hours: N/A        Hotlines and Helplines       Hotline - Housing crisis  1  [facility] Distance: 14.67 miles      Phone/Virtual   [address] [place] [address]  Language: English  Hours: Mon - Sun Open 24 Hours   Phone: (595) 180-6274 Email: communications@[facility].org Website: https://[facility]-[place].org/oursaviourshousing/     2  [facility] Distance: 16.27 miles      Phone/Virtual   [address] [place] [address]  Language: English  Hours: Mon - Sun Open 24 Hours   Phone: (973) 348-4135 Email: info@[facility].org Website: http://www.[facility].org          Housing       Coordinated Entry access point  3  [facility] Distance: 7.34 miles      In-Person, Phone/Virtual   [address] [place] [address]  Language: English, Korean  Hours: Mon - Fri 8:30 AM - 4:30 PM  Fees: Free   Phone: (610) 692-4892 Email: info@[facility].org Website: https://www.[facility].org/locations/downLifec[facility]al [facility]-clinic/     4  [facility] - [facility] Services Distance: 14.86 miles      In-Person   [address] [place] [address]  Language: English  Hours: Mon - Fri 9:00 AM - 5:00 PM  Fees: Free   Phone: (447) 694-5079 Email: housing@[facility].org Website: http://www.[facility].org/housing     Drop-in center or day shelter  5  [facility] Distance: 5.8 miles      In-Person   [address] [place] [address]  Language: English  Hours: Mon - Fri 9:00 AM - 4:00 PM  Fees: Free   Phone: (597) 416-2397 Email: marco@[employer].org Website: http://listeninghouse.org     6  [facility] [place]  [place] [facility] Distance: 7.39 miles      In-Person   [address] [place] [address]  Language: English  Hours: Mon - Sun 9:00 AM - 5:30 PM  Fees: Free, Self Pay   Phone: (983) 378-1162 Email: info@[employer] Website: https://www.[employer]/locations/[facility]/     Housing search assistance  7  [facility] - [facility] Resources Distance: 4.82 miles      In-Person, Phone/Virtual   [address] [place] [address]  Language: English  Hours: Mon - Fri 8:00 AM - 4:30 PM  Fees: Free   Phone: (291) 444-7868 Email: office@[employer] Website: http://[employer]     8  [facility] and Housing Information Distance: 8.52 miles      In-Person, Phone/Virtual   [address] [place] [address]  Language: English  Hours: Mon - Fri 8:00 AM - 4:30 PM  Fees: Free   Phone: (472) 346-7831 Email: farida@[facility]. Website: https://www.[facility]./469/Community-Services     Shelter for families  9  [facility] Distance: 11.52 miles      In-Person   [address] [place] [address]  Language: English  Hours: Mon - Fri 3:00 PM - 9:00 AM , Sat - Sun Open 24 Hours  Fees: Free   Phone: (328) 585-8114 Ext.1 Website: https://www.[facility].org/2020/07/03/emergency-family-shelter/     10  [facility] Distance: 22.25 miles      In-Person   [address] [place] [address]  Language: English  Hours: Mon - Sun Open 24 Hours  Fees: Free, Self Pay   Phone: (184) 781-9031 Email: Marleni@[facility].org Website: http://www.[facility].org/     Shelter for individuals  11  [facility] [place] [facility] Distance: 7.39 miles      In-Person   [address] [place] [address]  Language: English  Hours: Mon - Sun 5:00 PM -  10:00 AM  Fees: Free, Self Pay   Phone: (107) 425-2145 Email: info@[employer] Website: https://www.[employer]/locations/[facility]/     12  [facility] Distance: 14.67 miles      In-Person   [address] [place] [address]  Language: English  Hours: Mon - Sun Open 24 Hours  Fees: Free   Phone: (664) 742-9514 Email: communications@[employer].org Website: https://[facility] StatesKS[facility] Corporationmn.org/oursaviourshousing/          Important Numbers & Websites       Emergency Services   911  [facility]   311  Poison Control   (108) 827-3506  Suicide Prevention Lifeline   (980) 931-7369 (TALK)  Child Abuse Hotline   (866) 554-8776 (4-A-Child)  Sexual Assault Hotline   (672) 654-8567 (HOPE)  National Runaway Safeline   (591) 807-7401 (RUNAWAY)  All-Options Talkline   (949) 922-8072  Substance Abuse Referral   (782) 448-8527 (HELP)

## 2023-10-03 NOTE — LETTER
October 4, 2023      Thiago Hein  [address] [place] [address]        Dear ,    We are writing to inform you of your test results.    Continue your current diabetes management as discussed in order to further improve.  Goal A1c  remains < 7.0% ideally.     Your urine screen was normal.  No evidence for significant protein in your urine.  We will plan to repeat this test on a yearly basis.     Your cholesterol results were near goal.  Continue your current medication as discussed.  Ensure regular exercise, healthy diet, and weight loss modifications in order to further improve.  We will plan to recheck your labs while fasting in the next 6-12 months to ensure desired improvement.      Your kidney and liver tests were otherwise fine.    Your complete blood count results were normal.  No evidence for anemia, etc.       Resulted Orders   Hemoglobin A1c   Result Value Ref Range    Hemoglobin A1C 7.3 (H) 0.0 - 5.6 %      Comment:      Normal <5.7%   Prediabetes 5.7-6.4%    Diabetes 6.5% or higher     Note: Adopted from ADA consensus guidelines.   Albumin Random Urine Quantitative with Creat Ratio   Result Value Ref Range    Creatinine Urine mg/dL 19.0 mg/dL      Comment:      The reference ranges have not been established in urine creatinine. The results should be integrated into the clinical context for interpretation.    Albumin Urine mg/L <12.0 mg/L      Comment:      The reference ranges have not been established in urine albumin. The results should be integrated into the clinical context for interpretation.    Albumin Urine mg/g Cr        Comment:      Unable to calculate, urine albumin and/or urine creatinine is outside detectable limits.  Microalbuminuria is defined as an albumin:creatinine ratio of 17 to 299 for males and 25 to 299 for females. A ratio of albumin:creatinine of 300 or higher is indicative of overt proteinuria.  Due to biologic variability, positive results should be  confirmed by a second, first-morning random or 24-hour timed urine specimen. If there is discrepancy, a third specimen is recommended. When 2 out of 3 results are in the microalbuminuria range, this is evidence for incipient nephropathy and warrants increased efforts at glucose control, blood pressure control, and institution of therapy with an angiotensin-converting-enzyme (ACE) inhibitor (if the patient can tolerate it).     Lipid panel reflex to direct LDL Fasting   Result Value Ref Range    Cholesterol 101 <200 mg/dL    Triglycerides 154 (H) <150 mg/dL    Direct Measure HDL 29 (L) >=40 mg/dL    LDL Cholesterol Calculated 41 <=100 mg/dL    Non HDL Cholesterol 72 <130 mg/dL    Narrative    Cholesterol  Desirable:  <200 mg/dL    Triglycerides  Normal:  Less than 150 mg/dL  Borderline High:  150-199 mg/dL  High:  200-499 mg/dL  Very High:  Greater than or equal to 500 mg/dL    Direct Measure HDL  Female:  Greater than or equal to 50 mg/dL   Male:  Greater than or equal to 40 mg/dL    LDL Cholesterol  Desirable:  <100mg/dL  Above Desirable:  100-129 mg/dL   Borderline High:  130-159 mg/dL   High:  160-189 mg/dL   Very High:  >= 190 mg/dL    Non HDL Cholesterol  Desirable:  130 mg/dL  Above Desirable:  130-159 mg/dL  Borderline High:  160-189 mg/dL  High:  190-219 mg/dL  Very High:  Greater than or equal to 220 mg/dL   Comprehensive metabolic panel   Result Value Ref Range    Sodium 141 135 - 145 mmol/L      Comment:      Reference intervals for this test were updated on 09/26/2023 to more accurately reflect our healthy population. There may be differences in the flagging of prior results with similar values performed with this method. Interpretation of those prior results can be made in the context of the updated reference intervals.     Potassium 3.9 3.4 - 5.3 mmol/L    Carbon Dioxide (CO2) 25 22 - 29 mmol/L    Anion Gap 11 7 - 15 mmol/L    Urea Nitrogen 17.8 8.0 - 23.0 mg/dL    Creatinine 0.89 0.67 - 1.17 mg/dL     GFR Estimate 90 >60 mL/min/1.73m2    Calcium 9.4 8.8 - 10.2 mg/dL    Chloride 105 98 - 107 mmol/L    Glucose 128 (H) 70 - 99 mg/dL    Alkaline Phosphatase 108 40 - 129 U/L    AST 19 0 - 45 U/L      Comment:      Reference intervals for this test were updated on 6/12/2023 to more accurately reflect our healthy population. There may be differences in the flagging of prior results with similar values performed with this method. Interpretation of those prior results can be made in the context of the updated reference intervals.    ALT 17 0 - 70 U/L      Comment:      Reference intervals for this test were updated on 6/12/2023 to more accurately reflect our healthy population. There may be differences in the flagging of prior results with similar values performed with this method. Interpretation of those prior results can be made in the context of the updated reference intervals.      Protein Total 7.2 6.4 - 8.3 g/dL    Albumin 4.5 3.5 - 5.2 g/dL    Bilirubin Total 0.8 <=1.2 mg/dL   CBC with platelets   Result Value Ref Range    WBC Count 7.0 4.0 - 11.0 10e3/uL    RBC Count 5.31 4.40 - 5.90 10e6/uL    Hemoglobin 15.3 13.3 - 17.7 g/dL    Hematocrit 45.0 40.0 - 53.0 %    MCV 85 78 - 100 fL    MCH 28.8 26.5 - 33.0 pg    MCHC 34.0 31.5 - 36.5 g/dL    RDW 13.1 10.0 - 15.0 %    Platelet Count 193 150 - 450 10e3/uL       If you have any questions or concerns, please call the clinic at the number listed above.       Sincerely,      Don Armijo MD

## 2023-10-03 NOTE — PROGRESS NOTES
Assessment/Plan:    Type 2 diabetes mellitus without complication, without long-term current use of insulin (H)  Type 2 diabetes with slight improvement with A1c decreasing from 7.8% to 7.3%.  Continues metformin  mg using 3 tablets in the morning 1 tablet in the evening.  No GI side effects.  Needs to get in and have diabetic eye exam however is on probation with [facility] due to missed appointments and needs to call day off in order to see if cancellation.  We will update microalbumin screen today.  Reassess at follow-up in about 4 months through this office.  - Albumin Random Urine Quantitative with Creat Ratio  - Hemoglobin A1c  - Comprehensive metabolic panel    Essential hypertension  Hypertension well controlled with lisinopril 10 mg daily and use of amlodipine 5 mg daily.  - Comprehensive metabolic panel    Hyperlipidemia, unspecified hyperlipidemia type  Continues use of atorvastatin 20 mg daily for lipid management.  - Lipid panel reflex to direct LDL Fasting    Encounter for immunization  High-dose flu shot provided today.  - INFLUENZA VACCINE 65+ (FLUZONE HD)    BPH with urinary obstruction  BPH with urinary obstruction with benefits of tamsulosin 0.4 mg daily.    Rheumatoid arthritis with negative rheumatoid factor, involving unspecified site (H)  Continues use of Celebrex 100 mg twice daily and hydroxychloroquine 200 mg twice daily.  Needs to get and have a repeat eye exam while on hydroxychloroquine.  Patient on probation with [facility].  Declines referral outside of [facility] and will continue to try to schedule same-day appointment by calling to see if cancellation.  - Comprehensive metabolic panel  - CBC with platelets               Subjective:    Thiago Hein is seen today for follow-up assessment.  Type 2 diabetes reviewed.  Metformin  mg using 3 tablets in the morning 1 tablet in the evening.  Tolerating well.  Lisinopril 10 mg daily as well as  "use of amlodipine 5 mg daily for hypertension management.  Atorvastatin 20 mg daily for cholesterol management.  Tamsulosin 0.4 mg daily for BPH with urinary obstruction.  Celebrex 100 mg twice daily and hydroxychloroquine 200 mg twice daily for rheumatoid arthritis.  Doing well with this however does need to schedule an eye exam however due to missed appointments is on probation with [facility] and needs to call the same day as an appointment to see if there is a cancellation in order to to schedule.  Described prior episode with some dysuria and urinary urgency symptoms in the past however this has subsequently resolved.  No current dysuria urgency or frequency described.  Comprehensive review of systems as above otherwise all negative.      Single   1 son - 32 \"Kraig\"   Tobacco: none   EtOH: none   Mom - Meena   Dad - currently 86 Yovanny - DM, CAD, HTN, high cholesterol, back problems, arthritis, spinal stenosis, pacemaker/defibrillator   1 bro - Be - HTN   Surgeries: \"lumbar back surgery for cyst removal\" - September, 2008 (Dr. 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[place], [place])   Hobbies:       Past Surgical History:   Procedure Laterality Date    ANESTHESIA OUT OF OR MRI N/A 10/2/2015    Procedure: ANESTHESIA OUT OF OR MRI;  Surgeon: GENERIC ANESTHESIA PROVIDER;  Location: [facility] OR    BACK SURGERY  2008    lumbar spine surgery-- unclear details    SOFT TISSUE SURGERY  2012    skin cancer removed from L shoulder, chest, right neck        Family History   Problem Relation Age of Onset    Coronary Artery Disease Father     Hypertension Father         Past Medical History:   Diagnosis Date    Diabetes (H)     Hypertension         Social History     Tobacco Use    Smoking status: Never     Passive exposure: Never    Smokeless tobacco: Former   Substance Use Topics    Alcohol use: No     " Comment: Quit in 1990    Drug use: No        Current Outpatient Medications   Medication Sig Dispense Refill    amLODIPine (NORVASC) 5 MG tablet TAKE 1 TABLET(5 MG) BY MOUTH DAILY 90 tablet 3    atorvastatin (LIPITOR) 20 MG tablet TAKE 1 TABLET(20 MG) BY MOUTH DAILY 90 tablet 0    celecoxib (CELEBREX) 100 MG capsule Take 1 capsule (100 mg) by mouth 2 times daily 180 capsule 3    furosemide (LASIX) 40 MG tablet TAKE 1 TABLET(40 MG) BY MOUTH DAILY 90 tablet 2    hydroxychloroquine (PLAQUENIL) 200 MG tablet Take 1 tablet (200 mg) by mouth 2 times daily 180 tablet 0    lisinopril (ZESTRIL) 10 MG tablet Take 1 tablet (10 mg) by mouth daily 90 tablet 3    metFORMIN (GLUCOPHAGE XR) 500 MG 24 hr tablet TAKE 3 TABLETS BY MOUTH EVERY MORNING AND 1 TABLET EVERY EVENING 360 tablet 2    tamsulosin (FLOMAX) 0.4 MG capsule Take 2 capsules (0.8 mg) by mouth every evening 180 capsule 3    augmented betamethasone dipropionate (DIPROLENE-AF) 0.05 % external ointment Apply topically 2 times daily as needed (palmar rash) (Patient not taking: Reported on 10/3/2023) 15 g 1          Objective:    Vitals:    10/03/23 1103   BP: 126/70   Pulse: 91   Resp: 19   Temp: 97.5  F (36.4  C)   SpO2: 97%   Weight: 109.3 kg (241 lb)   Height: 1.829 m (6')      Body mass index is 32.69 kg/m .    Alert.  No apparent distress.  Cardiac exam regular.  Chest clear.  Extremities warm and dry.      This note has been dictated using voice recognition software and as a result may contain minor grammatical errors and unintended word substitutions.         Answers submitted by the patient for this visit:  Diabetes Visit (Submitted on 10/3/2023)  Chief Complaint: Chronic problems general questions HPI Form  Frequency of checking blood sugars:: not at all  Diabetic concerns:: none  Paraesthesia present:: none of these symptoms  Have you had a diabetic eye exam within the last year?: No  General Questionnaire (Submitted on 10/3/2023)  Chief Complaint: Chronic  problems general questions HPI Form  How many servings of fruits and vegetables do you eat daily?: 2-3  On average, how many sweetened beverages do you drink each day (Examples: soda, juice, sweet tea, etc.  Do NOT count diet or artificially sweetened beverages)?: 1  How many minutes a day do you exercise enough to make your heart beat faster?: 9 or less  How many days a week do you exercise enough to make your heart beat faster?: 3 or less  How many days per week do you miss taking your medication?: 0

## 2023-10-04 LAB
ALBUMIN SERPL BCG-MCNC: 4.5 G/DL (ref 3.5–5.2)
ALP SERPL-CCNC: 108 U/L (ref 40–129)
ALT SERPL W P-5'-P-CCNC: 17 U/L (ref 0–70)
ANION GAP SERPL CALCULATED.3IONS-SCNC: 11 MMOL/L (ref 7–15)
AST SERPL W P-5'-P-CCNC: 19 U/L (ref 0–45)
BILIRUB SERPL-MCNC: 0.8 MG/DL
BUN SERPL-MCNC: 17.8 MG/DL (ref 8–23)
CALCIUM SERPL-MCNC: 9.4 MG/DL (ref 8.8–10.2)
CHLORIDE SERPL-SCNC: 105 MMOL/L (ref 98–107)
CHOLEST SERPL-MCNC: 101 MG/DL
CREAT SERPL-MCNC: 0.89 MG/DL (ref 0.67–1.17)
DEPRECATED HCO3 PLAS-SCNC: 25 MMOL/L (ref 22–29)
EGFRCR SERPLBLD CKD-EPI 2021: 90 ML/MIN/1.73M2
GLUCOSE SERPL-MCNC: 128 MG/DL (ref 70–99)
HDLC SERPL-MCNC: 29 MG/DL
LDLC SERPL CALC-MCNC: 41 MG/DL
NONHDLC SERPL-MCNC: 72 MG/DL
POTASSIUM SERPL-SCNC: 3.9 MMOL/L (ref 3.4–5.3)
PROT SERPL-MCNC: 7.2 G/DL (ref 6.4–8.3)
SODIUM SERPL-SCNC: 141 MMOL/L (ref 135–145)
TRIGL SERPL-MCNC: 154 MG/DL

## 2023-11-01 DIAGNOSIS — E78.5 HYPERLIPIDEMIA, UNSPECIFIED HYPERLIPIDEMIA TYPE: ICD-10-CM

## 2023-11-01 RX ORDER — ATORVASTATIN CALCIUM 20 MG/1
TABLET, FILM COATED ORAL
Qty: 90 TABLET | Refills: 0 | Status: SHIPPED | OUTPATIENT
Start: 2023-11-01 | End: 2024-02-26

## 2023-12-01 ENCOUNTER — HOSPITAL ENCOUNTER (EMERGENCY)
Facility: CLINIC | Age: 73
Discharge: HOME | End: 2023-12-01
Attending: EMERGENCY MEDICINE | Admitting: EMERGENCY MEDICINE
Payer: MEDICARE

## 2023-12-01 ENCOUNTER — APPOINTMENT (OUTPATIENT)
Dept: MRI IMAGING | Facility: CLINIC | Age: 73
End: 2023-12-01
Attending: EMERGENCY MEDICINE
Payer: MEDICARE

## 2023-12-01 VITALS
DIASTOLIC BLOOD PRESSURE: 67 MMHG | OXYGEN SATURATION: 95 % | TEMPERATURE: 97.4 F | HEART RATE: 60 BPM | RESPIRATION RATE: 16 BRPM | HEIGHT: 72 IN | WEIGHT: 240 LBS | BODY MASS INDEX: 32.51 KG/M2 | SYSTOLIC BLOOD PRESSURE: 138 MMHG

## 2023-12-01 DIAGNOSIS — R42 VERTIGO: ICD-10-CM

## 2023-12-01 LAB
ANION GAP SERPL CALCULATED.3IONS-SCNC: 11 MMOL/L (ref 7–15)
BASOPHILS # BLD AUTO: 0 10E3/UL (ref 0–0.2)
BASOPHILS NFR BLD AUTO: 1 %
BUN SERPL-MCNC: 16.8 MG/DL (ref 8–23)
CALCIUM SERPL-MCNC: 9.1 MG/DL (ref 8.8–10.2)
CHLORIDE SERPL-SCNC: 103 MMOL/L (ref 98–107)
CREAT SERPL-MCNC: 0.79 MG/DL (ref 0.67–1.17)
DEPRECATED HCO3 PLAS-SCNC: 24 MMOL/L (ref 22–29)
EGFRCR SERPLBLD CKD-EPI 2021: >90 ML/MIN/1.73M2
EOSINOPHIL # BLD AUTO: 0.1 10E3/UL (ref 0–0.7)
EOSINOPHIL NFR BLD AUTO: 1 %
ERYTHROCYTE [DISTWIDTH] IN BLOOD BY AUTOMATED COUNT: 13.4 % (ref 10–15)
GLUCOSE SERPL-MCNC: 231 MG/DL (ref 70–99)
HCT VFR BLD AUTO: 43.6 % (ref 40–53)
HGB BLD-MCNC: 14.8 G/DL (ref 13.3–17.7)
IMM GRANULOCYTES # BLD: 0 10E3/UL
IMM GRANULOCYTES NFR BLD: 0 %
LYMPHOCYTES # BLD AUTO: 1.3 10E3/UL (ref 0.8–5.3)
LYMPHOCYTES NFR BLD AUTO: 23 %
MCH RBC QN AUTO: 29.3 PG (ref 26.5–33)
MCHC RBC AUTO-ENTMCNC: 33.9 G/DL (ref 31.5–36.5)
MCV RBC AUTO: 86 FL (ref 78–100)
MONOCYTES # BLD AUTO: 0.5 10E3/UL (ref 0–1.3)
MONOCYTES NFR BLD AUTO: 9 %
NEUTROPHILS # BLD AUTO: 3.8 10E3/UL (ref 1.6–8.3)
NEUTROPHILS NFR BLD AUTO: 66 %
NRBC # BLD AUTO: 0 10E3/UL
NRBC BLD AUTO-RTO: 0 /100
PLATELET # BLD AUTO: 197 10E3/UL (ref 150–450)
POTASSIUM SERPL-SCNC: 3.8 MMOL/L (ref 3.4–5.3)
RBC # BLD AUTO: 5.05 10E6/UL (ref 4.4–5.9)
SODIUM SERPL-SCNC: 138 MMOL/L (ref 135–145)
WBC # BLD AUTO: 5.6 10E3/UL (ref 4–11)

## 2023-12-01 PROCEDURE — 93010 ELECTROCARDIOGRAM REPORT: CPT | Performed by: EMERGENCY MEDICINE

## 2023-12-01 PROCEDURE — 80048 BASIC METABOLIC PNL TOTAL CA: CPT | Performed by: EMERGENCY MEDICINE

## 2023-12-01 PROCEDURE — G1010 CDSM STANSON: HCPCS

## 2023-12-01 PROCEDURE — 96361 HYDRATE IV INFUSION ADD-ON: CPT

## 2023-12-01 PROCEDURE — A9585 GADOBUTROL INJECTION: HCPCS | Performed by: EMERGENCY MEDICINE

## 2023-12-01 PROCEDURE — 250N000011 HC RX IP 250 OP 636: Performed by: EMERGENCY MEDICINE

## 2023-12-01 PROCEDURE — 96374 THER/PROPH/DIAG INJ IV PUSH: CPT | Mod: 59

## 2023-12-01 PROCEDURE — 85041 AUTOMATED RBC COUNT: CPT | Performed by: EMERGENCY MEDICINE

## 2023-12-01 PROCEDURE — 258N000003 HC RX IP 258 OP 636: Performed by: EMERGENCY MEDICINE

## 2023-12-01 PROCEDURE — 36415 COLL VENOUS BLD VENIPUNCTURE: CPT | Performed by: EMERGENCY MEDICINE

## 2023-12-01 PROCEDURE — 99285 EMERGENCY DEPT VISIT HI MDM: CPT | Mod: 25

## 2023-12-01 PROCEDURE — 99285 EMERGENCY DEPT VISIT HI MDM: CPT | Mod: 25 | Performed by: EMERGENCY MEDICINE

## 2023-12-01 PROCEDURE — 70553 MRI BRAIN STEM W/O & W/DYE: CPT | Mod: MG

## 2023-12-01 PROCEDURE — 93005 ELECTROCARDIOGRAM TRACING: CPT

## 2023-12-01 PROCEDURE — 96375 TX/PRO/DX INJ NEW DRUG ADDON: CPT

## 2023-12-01 PROCEDURE — 255N000002 HC RX 255 OP 636: Performed by: EMERGENCY MEDICINE

## 2023-12-01 PROCEDURE — 85014 HEMATOCRIT: CPT | Performed by: EMERGENCY MEDICINE

## 2023-12-01 RX ORDER — LORAZEPAM 2 MG/ML
2 INJECTION INTRAMUSCULAR ONCE
Status: COMPLETED | OUTPATIENT
Start: 2023-12-01 | End: 2023-12-01

## 2023-12-01 RX ORDER — GADOBUTROL 604.72 MG/ML
11 INJECTION INTRAVENOUS ONCE
Status: COMPLETED | OUTPATIENT
Start: 2023-12-01 | End: 2023-12-01

## 2023-12-01 RX ORDER — ONDANSETRON 2 MG/ML
4 INJECTION INTRAMUSCULAR; INTRAVENOUS ONCE
Status: COMPLETED | OUTPATIENT
Start: 2023-12-01 | End: 2023-12-01

## 2023-12-01 RX ADMIN — SODIUM CHLORIDE 50 ML: 9 INJECTION, SOLUTION INTRAVENOUS at 15:45

## 2023-12-01 RX ADMIN — GADOBUTROL 11 ML: 604.72 INJECTION INTRAVENOUS at 15:45

## 2023-12-01 RX ADMIN — ONDANSETRON 4 MG: 2 INJECTION INTRAMUSCULAR; INTRAVENOUS at 14:40

## 2023-12-01 RX ADMIN — LORAZEPAM 2 MG: 2 INJECTION INTRAMUSCULAR; INTRAVENOUS at 13:56

## 2023-12-01 ASSESSMENT — ACTIVITIES OF DAILY LIVING (ADL)
ADLS_ACUITY_SCORE: 35

## 2023-12-01 NOTE — ED PROVIDER NOTES
History     Chief Complaint   Patient presents with    Dizziness     HPI  Thiago Hein is a 73 year old male who presents with feeling of dizziness, imbalance.  No discrete vertigo described.  No near syncope.  Symptoms ongoing for the past couple days.  No headache no visual change difficulty speaking or swallowing focal numbness or weakness described.  History of hypertension, hyperlipidemia, adult stills disease, epidural abscess, type 2 diabetes and morbid obesity.  He does not smoke or use alcohol.  He denies fall or head injury.  Denies recent febrile illness, no nausea vomiting diarrhea.  Out of antihypertensive and statin over the past couple weeks.    Allergies:  No Known Allergies    Problem List:    Patient Active Problem List    Diagnosis Date Noted    Chronic polyarticular juvenile rheumatoid arthritis (H) 08/24/2021     Priority: Medium     Formatting of this note might be different from the original.  Created by Conversion    Replacement Utility updated for latest IMO load      Emotional lability 08/24/2021     Priority: Medium     Formatting of this note might be different from the original.  Created by Conversion      Lower back pain 08/24/2021     Priority: Medium     Formatting of this note might be different from the original.  Created by Conversion      Morbid obesity (H) 08/24/2021     Priority: Medium    BPH with urinary obstruction 02/20/2020     Priority: Medium    Primary osteoarthritis involving multiple joints 10/08/2019     Priority: Medium    Left carpal tunnel syndrome 12/27/2018     Priority: Medium    Trigger finger, right index finger 09/26/2018     Priority: Medium    Polyarthralgia 07/20/2018     Priority: Medium    Unintentional weight loss 06/01/2018     Priority: Medium    Non morbid obesity due to excess calories 07/14/2017     Priority: Medium    Peripheral edema 07/14/2017     Priority: Medium    Diverticulosis 11/15/2016     Priority: Medium    Hyperlipidemia  08/04/2016     Priority: Medium    Need for pneumococcal vaccination 01/11/2016     Priority: Medium    Need for vaccination 11/20/2015     Priority: Medium    Type 2 diabetes mellitus (H) 11/11/2015     Priority: Medium     Formatting of this note might be different from the original.  Created by Conversion      Bacteremia due to Staphylococcus aureus 11/06/2015     Priority: Medium    Dysphagia 11/06/2015     Priority: Medium    Epidural abscess 11/06/2015     Priority: Medium    Essential hypertension 11/06/2015     Priority: Medium     Formatting of this note might be different from the original.  Created by Conversion    Replacement Utility updated for latest IMO load      Lesion of salivary gland 11/06/2015     Priority: Medium    Somnolence 11/06/2015     Priority: Medium    Discitis 10/04/2015     Priority: Medium    Adult Still's disease (H) 10/01/2015     Priority: Medium    Leukocytosis 09/29/2015     Priority: Medium    Sepsis (H) 09/29/2015     Priority: Medium        Past Medical History:    Past Medical History:   Diagnosis Date    Diabetes (H)     Hypertension        Past Surgical History:    Past Surgical History:   Procedure Laterality Date    ANESTHESIA OUT OF OR MRI N/A 10/2/2015    Procedure: ANESTHESIA OUT OF OR MRI;  Surgeon: GENERIC ANESTHESIA PROVIDER;  Location: [facility] OR    BACK SURGERY  2008    lumbar spine surgery-- unclear details    SOFT TISSUE SURGERY  2012    skin cancer removed from L shoulder, chest, right neck       Family History:    Family History   Problem Relation Age of Onset    Coronary Artery Disease Father     Hypertension Father        Social History:  Marital Status:  Single [1]  Social History     Tobacco Use    Smoking status: Never     Passive exposure: Never    Smokeless tobacco: Former   Substance Use Topics    Alcohol use: No     Comment: Quit in 1990    Drug use: No        Medications:    amLODIPine (NORVASC) 5 MG tablet  atorvastatin (LIPITOR) 20 MG tablet  celecoxib  (CELEBREX) 100 MG capsule  furosemide (LASIX) 40 MG tablet  hydroxychloroquine (PLAQUENIL) 200 MG tablet  lisinopril (ZESTRIL) 10 MG tablet  metFORMIN (GLUCOPHAGE XR) 500 MG 24 hr tablet  tamsulosin (FLOMAX) 0.4 MG capsule          Review of Systems    Physical Exam   BP: (!) 150/90  Pulse: 70  Temp: 97.4  F (36.3  C)  Resp: 16  Height: 182.9 cm (6')  Weight: 108.9 kg (240 lb)  SpO2: 96 %      Physical Exam  GENERAL Nontoxic-appearing no respiratory distress alert and oriented x3. GCS 15 on arrival, throughout stay and at discharge.    HEENT Head atraumatic normocephalic     PERRL, EOMI, conjunctiva clear, sclera nonicteric, no discharge, no periorbital swelling or redness     TMs/EACs are unremarkable with exception of bilateral cerumen although not impacted     Oropharynx moist without lesions, erythema or exudate.  Tongue is not swollen.     Nose is unremarkable to inspection, mucous membranes are moist and pink, no nasal discharge or bleeding    PULMONARY lungs are clear to auscultation, breath sounds are symmetrical, bilateral chest rise, no rales rhonchi or wheezes appreciated    CARDIOVASCULAR heart is regular no murmur appreciated.  Peripheral pulses are symmetrical and strong.  Capillary refill is normal.     GASTROINTESTINAL abdomen is soft, nondistended, bowel sounds positive, no mass appreciated, no guarding or rebound    NEUROLOGICAL Cranial nerves; vision baseline fields intact, PERRL, EOMI, facial sensation intact to light touch, facial muscle tone intact and symmetrical, hearing grossly intact,swallowing without difficulty, SCM strength intact, tongue protrudes midline, shoulder shrug intact      Strength is 5/5 throughout all muscle groups of the extremities     Sensation intact to light touch     There is no ataxia    SKIN skin is clear from rash, pink warm and dry    PSYCHIATRIC patient is alert, attentive, good eye contact, well kempt, thought processes are rational and organized, affect is  normal, mood is neutral, patient is not agitated, no delusions, able to answer questions appropriately    ED Course                 Procedures    .ekg     ECG: Normal rate and rhythm, axis and intervals within normal limits, no Q waves, no ectopy no acute ST or T wave changes, unchanged from previous, read by Dr. Hua Boggs      Results for orders placed or performed during the hospital encounter of 12/01/23 (from the past 24 hour(s))   CBC with platelets differential    Narrative    The following orders were created for panel order CBC with platelets differential.  Procedure                               Abnormality         Status                     ---------                               -----------         ------                     CBC with platelets and d...[[id number]]                      Final result                 Please view results for these tests on the individual orders.   Basic metabolic panel   Result Value Ref Range    Sodium 138 135 - 145 mmol/L    Potassium 3.8 3.4 - 5.3 mmol/L    Chloride 103 98 - 107 mmol/L    Carbon Dioxide (CO2) 24 22 - 29 mmol/L    Anion Gap 11 7 - 15 mmol/L    Urea Nitrogen 16.8 8.0 - 23.0 mg/dL    Creatinine 0.79 0.67 - 1.17 mg/dL    GFR Estimate >90 >60 mL/min/1.73m2    Calcium 9.1 8.8 - 10.2 mg/dL    Glucose 231 (H) 70 - 99 mg/dL   CBC with platelets and differential   Result Value Ref Range    WBC Count 5.6 4.0 - 11.0 10e3/uL    RBC Count 5.05 4.40 - 5.90 10e6/uL    Hemoglobin 14.8 13.3 - 17.7 g/dL    Hematocrit 43.6 40.0 - 53.0 %    MCV 86 78 - 100 fL    MCH 29.3 26.5 - 33.0 pg    MCHC 33.9 31.5 - 36.5 g/dL    RDW 13.4 10.0 - 15.0 %    Platelet Count 197 150 - 450 10e3/uL    % Neutrophils 66 %    % Lymphocytes 23 %    % Monocytes 9 %    % Eosinophils 1 %    % Basophils 1 %    % Immature Granulocytes 0 %    NRBCs per 100 WBC 0 <1 /100    Absolute Neutrophils 3.8 1.6 - 8.3 10e3/uL    Absolute Lymphocytes 1.3 0.8 - 5.3 10e3/uL    Absolute Monocytes 0.5 0.0 - 1.3 10e3/uL     Absolute Eosinophils 0.1 0.0 - 0.7 10e3/uL    Absolute Basophils 0.0 0.0 - 0.2 10e3/uL    Absolute Immature Granulocytes 0.0 <=0.4 10e3/uL    Absolute NRBCs 0.0 10e3/uL   MR Brain w/o & w Contrast    Narrative    EXAM: MR BRAIN W/O & W CONTRAST  12/1/2023 3:45 PM     HISTORY: imbalance       COMPARISON: Brain MRI: 10/5/2015.    TECHNIQUE: Multiplanar, multisequence MR imaging of the head obtained  prior to, and after, intravenous contrast administration    CONTRAST: 11 mL Gadavist.    FINDINGS:    Calvarium/skull base: No focal marrow replacing lesion. Small  bilateral mastoid effusions, right greater than left.    Orbits: Within normal limits accounting for technique.     Paranasal sinuses: Lobulated mucosal thickening versus mucous  retention cyst along the floors of the maxillary sinuses, left greater  than right.    Brain: No restricted diffusion.  No evidence of acute intracranial  hemorrhage.  No parenchymal edema. No substantial white matter disease  for patient's age.  No hydrocephalus.  Normal positioning and  morphology of the cerebellar tonsils.  Normal flow related signal  within the major intracranial arteries and venous structures. No  pathologic contrast enhancement within constraints of mild motion  degradation on postcontrast sequences.      Impression    IMPRESSION:    1.  No acute intracranial abnormality. Specifically, no acute infarct.  2.  No pathologic contrast enhancement within constraints of mild  motion degradation.  3.  Small bilateral mastoid effusions, right greater than left,  nonspecific.    NORMA BURTON MD         SYSTEM ID:  [id number]   MRA Angiogram Head w/o Contrast    Narrative    EXAM MRA BRAIN ([facility]) W/O CONTRAST 12/1/2023 3:45 PM    HISTORY: imbalance    COMPARISON: None.    TECHNIQUE: Using a 3D time-of-flight image acquisition technique, MRA  of the major arteries at the base of the brain was obtained without  intravenous contrast. Three-dimensional  reconstructions of the head  MRA were created, which were reviewed by the radiologist.    FINDINGS:     ANTERIOR CIRCULATION: No substantial stenosis or occlusion. No  evidence of aneurysm or high flow vascular malformation. Prominent  right posterior communicating artery with small right P1 segment.    POSTERIOR CIRCULATION: No substantial stenosis or occlusion. No  evidence of aneurysm or high flow vascular malformation.      Impression    IMPRESSION:    1.  No substantial stenosis or occlusion involving the major  intracranial arteries.  2.  No evidence of aneurysm.    NORMA BURTON MD         SYSTEM ID:  [id number]   MRA Angiogram Neck w/o & w Contrast    Narrative    EXAM: MRA NECK (CAROTIDS) W/O & W CONTRAST  12/1/2023 3:45 PM     HISTORY: imbalance       COMPARISON: None.    TECHNIQUE: Neck MRA: Limited non contrast 2DTOF images were obtained  of the mid-cervical region. Following intravenous gadolinium-based  contrast administration, a contrast enhanced MRA of the neck/cervical  vessels was performed.  Three-dimensional reconstructions of the neck and head MRA were  created, which were reviewed by the radiologist.    CONTRAST: 11 mL gadavist.    FINDINGS:    AORTIC ARCH: Bovine configuration of the aortic arch, anatomic  variant. No high-grade stenosis involving the origins of the major  branch vessels.    LEFT CAROTID ARTERY: No substantial stenosis or occlusion. No evidence  of dissection.    RIGHT CAROTID ARTERY: No substantial stenosis or occlusion. No  evidence of dissection.    VERTEBRAL ARTERIES: No substantial stenosis or occlusion. No evidence  of dissection. Codominant.      Impression    IMPRESSION:     1.  No evidence of substantial stenosis or occlusion involving the  major arteries of the neck.  2.  No evidence of dissection.    NORMA BURTON MD         SYSTEM ID:  [id number]       Medications   LORazepam (ATIVAN) injection 2 mg (2 mg Intravenous $Given 12/1/23 6159)   gadobutrol  (GADAVIST) injection 11 mL (11 mLs Intravenous $Given 12/1/23 1545)   sodium chloride 0.9% BOLUS 50 mL (0 mLs Intravenous Stopped 12/1/23 1624)   ondansetron (ZOFRAN) injection 4 mg (4 mg Intravenous $Given 12/1/23 1440)       Assessments & Plan (with Medical Decision Making)  Patient presents with a vague description of dizziness, he describes some features of vertigo, he has no red flags by history or exam, however has risk factor for vascular disease and has been on and off amlodipine and atorvastatin recently.  His exam is nonfocal, unable to elicit vertigo/nystagmus.  For these reasons he underwent MRI brain, MRA brain and neck which were all unremarkable as above.  He is safe for discharge home.  Encouraged him to restart his baseline meds as soon as possible.  Discussed maneuvers for vertigo.  Discussed return criteria as well as follow-up.     I have reviewed the nursing notes.    I have reviewed the findings, diagnosis, plan and need for follow up with the patient.        New Prescriptions    No medications on file       Final diagnoses:   Vertigo       12/1/2023   [facility] EMERGENCY DEPT       Hua Boggs MD  12/01/23 1640

## 2023-12-01 NOTE — DISCHARGE INSTRUCTIONS
Rest, drink plenty fluids    You may attempt different maneuvers to try and fatigue vertigo    [facility]     You may also follow-up with primary care/physical therapy for further evaluation and treatment    Return here for headache, focal neurologic change, vomiting, fever or any other concern.

## 2023-12-01 NOTE — ED NOTES
"Patient states he has been dizzy since Wednesday but worse today. He admits to being \"out of his antihtn meds for a week due to pharmacy mis communication\"  "

## 2023-12-01 NOTE — ED TRIAGE NOTES
"    Pt states he is dizzy, started 2 days ago, went away and then came back, nothing yesterday, and now this AM when he first stood up.  \"It feels like motion sickness\".   Pt denies visual changes.  Pt reports feeling more dizzy when he turns head to the R.  Pt denies HA, but \"my head isn't clear\", \"Buzzy\".        Pt states he is out of his statin for 2 weeks, and amlodipine for a week.            "

## 2023-12-27 DIAGNOSIS — N40.1 BPH WITH URINARY OBSTRUCTION: ICD-10-CM

## 2023-12-27 DIAGNOSIS — N13.8 BPH WITH URINARY OBSTRUCTION: ICD-10-CM

## 2023-12-27 RX ORDER — TAMSULOSIN HYDROCHLORIDE 0.4 MG/1
CAPSULE ORAL
Qty: 180 CAPSULE | Refills: 2 | Status: ON HOLD | OUTPATIENT
Start: 2023-12-27 | End: 2024-07-06

## 2024-01-10 ENCOUNTER — LAB (OUTPATIENT)
Dept: LAB | Facility: CLINIC | Age: 74
End: 2024-01-10
Payer: MEDICARE

## 2024-01-10 DIAGNOSIS — Z79.899 HIGH RISK MEDICATION USE: ICD-10-CM

## 2024-01-10 LAB
ALBUMIN SERPL BCG-MCNC: 4.3 G/DL (ref 3.5–5.2)
ALT SERPL W P-5'-P-CCNC: 20 U/L (ref 0–70)
CREAT SERPL-MCNC: 0.84 MG/DL (ref 0.67–1.17)
EGFRCR SERPLBLD CKD-EPI 2021: >90 ML/MIN/1.73M2
ERYTHROCYTE [DISTWIDTH] IN BLOOD BY AUTOMATED COUNT: 13 % (ref 10–15)
HCT VFR BLD AUTO: 44.6 % (ref 40–53)
HGB BLD-MCNC: 15.2 G/DL (ref 13.3–17.7)
MCH RBC QN AUTO: 29.3 PG (ref 26.5–33)
MCHC RBC AUTO-ENTMCNC: 34.1 G/DL (ref 31.5–36.5)
MCV RBC AUTO: 86 FL (ref 78–100)
PLATELET # BLD AUTO: 168 10E3/UL (ref 150–450)
RBC # BLD AUTO: 5.18 10E6/UL (ref 4.4–5.9)
WBC # BLD AUTO: 7 10E3/UL (ref 4–11)

## 2024-01-10 PROCEDURE — 82565 ASSAY OF CREATININE: CPT

## 2024-01-10 PROCEDURE — 84460 ALANINE AMINO (ALT) (SGPT): CPT

## 2024-01-10 PROCEDURE — 85027 COMPLETE CBC AUTOMATED: CPT

## 2024-01-10 PROCEDURE — 36415 COLL VENOUS BLD VENIPUNCTURE: CPT

## 2024-01-10 PROCEDURE — 82040 ASSAY OF SERUM ALBUMIN: CPT

## 2024-01-15 ENCOUNTER — HOSPITAL ENCOUNTER (OUTPATIENT)
Dept: GENERAL RADIOLOGY | Facility: HOSPITAL | Age: 74
Discharge: HOME | End: 2024-01-15
Attending: INTERNAL MEDICINE | Admitting: INTERNAL MEDICINE
Payer: MEDICARE

## 2024-01-15 ENCOUNTER — OFFICE VISIT (OUTPATIENT)
Dept: RHEUMATOLOGY | Facility: CLINIC | Age: 74
End: 2024-01-15
Payer: MEDICARE

## 2024-01-15 VITALS
OXYGEN SATURATION: 98 % | SYSTOLIC BLOOD PRESSURE: 112 MMHG | WEIGHT: 239.3 LBS | DIASTOLIC BLOOD PRESSURE: 62 MMHG | BODY MASS INDEX: 32.45 KG/M2 | HEART RATE: 69 BPM

## 2024-01-15 DIAGNOSIS — M15.0 PRIMARY OSTEOARTHRITIS INVOLVING MULTIPLE JOINTS: ICD-10-CM

## 2024-01-15 DIAGNOSIS — Z79.899 HIGH RISK MEDICATION USE: ICD-10-CM

## 2024-01-15 DIAGNOSIS — M06.00 SERONEGATIVE RHEUMATOID ARTHRITIS (H): Primary | ICD-10-CM

## 2024-01-15 DIAGNOSIS — M25.50 POLYARTHRALGIA: ICD-10-CM

## 2024-01-15 DIAGNOSIS — M06.00 SERONEGATIVE RHEUMATOID ARTHRITIS (H): ICD-10-CM

## 2024-01-15 PROCEDURE — 73560 X-RAY EXAM OF KNEE 1 OR 2: CPT | Mod: 50

## 2024-01-15 PROCEDURE — 20610 DRAIN/INJ JOINT/BURSA W/O US: CPT | Mod: LT | Performed by: INTERNAL MEDICINE

## 2024-01-15 PROCEDURE — 99214 OFFICE O/P EST MOD 30 MIN: CPT | Mod: 25 | Performed by: INTERNAL MEDICINE

## 2024-01-15 RX ORDER — TRIAMCINOLONE ACETONIDE 40 MG/ML
40 INJECTION, SUSPENSION INTRA-ARTICULAR; INTRAMUSCULAR ONCE
Status: COMPLETED | OUTPATIENT
Start: 2024-01-15 | End: 2024-01-15

## 2024-01-15 RX ORDER — HYDROXYCHLOROQUINE SULFATE 200 MG/1
200 TABLET, FILM COATED ORAL 2 TIMES DAILY
Qty: 180 TABLET | Refills: 0 | Status: SHIPPED | OUTPATIENT
Start: 2024-01-15 | End: 2024-04-17

## 2024-01-15 RX ADMIN — TRIAMCINOLONE ACETONIDE 40 MG: 40 INJECTION, SUSPENSION INTRA-ARTICULAR; INTRAMUSCULAR at 12:43

## 2024-01-15 NOTE — PROGRESS NOTES
Rheumatology follow-up visit note     Thiago is a 73 year old male presents today for follow-up.    Thiago was seen today for recheck.    Diagnoses and all orders for this visit:    Seronegative rheumatoid arthritis (H)  -     hydroxychloroquine (PLAQUENIL) 200 MG tablet; Take 1 tablet (200 mg) by mouth 2 times daily  -     XR Knee AP/Lat Standing Bilateral; Future  -     triamcinolone (KENALOG-40) injection 40 mg  -     ASPIRATION/INJECTION MAJOR JOINT    High risk medication use    Primary osteoarthritis involving multiple joints  -     XR Knee AP/Lat Standing Bilateral; Future  -     triamcinolone (KENALOG-40) injection 40 mg  -     ASPIRATION/INJECTION MAJOR JOINT    Polyarthralgia    0    This patient with well-controlled seronegative rheumatoid arthritis complains of pain in his left knee that has trouble him with enough discomfort to interfere with day-to-day activity he would like to try corticosteroid injection done after pros and cons were outlined and, 40 mg of Kenalog injected anterolateral approach.    Follow up in 3 months.    HPI    Thiago Hein is a 73 year old male is here for follow-up of seronegative rheumatoid arthritis, osteoarthritis, he is on hydroxychloroquine having decided not to go for methotrexate.  He noted that this is controlling his joint symptoms very well with the exception of knees especially the left side which is worse with activity without effusion that he can identify.  There is no history of trauma the knee does not give way.  However it does trouble him at night or when he is in bed.  This is a case bilaterally.    DETAILED EXAMINATION  01/15/24  :    Vitals:    01/15/24 1046   BP: 112/62   Pulse: 69   SpO2: 98%   Weight: 108.5 kg (239 lb 4.8 oz)     Alert oriented. Head including the face is examined for malar rash, heliotropes, scarring, lupus pernio. Eyes examined for redness such as in episcleritis/scleritis, periorbital lesions.   Neck/ Face examined for  parotid gland swelling, range of motion of neck.  Left upper and lower and right upper and lower extremities examined for tenderness, swelling, warmth of the appendicular joints, range of motion, edema, rash.  Some of the important findings included: he does not have evidence of synovitis in the palpable joints of the upper extremities.  No significant deformities of the digits.  Small Heberden nodes.  Range of motion of the shoulders   show full abduction.  He has joint line tenderness of both the knees warmth more so on the left side.  Patient Active Problem List    Diagnosis Date Noted    Chronic polyarticular juvenile rheumatoid arthritis (H) 08/24/2021     Priority: Medium     Formatting of this note might be different from the original.  Created by Conversion    Replacement Utility updated for latest IMO load      Emotional lability 08/24/2021     Priority: Medium     Formatting of this note might be different from the original.  Created by Conversion      Lower back pain 08/24/2021     Priority: Medium     Formatting of this note might be different from the original.  Created by Conversion      Morbid obesity (H) 08/24/2021     Priority: Medium    BPH with urinary obstruction 02/20/2020     Priority: Medium    Primary osteoarthritis involving multiple joints 10/08/2019     Priority: Medium    Left carpal tunnel syndrome 12/27/2018     Priority: Medium    Trigger finger, right index finger 09/26/2018     Priority: Medium    Polyarthralgia 07/20/2018     Priority: Medium    Unintentional weight loss 06/01/2018     Priority: Medium    Non morbid obesity due to excess calories 07/14/2017     Priority: Medium    Peripheral edema 07/14/2017     Priority: Medium    Diverticulosis 11/15/2016     Priority: Medium    Hyperlipidemia 08/04/2016     Priority: Medium    Need for pneumococcal vaccination 01/11/2016     Priority: Medium    Need for vaccination 11/20/2015     Priority: Medium    Type 2 diabetes mellitus (H)  11/11/2015     Priority: Medium     Formatting of this note might be different from the original.  Created by Conversion      Bacteremia due to Staphylococcus aureus 11/06/2015     Priority: Medium    Dysphagia 11/06/2015     Priority: Medium    Epidural abscess 11/06/2015     Priority: Medium    Essential hypertension 11/06/2015     Priority: Medium     Formatting of this note might be different from the original.  Created by Conversion    Replacement Utility updated for latest IMO load      Lesion of salivary gland 11/06/2015     Priority: Medium    Somnolence 11/06/2015     Priority: Medium    Discitis 10/04/2015     Priority: Medium    Adult Still's disease (H) 10/01/2015     Priority: Medium    Leukocytosis 09/29/2015     Priority: Medium    Sepsis (H) 09/29/2015     Priority: Medium     Past Surgical History:   Procedure Laterality Date    ANESTHESIA OUT OF OR MRI N/A 10/2/2015    Procedure: ANESTHESIA OUT OF OR MRI;  Surgeon: GENERIC ANESTHESIA PROVIDER;  Location: [facility] OR    BACK SURGERY  2008    lumbar spine surgery-- unclear details    SOFT TISSUE SURGERY  2012    skin cancer removed from L shoulder, chest, right neck      Past Medical History:   Diagnosis Date    Diabetes (H)     Hypertension      No Known Allergies  Current Outpatient Medications   Medication Sig Dispense Refill    amLODIPine (NORVASC) 5 MG tablet TAKE 1 TABLET(5 MG) BY MOUTH DAILY 90 tablet 3    atorvastatin (LIPITOR) 20 MG tablet TAKE 1 TABLET(20 MG) BY MOUTH DAILY 90 tablet 0    celecoxib (CELEBREX) 100 MG capsule Take 1 capsule (100 mg) by mouth 2 times daily 180 capsule 3    furosemide (LASIX) 40 MG tablet TAKE 1 TABLET(40 MG) BY MOUTH DAILY 90 tablet 2    hydroxychloroquine (PLAQUENIL) 200 MG tablet Take 1 tablet (200 mg) by mouth 2 times daily 180 tablet 0    lisinopril (ZESTRIL) 10 MG tablet Take 1 tablet (10 mg) by mouth daily 90 tablet 3    metFORMIN (GLUCOPHAGE XR) 500 MG 24 hr tablet TAKE 3 TABLETS BY MOUTH EVERY MORNING AND 1  TABLET EVERY EVENING 360 tablet 2    tamsulosin (FLOMAX) 0.4 MG capsule TAKE 2 CAPSULES(0.8 MG) BY MOUTH EVERY EVENING 180 capsule 2     family history includes Coronary Artery Disease in his father; Hypertension in his father.  Social Connections: Not on file          WBC   Date Value Ref Range Status   04/23/2021 8.2 4.0 - 11.0 10e9/L Final     WBC Count   Date Value Ref Range Status   01/10/2024 7.0 4.0 - 11.0 10e3/uL Final     RBC Count   Date Value Ref Range Status   01/10/2024 5.18 4.40 - 5.90 10e6/uL Final   04/23/2021 5.25 4.4 - 5.9 10e12/L Final     Hemoglobin   Date Value Ref Range Status   01/10/2024 15.2 13.3 - 17.7 g/dL Final   04/23/2021 15.2 13.3 - 17.7 g/dL Final     Hematocrit   Date Value Ref Range Status   01/10/2024 44.6 40.0 - 53.0 % Final   04/23/2021 44.8 40.0 - 53.0 % Final     MCV   Date Value Ref Range Status   01/10/2024 86 78 - 100 fL Final   04/23/2021 85 78 - 100 fl Final     MCH   Date Value Ref Range Status   01/10/2024 29.3 26.5 - 33.0 pg Final   04/23/2021 29.0 26.5 - 33.0 pg Final     Platelet Count   Date Value Ref Range Status   01/10/2024 168 150 - 450 10e3/uL Final   04/23/2021 222 150 - 450 10e9/L Final     % Lymphocytes   Date Value Ref Range Status   12/01/2023 23 % Final   04/23/2021 20.9 % Final     AST   Date Value Ref Range Status   10/03/2023 19 0 - 45 U/L Final     Comment:     Reference intervals for this test were updated on 6/12/2023 to more accurately reflect our healthy population. There may be differences in the flagging of prior results with similar values performed with this method. Interpretation of those prior results can be made in the context of the updated reference intervals.   10/12/2015 18 0 - 45 U/L Final     ALT   Date Value Ref Range Status   01/10/2024 20 0 - 70 U/L Final   10/12/2015 30 0 - 70 U/L Final     Albumin   Date Value Ref Range Status   01/10/2024 4.3 3.5 - 5.2 g/dL Final   05/03/2022 4.0 3.5 - 5.0 g/dL Final   10/12/2015 2.2 (L) 3.4 -  5.0 g/dL Final     Alkaline Phosphatase   Date Value Ref Range Status   10/03/2023 108 40 - 129 U/L Final   10/17/2015 133 40 - 150 U/L Final     Creatinine   Date Value Ref Range Status   01/10/2024 0.84 0.67 - 1.17 mg/dL Final   04/23/2021 0.86 0.66 - 1.25 mg/dL Final     GFR Estimate   Date Value Ref Range Status   01/10/2024 >90 >60 mL/min/1.73m2 Final   04/23/2021 87 >60 mL/min/[1.73_m2] Final     Comment:     Non  GFR Calc  Starting 12/18/2018, serum creatinine based estimated GFR (eGFR) will be   calculated using the Chronic Kidney Disease Epidemiology Collaboration   (CKD-EPI) equation.       GFR Estimate If Black   Date Value Ref Range Status   04/23/2021 >90 >60 mL/min/[1.73_m2] Final     Comment:      GFR Calc  Starting 12/18/2018, serum creatinine based estimated GFR (eGFR) will be   calculated using the Chronic Kidney Disease Epidemiology Collaboration   (CKD-EPI) equation.       Creatinine Urine mg/dL   Date Value Ref Range Status   10/03/2023 19.0 mg/dL Final     Comment:     The reference ranges have not been established in urine creatinine. The results should be integrated into the clinical context for interpretation.   08/24/2021 40 mg/dL Final     Sed Rate   Date Value Ref Range Status   04/23/2021 9 0 - 20 mm/h Final     Erythrocyte Sedimentation Rate   Date Value Ref Range Status   08/24/2021 2 0 - 15 mm/hr Final     CRP Inflammation   Date Value Ref Range Status   04/23/2021 3.6 0.0 - 8.0 mg/L Final     CRP   Date Value Ref Range Status   08/24/2021 0.2 0.0-<0.8 mg/dL Final

## 2024-01-16 ENCOUNTER — TRANSFERRED RECORDS (OUTPATIENT)
Dept: HEALTH INFORMATION MANAGEMENT | Facility: CLINIC | Age: 74
End: 2024-01-16
Payer: MEDICARE

## 2024-01-16 LAB — RETINOPATHY: NEGATIVE

## 2024-02-06 ENCOUNTER — OFFICE VISIT (OUTPATIENT)
Dept: FAMILY MEDICINE | Facility: CLINIC | Age: 74
End: 2024-02-06
Payer: MEDICARE

## 2024-02-06 VITALS
HEIGHT: 72 IN | WEIGHT: 240 LBS | TEMPERATURE: 97.6 F | HEART RATE: 80 BPM | RESPIRATION RATE: 18 BRPM | OXYGEN SATURATION: 98 % | SYSTOLIC BLOOD PRESSURE: 110 MMHG | BODY MASS INDEX: 32.51 KG/M2 | DIASTOLIC BLOOD PRESSURE: 60 MMHG

## 2024-02-06 DIAGNOSIS — M06.00 RHEUMATOID ARTHRITIS WITH NEGATIVE RHEUMATOID FACTOR, INVOLVING UNSPECIFIED SITE (H): ICD-10-CM

## 2024-02-06 DIAGNOSIS — N13.8 BPH WITH URINARY OBSTRUCTION: ICD-10-CM

## 2024-02-06 DIAGNOSIS — I10 ESSENTIAL HYPERTENSION: ICD-10-CM

## 2024-02-06 DIAGNOSIS — E78.5 HYPERLIPIDEMIA, UNSPECIFIED HYPERLIPIDEMIA TYPE: ICD-10-CM

## 2024-02-06 DIAGNOSIS — E11.9 TYPE 2 DIABETES MELLITUS WITHOUT COMPLICATION, WITHOUT LONG-TERM CURRENT USE OF INSULIN (H): Primary | ICD-10-CM

## 2024-02-06 DIAGNOSIS — N40.1 BPH WITH URINARY OBSTRUCTION: ICD-10-CM

## 2024-02-06 LAB
HBA1C MFR BLD: 7.2 % (ref 0–5.6)
HOLD SPECIMEN: NORMAL

## 2024-02-06 PROCEDURE — 99214 OFFICE O/P EST MOD 30 MIN: CPT | Performed by: FAMILY MEDICINE

## 2024-02-06 PROCEDURE — 36415 COLL VENOUS BLD VENIPUNCTURE: CPT | Performed by: FAMILY MEDICINE

## 2024-02-06 PROCEDURE — 83036 HEMOGLOBIN GLYCOSYLATED A1C: CPT | Performed by: FAMILY MEDICINE

## 2024-02-06 PROCEDURE — 80053 COMPREHEN METABOLIC PANEL: CPT | Performed by: FAMILY MEDICINE

## 2024-02-06 RX ORDER — RESPIRATORY SYNCYTIAL VIRUS VACCINE 120MCG/0.5
0.5 KIT INTRAMUSCULAR ONCE
Qty: 1 EACH | Refills: 0 | Status: CANCELLED | OUTPATIENT
Start: 2024-02-06 | End: 2024-02-06

## 2024-02-06 ASSESSMENT — PAIN SCALES - GENERAL: PAINLEVEL: NO PAIN (0)

## 2024-02-06 NOTE — PROGRESS NOTES
Assessment/Plan:    Type 2 diabetes mellitus without complication, without long-term current use of insulin (H)  Type 2 diabetes reviewed with A1c improving from 7.3% to 7.2% today.  Continues metformin  mg using 3 tablets in the morning 1 tablet in the evening.  Prior microalbumin screen normal October 3, 2023.  Monofilament testing today was normal.  Recent diabetic eye exam January 19, 2024 without describe retinopathy.  Diabetes recheck at annual wellness visit anticipated in 4 months.  - Hemoglobin A1c  - Hemoglobin A1c  - Comprehensive metabolic panel  - Comprehensive metabolic panel    Essential hypertension  Hypertension appears well-controlled with lisinopril 10 mg daily and amlodipine 5 mg daily.  - Comprehensive metabolic panel  - Comprehensive metabolic panel    Hyperlipidemia, unspecified hyperlipidemia type  Atorvastatin 20 mg daily for lipid management.  - Comprehensive metabolic panel  - Comprehensive metabolic panel    BPH with urinary obstruction  BPH with urinary obstruction continuing tamsulosin 0.4 mg using 2 tablets daily.  Misses dose perhaps once a month when he is out of town.  Does have rapid urinary frequency concerns that same evening and when she wakes up at 1 AM, 2 AM and 3 AM often but then reviewed returns to normal benefit following resumption of tamsulosin.  - Comprehensive metabolic panel  - Comprehensive metabolic panel    Rheumatoid arthritis with negative rheumatoid factor, involving unspecified site (H)  Continues to follow with Dr. Bundy and remains on Celebrex 100 mg twice daily and hydroxychloroquine 200 mg twice daily.               Subjective:    Thiago Hein is seen today for follow-up evaluation.  Type 2 diabetes reviewed.  Metformin  mg using 3 tablets in the morning 1 tablet in the evening.  Recent microalbumin screen normal October 3, 2023 with diabetic eye exam January 19, 2024 without retinopathy described.  Remains on atorvastatin 20 mg daily  "for lipid management as well.  BPH with urinary obstruction treated with tamsulosin 0.4 mg using 2 tablets (0.8 mg) daily.  If he misses a dose perhaps once a month he will have increased urinary frequency overnight then improved following resumption of medicine.  Celebrex 100 mg twice daily and hydroxychloroquine 200 mg twice daily for RA management and is followed by Dr. Bundy.  Describes upcoming dental extraction procedure and implants and will need preop clearance prior however has not scheduled this procedure as of yet.  Comprehensive review of systems as above otherwise all negative.      Single  1 son - 32 \"Kraig\"  Tobacco: none  EtOH: none  Mom - Meena  Dad - currently 86 Yovanny - DM, CAD, HTN, high cholesterol, back problems, arthritis, spinal stenosis, pacemaker/defibrillator  1 bro - Be - HTN  Surgeries: \"lumbar back surgery for cyst removal\" - September, 2008 (Dr. 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[place], [place])  Hobbies:           Past Surgical History:   Procedure Laterality Date    ANESTHESIA OUT OF OR MRI N/A 10/2/2015    Procedure: ANESTHESIA OUT OF OR MRI;  Surgeon: GENERIC ANESTHESIA PROVIDER;  Location: [facility] OR    BACK SURGERY  2008    lumbar spine surgery-- unclear details    SOFT TISSUE SURGERY  2012    skin cancer removed from L shoulder, chest, right neck        Family History   Problem Relation Age of Onset    Coronary Artery Disease Father     Hypertension Father         Past Medical History:   Diagnosis Date    Diabetes (H)     Hypertension         Social History     Tobacco Use    Smoking status: Never     Passive exposure: Never    Smokeless tobacco: Former   Substance Use Topics    Alcohol use: No     Comment: Quit in 1990    Drug use: No        Current Outpatient Medications   Medication Sig Dispense Refill    amLODIPine (NORVASC) 5 MG tablet TAKE 1 TABLET(5 MG) BY " MOUTH DAILY 90 tablet 3    atorvastatin (LIPITOR) 20 MG tablet TAKE 1 TABLET(20 MG) BY MOUTH DAILY 90 tablet 0    celecoxib (CELEBREX) 100 MG capsule Take 1 capsule (100 mg) by mouth 2 times daily 180 capsule 3    furosemide (LASIX) 40 MG tablet TAKE 1 TABLET(40 MG) BY MOUTH DAILY 90 tablet 2    hydroxychloroquine (PLAQUENIL) 200 MG tablet Take 1 tablet (200 mg) by mouth 2 times daily 180 tablet 0    lisinopril (ZESTRIL) 10 MG tablet Take 1 tablet (10 mg) by mouth daily 90 tablet 3    metFORMIN (GLUCOPHAGE XR) 500 MG 24 hr tablet TAKE 3 TABLETS BY MOUTH EVERY MORNING AND 1 TABLET EVERY EVENING 360 tablet 2    tamsulosin (FLOMAX) 0.4 MG capsule TAKE 2 CAPSULES(0.8 MG) BY MOUTH EVERY EVENING 180 capsule 2          Objective:    Vitals:    02/06/24 1031   BP: 110/60   Pulse: 80   Resp: 18   Temp: 97.6  F (36.4  C)   SpO2: 98%   Weight: 108.9 kg (240 lb)   Height: 1.829 m (6')      Body mass index is 32.55 kg/m .    Alert.  No apparent distress.  Cardiac exam regular.  Chest clear.  Extremities warm and dry.  Monofilament testing today was described as normal.      This note has been dictated using voice recognition software and as a result may contain minor grammatical errors and unintended word substitutions.   Answers submitted by the patient for this visit:  Diabetes Visit (Submitted on 2/6/2024)  Chief Complaint: Chronic problems general questions HPI Form  Frequency of checking blood sugars:: not at all  Diabetic concerns:: none  Paraesthesia present:: none of these symptoms  General Questionnaire (Submitted on 2/6/2024)  Chief Complaint: Chronic problems general questions HPI Form  How many servings of fruits and vegetables do you eat daily?: 0-1  On average, how many sweetened beverages do you drink each day (Examples: soda, juice, sweet tea, etc.  Do NOT count diet or artificially sweetened beverages)?: 1  How many minutes a day do you exercise enough to make your heart beat faster?: 10 to 19  How many days a  week do you exercise enough to make your heart beat faster?: 3 or less  How many days per week do you miss taking your medication?: 0

## 2024-02-06 NOTE — LETTER
February 7, 2024      Thiago Hein  [address] [place] [address]        Dear ,    We are writing to inform you of your test results.    Continue your current diabetes management as discussed in order to further improve.  Goal A1c < 8.0% initially, < 7.0% ideally.     Your kidney and liver tests were otherwise normal.    Resulted Orders   Hemoglobin A1c   Result Value Ref Range    Hemoglobin A1C 7.2 (H) 0.0 - 5.6 %      Comment:      Normal <5.7%   Prediabetes 5.7-6.4%    Diabetes 6.5% or higher     Note: Adopted from ADA consensus guidelines.   Comprehensive metabolic panel   Result Value Ref Range    Sodium 139 135 - 145 mmol/L      Comment:      Reference intervals for this test were updated on 09/26/2023 to more accurately reflect our healthy population. There may be differences in the flagging of prior results with similar values performed with this method. Interpretation of those prior results can be made in the context of the updated reference intervals.     Potassium 4.8 3.4 - 5.3 mmol/L    Carbon Dioxide (CO2) 29 22 - 29 mmol/L    Anion Gap 9 7 - 15 mmol/L    Urea Nitrogen 15.9 8.0 - 23.0 mg/dL    Creatinine 0.88 0.67 - 1.17 mg/dL    GFR Estimate >90 >60 mL/min/1.73m2    Calcium 9.7 8.8 - 10.2 mg/dL    Chloride 101 98 - 107 mmol/L    Glucose 128 (H) 70 - 99 mg/dL    Alkaline Phosphatase 107 40 - 150 U/L      Comment:      Reference intervals for this test were updated on 11/14/2023 to more accurately reflect our healthy population. There may be differences in the flagging of prior results with similar values performed with this method. Interpretation of those prior results can be made in the context of the updated reference intervals.    AST 16 0 - 45 U/L      Comment:      Reference intervals for this test were updated on 6/12/2023 to more accurately reflect our healthy population. There may be differences in the flagging of prior results with similar values performed with this  method. Interpretation of those prior results can be made in the context of the updated reference intervals.    ALT 17 0 - 70 U/L      Comment:      Reference intervals for this test were updated on 6/12/2023 to more accurately reflect our healthy population. There may be differences in the flagging of prior results with similar values performed with this method. Interpretation of those prior results can be made in the context of the updated reference intervals.      Protein Total 6.7 6.4 - 8.3 g/dL    Albumin 4.4 3.5 - 5.2 g/dL    Bilirubin Total 0.5 <=1.2 mg/dL       If you have any questions or concerns, please call the clinic at the number listed above.       Sincerely,      Don Armijo MD

## 2024-02-07 LAB
ALBUMIN SERPL BCG-MCNC: 4.4 G/DL (ref 3.5–5.2)
ALP SERPL-CCNC: 107 U/L (ref 40–150)
ALT SERPL W P-5'-P-CCNC: 17 U/L (ref 0–70)
ANION GAP SERPL CALCULATED.3IONS-SCNC: 9 MMOL/L (ref 7–15)
AST SERPL W P-5'-P-CCNC: 16 U/L (ref 0–45)
BILIRUB SERPL-MCNC: 0.5 MG/DL
BUN SERPL-MCNC: 15.9 MG/DL (ref 8–23)
CALCIUM SERPL-MCNC: 9.7 MG/DL (ref 8.8–10.2)
CHLORIDE SERPL-SCNC: 101 MMOL/L (ref 98–107)
CREAT SERPL-MCNC: 0.88 MG/DL (ref 0.67–1.17)
DEPRECATED HCO3 PLAS-SCNC: 29 MMOL/L (ref 22–29)
EGFRCR SERPLBLD CKD-EPI 2021: >90 ML/MIN/1.73M2
GLUCOSE SERPL-MCNC: 128 MG/DL (ref 70–99)
POTASSIUM SERPL-SCNC: 4.8 MMOL/L (ref 3.4–5.3)
PROT SERPL-MCNC: 6.7 G/DL (ref 6.4–8.3)
SODIUM SERPL-SCNC: 139 MMOL/L (ref 135–145)

## 2024-02-08 ENCOUNTER — TRANSFERRED RECORDS (OUTPATIENT)
Dept: HEALTH INFORMATION MANAGEMENT | Facility: CLINIC | Age: 74
End: 2024-02-08
Payer: MEDICARE

## 2024-02-26 DIAGNOSIS — E78.5 HYPERLIPIDEMIA, UNSPECIFIED HYPERLIPIDEMIA TYPE: ICD-10-CM

## 2024-02-26 RX ORDER — ATORVASTATIN CALCIUM 20 MG/1
TABLET, FILM COATED ORAL
Qty: 90 TABLET | Refills: 1 | Status: SHIPPED | OUTPATIENT
Start: 2024-02-26 | End: 2024-09-09

## 2024-03-05 DIAGNOSIS — E11.9 TYPE 2 DIABETES MELLITUS WITHOUT COMPLICATION, WITHOUT LONG-TERM CURRENT USE OF INSULIN (H): ICD-10-CM

## 2024-03-05 RX ORDER — METFORMIN HCL 500 MG
TABLET, EXTENDED RELEASE 24 HR ORAL
Qty: 360 TABLET | Refills: 0 | Status: SHIPPED | OUTPATIENT
Start: 2024-03-05 | End: 2024-06-20

## 2024-03-23 DIAGNOSIS — I10 ESSENTIAL HYPERTENSION: ICD-10-CM

## 2024-03-23 DIAGNOSIS — R60.0 PERIPHERAL EDEMA: ICD-10-CM

## 2024-03-25 RX ORDER — FUROSEMIDE 40 MG
TABLET ORAL
Qty: 90 TABLET | Refills: 0 | Status: SHIPPED | OUTPATIENT
Start: 2024-03-25 | End: 2024-06-27

## 2024-03-25 RX ORDER — LISINOPRIL 10 MG/1
10 TABLET ORAL DAILY
Qty: 90 TABLET | Refills: 0 | Status: SHIPPED | OUTPATIENT
Start: 2024-03-25 | End: 2024-06-27

## 2024-04-16 ENCOUNTER — OFFICE VISIT (OUTPATIENT)
Dept: FAMILY MEDICINE | Facility: CLINIC | Age: 74
End: 2024-04-16
Payer: MEDICARE

## 2024-04-16 VITALS
WEIGHT: 239 LBS | BODY MASS INDEX: 32.37 KG/M2 | HEART RATE: 80 BPM | RESPIRATION RATE: 16 BRPM | DIASTOLIC BLOOD PRESSURE: 70 MMHG | OXYGEN SATURATION: 98 % | HEIGHT: 72 IN | TEMPERATURE: 97.4 F | SYSTOLIC BLOOD PRESSURE: 138 MMHG

## 2024-04-16 DIAGNOSIS — I10 ESSENTIAL HYPERTENSION: ICD-10-CM

## 2024-04-16 DIAGNOSIS — Z01.818 PREOP GENERAL PHYSICAL EXAM: Primary | ICD-10-CM

## 2024-04-16 DIAGNOSIS — E11.9 TYPE 2 DIABETES MELLITUS WITHOUT COMPLICATION, WITHOUT LONG-TERM CURRENT USE OF INSULIN (H): ICD-10-CM

## 2024-04-16 DIAGNOSIS — E66.01 MORBID OBESITY (H): ICD-10-CM

## 2024-04-16 DIAGNOSIS — M08.3 CHRONIC POLYARTICULAR JUVENILE RHEUMATOID ARTHRITIS (H): ICD-10-CM

## 2024-04-16 DIAGNOSIS — K08.539 FRACTURE OF CROWN, ENAMEL, AND DENTIN OF TOOTH WITHOUT PULP EXPOSURE: ICD-10-CM

## 2024-04-16 PROCEDURE — 99214 OFFICE O/P EST MOD 30 MIN: CPT | Performed by: STUDENT IN AN ORGANIZED HEALTH CARE EDUCATION/TRAINING PROGRAM

## 2024-04-16 ASSESSMENT — PAIN SCALES - GENERAL: PAINLEVEL: NO PAIN (0)

## 2024-04-16 NOTE — PATIENT INSTRUCTIONS
Antiplatelet or Anticoagulation Medication Instructions  Hold Celebrex 5 days before the surgery. Can restart the day after.    Additional Medication Instructions   - metformin: HOLD day of surgery.  - hold atorvastatin day of surgery  - Take lasix, lisinopril, amlodipine, tamsulosin the day of the surgery

## 2024-04-16 NOTE — PROGRESS NOTES
Preoperative Evaluation  [facility]  [address]  [facility] [address]  Phone: [phone]  Fax: [phone]  Primary Provider: Don Armijo  Pre-op Performing Provider: KVNG CASEY  Apr 16, 2024     Thiago is a 74 year old, presenting for the following:  Pre-Op Exam    Surgical Information  Surgery/Procedure: Dental Surgery, Implants   Surgery Location: [facility],  Surgeon: Dr Nasim Munson   Surgery Date: 04/26/2024  Time of Surgery: 7:00AM   Where patient plans to recover: At home alone  Fax number for surgical facility: [phone]    Assessment & Plan   74-year-old male with past with history of type 2 diabetes not on insulin, essential hypertension, morbid obesity, rheumatoid arthritis who presents for preop evaluation to have dental implants/surgery due to poor dentition.  Patient's chronic medical problems are controlled on his current regimen.  He is overall low to moderate risk.  Main risk factors are his chronic medical problems and his age.  These are optimized.  Surgery is overall low risk.  He just had labs done for his physical approximately 2 months ago and these looked good.  Did not recommend any further labs today.  Patient approved to proceed with surgery.    1. Preop general physical exam    2. Fracture of crown, enamel, and dentin of tooth without pulp exposure    3. Chronic polyarticular juvenile rheumatoid arthritis (H)    4. Type 2 diabetes mellitus without complication, without long-term current use of insulin (H)    5. Essential hypertension    The proposed surgical procedure is considered LOW risk.     - No identified additional risk factors other than previously addressed    Antiplatelet or Anticoagulation Medication Instructions  Hold Celebrex 5 days before the surgery. Can restart the day after.    Additional Medication Instructions   - metformin: HOLD day of surgery.  - hold atorvastatin day of surgery  - Take  lasix, lisinopril, amlodipine, tamsulosin the day of the surgery    Recommendation  APPROVAL GIVEN to proceed with proposed procedure, without further diagnostic evaluation.      Subjective     HPI related to upcoming procedure: dental implants for teeth trauma in the past        4/16/2024    10:42 AM   Preop Questions   1. Have you ever had a heart attack or stroke? No   2. Have you ever had surgery on your heart or blood vessels, such as a stent placement, a coronary artery bypass, or surgery on an artery in your head, neck, heart, or legs? No   3. Do you have chest pain with activity? No   4. Do you have a history of  heart failure? No   5. Do you currently have a cold, bronchitis or symptoms of other infection? No   6. Do you have a cough, shortness of breath, or wheezing? No   7. Do you or anyone in your family have previous history of blood clots? No   8. Do you or does anyone in your family have a serious bleeding problem such as prolonged bleeding following surgeries or cuts? No   9. Have you ever had problems with anemia or been told to take iron pills? No   10. Have you had any abnormal blood loss such as black, tarry or bloody stools? No   11. Have you ever had a blood transfusion? No   12. Are you willing to have a blood transfusion if it is medically needed before, during, or after your surgery? Yes   13. Have you or any of your relatives ever had problems with anesthesia? No   14. Do you have sleep apnea, excessive snoring or daytime drowsiness? No   15. Do you have any artifical heart valves or other implanted medical devices like a pacemaker, defibrillator, or continuous glucose monitor? No   16. Do you have artificial joints? No   17. Are you allergic to latex? No       Health Care Directive  Patient does not have a Health Care Directive or Living Will: Discussed advance care planning with patient; however, patient declined at this time.    Preoperative Review of    reviewed - no record of  controlled substances prescribed.      Patient Active Problem List    Diagnosis Date Noted    Chronic polyarticular juvenile rheumatoid arthritis (H) 08/24/2021     Priority: Medium     Formatting of this note might be different from the original.  Created by Conversion    Replacement Utility updated for latest IMO load      Emotional lability 08/24/2021     Priority: Medium     Formatting of this note might be different from the original.  Created by Conversion      Lower back pain 08/24/2021     Priority: Medium     Formatting of this note might be different from the original.  Created by Conversion      Morbid obesity (H) 08/24/2021     Priority: Medium    BPH with urinary obstruction 02/20/2020     Priority: Medium    Primary osteoarthritis involving multiple joints 10/08/2019     Priority: Medium    Left carpal tunnel syndrome 12/27/2018     Priority: Medium    Trigger finger, right index finger 09/26/2018     Priority: Medium    Polyarthralgia 07/20/2018     Priority: Medium    Unintentional weight loss 06/01/2018     Priority: Medium    Non morbid obesity due to excess calories 07/14/2017     Priority: Medium    Peripheral edema 07/14/2017     Priority: Medium    Diverticulosis 11/15/2016     Priority: Medium    Hyperlipidemia 08/04/2016     Priority: Medium    Need for pneumococcal vaccination 01/11/2016     Priority: Medium    Need for vaccination 11/20/2015     Priority: Medium    Type 2 diabetes mellitus (H) 11/11/2015     Priority: Medium     Formatting of this note might be different from the original.  Created by Conversion      Bacteremia due to Staphylococcus aureus 11/06/2015     Priority: Medium    Dysphagia 11/06/2015     Priority: Medium    Epidural abscess 11/06/2015     Priority: Medium    Essential hypertension 11/06/2015     Priority: Medium     Formatting of this note might be different from the original.  Created by Conversion    Replacement Utility updated for latest IMO load      Lesion  of salivary gland 11/06/2015     Priority: Medium    Somnolence 11/06/2015     Priority: Medium    Discitis 10/04/2015     Priority: Medium    Adult Still's disease (H) 10/01/2015     Priority: Medium    Leukocytosis 09/29/2015     Priority: Medium    Sepsis (H) 09/29/2015     Priority: Medium      Past Medical History:   Diagnosis Date    Diabetes (H)     Hypertension      Past Surgical History:   Procedure Laterality Date    ANESTHESIA OUT OF OR MRI N/A 10/2/2015    Procedure: ANESTHESIA OUT OF OR MRI;  Surgeon: GENERIC ANESTHESIA PROVIDER;  Location: [facility] OR    BACK SURGERY  2008    lumbar spine surgery-- unclear details    SOFT TISSUE SURGERY  2012    skin cancer removed from L shoulder, chest, right neck     Current Outpatient Medications   Medication Sig Dispense Refill    amLODIPine (NORVASC) 5 MG tablet TAKE 1 TABLET(5 MG) BY MOUTH DAILY 90 tablet 3    atorvastatin (LIPITOR) 20 MG tablet TAKE 1 TABLET(20 MG) BY MOUTH DAILY 90 tablet 1    celecoxib (CELEBREX) 100 MG capsule Take 1 capsule (100 mg) by mouth 2 times daily 180 capsule 3    furosemide (LASIX) 40 MG tablet TAKE 1 TABLET(40 MG) BY MOUTH DAILY 90 tablet 0    hydroxychloroquine (PLAQUENIL) 200 MG tablet Take 1 tablet (200 mg) by mouth 2 times daily 180 tablet 0    lisinopril (ZESTRIL) 10 MG tablet TAKE 1 TABLET(10 MG) BY MOUTH DAILY 90 tablet 0    metFORMIN (GLUCOPHAGE XR) 500 MG 24 hr tablet TAKE 3 TABLETS BY MOUTH EVERY MORNING AND 1 TABLET BY MOUTH EVERY EVENING 360 tablet 0    tamsulosin (FLOMAX) 0.4 MG capsule TAKE 2 CAPSULES(0.8 MG) BY MOUTH EVERY EVENING 180 capsule 2       No Known Allergies     Social History     Tobacco Use    Smoking status: Never     Passive exposure: Never    Smokeless tobacco: Former   Substance Use Topics    Alcohol use: No     Comment: Quit in 1990     History   Drug Use No         Review of Systems  Complete ROS is negative except as noted in HPI      Objective    /70   Pulse 80   Temp 97.4  F (36.3  C)   Resp  16   Ht 1.829 m (6')   Wt 108.4 kg (239 lb)   SpO2 98%   BMI 32.41 kg/m     Estimated body mass index is 32.41 kg/m  as calculated from the following:    Height as of this encounter: 1.829 m (6').    Weight as of this encounter: 108.4 kg (239 lb).  Physical Exam    General appearance: Alert, cooperative, no distress, appears stated age, obese  Head: Normocephalic, atraumatic, without obvious abnormality  Eyes: Pupils equal round, reactive.  Conjunctiva clear.  Nose: Nares normal, no drainage.  Throat: Lips, mucosa, tongue normal mucosa pink and moist  Neck: Supple, symmetric, trachea midline, no adenopathy.  No thyroid enlargement, tenderness or nodules.    Lungs: Clear to auscultation bilaterally, no wheezing or crackles present.  Respirations unlabored  Heart: Regular rate and rhythm, normal S1 and S2, no murmur, rub or gallop.  Extremities: 1+ edema  Skin: Skin color, texture, turgor normal no rashes or lesions on limited skin exam  Neurologic: CN II through XII intact, normal strength.    Recent Labs   Lab Test 02/06/24  1034 01/10/24  1017 12/01/23  1105 10/03/23  1112   HGB  --  15.2 14.8 15.3   PLT  --  168 197 193     --  138 141   POTASSIUM 4.8  --  3.8 3.9   CR 0.88 0.84 0.79 0.89   A1C 7.2*  --   --  7.3*        Diagnostics  No labs were ordered during this visit.   No EKG required, no history of coronary heart disease, significant arrhythmia, peripheral arterial disease or other structural heart disease.    Revised Cardiac Risk Index (RCRI)  The patient has the following serious cardiovascular risks for perioperative complications:   - No serious cardiac risks = 0 points     RCRI Interpretation: 0 points: Class I (very low risk - 0.4% complication rate)       Signed Electronically by: Reese Price MD  Copy of this evaluation report is provided to requesting physician.

## 2024-04-17 ENCOUNTER — OFFICE VISIT (OUTPATIENT)
Dept: RHEUMATOLOGY | Facility: CLINIC | Age: 74
End: 2024-04-17
Payer: MEDICARE

## 2024-04-17 VITALS
WEIGHT: 240.8 LBS | DIASTOLIC BLOOD PRESSURE: 64 MMHG | BODY MASS INDEX: 32.66 KG/M2 | SYSTOLIC BLOOD PRESSURE: 124 MMHG | HEART RATE: 96 BPM | OXYGEN SATURATION: 96 %

## 2024-04-17 DIAGNOSIS — M15.0 PRIMARY OSTEOARTHRITIS INVOLVING MULTIPLE JOINTS: ICD-10-CM

## 2024-04-17 DIAGNOSIS — M25.50 POLYARTHRALGIA: ICD-10-CM

## 2024-04-17 DIAGNOSIS — Z79.899 HIGH RISK MEDICATION USE: ICD-10-CM

## 2024-04-17 DIAGNOSIS — M06.00 SERONEGATIVE RHEUMATOID ARTHRITIS (H): Primary | ICD-10-CM

## 2024-04-17 PROCEDURE — G2211 COMPLEX E/M VISIT ADD ON: HCPCS | Performed by: INTERNAL MEDICINE

## 2024-04-17 PROCEDURE — 99214 OFFICE O/P EST MOD 30 MIN: CPT | Performed by: INTERNAL MEDICINE

## 2024-04-17 RX ORDER — DULOXETIN HYDROCHLORIDE 20 MG/1
20 CAPSULE, DELAYED RELEASE ORAL DAILY
Qty: 30 CAPSULE | Refills: 2 | Status: SHIPPED | OUTPATIENT
Start: 2024-04-17 | End: 2024-07-29

## 2024-04-17 RX ORDER — HYDROXYCHLOROQUINE SULFATE 200 MG/1
200 TABLET, FILM COATED ORAL 2 TIMES DAILY
Qty: 180 TABLET | Refills: 0 | Status: SHIPPED | OUTPATIENT
Start: 2024-04-17 | End: 2024-07-29

## 2024-04-17 NOTE — PROGRESS NOTES
"      Rheumatology follow-up visit note     Thiago is a 74 year old male presents today for follow-up.    Thiago was seen today for recheck.    Diagnoses and all orders for this visit:    Seronegative rheumatoid arthritis (H)  -     hydroxychloroquine (PLAQUENIL) 200 MG tablet; Take 1 tablet (200 mg) by mouth 2 times daily    High risk medication use    Primary osteoarthritis involving multiple joints  -     DULoxetine (CYMBALTA) 20 MG capsule; Take 1 capsule (20 mg) by mouth daily for 30 days    Polyarthralgia  -     DULoxetine (CYMBALTA) 20 MG capsule; Take 1 capsule (20 mg) by mouth daily for 30 days        While her rheumatoid arthritis appears to be under good control he has polyarthralgia in association with osteoarthritis corticosteroid injections in the knees are of very little help if at all.  He had eyes examined recently within acceptable range.  We talked about duloxetine he would like to try that.  Major side effects outlined.The longitudinal plan of care for the diagnosis(es)/condition(s) as documented were addressed during this visit. Due to the added complexity in care, I will continue to support Thiago in the subsequent management and with ongoing continuity of care.      Follow up in 3 months.    HPI    Thiago Hein is a 74 year old male is here for follow-up of seronegative rheumatoid arthritis, osteoarthritis, he is on hydroxychloroquine having decided not to go for methotrexate.  He noted that this is controlling his joint symptoms very well with the exception of knees on his previous visit corticosteroid injection was done in his left knee.  Repeat x-ray was taken.  That was commensurate with osteoarthritis.  Especially the left side which is worse with activity without effusion that he can identify.  He did not think that the left knee injection was any use for him.  He recalls that there was not even an improvement for a day.  This is \"not bad\".  He has not tried duloxetine in the " past.  He noted overall pain level as mild but the knee pain worse than others.        DETAILED EXAMINATION  04/17/24  :    Vitals:    04/17/24 1048   BP: 124/64   Pulse: 96   SpO2: 96%   Weight: 109.2 kg (240 lb 12.8 oz)     Alert oriented. Head including the face is examined for malar rash, heliotropes, scarring, lupus pernio. Eyes examined for redness such as in episcleritis/scleritis, periorbital lesions.   Neck/ Face examined for parotid gland swelling, range of motion of neck.  Left upper and lower and right upper and lower extremities examined for tenderness, swelling, warmth of the appendicular joints, range of motion, edema, rash.  Some of the important findings included: he does not have evidence of synovitis in the palpable joints of the upper extremities.  No significant deformities of the digits.  no Heberden nodes.  Range of motion of the shoulders  show full abduction.  No JLT effusion or warmth of the knees.  he does not have dactylitis of the digits.     Patient Active Problem List    Diagnosis Date Noted    Chronic polyarticular juvenile rheumatoid arthritis (H) 08/24/2021     Priority: Medium     Formatting of this note might be different from the original.  Created by Conversion    Replacement Utility updated for latest IMO load      Emotional lability 08/24/2021     Priority: Medium     Formatting of this note might be different from the original.  Created by Conversion      Lower back pain 08/24/2021     Priority: Medium     Formatting of this note might be different from the original.  Created by Conversion      Morbid obesity (H) 08/24/2021     Priority: Medium    BPH with urinary obstruction 02/20/2020     Priority: Medium    Primary osteoarthritis involving multiple joints 10/08/2019     Priority: Medium    Left carpal tunnel syndrome 12/27/2018     Priority: Medium    Trigger finger, right index finger 09/26/2018     Priority: Medium    Polyarthralgia 07/20/2018     Priority: Medium     Unintentional weight loss 06/01/2018     Priority: Medium    Non morbid obesity due to excess calories 07/14/2017     Priority: Medium    Peripheral edema 07/14/2017     Priority: Medium    Diverticulosis 11/15/2016     Priority: Medium    Hyperlipidemia 08/04/2016     Priority: Medium    Need for pneumococcal vaccination 01/11/2016     Priority: Medium    Need for vaccination 11/20/2015     Priority: Medium    Type 2 diabetes mellitus (H) 11/11/2015     Priority: Medium     Formatting of this note might be different from the original.  Created by Conversion      Bacteremia due to Staphylococcus aureus 11/06/2015     Priority: Medium    Dysphagia 11/06/2015     Priority: Medium    Epidural abscess 11/06/2015     Priority: Medium    Essential hypertension 11/06/2015     Priority: Medium     Formatting of this note might be different from the original.  Created by Conversion    Replacement Utility updated for latest IMO load      Lesion of salivary gland 11/06/2015     Priority: Medium    Somnolence 11/06/2015     Priority: Medium    Discitis 10/04/2015     Priority: Medium    Adult Still's disease (H) 10/01/2015     Priority: Medium    Leukocytosis 09/29/2015     Priority: Medium    Sepsis (H) 09/29/2015     Priority: Medium     Past Surgical History:   Procedure Laterality Date    ANESTHESIA OUT OF OR MRI N/A 10/2/2015    Procedure: ANESTHESIA OUT OF OR MRI;  Surgeon: GENERIC ANESTHESIA PROVIDER;  Location: [facility] OR    BACK SURGERY  2008    lumbar spine surgery-- unclear details    SOFT TISSUE SURGERY  2012    skin cancer removed from L shoulder, chest, right neck      Past Medical History:   Diagnosis Date    Diabetes (H)     Hypertension      No Known Allergies  Current Outpatient Medications   Medication Sig Dispense Refill    amLODIPine (NORVASC) 5 MG tablet TAKE 1 TABLET(5 MG) BY MOUTH DAILY 90 tablet 3    atorvastatin (LIPITOR) 20 MG tablet TAKE 1 TABLET(20 MG) BY MOUTH DAILY 90 tablet 1    celecoxib (CELEBREX)  100 MG capsule Take 1 capsule (100 mg) by mouth 2 times daily 180 capsule 3    furosemide (LASIX) 40 MG tablet TAKE 1 TABLET(40 MG) BY MOUTH DAILY 90 tablet 0    hydroxychloroquine (PLAQUENIL) 200 MG tablet Take 1 tablet (200 mg) by mouth 2 times daily 180 tablet 0    lisinopril (ZESTRIL) 10 MG tablet TAKE 1 TABLET(10 MG) BY MOUTH DAILY 90 tablet 0    metFORMIN (GLUCOPHAGE XR) 500 MG 24 hr tablet TAKE 3 TABLETS BY MOUTH EVERY MORNING AND 1 TABLET BY MOUTH EVERY EVENING 360 tablet 0    tamsulosin (FLOMAX) 0.4 MG capsule TAKE 2 CAPSULES(0.8 MG) BY MOUTH EVERY EVENING 180 capsule 2     family history includes Coronary Artery Disease in his father; Hypertension in his father.  Social Connections: Not on file          WBC   Date Value Ref Range Status   04/23/2021 8.2 4.0 - 11.0 10e9/L Final     WBC Count   Date Value Ref Range Status   01/10/2024 7.0 4.0 - 11.0 10e3/uL Final     RBC Count   Date Value Ref Range Status   01/10/2024 5.18 4.40 - 5.90 10e6/uL Final   04/23/2021 5.25 4.4 - 5.9 10e12/L Final     Hemoglobin   Date Value Ref Range Status   01/10/2024 15.2 13.3 - 17.7 g/dL Final   04/23/2021 15.2 13.3 - 17.7 g/dL Final     Hematocrit   Date Value Ref Range Status   01/10/2024 44.6 40.0 - 53.0 % Final   04/23/2021 44.8 40.0 - 53.0 % Final     MCV   Date Value Ref Range Status   01/10/2024 86 78 - 100 fL Final   04/23/2021 85 78 - 100 fl Final     MCH   Date Value Ref Range Status   01/10/2024 29.3 26.5 - 33.0 pg Final   04/23/2021 29.0 26.5 - 33.0 pg Final     Platelet Count   Date Value Ref Range Status   01/10/2024 168 150 - 450 10e3/uL Final   04/23/2021 222 150 - 450 10e9/L Final     % Lymphocytes   Date Value Ref Range Status   12/01/2023 23 % Final   04/23/2021 20.9 % Final     AST   Date Value Ref Range Status   02/06/2024 16 0 - 45 U/L Final     Comment:     Reference intervals for this test were updated on 6/12/2023 to more accurately reflect our healthy population. There may be differences in the  flagging of prior results with similar values performed with this method. Interpretation of those prior results can be made in the context of the updated reference intervals.   10/12/2015 18 0 - 45 U/L Final     ALT   Date Value Ref Range Status   02/06/2024 17 0 - 70 U/L Final     Comment:     Reference intervals for this test were updated on 6/12/2023 to more accurately reflect our healthy population. There may be differences in the flagging of prior results with similar values performed with this method. Interpretation of those prior results can be made in the context of the updated reference intervals.     10/12/2015 30 0 - 70 U/L Final     Albumin   Date Value Ref Range Status   02/06/2024 4.4 3.5 - 5.2 g/dL Final   05/03/2022 4.0 3.5 - 5.0 g/dL Final   10/12/2015 2.2 (L) 3.4 - 5.0 g/dL Final     Alkaline Phosphatase   Date Value Ref Range Status   02/06/2024 107 40 - 150 U/L Final     Comment:     Reference intervals for this test were updated on 11/14/2023 to more accurately reflect our healthy population. There may be differences in the flagging of prior results with similar values performed with this method. Interpretation of those prior results can be made in the context of the updated reference intervals.   10/17/2015 133 40 - 150 U/L Final     Creatinine   Date Value Ref Range Status   02/06/2024 0.88 0.67 - 1.17 mg/dL Final   04/23/2021 0.86 0.66 - 1.25 mg/dL Final     GFR Estimate   Date Value Ref Range Status   02/06/2024 >90 >60 mL/min/1.73m2 Final   04/23/2021 87 >60 mL/min/[1.73_m2] Final     Comment:     Non  GFR Calc  Starting 12/18/2018, serum creatinine based estimated GFR (eGFR) will be   calculated using the Chronic Kidney Disease Epidemiology Collaboration   (CKD-EPI) equation.       GFR Estimate If Black   Date Value Ref Range Status   04/23/2021 >90 >60 mL/min/[1.73_m2] Final     Comment:      GFR Calc  Starting 12/18/2018, serum creatinine based estimated  GFR (eGFR) will be   calculated using the Chronic Kidney Disease Epidemiology Collaboration   (CKD-EPI) equation.       Creatinine Urine mg/dL   Date Value Ref Range Status   10/03/2023 19.0 mg/dL Final     Comment:     The reference ranges have not been established in urine creatinine. The results should be integrated into the clinical context for interpretation.   08/24/2021 40 mg/dL Final     Sed Rate   Date Value Ref Range Status   04/23/2021 9 0 - 20 mm/h Final     Erythrocyte Sedimentation Rate   Date Value Ref Range Status   08/24/2021 2 0 - 15 mm/hr Final     CRP Inflammation   Date Value Ref Range Status   04/23/2021 3.6 0.0 - 8.0 mg/L Final     CRP   Date Value Ref Range Status   08/24/2021 0.2 0.0-<0.8 mg/dL Final

## 2024-05-17 ENCOUNTER — HOSPITAL ENCOUNTER (EMERGENCY)
Facility: CLINIC | Age: 74
Discharge: HOME | End: 2024-05-17
Attending: STUDENT IN AN ORGANIZED HEALTH CARE EDUCATION/TRAINING PROGRAM | Admitting: STUDENT IN AN ORGANIZED HEALTH CARE EDUCATION/TRAINING PROGRAM
Payer: MEDICARE

## 2024-05-17 ENCOUNTER — APPOINTMENT (OUTPATIENT)
Dept: CT IMAGING | Facility: CLINIC | Age: 74
End: 2024-05-17
Attending: STUDENT IN AN ORGANIZED HEALTH CARE EDUCATION/TRAINING PROGRAM
Payer: MEDICARE

## 2024-05-17 VITALS
HEART RATE: 70 BPM | BODY MASS INDEX: 32.23 KG/M2 | WEIGHT: 238 LBS | HEIGHT: 72 IN | TEMPERATURE: 98.1 F | OXYGEN SATURATION: 94 % | SYSTOLIC BLOOD PRESSURE: 133 MMHG | DIASTOLIC BLOOD PRESSURE: 79 MMHG | RESPIRATION RATE: 22 BRPM

## 2024-05-17 DIAGNOSIS — N21.0 BLADDER STONES: ICD-10-CM

## 2024-05-17 DIAGNOSIS — R39.15 URINARY URGENCY: ICD-10-CM

## 2024-05-17 DIAGNOSIS — R31.0 GROSS HEMATURIA: ICD-10-CM

## 2024-05-17 LAB
ALBUMIN UR-MCNC: NEGATIVE MG/DL
ANION GAP SERPL CALCULATED.3IONS-SCNC: 11 MMOL/L (ref 7–15)
APPEARANCE UR: CLEAR
BASOPHILS # BLD AUTO: 0 10E3/UL (ref 0–0.2)
BASOPHILS NFR BLD AUTO: 1 %
BILIRUB UR QL STRIP: NEGATIVE
BUN SERPL-MCNC: 19.7 MG/DL (ref 8–23)
CALCIUM SERPL-MCNC: 9.5 MG/DL (ref 8.8–10.2)
CHLORIDE SERPL-SCNC: 105 MMOL/L (ref 98–107)
COLOR UR AUTO: YELLOW
CREAT SERPL-MCNC: 0.95 MG/DL (ref 0.67–1.17)
DEPRECATED HCO3 PLAS-SCNC: 25 MMOL/L (ref 22–29)
EGFRCR SERPLBLD CKD-EPI 2021: 84 ML/MIN/1.73M2
EOSINOPHIL # BLD AUTO: 0.1 10E3/UL (ref 0–0.7)
EOSINOPHIL NFR BLD AUTO: 2 %
ERYTHROCYTE [DISTWIDTH] IN BLOOD BY AUTOMATED COUNT: 13.2 % (ref 10–15)
GLUCOSE SERPL-MCNC: 94 MG/DL (ref 70–99)
GLUCOSE UR STRIP-MCNC: NEGATIVE MG/DL
HCT VFR BLD AUTO: 40.2 % (ref 40–53)
HGB BLD-MCNC: 13.9 G/DL (ref 13.3–17.7)
HGB UR QL STRIP: ABNORMAL
HYALINE CASTS: 5 /LPF
IMM GRANULOCYTES # BLD: 0 10E3/UL
IMM GRANULOCYTES NFR BLD: 0 %
KETONES UR STRIP-MCNC: NEGATIVE MG/DL
LEUKOCYTE ESTERASE UR QL STRIP: NEGATIVE
LYMPHOCYTES # BLD AUTO: 2.5 10E3/UL (ref 0.8–5.3)
LYMPHOCYTES NFR BLD AUTO: 35 %
MCH RBC QN AUTO: 29.9 PG (ref 26.5–33)
MCHC RBC AUTO-ENTMCNC: 34.6 G/DL (ref 31.5–36.5)
MCV RBC AUTO: 87 FL (ref 78–100)
MONOCYTES # BLD AUTO: 0.9 10E3/UL (ref 0–1.3)
MONOCYTES NFR BLD AUTO: 12 %
MUCOUS THREADS #/AREA URNS LPF: PRESENT /LPF
NEUTROPHILS # BLD AUTO: 3.5 10E3/UL (ref 1.6–8.3)
NEUTROPHILS NFR BLD AUTO: 50 %
NITRATE UR QL: NEGATIVE
NRBC # BLD AUTO: 0 10E3/UL
NRBC BLD AUTO-RTO: 0 /100
PH UR STRIP: 5 [PH] (ref 5–7)
PLATELET # BLD AUTO: 233 10E3/UL (ref 150–450)
POTASSIUM SERPL-SCNC: 3.9 MMOL/L (ref 3.4–5.3)
RBC # BLD AUTO: 4.65 10E6/UL (ref 4.4–5.9)
RBC URINE: 55 /HPF
SODIUM SERPL-SCNC: 141 MMOL/L (ref 135–145)
SP GR UR STRIP: 1.01 (ref 1–1.03)
UROBILINOGEN UR STRIP-MCNC: NORMAL MG/DL
WBC # BLD AUTO: 7.1 10E3/UL (ref 4–11)
WBC URINE: 1 /HPF

## 2024-05-17 PROCEDURE — 99284 EMERGENCY DEPT VISIT MOD MDM: CPT | Mod: 25 | Performed by: STUDENT IN AN ORGANIZED HEALTH CARE EDUCATION/TRAINING PROGRAM

## 2024-05-17 PROCEDURE — 36415 COLL VENOUS BLD VENIPUNCTURE: CPT | Performed by: STUDENT IN AN ORGANIZED HEALTH CARE EDUCATION/TRAINING PROGRAM

## 2024-05-17 PROCEDURE — 81001 URINALYSIS AUTO W/SCOPE: CPT | Performed by: EMERGENCY MEDICINE

## 2024-05-17 PROCEDURE — 85025 COMPLETE CBC W/AUTO DIFF WBC: CPT | Performed by: STUDENT IN AN ORGANIZED HEALTH CARE EDUCATION/TRAINING PROGRAM

## 2024-05-17 PROCEDURE — 51798 US URINE CAPACITY MEASURE: CPT | Performed by: STUDENT IN AN ORGANIZED HEALTH CARE EDUCATION/TRAINING PROGRAM

## 2024-05-17 PROCEDURE — 99284 EMERGENCY DEPT VISIT MOD MDM: CPT | Performed by: STUDENT IN AN ORGANIZED HEALTH CARE EDUCATION/TRAINING PROGRAM

## 2024-05-17 PROCEDURE — 74176 CT ABD & PELVIS W/O CONTRAST: CPT | Mod: MG

## 2024-05-17 PROCEDURE — 80048 BASIC METABOLIC PNL TOTAL CA: CPT | Performed by: STUDENT IN AN ORGANIZED HEALTH CARE EDUCATION/TRAINING PROGRAM

## 2024-05-17 PROCEDURE — 81001 URINALYSIS AUTO W/SCOPE: CPT | Performed by: STUDENT IN AN ORGANIZED HEALTH CARE EDUCATION/TRAINING PROGRAM

## 2024-05-17 ASSESSMENT — COLUMBIA-SUICIDE SEVERITY RATING SCALE - C-SSRS
6. HAVE YOU EVER DONE ANYTHING, STARTED TO DO ANYTHING, OR PREPARED TO DO ANYTHING TO END YOUR LIFE?: NO
1. IN THE PAST MONTH, HAVE YOU WISHED YOU WERE DEAD OR WISHED YOU COULD GO TO SLEEP AND NOT WAKE UP?: NO
2. HAVE YOU ACTUALLY HAD ANY THOUGHTS OF KILLING YOURSELF IN THE PAST MONTH?: NO

## 2024-05-17 ASSESSMENT — ACTIVITIES OF DAILY LIVING (ADL)
ADLS_ACUITY_SCORE: 38
ADLS_ACUITY_SCORE: 36
ADLS_ACUITY_SCORE: 36

## 2024-05-17 NOTE — ED NOTES
Pt has hx of enlarged prostate. Presented tonight with dysuria, hematuria, and difficulty urinating. Denied flank pain, or abd discomfort.

## 2024-05-17 NOTE — ED TRIAGE NOTES
Patient states he is having difficulty urinating has had this trouble in the past   Triage Assessment (Adult)       Row Name 05/17/24 6948          Triage Assessment    Airway WDL WDL        Respiratory WDL    Respiratory WDL WDL        Cardiac WDL    Cardiac WDL WDL        Peripheral/Neurovascular WDL    Peripheral Neurovascular WDL WDL        Cognitive/Neuro/Behavioral WDL    Cognitive/Neuro/Behavioral WDL WDL

## 2024-05-18 NOTE — ED PROVIDER NOTES
History     Chief Complaint   Patient presents with    Hematuria     HPI  Thiago Hein is a 74 year old male with a documented PMH significant for rheumatoid arthritis, osteoarthritis, HLD, T2DM, HTN, and Still's disease who presents to the department for evaluation of dark appearing urine today.  Patient explains that he has had an enlarging prostate for years for which he has been taking escalating doses of tamsulosin, however he forgot to take his tamsulosin today and by early afternoon felt as though he was retaining urine and with an urgency.  This evening the patient urinated what appeared to be dark brown appearing urine so he decided come to the department for evaluation.  In the department he is without active pain or discomfort, no urge to urinate or other complaint.  He specifically denies fever, chills, back pain, flank pain, abdominal pain or other symptoms.        Allergies:  No Known Allergies    Problem List:    Patient Active Problem List    Diagnosis Date Noted    Chronic polyarticular juvenile rheumatoid arthritis (H) 08/24/2021     Priority: Medium     Formatting of this note might be different from the original.  Created by Conversion    Replacement Utility updated for latest IMO load      Emotional lability 08/24/2021     Priority: Medium     Formatting of this note might be different from the original.  Created by Conversion      Lower back pain 08/24/2021     Priority: Medium     Formatting of this note might be different from the original.  Created by Conversion      Morbid obesity (H) 08/24/2021     Priority: Medium    BPH with urinary obstruction 02/20/2020     Priority: Medium    Primary osteoarthritis involving multiple joints 10/08/2019     Priority: Medium    Left carpal tunnel syndrome 12/27/2018     Priority: Medium    Trigger finger, right index finger 09/26/2018     Priority: Medium    Polyarthralgia 07/20/2018     Priority: Medium    Unintentional weight loss 06/01/2018      Priority: Medium    Non morbid obesity due to excess calories 07/14/2017     Priority: Medium    Peripheral edema 07/14/2017     Priority: Medium    Diverticulosis 11/15/2016     Priority: Medium    Hyperlipidemia 08/04/2016     Priority: Medium    Need for pneumococcal vaccination 01/11/2016     Priority: Medium    Need for vaccination 11/20/2015     Priority: Medium    Type 2 diabetes mellitus (H) 11/11/2015     Priority: Medium     Formatting of this note might be different from the original.  Created by Conversion      Bacteremia due to Staphylococcus aureus 11/06/2015     Priority: Medium    Dysphagia 11/06/2015     Priority: Medium    Epidural abscess 11/06/2015     Priority: Medium    Essential hypertension 11/06/2015     Priority: Medium     Formatting of this note might be different from the original.  Created by Conversion    Replacement Utility updated for latest IMO load      Lesion of salivary gland 11/06/2015     Priority: Medium    Somnolence 11/06/2015     Priority: Medium    Discitis 10/04/2015     Priority: Medium    Adult Still's disease (H) 10/01/2015     Priority: Medium    Leukocytosis 09/29/2015     Priority: Medium    Sepsis (H) 09/29/2015     Priority: Medium        Past Medical History:    Past Medical History:   Diagnosis Date    Diabetes (H)     Hypertension        Past Surgical History:    Past Surgical History:   Procedure Laterality Date    ANESTHESIA OUT OF OR MRI N/A 10/2/2015    Procedure: ANESTHESIA OUT OF OR MRI;  Surgeon: GENERIC ANESTHESIA PROVIDER;  Location: [facility] OR    BACK SURGERY  2008    lumbar spine surgery-- unclear details    SOFT TISSUE SURGERY  2012    skin cancer removed from L shoulder, chest, right neck       Family History:    Family History   Problem Relation Age of Onset    Coronary Artery Disease Father     Hypertension Father        Social History:  Marital Status:  Single [1]  Social History     Tobacco Use    Smoking status: Never     Passive exposure:  Never    Smokeless tobacco: Former   Substance Use Topics    Alcohol use: No     Comment: Quit in 1990    Drug use: No        Medications:    amLODIPine (NORVASC) 5 MG tablet  atorvastatin (LIPITOR) 20 MG tablet  celecoxib (CELEBREX) 100 MG capsule  DULoxetine (CYMBALTA) 20 MG capsule  furosemide (LASIX) 40 MG tablet  hydroxychloroquine (PLAQUENIL) 200 MG tablet  lisinopril (ZESTRIL) 10 MG tablet  metFORMIN (GLUCOPHAGE XR) 500 MG 24 hr tablet  tamsulosin (FLOMAX) 0.4 MG capsule          Review of Systems   ROS: 10 point ROS neg other than the symptoms noted above in the HPI.    Physical Exam   BP: 119/73  Pulse: 69  Temp: 98.1  F (36.7  C)  Resp: 22  Height: 182.9 cm (6')  Weight: 108 kg (238 lb)  SpO2: 95 %      Physical Exam  Constitutional:  Well developed, well nourished.  Appears nontoxic and in no acute distress.  Resting comfortably on the gurney.  HENT:  Normocephalic and atraumatic.  Symmetric in appearance.  Eyes:  Conjunctivae are normal.  Cardiovascular:  No cyanosis.    Respiratory:  Effort normal without sign of respiratory distress.    Gastrointestinal:  Soft nondistended abdomen.  Nontender and without guarding.  No rigidity or rebound tenderness.  Negative Melo's sign.  Negative McBurney's point.   Musculoskeletal:  Moves extremities spontaneously.  Neurological:  Patient is alert.  Skin:  Skin is warm and dry.  Psychiatric:  Normal mood and affect.      ED Course        Procedures                Results for orders placed or performed during the hospital encounter of 05/17/24 (from the past 24 hour(s))   UA with Microscopic reflex to Culture    Specimen: Urine, Midstream   Result Value Ref Range    Color Urine Yellow Colorless, Straw, Light Yellow, Yellow    Appearance Urine Clear Clear    Glucose Urine Negative Negative mg/dL    Bilirubin Urine Negative Negative    Ketones Urine Negative Negative mg/dL    Specific Gravity Urine 1.011 1.003 - 1.035    Blood Urine Large (A) Negative    pH Urine 5.0  5.0 - 7.0    Protein Albumin Urine Negative Negative mg/dL    Urobilinogen Urine Normal Normal, 2.0 mg/dL    Nitrite Urine Negative Negative    Leukocyte Esterase Urine Negative Negative    Mucus Urine Present (A) None Seen /LPF    RBC Urine 55 (H) <=2 /HPF    WBC Urine 1 <=5 /HPF    Hyaline Casts Urine 5 (H) <=2 /LPF    Narrative    Urine Culture not indicated   Basic metabolic panel   Result Value Ref Range    Sodium 141 135 - 145 mmol/L    Potassium 3.9 3.4 - 5.3 mmol/L    Chloride 105 98 - 107 mmol/L    Carbon Dioxide (CO2) 25 22 - 29 mmol/L    Anion Gap 11 7 - 15 mmol/L    Urea Nitrogen 19.7 8.0 - 23.0 mg/dL    Creatinine 0.95 0.67 - 1.17 mg/dL    GFR Estimate 84 >60 mL/min/1.73m2    Calcium 9.5 8.8 - 10.2 mg/dL    Glucose 94 70 - 99 mg/dL   CBC with platelets differential    Narrative    The following orders were created for panel order CBC with platelets differential.  Procedure                               Abnormality         Status                     ---------                               -----------         ------                     CBC with platelets and d...[[id number]]                      Final result                 Please view results for these tests on the individual orders.   CBC with platelets and differential   Result Value Ref Range    WBC Count 7.1 4.0 - 11.0 10e3/uL    RBC Count 4.65 4.40 - 5.90 10e6/uL    Hemoglobin 13.9 13.3 - 17.7 g/dL    Hematocrit 40.2 40.0 - 53.0 %    MCV 87 78 - 100 fL    MCH 29.9 26.5 - 33.0 pg    MCHC 34.6 31.5 - 36.5 g/dL    RDW 13.2 10.0 - 15.0 %    Platelet Count 233 150 - 450 10e3/uL    % Neutrophils 50 %    % Lymphocytes 35 %    % Monocytes 12 %    % Eosinophils 2 %    % Basophils 1 %    % Immature Granulocytes 0 %    NRBCs per 100 WBC 0 <1 /100    Absolute Neutrophils 3.5 1.6 - 8.3 10e3/uL    Absolute Lymphocytes 2.5 0.8 - 5.3 10e3/uL    Absolute Monocytes 0.9 0.0 - 1.3 10e3/uL    Absolute Eosinophils 0.1 0.0 - 0.7 10e3/uL    Absolute Basophils 0.0 0.0 - 0.2  10e3/uL    Absolute Immature Granulocytes 0.0 <=0.4 10e3/uL    Absolute NRBCs 0.0 10e3/uL   CT Abdomen Pelvis w/o Contrast    Narrative    EXAM: CT ABDOMEN PELVIS W/O CONTRAST  LOCATION: [facility]  DATE: 5/17/2024    INDICATION: Gross hematuria  COMPARISON: None.  TECHNIQUE: CT scan of the abdomen and pelvis was performed without IV contrast. Multiplanar reformats were obtained. Dose reduction techniques were used.  CONTRAST: None.    FINDINGS:   LOWER CHEST: Normal.    HEPATOBILIARY: Cholelithiasis.    PANCREAS: Normal.    SPLEEN: Normal.    ADRENAL GLANDS: Normal.    KIDNEYS/BLADDER: Bilateral renal cysts. No renal or ureteral stones. No hydroureteronephrosis. 3 dependent bladder stones the largest measuring 1.2 cm.    BOWEL: Diverticulosis. No diverticulitis, colitis, or obstruction.    LYMPH NODES: Normal.    VASCULATURE: Moderate atherosclerotic vascular calcifications. No aneurysm.    PELVIC ORGANS: Moderately enlarged prostate.    MUSCULOSKELETAL: Mild to moderate multilevel discogenic degenerative change, most prominent at L3-L4.      Impression    IMPRESSION:   1.  Bladder stones, correlate as etiology of gross hematuria.  2.  Cholelithiasis.  3.  Moderate prostatomegaly.  4.  Atherosclerotic vascular disease.         Medications - No data to display    Assessments & Plan (with Medical Decision Making)   Thiago Hein is a 74 year old male who presents to the department for evaluation of gross hematuria.  Patient has emergency today but also admits that he forgot to take his morning dose of tamsulosin.  Upon arrival he is afebrile hemodynamically stable with benign abdomen.  Urinalysis positive for RBC but unimpressive for infection.  Metabolic panel values reassuring and without acute kidney injury.  CT scan of abdomen/pelvis independently viewed and agree with radiologist that there appears to be 3 large bladder stones but no sign of ureterolithiasis.  Given the  patient's hematuria and bladder stones, recommend outpatient follow-up with urology clinician and also further discussions around progressive BPH.      Disclaimer:  This note consists of symbols derived from keyboarding, dictation, and/or voice recognition software.  As a result, there may be errors in the script that have gone undetected.  Please consider this when interpreting information found in the chart.      I have reviewed the nursing notes.    I have reviewed the findings, diagnosis, plan and need for follow up with the patient.          Discharge Medication List as of 5/17/2024  8:13 PM          Final diagnoses:   Gross hematuria   Bladder stones   Urinary urgency       5/17/2024   [facility] EMERGENCY DEPT       Thiago Pack DO  05/17/24 2026

## 2024-05-23 ENCOUNTER — OFFICE VISIT (OUTPATIENT)
Dept: UROLOGY | Facility: CLINIC | Age: 74
End: 2024-05-23
Payer: MEDICARE

## 2024-05-23 VITALS
HEIGHT: 72 IN | OXYGEN SATURATION: 96 % | WEIGHT: 238 LBS | HEART RATE: 59 BPM | TEMPERATURE: 97.5 F | BODY MASS INDEX: 32.23 KG/M2 | SYSTOLIC BLOOD PRESSURE: 124 MMHG | DIASTOLIC BLOOD PRESSURE: 71 MMHG

## 2024-05-23 DIAGNOSIS — R39.12 BENIGN PROSTATIC HYPERPLASIA WITH WEAK URINARY STREAM: ICD-10-CM

## 2024-05-23 DIAGNOSIS — Z12.5 SCREENING FOR PROSTATE CANCER: Primary | ICD-10-CM

## 2024-05-23 DIAGNOSIS — I10 ESSENTIAL HYPERTENSION: ICD-10-CM

## 2024-05-23 DIAGNOSIS — R31.0 GROSS HEMATURIA: ICD-10-CM

## 2024-05-23 DIAGNOSIS — N21.0 BLADDER STONES: ICD-10-CM

## 2024-05-23 DIAGNOSIS — N40.1 BENIGN PROSTATIC HYPERPLASIA WITH WEAK URINARY STREAM: ICD-10-CM

## 2024-05-23 LAB
ALBUMIN UR-MCNC: NEGATIVE MG/DL
APPEARANCE UR: CLEAR
BILIRUB UR QL STRIP: NEGATIVE
COLOR UR AUTO: YELLOW
GLUCOSE UR STRIP-MCNC: NEGATIVE MG/DL
HGB UR QL STRIP: NEGATIVE
KETONES UR STRIP-MCNC: NEGATIVE MG/DL
LEUKOCYTE ESTERASE UR QL STRIP: NEGATIVE
NITRATE UR QL: NEGATIVE
PH UR STRIP: 5.5 [PH] (ref 5–7)
PSA SERPL DL<=0.01 NG/ML-MCNC: 2.69 NG/ML (ref 0–6.5)
SP GR UR STRIP: 1.01 (ref 1–1.03)
UROBILINOGEN UR STRIP-ACNC: 0.2 E.U./DL

## 2024-05-23 PROCEDURE — 88112 CYTOPATH CELL ENHANCE TECH: CPT | Mod: 26 | Performed by: PATHOLOGY

## 2024-05-23 PROCEDURE — 99214 OFFICE O/P EST MOD 30 MIN: CPT | Mod: 25 | Performed by: STUDENT IN AN ORGANIZED HEALTH CARE EDUCATION/TRAINING PROGRAM

## 2024-05-23 PROCEDURE — G0103 PSA SCREENING: HCPCS | Performed by: STUDENT IN AN ORGANIZED HEALTH CARE EDUCATION/TRAINING PROGRAM

## 2024-05-23 PROCEDURE — 81003 URINALYSIS AUTO W/O SCOPE: CPT | Performed by: STUDENT IN AN ORGANIZED HEALTH CARE EDUCATION/TRAINING PROGRAM

## 2024-05-23 PROCEDURE — 51798 US URINE CAPACITY MEASURE: CPT | Performed by: STUDENT IN AN ORGANIZED HEALTH CARE EDUCATION/TRAINING PROGRAM

## 2024-05-23 PROCEDURE — 36415 COLL VENOUS BLD VENIPUNCTURE: CPT | Performed by: STUDENT IN AN ORGANIZED HEALTH CARE EDUCATION/TRAINING PROGRAM

## 2024-05-23 NOTE — PROGRESS NOTES
UROLOGY FOLLOW-UP NOTE          Chief Complaint:   Today I had the pleasure of seeing . Thiago Hein in follow-up for a chief complaint of gross hematuria.          Interval Update   Thiago Hein is a very pleasant 74 year old male with a history of rheumatoid arthritis, T2 DM, HLD, HTN, and Adult Still's disease.     Brief History: Mr. Thiago Hein has followed with urology previously for LUTS. He takes tamsulosin 0.8 mg daily. PVR was 41 mL on previous evaluations.     He presented to the ED on 5/17/2024 for dark brown urine and difficulty urinating after missing a dose of tamsulosin. UA was notable for 55 RBCs. CT abdomen pelvis without contrast was notable for three dependent bladder stones, the largest measuring 1.2 cm.     Today's notes: His symptoms are improvement since he was in the ED. His urine has been clear. He reports weak stream, urinary frequency, and post-void dribbling.     He denies a history of kidney stones.     He is a never smoker.          Physical Exam:   Patient is a 74 year old  male   Vitals: Blood pressure 124/71, pulse 59, temperature 97.5  F (36.4  C), temperature source Tympanic, height 1.829 m (6'), weight 108 kg (238 lb), SpO2 96%.  General: Alert and oriented x 3, no acute distress.  Respiratory: Non-labored breathing.  Cardiac: Regular rate.    PVR: 48 mL        Labs and Pathology:    I personally reviewed all applicable laboratory data and went over findings with patient  Significant for:    CBC RESULTS:  Recent Labs   Lab Test 05/17/24 1917 01/10/24  1017 12/01/23  1105 10/03/23  1112   WBC 7.1 7.0 5.6 7.0   HGB 13.9 15.2 14.8 15.3    168 197 193        BMP RESULTS:  Recent Labs   Lab Test 05/17/24  1917 02/06/24  1034 01/10/24  1017 12/01/23  1105 10/03/23  1112 08/24/21  1045 04/23/21  0711 03/04/21  1802 03/04/21  1751 12/29/20  1154 11/27/20  1403    139  --  138 141   < > 138 140  --  141  --    POTASSIUM 3.9 4.8  --  3.8 3.9    < > 4.1 3.6  --  4.5  --    CHLORIDE 105 101  --  103 105   < > 106 102  --  101  --    CO2 25 29  --  24 25   < > 25 25  --  31  --    ANIONGAP 11 9  --  11 11   < > 7 13  --  9  --    GLC 94 128*  --  231* 128*   < > 176* 160*   < > 129*  --    BUN 19.7 15.9  --  16.8 17.8   < > 20 15  --  19  --    CR 0.95 0.88 0.84 0.79 0.89   < > 0.86 0.97  --  0.94 0.91   GFRESTIMATED 84 >90 >90 >90 90   < > 87 >60  --  >60 >60   GFRESTBLACK  --   --   --   --   --   --  >90 >60  --  >60 >60   LOLA 9.5 9.7  --  9.1 9.4   < > 8.6 8.7  --  9.8  --     < > = values in this interval not displayed.       UA RESULTS:   Recent Labs   Lab Test 05/17/24  1821 08/26/22  1541 08/09/22 2009   SG 1.011 >=1.030 1.030   URINEPH 5.0 5.5 6.0   NITRITE Negative Negative Negative   RBCU 55* 0-2 8*   WBCU 1 0-5 3       PSA RESULTS  Prostate Specific Antigen Screen   Date Value Ref Range Status   02/20/2020 2.5 0.0 - 4.5 ng/mL Final           Imaging:    I personally reviewed all applicable imaging and went over the below findings with patient.    Results for orders placed or performed during the hospital encounter of 05/17/24   CT Abdomen Pelvis w/o Contrast    Narrative    EXAM: CT ABDOMEN PELVIS W/O CONTRAST  LOCATION: [facility]  DATE: 5/17/2024    INDICATION: Gross hematuria  COMPARISON: None.  TECHNIQUE: CT scan of the abdomen and pelvis was performed without IV contrast. Multiplanar reformats were obtained. Dose reduction techniques were used.  CONTRAST: None.    FINDINGS:   LOWER CHEST: Normal.    HEPATOBILIARY: Cholelithiasis.    PANCREAS: Normal.    SPLEEN: Normal.    ADRENAL GLANDS: Normal.    KIDNEYS/BLADDER: Bilateral renal cysts. No renal or ureteral stones. No hydroureteronephrosis. 3 dependent bladder stones the largest measuring 1.2 cm.    BOWEL: Diverticulosis. No diverticulitis, colitis, or obstruction.    LYMPH NODES: Normal.    VASCULATURE: Moderate atherosclerotic vascular calcifications. No  aneurysm.    PELVIC ORGANS: Moderately enlarged prostate.    MUSCULOSKELETAL: Mild to moderate multilevel discogenic degenerative change, most prominent at L3-L4.      Impression    IMPRESSION:   1.  Bladder stones, correlate as etiology of gross hematuria.  2.  Cholelithiasis.  3.  Moderate prostatomegaly.  4.  Atherosclerotic vascular disease.                Assessment/Plan   74 year old male seen in evaluation for BPH and bladder stones. He takes tamsulosin 0.8 mg and reports slow stream and urinary frequency at baseline.     He presented to the ED on 5/17/2024 for dark brown urine and difficulty urinating after missing a dose of tamsulosin. UA was notable for 55 RBCs. CT abdomen pelvis without contrast was notable for three dependent bladder stones, the largest measuring 1.2 cm.     His PVR today was 48 mL. Given the gross hematuria evaluation, we will complete workup with urine cytology, CT urogram, and cystoscopy. I recommended cystoscopy with Dr. Gómez to consider bladder stone treatment and bladder outlet procedure. The patient is willing to consider surgery for BPH when the bladder stones are treated.     Uroflow results below:        Plan:  UA, urine cytology, and CT urogram to evaluate gross hematuria.   PSA today for prostate cancer screening.   Cystoscopy with Dr. Gómez for evaluation of gross hematuria and consideration of bladder stone/BPH treatment.            Past Medical History:     Past Medical History:   Diagnosis Date    Diabetes (H)     Hypertension             Past Surgical History:     Past Surgical History:   Procedure Laterality Date    ANESTHESIA OUT OF OR MRI N/A 10/2/2015    Procedure: ANESTHESIA OUT OF OR MRI;  Surgeon: GENERIC ANESTHESIA PROVIDER;  Location: [facility] OR    BACK SURGERY  2008    lumbar spine surgery-- unclear details    SOFT TISSUE SURGERY  2012    skin cancer removed from L shoulder, chest, right neck            Medications     Current Outpatient Medications    Medication Sig Dispense Refill    amLODIPine (NORVASC) 5 MG tablet TAKE 1 TABLET(5 MG) BY MOUTH DAILY 90 tablet 3    atorvastatin (LIPITOR) 20 MG tablet TAKE 1 TABLET(20 MG) BY MOUTH DAILY 90 tablet 1    celecoxib (CELEBREX) 100 MG capsule Take 1 capsule (100 mg) by mouth 2 times daily 180 capsule 3    DULoxetine (CYMBALTA) 20 MG capsule Take 1 capsule (20 mg) by mouth daily for 30 days 30 capsule 2    furosemide (LASIX) 40 MG tablet TAKE 1 TABLET(40 MG) BY MOUTH DAILY 90 tablet 0    hydroxychloroquine (PLAQUENIL) 200 MG tablet Take 1 tablet (200 mg) by mouth 2 times daily 180 tablet 0    lisinopril (ZESTRIL) 10 MG tablet TAKE 1 TABLET(10 MG) BY MOUTH DAILY 90 tablet 0    metFORMIN (GLUCOPHAGE XR) 500 MG 24 hr tablet TAKE 3 TABLETS BY MOUTH EVERY MORNING AND 1 TABLET BY MOUTH EVERY EVENING 360 tablet 0    tamsulosin (FLOMAX) 0.4 MG capsule TAKE 2 CAPSULES(0.8 MG) BY MOUTH EVERY EVENING 180 capsule 2     No current facility-administered medications for this visit.            Family History:     Family History   Problem Relation Age of Onset    Coronary Artery Disease Father     Hypertension Father             Social History:     Social History     Socioeconomic History    Marital status: Single     Spouse name: Not on file    Number of children: Not on file    Years of education: Not on file    Highest education level: Not on file   Occupational History    Not on file   Tobacco Use    Smoking status: Never     Passive exposure: Never    Smokeless tobacco: Former   Substance and Sexual Activity    Alcohol use: No     Comment: Quit in 1990    Drug use: No    Sexual activity: Not on file   Other Topics Concern    Parent/sibling w/ CABG, MI or angioplasty before 65F 55M? 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      Social Determinants of Health     Financial Resource Strain: Low Risk  (10/3/2023)    Financial Resource Strain     Within the past 12  months, have you or your family members you live with been unable to get utilities (heat, electricity) when it was really needed?: No   Food Insecurity: Low Risk  (10/3/2023)    Food Insecurity     Within the past 12 months, did you worry that your food would run out before you got money to buy more?: No     Within the past 12 months, did the food you bought just not last and you didn t have money to get more?: No   Transportation Needs: Low Risk  (10/3/2023)    Transportation Needs     Within the past 12 months, has lack of transportation kept you from medical appointments, getting your medicines, non-medical meetings or appointments, work, or from getting things that you need?: No   Physical Activity: Not on file   Stress: Not on file   Social Connections: Not on file   Interpersonal Safety: Low Risk  (4/16/2024)    Interpersonal Safety     Do you feel physically and emotionally safe where you currently live?: Yes     Within the past 12 months, have you been hit, slapped, kicked or otherwise physically hurt by someone?: No     Within the past 12 months, have you been humiliated or emotionally abused in other ways by your partner or ex-partner?: No   Housing Stability: High Risk (10/3/2023)    Housing Stability     Do you have housing? : No     Are you worried about losing your housing?: No            Allergies:   Patient has no known allergies.         Review of Systems:  From intake questionnaire   Negative 14 system review except as noted on HPI, nurse's note.        RENNY AUSTIN PA-C  Department of Urology

## 2024-05-23 NOTE — NURSING NOTE
Initial /71   Pulse 59   Temp 97.5  F (36.4  C) (Tympanic)   Ht 1.829 m (6')   Wt 108 kg (238 lb)   SpO2 96%   BMI 32.28 kg/m   Estimated body mass index is 32.28 kg/m  as calculated from the following:    Height as of this encounter: 1.829 m (6').    Weight as of this encounter: 108 kg (238 lb). .  Active order to obtain bladder scan? Yes   Name of ordering provider:  Flakita Malloy  Bladder scan preformed post void yes.  Bladder scan reveled 48ML  Provider notified?  Yes    Audrey LUCAS CMA

## 2024-05-23 NOTE — PATIENT INSTRUCTIONS
CT urogram: to rule out kidney cancer    Cystoscopy: look inside the bladder to determine treatment plan for bladder stones

## 2024-05-24 RX ORDER — AMLODIPINE BESYLATE 5 MG/1
TABLET ORAL
Qty: 90 TABLET | Refills: 1 | Status: SHIPPED | OUTPATIENT
Start: 2024-05-24

## 2024-05-25 LAB
PATH REPORT.COMMENTS IMP SPEC: NORMAL
PATH REPORT.FINAL DX SPEC: NORMAL
PATH REPORT.GROSS SPEC: NORMAL
PATH REPORT.MICROSCOPIC SPEC OTHER STN: NORMAL
PATH REPORT.RELEVANT HX SPEC: NORMAL

## 2024-06-06 ENCOUNTER — HOSPITAL ENCOUNTER (OUTPATIENT)
Dept: CT IMAGING | Facility: HOSPITAL | Age: 74
Discharge: HOME | End: 2024-06-06
Attending: STUDENT IN AN ORGANIZED HEALTH CARE EDUCATION/TRAINING PROGRAM | Admitting: STUDENT IN AN ORGANIZED HEALTH CARE EDUCATION/TRAINING PROGRAM
Payer: MEDICARE

## 2024-06-06 DIAGNOSIS — R31.0 GROSS HEMATURIA: ICD-10-CM

## 2024-06-06 PROCEDURE — 74178 CT ABD&PLV WO CNTR FLWD CNTR: CPT | Mod: MG

## 2024-06-06 PROCEDURE — 250N000011 HC RX IP 250 OP 636: Performed by: STUDENT IN AN ORGANIZED HEALTH CARE EDUCATION/TRAINING PROGRAM

## 2024-06-06 RX ORDER — IOPAMIDOL 755 MG/ML
90 INJECTION, SOLUTION INTRAVASCULAR ONCE
Status: COMPLETED | OUTPATIENT
Start: 2024-06-06 | End: 2024-06-06

## 2024-06-06 RX ADMIN — IOPAMIDOL 90 ML: 755 INJECTION, SOLUTION INTRAVENOUS at 13:15

## 2024-06-07 ENCOUNTER — TELEPHONE (OUTPATIENT)
Dept: UROLOGY | Facility: CLINIC | Age: 74
End: 2024-06-07
Payer: MEDICARE

## 2024-06-07 ENCOUNTER — HOSPITAL ENCOUNTER (EMERGENCY)
Facility: CLINIC | Age: 74
Discharge: HOME | End: 2024-06-07
Attending: EMERGENCY MEDICINE | Admitting: EMERGENCY MEDICINE
Payer: MEDICARE

## 2024-06-07 VITALS
WEIGHT: 219 LBS | DIASTOLIC BLOOD PRESSURE: 64 MMHG | HEART RATE: 63 BPM | HEIGHT: 72 IN | OXYGEN SATURATION: 95 % | RESPIRATION RATE: 20 BRPM | TEMPERATURE: 97.4 F | SYSTOLIC BLOOD PRESSURE: 102 MMHG | BODY MASS INDEX: 29.66 KG/M2

## 2024-06-07 DIAGNOSIS — N40.1 BPH WITH URINARY OBSTRUCTION: ICD-10-CM

## 2024-06-07 DIAGNOSIS — N21.0 BLADDER STONES: ICD-10-CM

## 2024-06-07 DIAGNOSIS — N13.8 BPH WITH URINARY OBSTRUCTION: ICD-10-CM

## 2024-06-07 DIAGNOSIS — N32.89 BLADDER SPASMS: ICD-10-CM

## 2024-06-07 DIAGNOSIS — F41.9 ANXIETY: ICD-10-CM

## 2024-06-07 LAB
ALBUMIN UR-MCNC: >499 MG/DL
APPEARANCE UR: ABNORMAL
BILIRUB UR QL STRIP: NEGATIVE
COLOR UR AUTO: ABNORMAL
GLUCOSE UR STRIP-MCNC: NEGATIVE MG/DL
HGB UR QL STRIP: NEGATIVE
HYALINE CASTS: 7 /LPF
KETONES UR STRIP-MCNC: 5 MG/DL
LEUKOCYTE ESTERASE UR QL STRIP: NEGATIVE
MUCOUS THREADS #/AREA URNS LPF: PRESENT /LPF
NITRATE UR QL: NEGATIVE
PH UR STRIP: 5 [PH] (ref 5–7)
RBC URINE: 107 /HPF
SP GR UR STRIP: 1.03 (ref 1–1.03)
SQUAMOUS EPITHELIAL: <1 /HPF
UROBILINOGEN UR STRIP-MCNC: NORMAL MG/DL
WBC URINE: 7 /HPF

## 2024-06-07 PROCEDURE — 81001 URINALYSIS AUTO W/SCOPE: CPT | Performed by: FAMILY MEDICINE

## 2024-06-07 PROCEDURE — 250N000011 HC RX IP 250 OP 636: Mod: JZ | Performed by: EMERGENCY MEDICINE

## 2024-06-07 PROCEDURE — 81001 URINALYSIS AUTO W/SCOPE: CPT | Performed by: EMERGENCY MEDICINE

## 2024-06-07 PROCEDURE — 250N000013 HC RX MED GY IP 250 OP 250 PS 637: Performed by: EMERGENCY MEDICINE

## 2024-06-07 PROCEDURE — 99284 EMERGENCY DEPT VISIT MOD MDM: CPT | Performed by: EMERGENCY MEDICINE

## 2024-06-07 PROCEDURE — 96372 THER/PROPH/DIAG INJ SC/IM: CPT | Performed by: EMERGENCY MEDICINE

## 2024-06-07 PROCEDURE — 51798 US URINE CAPACITY MEASURE: CPT | Performed by: EMERGENCY MEDICINE

## 2024-06-07 PROCEDURE — 99285 EMERGENCY DEPT VISIT HI MDM: CPT | Mod: 25 | Performed by: EMERGENCY MEDICINE

## 2024-06-07 RX ORDER — OXYBUTYNIN CHLORIDE 5 MG/1
5 TABLET ORAL 3 TIMES DAILY
Qty: 40 TABLET | Refills: 0 | Status: ON HOLD | OUTPATIENT
Start: 2024-06-07 | End: 2024-07-06

## 2024-06-07 RX ORDER — BACLOFEN 10 MG/1
TABLET ORAL
Qty: 40 TABLET | Refills: 0 | Status: SHIPPED | OUTPATIENT
Start: 2024-06-07 | End: 2024-06-07

## 2024-06-07 RX ORDER — KETOROLAC TROMETHAMINE 30 MG/ML
30 INJECTION, SOLUTION INTRAMUSCULAR; INTRAVENOUS ONCE
Status: COMPLETED | OUTPATIENT
Start: 2024-06-07 | End: 2024-06-07

## 2024-06-07 RX ORDER — OXYCODONE HYDROCHLORIDE 5 MG/1
5 TABLET ORAL EVERY 6 HOURS PRN
Qty: 12 TABLET | Refills: 0 | Status: SHIPPED | OUTPATIENT
Start: 2024-06-07 | End: 2024-07-29

## 2024-06-07 RX ORDER — BACLOFEN 10 MG/1
5 TABLET ORAL ONCE
Status: COMPLETED | OUTPATIENT
Start: 2024-06-07 | End: 2024-06-07

## 2024-06-07 RX ADMIN — KETOROLAC TROMETHAMINE 30 MG: 30 INJECTION, SOLUTION INTRAMUSCULAR at 16:16

## 2024-06-07 RX ADMIN — BACLOFEN 5 MG: 10 TABLET ORAL at 16:53

## 2024-06-07 ASSESSMENT — COLUMBIA-SUICIDE SEVERITY RATING SCALE - C-SSRS
2. HAVE YOU ACTUALLY HAD ANY THOUGHTS OF KILLING YOURSELF IN THE PAST MONTH?: NO
6. HAVE YOU EVER DONE ANYTHING, STARTED TO DO ANYTHING, OR PREPARED TO DO ANYTHING TO END YOUR LIFE?: NO
1. IN THE PAST MONTH, HAVE YOU WISHED YOU WERE DEAD OR WISHED YOU COULD GO TO SLEEP AND NOT WAKE UP?: NO

## 2024-06-07 ASSESSMENT — ACTIVITIES OF DAILY LIVING (ADL)
ADLS_ACUITY_SCORE: 38
ADLS_ACUITY_SCORE: 36
ADLS_ACUITY_SCORE: 38

## 2024-06-07 NOTE — ED PROVIDER NOTES
History     Chief Complaint   Patient presents with    Urinary Retention     Pt reports penile pain and pressure that has been going on for sometime. Pt reports he was seen by urology and had some imaging done. Pt reports the pain and pressure is so bad. Every time he tries to urinate only a little bit comes outs        HPI  History per patient, review of [facility] EMR and Care Everywhere EMR, including extensive chart review of multiple Urology clinic notes,  multiple prior imaging studies  and multiple prior laboratory evaluations.   Thiago Hein is a 74 year old male with history of BPH and on Flomax chronically who presents to the emergency department for evaluation of pain and pressure in the penis with sensation he needs to urinate but inability to urinate with a sense of urinary urgency and hesitancy. Recently documented postvoid residual of 48 m with similar symptoms.  Recent UA 5/23/2024 was unremarkable and prior to this UA 5/17/2024 was remarkable for hematuria without evidence of UTI. Recent CT abdomen/pelvis without contrast showed bladder stones.  A CT urogram without and with contrast yesterday was remarkable for 3 small stones in the dependent portion of the bladder with no perivesicular fat stranding or wall thickening.  Urine cytology 5/23/2024 was negative for malignancy but showed some crystals.  He has an appointment in Urology clinic next week, in 5 days, on 6/12/2024.  No other pertinent history or acute complaints or concerns.    Previous Records Reviewed:  Urology clinic note 5/23/2024  74 year old male seen in evaluation for BPH and bladder stones. He takes tamsulosin 0.8 mg and reports slow stream and urinary frequency at baseline.      He presented to the ED on 5/17/2024 for dark brown urine and difficulty urinating after missing a dose of tamsulosin. UA was notable for 55 RBCs. CT abdomen pelvis without contrast was notable for three dependent bladder stones, the largest measuring 1.2  cm.      His PVR today was 48 mL. Given the gross hematuria evaluation, we will complete workup with urine cytology, CT urogram, and cystoscopy. I recommended cystoscopy with Dr. Gómez to consider bladder stone treatment and bladder outlet procedure. The patient is willing to consider surgery for BPH when the bladder stones are treated.      Uroflow results below:    5/17/2024 - CT ABDOMEN PELVIS W/O CONTRAST - [facility]    INDICATION: Gross hematuria    IMPRESSION:   1.  Bladder stones, correlate as etiology of gross hematuria.  2.  Cholelithiasis.  3.  Moderate prostatomegaly.  4.  Atherosclerotic vascular disease.     6/6/2024, yesterday, CT urogram without and with contrast  IMPRESSION:    1.  No evidence of urothelial carcinoma, urinary tract calculi, or other explanation for hematuria. Several small stones present in the bladder.   BLADDER: Three small stones in the dependent portion of the bladder. No perivesical fat stranding or wall thickening.     2.  Cholelithiasis.     3.  Colonic diverticulosis.      Allergies:  No Known Allergies    Problem List:    Patient Active Problem List    Diagnosis Date Noted    Bladder stones 06/07/2024     Priority: Medium    Chronic polyarticular juvenile rheumatoid arthritis (H) 08/24/2021     Priority: Medium     Formatting of this note might be different from the original.  Created by Conversion    Replacement Utility updated for latest IMO load      Emotional lability 08/24/2021     Priority: Medium     Formatting of this note might be different from the original.  Created by Conversion      Lower back pain 08/24/2021     Priority: Medium     Formatting of this note might be different from the original.  Created by Conversion      Morbid obesity (H) 08/24/2021     Priority: Medium    BPH with urinary obstruction 02/20/2020     Priority: Medium    Primary osteoarthritis involving multiple joints 10/08/2019     Priority: Medium    Left carpal  tunnel syndrome 12/27/2018     Priority: Medium    Trigger finger, right index finger 09/26/2018     Priority: Medium    Polyarthralgia 07/20/2018     Priority: Medium    Unintentional weight loss 06/01/2018     Priority: Medium    Non morbid obesity due to excess calories 07/14/2017     Priority: Medium    Peripheral edema 07/14/2017     Priority: Medium    Diverticulosis 11/15/2016     Priority: Medium    Hyperlipidemia 08/04/2016     Priority: Medium    Need for pneumococcal vaccination 01/11/2016     Priority: Medium    Need for vaccination 11/20/2015     Priority: Medium    Type 2 diabetes mellitus (H) 11/11/2015     Priority: Medium     Formatting of this note might be different from the original.  Created by Conversion      Bacteremia due to Staphylococcus aureus 11/06/2015     Priority: Medium    Dysphagia 11/06/2015     Priority: Medium    Epidural abscess 11/06/2015     Priority: Medium    Essential hypertension 11/06/2015     Priority: Medium     Formatting of this note might be different from the original.  Created by Conversion    Replacement Utility updated for latest IMO load      Lesion of salivary gland 11/06/2015     Priority: Medium    Somnolence 11/06/2015     Priority: Medium    Discitis 10/04/2015     Priority: Medium    Adult Still's disease (H) 10/01/2015     Priority: Medium    Leukocytosis 09/29/2015     Priority: Medium    Sepsis (H) 09/29/2015     Priority: Medium        Past Medical History:    Past Medical History:   Diagnosis Date    Diabetes (H)     Hypertension        Past Surgical History:    Past Surgical History:   Procedure Laterality Date    ANESTHESIA OUT OF OR MRI N/A 10/2/2015    Procedure: ANESTHESIA OUT OF OR MRI;  Surgeon: GENERIC ANESTHESIA PROVIDER;  Location: [facility] OR    BACK SURGERY  2008    lumbar spine surgery-- unclear details    SOFT TISSUE SURGERY  2012    skin cancer removed from L shoulder, chest, right neck       Family History:    Family History   Problem  Relation Age of Onset    Coronary Artery Disease Father     Hypertension Father        Social History:  Marital Status:  Single [1]  Social History     Tobacco Use    Smoking status: Never     Passive exposure: Never    Smokeless tobacco: Former   Substance Use Topics    Alcohol use: No     Comment: Quit in 1990    Drug use: No        Medications:    oxyBUTYnin (DITROPAN) 5 MG tablet  oxyCODONE (ROXICODONE) 5 MG tablet  amLODIPine (NORVASC) 5 MG tablet  atorvastatin (LIPITOR) 20 MG tablet  celecoxib (CELEBREX) 100 MG capsule  DULoxetine (CYMBALTA) 20 MG capsule  furosemide (LASIX) 40 MG tablet  hydroxychloroquine (PLAQUENIL) 200 MG tablet  lisinopril (ZESTRIL) 10 MG tablet  metFORMIN (GLUCOPHAGE XR) 500 MG 24 hr tablet  tamsulosin (FLOMAX) 0.4 MG capsule        Review of Systems  As mentioned in the HPI, in addition focused review of systems was negative.    Physical Exam   BP: 105/68  Pulse: 100  Temp: 97.4  F (36.3  C)  Resp: 20  Height: 182.9 cm (6')  Weight: 99.3 kg (219 lb)  SpO2: 96 %      Physical Exam  Vitals and nursing note reviewed.   Constitutional:       General: He is not in acute distress.     Appearance: Normal appearance. He is not ill-appearing.   Cardiovascular:      Rate and Rhythm: Normal rate and regular rhythm.      Pulses: Normal pulses.   Pulmonary:      Effort: Pulmonary effort is normal. No respiratory distress.   Abdominal:      General: Bowel sounds are normal. There is no distension.      Tenderness: There is no abdominal tenderness. There is no right CVA tenderness, left CVA tenderness, guarding or rebound.   Genitourinary:     Penis: Normal.       Testes: Normal.      Comments:  examination: Normal penis, scrotum, testes.  No hernia.  Normal exam.  Musculoskeletal:      Right lower leg: No edema.   Skin:     General: Skin is warm and dry.      Coloration: Skin is not jaundiced or pale.      Findings: No bruising, erythema, lesion or rash.   Neurological:      Mental Status: He is  alert.   Psychiatric:         Behavior: Behavior normal.      Comments: Anxious affect.         ED Course        Procedures            Results for orders placed or performed during the hospital encounter of 06/07/24   UA with Microscopic reflex to Culture     Status: Abnormal    Specimen: Urine, Midstream   Result Value Ref Range    Color Urine Shannan (A) Colorless, Straw, Light Yellow, Yellow    Appearance Urine Cloudy (A) Clear    Glucose Urine Negative Negative mg/dL    Bilirubin Urine Negative Negative    Ketones Urine 5 (A) Negative mg/dL    Specific Gravity Urine 1.029 1.003 - 1.035    Blood Urine Negative Negative    pH Urine 5.0 5.0 - 7.0    Protein Albumin Urine >499 (A) Negative mg/dL    Urobilinogen Urine Normal Normal, 2.0 mg/dL    Nitrite Urine Negative Negative    Leukocyte Esterase Urine Negative Negative    Mucus Urine Present (A) None Seen /LPF    RBC Urine 107 (H) <=2 /HPF    WBC Urine 7 (H) <=5 /HPF    Squamous Epithelials Urine <1 <=1 /HPF    Hyaline Casts Urine 7 (H) <=2 /LPF    Narrative    Urine Culture not indicated           Medications   ketorolac (TORADOL) injection 30 mg (30 mg Intramuscular $Given 6/7/24 1616)   baclofen (LIORESAL) half-tab 5 mg (5 mg Oral $Given 6/7/24 1653)       Postvoid bladder scan: 26 mL    4:25 PM -I reviewed the case and consulted with Dr. Murray, Urologist on call.  We discussed patient's history, exam, UA and treatment and disposition plan.      Assessments & Plan (with Medical Decision Making)   74 year old male with history of BPH and on Flomax chronically who presents to the emergency department for evaluation of pain and pressure in the penis with sensation he needs to urinate but inability to urinate with a sense of urinary urgency and hesitancy. Recently documented postvoid residual of 48 ml with similar symptoms.  Recent UA 5/23/2024 was unremarkable and prior to this UA 5/17/2024 was remarkable for hematuria without evidence of UTI. Recent CT abdomen/pelvis  without contrast showed bladder stones. A CT urogram without and with contrast yesterday was remarkable for 3 small stones in the dependent portion of the bladder with no perivesicular fat stranding or wall thickening.  Today post void residual only 26 mL.  Normal exam except for significant anxiety.  His discomfort appears to be due to bladder spasm related to small bladder stones.  There is UTI and I doubt obstructing stone or ureteral stone.  Urology on call was consulted and concurs. I will rx oxybutynin for bladder spasms and a small number oxycodone use if needed for pain which appears secondary to bladder spasm due to small non-obstructing bladder stones.  Stable pain for weeks and doubt obstructing stone.  No evidence of UTI.  No current indication for emergent imaging reevaluation.  Urologist on-call agrees with today's evaluation disposition plan.  He will follow-up in Urology clinic as scheduled next week, 6/12/2024, and 5 days.  He was provided instructions for supportive care and will return as needed for worsened condition or worsening symptoms, or new problems or concerns.    I have reviewed the nursing notes.    I have reviewed the findings, diagnosis, plan and need for follow up with the patient.    Medical Decision Making: Moderate Complexity      Discharge Medication List as of 6/7/2024  7:37 PM     START taking these medications    Details   oxyCODONE (ROXICODONE) 5 MG tablet Take 1 tablet (5 mg) by mouth every 6 hours as needed for severe pain, Disp-12 tablet, R-0, E-Prescribe           oxyBUTYnin (DITROPAN) 5 MG tablet 40 tablet 0 6/7/2024 -- No   Sig - Route: Take 1 tablet (5 mg) by mouth 3 times daily as needed for bladder spasms. - Oral   Sent to pharmacy as: oxyBUTYnin Chloride 5 MG Oral Tablet (DITROPAN)   Class: E-Prescribe       Final diagnoses:   Bladder spasms   Bladder stones - 3 small bladder stones documented on CT urogram and CT in June 2024   BPH with urinary obstruction   Anxiety        6/7/2024   [facility] EMERGENCY DEPT       Romulo Amaya MD  06/10/24 [id number]

## 2024-06-07 NOTE — TELEPHONE ENCOUNTER
"Strong urge to urinate and then feels \"blocked\" and slow stream,  and a discomfort in penis all the way to the tip.    When specific asked about the discomfort and the Blocked feeling= if this was new or worse he said \"No\" this is what it has been since the ER and when he saw Mellissa.    (Although it did not state pain or \"blocked\" sensation in there ER note)    Linn SALCIDO   [facility] RN    "

## 2024-06-07 NOTE — TELEPHONE ENCOUNTER
Discussed options with patient and he states he would rather go to UC and check everything and maybe get Abx and or Place cath if going into retention and or any other options they may offer.  Linn SALCIDO   [facility] RN

## 2024-06-07 NOTE — ED NOTES
Patient states he has been seen many times for urology issues in the past few weeks. He states it hurts when he pees and after he pees and he can't start his stream

## 2024-06-07 NOTE — TELEPHONE ENCOUNTER
"Pt reports he did do the Urogram yesterday.    He is frustrated with the level of discomfort and time it is taking to solve his issue. And that the Cysto with Dalia next week is still not directed toward relieving his symptoms.    Pt states his level of symptoms has not worsened or gotten any better.  Taking Flomax 1 pill BID.    Asks if there is \"anything\" else he can do or take to attempt to relieve his discomfort and symptoms.    Linn SALCIDO   [facility] RN      "

## 2024-06-07 NOTE — TELEPHONE ENCOUNTER
If he feels he has a UTI or cannot pee/empty this bladder, then we can consider UA or catheter. PVR was 48 mL at his last visit. Otherwise not much that can be done until we can determine a surgical plan.

## 2024-06-07 NOTE — ED TRIAGE NOTES
Pt reports penile pain and pressure that has been going on for sometime. Pt reports he was seen by urology and had some imaging done. Pt reports the pain and pressure is so bad. Every time he tries to urinate only a little bit comes outs

## 2024-06-08 NOTE — DISCHARGE INSTRUCTIONS
Use one or more over the counter meds as needed to prevent constipation from opiate analgesic (Oxycodone):     Fiber supplement  Milk of Magnesia  Miralax powder daily (or generic brand)  Dulcolax or Senokot (laxative)  Magnesium Citrate 1 bottle if needed for severe constipation  Fleet Enema if needed for severe constipation    These are available over-the-counter and can be used together or in combination, as needed to prevent constipation.  Bladder spasm

## 2024-06-12 ENCOUNTER — OFFICE VISIT (OUTPATIENT)
Dept: UROLOGY | Facility: CLINIC | Age: 74
End: 2024-06-12
Payer: MEDICARE

## 2024-06-12 VITALS
SYSTOLIC BLOOD PRESSURE: 90 MMHG | HEART RATE: 47 BPM | TEMPERATURE: 98 F | DIASTOLIC BLOOD PRESSURE: 56 MMHG | BODY MASS INDEX: 30.11 KG/M2 | WEIGHT: 222 LBS

## 2024-06-12 DIAGNOSIS — N13.8 BPH WITH URINARY OBSTRUCTION: Primary | ICD-10-CM

## 2024-06-12 DIAGNOSIS — N40.1 BENIGN PROSTATIC HYPERPLASIA WITH INCOMPLETE BLADDER EMPTYING: ICD-10-CM

## 2024-06-12 DIAGNOSIS — N32.81 OVERACTIVE BLADDER: ICD-10-CM

## 2024-06-12 DIAGNOSIS — N40.1 BPH WITH URINARY OBSTRUCTION: Primary | ICD-10-CM

## 2024-06-12 DIAGNOSIS — R39.14 BENIGN PROSTATIC HYPERPLASIA WITH INCOMPLETE BLADDER EMPTYING: ICD-10-CM

## 2024-06-12 PROCEDURE — 52000 CYSTOURETHROSCOPY: CPT | Performed by: STUDENT IN AN ORGANIZED HEALTH CARE EDUCATION/TRAINING PROGRAM

## 2024-06-12 PROCEDURE — 99212 OFFICE O/P EST SF 10 MIN: CPT | Mod: 25 | Performed by: STUDENT IN AN ORGANIZED HEALTH CARE EDUCATION/TRAINING PROGRAM

## 2024-06-12 RX ORDER — OXYBUTYNIN CHLORIDE 10 MG/1
10 TABLET, EXTENDED RELEASE ORAL DAILY
Qty: 30 TABLET | Refills: 2 | Status: ON HOLD | OUTPATIENT
Start: 2024-06-12 | End: 2024-07-06

## 2024-06-12 RX ORDER — FINASTERIDE 5 MG/1
5 TABLET, FILM COATED ORAL DAILY
Qty: 30 TABLET | Refills: 3 | Status: ON HOLD | OUTPATIENT
Start: 2024-06-12 | End: 2024-07-06

## 2024-06-12 RX ADMIN — Medication 500 MG: at 10:00

## 2024-06-12 ASSESSMENT — PAIN SCALES - GENERAL: PAINLEVEL: NO PAIN (0)

## 2024-06-12 NOTE — NURSING NOTE
Initial BP 90/56 (BP Location: Right arm, Patient Position: Chair, Cuff Size: Adult Large)   Pulse (!) 47   Temp 98  F (36.7  C) (Tympanic)   Wt 100.7 kg (222 lb)   BMI 30.11 kg/m   Estimated body mass index is 30.11 kg/m  as calculated from the following:    Height as of 6/7/24: 1.829 m (6').    Weight as of this encounter: 100.7 kg (222 lb). .  Supriya Lee LPN

## 2024-06-12 NOTE — PROGRESS NOTES
UROLOGY OUTPATIENT VISIT      Chief Complaint:   Bladder stones      Synopsis   Thiago Hein is a very pleasant AGE: 74 year old year old person   He has a history of rheumatoid arthritis, type 2 diabetes, hyperlipidemia, adult stills disease.    He has been followed by urology for lower urinary tract symptoms.    Symptoms have been going on for many years.  He has been managed with tamsulosin 0.8 mg daily    May 20, 2024 he was found to have several bladder stones the largest measuring 1.2 cm he did have a flow rate that showed a plateaued curve diminishing at around 12 mL/s maximum    He had a CT urogram performed on 6/6/2024 that did not show any other etiologies for the gross hematuria    Prostate shows size of 7.0 x 7.0 x 6.4 = 156 grams on the CT scan based on my review      Patient is not anticoagulated.    BPH related history:  + Urinary tract infections  + Urinary tract stones  + Gross hematuria  -- Elevated PSA  -- History of Urinary retention  -- Chronic kidney disease  -- Prior BPH procedure  -- Prior Prostate biopsy   -- History of prostate cancer         The following  distinct labs were reviewed    I personally reviewed all applicable laboratory data and went over findings with patient  Significant for:    CBC RESULTS:  Recent Labs   Lab Test 05/17/24  1917 01/10/24  1017 12/01/23  1105 10/03/23  1112   WBC 7.1 7.0 5.6 7.0   HGB 13.9 15.2 14.8 15.3    168 197 193        BMP RESULTS:  Recent Labs   Lab Test 05/17/24 1917 02/06/24  1034 01/10/24  1017 12/01/23  1105 10/03/23  1112 08/24/21  1045 04/23/21  0711 03/04/21  1802 03/04/21  1751 12/29/20  1154 11/27/20  1403    139  --  138 141   < > 138 140  --  141  --    POTASSIUM 3.9 4.8  --  3.8 3.9   < > 4.1 3.6  --  4.5  --    CHLORIDE 105 101  --  103 105   < > 106 102  --  101  --    CO2 25 29  --  24 25   < > 25 25  --  31  --    ANIONGAP 11 9  --  11 11   < > 7 13  --  9  --    GLC 94 128*  --  231* 128*   < > 176* 160*   <  > 129*  --    BUN 19.7 15.9  --  16.8 17.8   < > 20 15  --  19  --    CR 0.95 0.88 0.84 0.79 0.89   < > 0.86 0.97  --  0.94 0.91   GFRESTIMATED 84 >90 >90 >90 90   < > 87 >60  --  >60 >60   GFRESTBLACK  --   --   --   --   --   --  >90 >60  --  >60 >60    < > = values in this interval not displayed.   PSA RESULTS  Prostate Specific Antigen Screen   Date Value Ref Range Status   05/23/2024 2.69 0.00 - 6.50 ng/mL Final   02/20/2020 2.5 0.0 - 4.5 ng/mL Final     Medical Comorbidities      Past Medical History:   Diagnosis Date    Diabetes (H)     Hypertension                Medications     Current Outpatient Medications   Medication Sig Dispense Refill    amLODIPine (NORVASC) 5 MG tablet TAKE 1 TABLET(5 MG) BY MOUTH DAILY 90 tablet 1    atorvastatin (LIPITOR) 20 MG tablet TAKE 1 TABLET(20 MG) BY MOUTH DAILY 90 tablet 1    celecoxib (CELEBREX) 100 MG capsule Take 1 capsule (100 mg) by mouth 2 times daily 180 capsule 3    DULoxetine (CYMBALTA) 20 MG capsule Take 1 capsule (20 mg) by mouth daily for 30 days 30 capsule 2    furosemide (LASIX) 40 MG tablet TAKE 1 TABLET(40 MG) BY MOUTH DAILY 90 tablet 0    hydroxychloroquine (PLAQUENIL) 200 MG tablet Take 1 tablet (200 mg) by mouth 2 times daily 180 tablet 0    lisinopril (ZESTRIL) 10 MG tablet TAKE 1 TABLET(10 MG) BY MOUTH DAILY 90 tablet 0    metFORMIN (GLUCOPHAGE XR) 500 MG 24 hr tablet TAKE 3 TABLETS BY MOUTH EVERY MORNING AND 1 TABLET BY MOUTH EVERY EVENING 360 tablet 0    oxyBUTYnin (DITROPAN) 5 MG tablet Take 1 tablet (5 mg) by mouth 3 times daily as needed for bladder spasms. 40 tablet 0    oxyCODONE (ROXICODONE) 5 MG tablet Take 1 tablet (5 mg) by mouth every 6 hours as needed for severe pain 12 tablet 0    tamsulosin (FLOMAX) 0.4 MG capsule TAKE 2 CAPSULES(0.8 MG) BY MOUTH EVERY EVENING 180 capsule 2     No current facility-administered medications for this visit.       CYSTOSCOPY  We discussed the risks and benefits of the procedure which include risk of bleeding  and infection.  Informed consent was obtained, the patient was prepped and draped in the standard sterile fashion.  The 15 [place] flexible cystoscope was inserted through the urethral meatus.      The anterior urethra was:  normal without stricture.    The external sphincter was  appropriately coapted.   The prostatic urethra demonstrated bilobar hypertrophy.    The bladder neck was nonocclusive.    The bladder was unremarkable for tumors, erythema but 3 stones  The ureteral orifices  were identified on each side in orthotopic position  There were mild trabeculations.    On retroflexion there was the usual bladder neck hyperemia.    There mild intravesical protrusion of the prostate.                      EVALUATION AND MANAGEMENT   Upon completion of the cystoscopy the patient was brought to a separate examination room to discuss findings of above testing, review available treatment options and make a plan for further steps in care     Number oh 74 year old year old person with   He has a history of rheumatoid arthritis, type 2 diabetes, hyperlipidemia, adult stills disease.    Bladder Stones - several bladder stones in bladder, likely due to incomplete emptying and enlarged prostate - I discussed we can remove the stone with laser lithotripsy but consider outlet procedure at same time.     BPH  Very large prostate at 150 grams and he's already on maximal medical therapy with flomax 0.8 mg, he got started on oxybutynin 5 mg TID for bladder spasms with the stone in place which he thinks its getting better.    I think given the size of prostate best outcome would be either robotic simple prostatectomy or laser nucleation of the prostate.    We discussed the associated risks of this procedure included but not limited to the following:  -Bleeding, potentially significant enough to require clot evacuation and blood transfusion  -Infection, for which we will plan to treat preoperatively based on targeted antibiotic  therapy  -Damage to the bladder, urethra and penis including the risk of urethral stricture and bladder neck contracture  -Risk of incidentally discovered prostate cancer.  We discussed that this would not preclude him from further therapy though it could prolong recovery and potentially increase risk of complications associated with cancer treatment.  Further preoperative workup to assess for prostate cancer prior to surgery was offered but patient deferred.  -Risk of retrograde ejaculation which would be expected to occur in the majority if not all men after HoLEP  -Risk of urinary incontinence.  We discussed that in the majority of men this is a transient process that is generally self limited to the first 6-12 weeks after surgery though could take longer to resolve depending on baseline bladder instability.  -Risk of postperative urinary retention though in published series HoLEP has been found to be associated with high success rates of achieving spontaneous voiding even in men with underactive and atonic bladders.    19 Minutes spent were spent on the E/M portion of the visit in a separate examination room after cystoscopy discussing findings, available treatment options and next steps in care.

## 2024-06-14 ENCOUNTER — TELEPHONE (OUTPATIENT)
Dept: UROLOGY | Facility: CLINIC | Age: 74
End: 2024-06-14
Payer: MEDICARE

## 2024-06-14 NOTE — TELEPHONE ENCOUNTER
Spoke with: Patient       Date of surgery: Friday July 5 2024 with Dr Gómez       Location: [facility]       Informed patient they will need a adult : YES 23 hr obs       Pre op with provider: Patient is schedule for a wellness check on 6/20at [facility]  Patient is going to check to see if they can make that a pre op       H&P Scheduled in PAC- NA         Pre procedure covid :Not req      Additional imaging: NA        Surgery Packet : Mailed to patient       Additional comments: Please call for surgery teaching

## 2024-06-19 ENCOUNTER — TELEPHONE (OUTPATIENT)
Dept: UROLOGY | Facility: CLINIC | Age: 74
End: 2024-06-19
Payer: MEDICARE

## 2024-06-19 NOTE — TELEPHONE ENCOUNTER
Spoke with patient who has his Wellness exam tomorrow at [facility], and is hoping to add the preop.  He will also stop by the lab to do his urine culture, order is in chart.  Also discussed only taking the extended release or vs the three times per day of Oxybutynin.  Tara Goddard RN

## 2024-06-20 ENCOUNTER — TELEPHONE (OUTPATIENT)
Dept: FAMILY MEDICINE | Facility: CLINIC | Age: 74
End: 2024-06-20

## 2024-06-20 ENCOUNTER — OFFICE VISIT (OUTPATIENT)
Dept: FAMILY MEDICINE | Facility: CLINIC | Age: 74
End: 2024-06-20
Payer: MEDICARE

## 2024-06-20 VITALS
TEMPERATURE: 97.9 F | HEART RATE: 68 BPM | DIASTOLIC BLOOD PRESSURE: 64 MMHG | OXYGEN SATURATION: 95 % | SYSTOLIC BLOOD PRESSURE: 110 MMHG | RESPIRATION RATE: 13 BRPM | BODY MASS INDEX: 30.54 KG/M2 | WEIGHT: 225.2 LBS

## 2024-06-20 DIAGNOSIS — R33.9 URINARY RETENTION: ICD-10-CM

## 2024-06-20 DIAGNOSIS — I10 ESSENTIAL HYPERTENSION: ICD-10-CM

## 2024-06-20 DIAGNOSIS — M06.00 RHEUMATOID ARTHRITIS WITH NEGATIVE RHEUMATOID FACTOR, INVOLVING UNSPECIFIED SITE (H): ICD-10-CM

## 2024-06-20 DIAGNOSIS — Z01.818 PREOPERATIVE EXAMINATION: Primary | ICD-10-CM

## 2024-06-20 DIAGNOSIS — N40.1 BPH WITH URINARY OBSTRUCTION: ICD-10-CM

## 2024-06-20 DIAGNOSIS — E78.5 HYPERLIPIDEMIA, UNSPECIFIED HYPERLIPIDEMIA TYPE: ICD-10-CM

## 2024-06-20 DIAGNOSIS — N21.0 BLADDER STONES: ICD-10-CM

## 2024-06-20 DIAGNOSIS — Z23 ENCOUNTER FOR IMMUNIZATION: ICD-10-CM

## 2024-06-20 DIAGNOSIS — E11.9 TYPE 2 DIABETES MELLITUS WITHOUT COMPLICATION, WITHOUT LONG-TERM CURRENT USE OF INSULIN (H): ICD-10-CM

## 2024-06-20 DIAGNOSIS — N13.8 BPH WITH URINARY OBSTRUCTION: ICD-10-CM

## 2024-06-20 LAB
ANION GAP SERPL CALCULATED.3IONS-SCNC: 8 MMOL/L (ref 7–15)
BUN SERPL-MCNC: 14.3 MG/DL (ref 8–23)
CALCIUM SERPL-MCNC: 9.5 MG/DL (ref 8.8–10.2)
CHLORIDE SERPL-SCNC: 106 MMOL/L (ref 98–107)
CREAT SERPL-MCNC: 0.87 MG/DL (ref 0.67–1.17)
DEPRECATED HCO3 PLAS-SCNC: 26 MMOL/L (ref 22–29)
EGFRCR SERPLBLD CKD-EPI 2021: >90 ML/MIN/1.73M2
ERYTHROCYTE [DISTWIDTH] IN BLOOD BY AUTOMATED COUNT: 13.2 % (ref 10–15)
GLUCOSE SERPL-MCNC: 143 MG/DL (ref 70–99)
HBA1C MFR BLD: 6.1 % (ref 0–5.6)
HCT VFR BLD AUTO: 42.7 % (ref 40–53)
HGB BLD-MCNC: 14.4 G/DL (ref 13.3–17.7)
HOLD SPECIMEN: NORMAL
MCH RBC QN AUTO: 29 PG (ref 26.5–33)
MCHC RBC AUTO-ENTMCNC: 33.7 G/DL (ref 31.5–36.5)
MCV RBC AUTO: 86 FL (ref 78–100)
PLATELET # BLD AUTO: 195 10E3/UL (ref 150–450)
POTASSIUM SERPL-SCNC: 4.9 MMOL/L (ref 3.4–5.3)
RBC # BLD AUTO: 4.96 10E6/UL (ref 4.4–5.9)
SODIUM SERPL-SCNC: 140 MMOL/L (ref 135–145)
WBC # BLD AUTO: 5.6 10E3/UL (ref 4–11)

## 2024-06-20 PROCEDURE — 83036 HEMOGLOBIN GLYCOSYLATED A1C: CPT | Performed by: FAMILY MEDICINE

## 2024-06-20 PROCEDURE — 80048 BASIC METABOLIC PNL TOTAL CA: CPT | Performed by: FAMILY MEDICINE

## 2024-06-20 PROCEDURE — 36415 COLL VENOUS BLD VENIPUNCTURE: CPT | Performed by: FAMILY MEDICINE

## 2024-06-20 PROCEDURE — 99215 OFFICE O/P EST HI 40 MIN: CPT | Mod: 25 | Performed by: FAMILY MEDICINE

## 2024-06-20 PROCEDURE — 85027 COMPLETE CBC AUTOMATED: CPT | Performed by: FAMILY MEDICINE

## 2024-06-20 PROCEDURE — 90480 ADMN SARSCOV2 VAC 1/ONLY CMP: CPT | Performed by: FAMILY MEDICINE

## 2024-06-20 PROCEDURE — 91320 SARSCV2 VAC 30MCG TRS-SUC IM: CPT | Performed by: FAMILY MEDICINE

## 2024-06-20 RX ORDER — METFORMIN HCL 500 MG
TABLET, EXTENDED RELEASE 24 HR ORAL
Qty: 360 TABLET | Refills: 3 | Status: SHIPPED | OUTPATIENT
Start: 2024-06-20

## 2024-06-20 RX ORDER — METFORMIN HCL 500 MG
TABLET, EXTENDED RELEASE 24 HR ORAL
Qty: 360 TABLET | Refills: 0 | Status: SHIPPED | OUTPATIENT
Start: 2024-06-20 | End: 2024-06-20

## 2024-06-20 ASSESSMENT — PAIN SCALES - GENERAL: PAINLEVEL: EXTREME PAIN (9)

## 2024-06-20 NOTE — PROGRESS NOTES
Preoperative Evaluation  [facility]  [address]  [facility] [address]  Phone: [phone]  Fax: [phone]  Primary Provider: Don Armijo MD  Pre-op Performing Provider: Don Armijo MD  Jun 20, 2024 6/20/2024   Surgical Information   What procedure is being done?    Facility or Hospital where procedure/surgery will be performed: Lithotripsy   Who is doing the procedure / surgery? Dr. Gómez   Date of surgery / procedure:  7/5/24   Time of surgery / procedure: TBD   Where do you plan to recover after surgery? at home alone        Fax number for surgical facility: Note does not need to be faxed, will be available electronically in Epic.    Assessment & Plan     The proposed surgical procedure is considered INTERMEDIATE risk.    Preoperative examination  Preoperative examination completed.  No contraindication to scheduled bladder stone lithotripsy or simple robotic prostatectomy procedure identified.    Bladder stones  Schedule laser lithotripsy likely for bladder stones with urinary obstruction, intermittent.    BPH with urinary obstruction  BPH with urinary obstruction with anticipated simple robotic prostatectomy likely.  - Urine Culture  - Extra Tube  - Extra Tube    Urinary retention  Urinary retention likely secondary to bladder stones historically.  Also noted BPH with urinary obstruction.    Type 2 diabetes mellitus without complication, without long-term current use of insulin (H)  Type 2 diabetes reviewed with A1c improving from 7.2% to 6.1% following 15 pound weight loss since prior office visit February 6, 2024.  Continues metformin  mg using 3 tablets in the morning 1 tablet in the evening and will hold metformin morning of surgery.  Reassess at annual wellness visit in 3 to 4 months.  - Random Glucose  - Hemoglobin A1c  - Hemoglobin A1c  - Random Glucose  - Extra Tube  - Extra Tube  - metFORMIN (GLUCOPHAGE XR) 500 MG 24 hr tablet   Dispense: 360 tablet; Refill: 3  - Basic metabolic panel    Essential hypertension  Hypertension.  Hold lisinopril 10 mg daily the morning of surgery.  Also continuing amlodipine 5 mg daily.  - Basic metabolic panel    Hyperlipidemia, unspecified hyperlipidemia type  Remains on atorvastatin 20 mg daily for lipid management.    Rheumatoid arthritis with negative rheumatoid factor, involving unspecified site (H)  Okay to continue hydroxychloroquine 200 mg twice daily.  Hold Celebrex 100 mg as directed 3 days prior to scheduled procedure.  - CBC with platelets    Encounter for immunization  Updated COVID booster to be provided.  - COVID-19 12+ (2023-24) (PFIZER)       The longitudinal plan of care for the diagnosis(es)/condition(s) as documented were addressed during this visit. Due to the added complexity in care, I will continue to support Navin in the subsequent management and with ongoing continuity of care.       40 minutes total time spent on the date of encounter including patient chart review, counseling and coordination of cares, and documentation.        - No identified additional risk factors other than previously addressed         Recommendation  Approval given to proceed with proposed procedure, without further diagnostic evaluation.    Subjective   Navin is a 74 year old, presenting for the following:  Pre-Op Exam (DOS 7/5/24, prostate, Dr. Gómez, [facility])          6/20/2024     9:01 AM   Additional Questions   Roomed by Pastora ALLISON related to upcoming procedure: Patient seen today for preop clearance.  Patient with urinary obstruction symptoms.  BPH noted.  Bladder stones as well.  Using tamsulosin 0.8 mg daily.  Has oxybutynin available not certain if he was to take 5 mg 3 times daily or extended release 10 mg once daily.  We did discuss use of once daily medication currently to decrease bladder spasms etc.  Type 2 diabetes reviewed.  Has lost 15 pounds since prior visit February 6, 2024  and continues metformin  mg using 3 tablets in the morning 1 tablet in the evening.  Lisinopril 10 mg daily and amlodipine 5 mg daily for hypertension.  Celebrex 100 mg twice daily and will hold 3 days prior to scheduled surgery.  Hydroxychloroquine 200 mg twice daily.  Follows with Dr. Bundy.  Atorvastatin 20 mg daily for lipid management.  Denies recent illness.  Comprehensive review of systems as above otherwise all negative.      Urology clinic note 5/23/2024  74 year old male seen in evaluation for BPH and bladder stones. He takes tamsulosin 0.8 mg and reports slow stream and urinary frequency at baseline.      He presented to the ED on 5/17/2024 for dark brown urine and difficulty urinating after missing a dose of tamsulosin. UA was notable for 55 RBCs. CT abdomen pelvis without contrast was notable for three dependent bladder stones, the largest measuring 1.2 cm.      His PVR today was 48 mL. Given the gross hematuria evaluation, we will complete workup with urine cytology, CT urogram, and cystoscopy. I recommended cystoscopy with Dr. Gómez to consider bladder stone treatment and bladder outlet procedure. The patient is willing to consider surgery for BPH when the bladder stones are treated.      Uroflow results below:     5/17/2024 - CT ABDOMEN PELVIS W/O CONTRAST [facility]     INDICATION: Gross hematuria     IMPRESSION:   1.  Bladder stones, correlate as etiology of gross hematuria.  2.  Cholelithiasis.  3.  Moderate prostatomegaly.  4.  Atherosclerotic vascular disease.     6/6/2024, yesterday, CT urogram without and with contrast  IMPRESSION:     1.  No evidence of urothelial carcinoma, urinary tract calculi, or other explanation for hematuria. Several small stones present in the bladder.   BLADDER: Three small stones in the dependent portion of the bladder. No perivesical fat stranding or wall thickening.      2.  Cholelithiasis.     3.  Colonic  "diverticulosis.        Single   1 son - 32 \"Kraig\"   Tobacco: none   EtOH: none   Mom - Meena   Dad - currently 86 Yovanny - DM, CAD, HTN, high cholesterol, back problems, arthritis, spinal stenosis, pacemaker/defibrillator   1 bro - Be - HTN   Surgeries: \"lumbar back surgery for cyst removal\" - September, 2008 (Dr. 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 No   Have you ever had surgery on your heart or blood vessels, such as a stent placement, a coronary artery bypass, or surgery on an artery in your head, neck, heart, or legs? No   Do you have chest pain with activity? No   Do you have a history of heart failure? No   Do you currently have a cold, bronchitis or symptoms of other infection? No   Do you have a cough, shortness of breath, or wheezing? No   Do you or anyone in your family have previous history of blood clots? No   Do you or does anyone in your family have a serious bleeding problem such as prolonged bleeding following surgeries or cuts? No   Have you ever had problems with anemia or been told to take iron pills? No   Have you had any abnormal blood loss such as black, tarry or bloody stools? No   Have you ever had a blood transfusion? No   Are you willing to have a blood transfusion if it is medically needed before, during, or after your surgery? Yes   Have you or any of your relatives ever had problems with anesthesia? No   Do you have sleep apnea, excessive snoring or daytime drowsiness? No   Do you have any artifical heart valves or other implanted medical devices like a pacemaker, defibrillator, or continuous glucose monitor? No   Do you have artificial joints? No   Are you allergic to latex? No        Health Care Directive  Patient does not have a Health Care " Directive or Living Will: Patient states has Advance Directive and will bring in a copy to clinic.    Preoperative Review of    reviewed - controlled substances reflected in medication list.      Status of Chronic Conditions:  See problem list for active medical problems.  Problems all longstanding and stable, except as noted/documented.  See ROS for pertinent symptoms related to these conditions.    Patient Active Problem List    Diagnosis Date Noted    Bladder stones 06/07/2024     Priority: Medium    Chronic polyarticular juvenile rheumatoid arthritis (H) 08/24/2021     Priority: Medium     Formatting of this note might be different from the original.  Created by Conversion    Replacement Utility updated for latest IMO load      Emotional lability 08/24/2021     Priority: Medium     Formatting of this note might be different from the original.  Created by Conversion      Lower back pain 08/24/2021     Priority: Medium     Formatting of this note might be different from the original.  Created by Conversion      Morbid obesity (H) 08/24/2021     Priority: Medium    BPH with urinary obstruction 02/20/2020     Priority: Medium    Primary osteoarthritis involving multiple joints 10/08/2019     Priority: Medium    Left carpal tunnel syndrome 12/27/2018     Priority: Medium    Trigger finger, right index finger 09/26/2018     Priority: Medium    Polyarthralgia 07/20/2018     Priority: Medium    Unintentional weight loss 06/01/2018     Priority: Medium    Non morbid obesity due to excess calories 07/14/2017     Priority: Medium    Peripheral edema 07/14/2017     Priority: Medium    Diverticulosis 11/15/2016     Priority: Medium    Hyperlipidemia 08/04/2016     Priority: Medium    Need for pneumococcal vaccination 01/11/2016     Priority: Medium    Need for vaccination 11/20/2015     Priority: Medium    Type 2 diabetes mellitus (H) 11/11/2015     Priority: Medium     Formatting of this note might be different from  the original.  Created by Conversion      Bacteremia due to Staphylococcus aureus 11/06/2015     Priority: Medium    Dysphagia 11/06/2015     Priority: Medium    Epidural abscess 11/06/2015     Priority: Medium    Essential hypertension 11/06/2015     Priority: Medium     Formatting of this note might be different from the original.  Created by Conversion    Replacement Utility updated for latest IMO load      Lesion of salivary gland 11/06/2015     Priority: Medium    Somnolence 11/06/2015     Priority: Medium    Discitis 10/04/2015     Priority: Medium    Adult Still's disease (H) 10/01/2015     Priority: Medium    Leukocytosis 09/29/2015     Priority: Medium    Sepsis (H) 09/29/2015     Priority: Medium      Past Medical History:   Diagnosis Date    Diabetes (H)     Hypertension      Past Surgical History:   Procedure Laterality Date    ANESTHESIA OUT OF OR MRI N/A 10/2/2015    Procedure: ANESTHESIA OUT OF OR MRI;  Surgeon: GENERIC ANESTHESIA PROVIDER;  Location: [facility] OR    BACK SURGERY  2008    lumbar spine surgery-- unclear details    SOFT TISSUE SURGERY  2012    skin cancer removed from L shoulder, chest, right neck     Current Outpatient Medications   Medication Sig Dispense Refill    amLODIPine (NORVASC) 5 MG tablet TAKE 1 TABLET(5 MG) BY MOUTH DAILY 90 tablet 1    atorvastatin (LIPITOR) 20 MG tablet TAKE 1 TABLET(20 MG) BY MOUTH DAILY 90 tablet 1    celecoxib (CELEBREX) 100 MG capsule Take 1 capsule (100 mg) by mouth 2 times daily 180 capsule 3    finasteride (PROSCAR) 5 MG tablet Take 1 tablet (5 mg) by mouth daily 30 tablet 3    furosemide (LASIX) 40 MG tablet TAKE 1 TABLET(40 MG) BY MOUTH DAILY 90 tablet 0    hydroxychloroquine (PLAQUENIL) 200 MG tablet Take 1 tablet (200 mg) by mouth 2 times daily 180 tablet 0    lisinopril (ZESTRIL) 10 MG tablet TAKE 1 TABLET(10 MG) BY MOUTH DAILY 90 tablet 0    metFORMIN (GLUCOPHAGE XR) 500 MG 24 hr tablet TAKE 3 TABLETS BY MOUTH EVERY MORNING AND 1 TABLET BY MOUTH  EVERY EVENING 360 tablet 3    oxyBUTYnin (DITROPAN) 5 MG tablet Take 1 tablet (5 mg) by mouth 3 times daily as needed for bladder spasms. 40 tablet 0    oxyBUTYnin ER (DITROPAN XL) 10 MG 24 hr tablet Take 1 tablet (10 mg) by mouth daily for 90 days Daily while stent in place for bladder spasms 30 tablet 2    oxyCODONE (ROXICODONE) 5 MG tablet Take 1 tablet (5 mg) by mouth every 6 hours as needed for severe pain 12 tablet 0    tamsulosin (FLOMAX) 0.4 MG capsule TAKE 2 CAPSULES(0.8 MG) BY MOUTH EVERY EVENING 180 capsule 2    DULoxetine (CYMBALTA) 20 MG capsule Take 1 capsule (20 mg) by mouth daily for 30 days 30 capsule 2       No Known Allergies     Social History     Tobacco Use    Smoking status: Never     Passive exposure: Never    Smokeless tobacco: Former   Substance Use Topics    Alcohol use: No     Comment: Quit in 1990     Family History   Problem Relation Age of Onset    Coronary Artery Disease Father     Hypertension Father      History   Drug Use No             Review of Systems  Constitutional, HEENT, cardiovascular, pulmonary, GI, , musculoskeletal, neuro, skin, endocrine and psych systems are negative, except as otherwise noted.    Objective    /64 (BP Location: Right arm, Patient Position: Sitting, Cuff Size: Adult Regular)   Pulse 68   Temp 97.9  F (36.6  C) (Temporal)   Resp 13   Wt 102.2 kg (225 lb 3.2 oz)   SpO2 95%   BMI 30.54 kg/m     Estimated body mass index is 30.54 kg/m  as calculated from the following:    Height as of 6/7/24: 1.829 m (6').    Weight as of this encounter: 102.2 kg (225 lb 3.2 oz).      Physical Exam  GENERAL: alert and no distress  EYES: Eyes grossly normal to inspection, PERRL and conjunctivae and sclerae normal  HENT: ear canals and TM's normal, nose and mouth without ulcers or lesions  NECK: no adenopathy, no asymmetry, masses, or scars  RESP: lungs clear to auscultation - no rales, rhonchi or wheezes  CV: regular rate and rhythm, normal S1 S2, no S3 or S4,  no murmur, click or rub, no peripheral edema  ABDOMEN: soft, nontender, no hepatosplenomegaly, no masses and bowel sounds normal  MS: no gross musculoskeletal defects noted, no edema  SKIN: no suspicious lesions or rashes  NEURO: Normal strength and tone, mentation intact and speech normal  PSYCH: mentation appears normal, affect normal/bright    Recent Labs   Lab Test 05/17/24  1917 02/06/24  1034 01/10/24  1017 12/01/23  1105 10/03/23  1112   HGB 13.9  --  15.2   < > 15.3     --  168   < > 193    139  --    < > 141   POTASSIUM 3.9 4.8  --    < > 3.9   CR 0.95 0.88 0.84   < > 0.89   A1C  --  7.2*  --   --  7.3*    < > = values in this interval not displayed.        Diagnostics  Labs pending at this time.  Results will be reviewed when available.  Recent Results (from the past 24 hour(s))   Hemoglobin A1c    Collection Time: 06/20/24  9:19 AM   Result Value Ref Range    Hemoglobin A1C 6.1 (H) 0.0 - 5.6 %      No EKG required, no history of coronary heart disease, significant arrhythmia, peripheral arterial disease or other structural heart disease.    Revised Cardiac Risk Index (RCRI)  The patient has the following serious cardiovascular risks for perioperative complications:   - none             Signed Electronically by: Don Armijo MD  Copy of this evaluation report is provided to requesting physician.

## 2024-06-20 NOTE — LETTER
June 20, 2024      Navin Hein  [address] [place] [address]        Dear ,    We are writing to inform you of your test results.    Continue your current diabetes management as discussed.    Your kidney function tests (i.e. Basic Metabolic Panel) were otherwise normal.    Your complete blood count results were normal.  No evidence for anemia, etc.     Resulted Orders   Hemoglobin A1c   Result Value Ref Range    Hemoglobin A1C 6.1 (H) 0.0 - 5.6 %      Comment:      Normal <5.7%   Prediabetes 5.7-6.4%    Diabetes 6.5% or higher     Note: Adopted from ADA consensus guidelines.   CBC with platelets   Result Value Ref Range    WBC Count 5.6 4.0 - 11.0 10e3/uL    RBC Count 4.96 4.40 - 5.90 10e6/uL    Hemoglobin 14.4 13.3 - 17.7 g/dL    Hematocrit 42.7 40.0 - 53.0 %    MCV 86 78 - 100 fL    MCH 29.0 26.5 - 33.0 pg    MCHC 33.7 31.5 - 36.5 g/dL    RDW 13.2 10.0 - 15.0 %    Platelet Count 195 150 - 450 10e3/uL   Basic metabolic panel   Result Value Ref Range    Sodium 140 135 - 145 mmol/L      Comment:      Reference intervals for this test were updated on 09/26/2023 to more accurately reflect our healthy population. There may be differences in the flagging of prior results with similar values performed with this method. Interpretation of those prior results can be made in the context of the updated reference intervals.     Potassium 4.9 3.4 - 5.3 mmol/L    Chloride 106 98 - 107 mmol/L    Carbon Dioxide (CO2) 26 22 - 29 mmol/L    Anion Gap 8 7 - 15 mmol/L    Urea Nitrogen 14.3 8.0 - 23.0 mg/dL    Creatinine 0.87 0.67 - 1.17 mg/dL    GFR Estimate >90 >60 mL/min/1.73m2      Comment:      eGFR calculated using 2021 CKD-EPI equation.    Calcium 9.5 8.8 - 10.2 mg/dL    Glucose 143 (H) 70 - 99 mg/dL       If you have any questions or concerns, please call the clinic at the number listed above.       Sincerely,      Don Armijo MD

## 2024-06-20 NOTE — PATIENT INSTRUCTIONS

## 2024-06-24 ENCOUNTER — LAB (OUTPATIENT)
Dept: LAB | Facility: CLINIC | Age: 74
End: 2024-06-24
Payer: MEDICARE

## 2024-06-24 ENCOUNTER — TELEPHONE (OUTPATIENT)
Dept: UROLOGY | Facility: CLINIC | Age: 74
End: 2024-06-24

## 2024-06-24 DIAGNOSIS — N40.1 BENIGN PROSTATIC HYPERPLASIA WITH WEAK URINARY STREAM: Primary | ICD-10-CM

## 2024-06-24 DIAGNOSIS — N40.1 BENIGN PROSTATIC HYPERPLASIA WITH WEAK URINARY STREAM: ICD-10-CM

## 2024-06-24 DIAGNOSIS — R39.12 BENIGN PROSTATIC HYPERPLASIA WITH WEAK URINARY STREAM: ICD-10-CM

## 2024-06-24 DIAGNOSIS — R39.12 BENIGN PROSTATIC HYPERPLASIA WITH WEAK URINARY STREAM: Primary | ICD-10-CM

## 2024-06-24 PROCEDURE — 87086 URINE CULTURE/COLONY COUNT: CPT

## 2024-06-24 NOTE — TELEPHONE ENCOUNTER
Spoke with patient who states that he left a urine on 6/20, however the order for culture still says future.  Spoke with clinic staff who state that the culture was not done as pt was unable to leave a specimen.  Spoke with patient who will go in to leave a new specimen today.  Tara Goddard RN

## 2024-06-25 LAB — BACTERIA UR CULT: NO GROWTH

## 2024-06-27 DIAGNOSIS — I10 ESSENTIAL HYPERTENSION: ICD-10-CM

## 2024-06-27 DIAGNOSIS — R60.0 PERIPHERAL EDEMA: ICD-10-CM

## 2024-06-27 RX ORDER — LISINOPRIL 10 MG/1
10 TABLET ORAL DAILY
Qty: 90 TABLET | Refills: 2 | Status: SHIPPED | OUTPATIENT
Start: 2024-06-27

## 2024-06-27 RX ORDER — FUROSEMIDE 40 MG
TABLET ORAL
Qty: 90 TABLET | Refills: 2 | Status: SHIPPED | OUTPATIENT
Start: 2024-06-27

## 2024-07-03 DIAGNOSIS — M15.0 PRIMARY OSTEOARTHRITIS INVOLVING MULTIPLE JOINTS: ICD-10-CM

## 2024-07-03 RX ORDER — CELECOXIB 100 MG/1
100 CAPSULE ORAL 2 TIMES DAILY
Qty: 180 CAPSULE | Refills: 3 | Status: SHIPPED | OUTPATIENT
Start: 2024-07-03

## 2024-07-05 ENCOUNTER — ANESTHESIA EVENT (OUTPATIENT)
Dept: SURGERY | Facility: CLINIC | Age: 74
End: 2024-07-05
Payer: MEDICARE

## 2024-07-05 ENCOUNTER — ANESTHESIA (OUTPATIENT)
Dept: SURGERY | Facility: CLINIC | Age: 74
End: 2024-07-05
Payer: MEDICARE

## 2024-07-05 ENCOUNTER — HOSPITAL ENCOUNTER (OUTPATIENT)
Facility: CLINIC | Age: 74
Discharge: HOME | End: 2024-07-06
Attending: STUDENT IN AN ORGANIZED HEALTH CARE EDUCATION/TRAINING PROGRAM | Admitting: STUDENT IN AN ORGANIZED HEALTH CARE EDUCATION/TRAINING PROGRAM
Payer: MEDICARE

## 2024-07-05 DIAGNOSIS — N40.1 BENIGN PROSTATIC HYPERPLASIA WITH INCOMPLETE BLADDER EMPTYING: Primary | ICD-10-CM

## 2024-07-05 DIAGNOSIS — R39.14 BENIGN PROSTATIC HYPERPLASIA WITH INCOMPLETE BLADDER EMPTYING: Primary | ICD-10-CM

## 2024-07-05 PROBLEM — N40.0 BPH (BENIGN PROSTATIC HYPERPLASIA): Status: ACTIVE | Noted: 2024-07-05

## 2024-07-05 LAB
ABO/RH(D): NORMAL
ANION GAP SERPL CALCULATED.3IONS-SCNC: 9 MMOL/L (ref 7–15)
ANTIBODY SCREEN: NEGATIVE
BUN SERPL-MCNC: 10 MG/DL (ref 8–23)
CALCIUM SERPL-MCNC: 7.9 MG/DL (ref 8.8–10.2)
CHLORIDE SERPL-SCNC: 107 MMOL/L (ref 98–107)
CREAT SERPL-MCNC: 0.91 MG/DL (ref 0.67–1.17)
DEPRECATED HCO3 PLAS-SCNC: 28 MMOL/L (ref 22–29)
EGFRCR SERPLBLD CKD-EPI 2021: 88 ML/MIN/1.73M2
ERYTHROCYTE [DISTWIDTH] IN BLOOD BY AUTOMATED COUNT: 13.3 % (ref 10–15)
FASTING STATUS PATIENT QL REPORTED: YES
GLUCOSE BLDC GLUCOMTR-MCNC: 115 MG/DL (ref 70–99)
GLUCOSE BLDC GLUCOMTR-MCNC: 152 MG/DL (ref 70–99)
GLUCOSE SERPL-MCNC: 144 MG/DL (ref 70–99)
GLUCOSE SERPL-MCNC: 153 MG/DL (ref 70–99)
HCT VFR BLD AUTO: 39.6 % (ref 40–53)
HGB BLD-MCNC: 13.1 G/DL (ref 13.3–17.7)
MCH RBC QN AUTO: 29.6 PG (ref 26.5–33)
MCHC RBC AUTO-ENTMCNC: 33.1 G/DL (ref 31.5–36.5)
MCV RBC AUTO: 89 FL (ref 78–100)
PLATELET # BLD AUTO: 167 10E3/UL (ref 150–450)
POTASSIUM SERPL-SCNC: 3.6 MMOL/L (ref 3.4–5.3)
POTASSIUM SERPL-SCNC: 4 MMOL/L (ref 3.4–5.3)
RBC # BLD AUTO: 4.43 10E6/UL (ref 4.4–5.9)
SODIUM SERPL-SCNC: 144 MMOL/L (ref 135–145)
SPECIMEN EXPIRATION DATE: NORMAL
WBC # BLD AUTO: 7.8 10E3/UL (ref 4–11)

## 2024-07-05 PROCEDURE — 250N000011 HC RX IP 250 OP 636: Performed by: STUDENT IN AN ORGANIZED HEALTH CARE EDUCATION/TRAINING PROGRAM

## 2024-07-05 PROCEDURE — 36415 COLL VENOUS BLD VENIPUNCTURE: CPT | Performed by: STUDENT IN AN ORGANIZED HEALTH CARE EDUCATION/TRAINING PROGRAM

## 2024-07-05 PROCEDURE — 84132 ASSAY OF SERUM POTASSIUM: CPT | Performed by: ANESTHESIOLOGY

## 2024-07-05 PROCEDURE — 250N000013 HC RX MED GY IP 250 OP 250 PS 637: Performed by: STUDENT IN AN ORGANIZED HEALTH CARE EDUCATION/TRAINING PROGRAM

## 2024-07-05 PROCEDURE — 82365 CALCULUS SPECTROSCOPY: CPT | Performed by: STUDENT IN AN ORGANIZED HEALTH CARE EDUCATION/TRAINING PROGRAM

## 2024-07-05 PROCEDURE — 258N000003 HC RX IP 258 OP 636: Performed by: ANESTHESIOLOGY

## 2024-07-05 PROCEDURE — 250N000012 HC RX MED GY IP 250 OP 636 PS 637: Performed by: STUDENT IN AN ORGANIZED HEALTH CARE EDUCATION/TRAINING PROGRAM

## 2024-07-05 PROCEDURE — 52318 REMOVE BLADDER STONE: CPT | Performed by: STUDENT IN AN ORGANIZED HEALTH CARE EDUCATION/TRAINING PROGRAM

## 2024-07-05 PROCEDURE — 52325 CYSTOSCOPY STONE REMOVAL: CPT | Performed by: NURSE ANESTHETIST, CERTIFIED REGISTERED

## 2024-07-05 PROCEDURE — 99100 ANES PT EXTEME AGE<1 YR&>70: CPT | Performed by: NURSE ANESTHETIST, CERTIFIED REGISTERED

## 2024-07-05 PROCEDURE — 258N000003 HC RX IP 258 OP 636: Performed by: STUDENT IN AN ORGANIZED HEALTH CARE EDUCATION/TRAINING PROGRAM

## 2024-07-05 PROCEDURE — 272N000001 HC OR GENERAL SUPPLY STERILE: Performed by: STUDENT IN AN ORGANIZED HEALTH CARE EDUCATION/TRAINING PROGRAM

## 2024-07-05 PROCEDURE — 88305 TISSUE EXAM BY PATHOLOGIST: CPT | Mod: TC | Performed by: STUDENT IN AN ORGANIZED HEALTH CARE EDUCATION/TRAINING PROGRAM

## 2024-07-05 PROCEDURE — 85049 AUTOMATED PLATELET COUNT: CPT | Performed by: STUDENT IN AN ORGANIZED HEALTH CARE EDUCATION/TRAINING PROGRAM

## 2024-07-05 PROCEDURE — 250N000011 HC RX IP 250 OP 636: Performed by: ANESTHESIOLOGY

## 2024-07-05 PROCEDURE — 999N000141 HC STATISTIC PRE-PROCEDURE NURSING ASSESSMENT: Performed by: STUDENT IN AN ORGANIZED HEALTH CARE EDUCATION/TRAINING PROGRAM

## 2024-07-05 PROCEDURE — 250N000011 HC RX IP 250 OP 636: Performed by: NURSE ANESTHETIST, CERTIFIED REGISTERED

## 2024-07-05 PROCEDURE — 80048 BASIC METABOLIC PNL TOTAL CA: CPT | Performed by: ANESTHESIOLOGY

## 2024-07-05 PROCEDURE — 52649 PROSTATE LASER ENUCLEATION: CPT | Performed by: STUDENT IN AN ORGANIZED HEALTH CARE EDUCATION/TRAINING PROGRAM

## 2024-07-05 PROCEDURE — 82374 ASSAY BLOOD CARBON DIOXIDE: CPT | Performed by: STUDENT IN AN ORGANIZED HEALTH CARE EDUCATION/TRAINING PROGRAM

## 2024-07-05 PROCEDURE — C1758 CATHETER, URETERAL: HCPCS | Performed by: STUDENT IN AN ORGANIZED HEALTH CARE EDUCATION/TRAINING PROGRAM

## 2024-07-05 PROCEDURE — 258N000003 HC RX IP 258 OP 636: Performed by: NURSE ANESTHETIST, CERTIFIED REGISTERED

## 2024-07-05 PROCEDURE — 360N000077 HC SURGERY LEVEL 4, PER MIN: Performed by: STUDENT IN AN ORGANIZED HEALTH CARE EDUCATION/TRAINING PROGRAM

## 2024-07-05 PROCEDURE — 86900 BLOOD TYPING SEROLOGIC ABO: CPT | Performed by: STUDENT IN AN ORGANIZED HEALTH CARE EDUCATION/TRAINING PROGRAM

## 2024-07-05 PROCEDURE — 82962 GLUCOSE BLOOD TEST: CPT

## 2024-07-05 PROCEDURE — 36415 COLL VENOUS BLD VENIPUNCTURE: CPT | Performed by: ANESTHESIOLOGY

## 2024-07-05 PROCEDURE — 370N000017 HC ANESTHESIA TECHNICAL FEE, PER MIN: Performed by: STUDENT IN AN ORGANIZED HEALTH CARE EDUCATION/TRAINING PROGRAM

## 2024-07-05 PROCEDURE — 258N000001 HC RX 258: Performed by: STUDENT IN AN ORGANIZED HEALTH CARE EDUCATION/TRAINING PROGRAM

## 2024-07-05 PROCEDURE — 250N000009 HC RX 250: Performed by: NURSE ANESTHETIST, CERTIFIED REGISTERED

## 2024-07-05 PROCEDURE — 52325 CYSTOSCOPY STONE REMOVAL: CPT | Performed by: ANESTHESIOLOGY

## 2024-07-05 PROCEDURE — 250N000009 HC RX 250: Performed by: STUDENT IN AN ORGANIZED HEALTH CARE EDUCATION/TRAINING PROGRAM

## 2024-07-05 PROCEDURE — 710N000009 HC RECOVERY PHASE 1, LEVEL 1, PER MIN: Performed by: STUDENT IN AN ORGANIZED HEALTH CARE EDUCATION/TRAINING PROGRAM

## 2024-07-05 PROCEDURE — 250N000025 HC SEVOFLURANE, PER MIN: Performed by: STUDENT IN AN ORGANIZED HEALTH CARE EDUCATION/TRAINING PROGRAM

## 2024-07-05 RX ORDER — CEFTRIAXONE 2 G/1
2 INJECTION, POWDER, FOR SOLUTION INTRAMUSCULAR; INTRAVENOUS ONCE
Status: COMPLETED | OUTPATIENT
Start: 2024-07-06 | End: 2024-07-06

## 2024-07-05 RX ORDER — POLYETHYLENE GLYCOL 3350 17 G/17G
17 POWDER, FOR SOLUTION ORAL DAILY
Status: DISCONTINUED | OUTPATIENT
Start: 2024-07-06 | End: 2024-07-06 | Stop reason: HOSPADM

## 2024-07-05 RX ORDER — SODIUM CHLORIDE 9 MG/ML
INJECTION, SOLUTION INTRAVENOUS CONTINUOUS
Status: ACTIVE | OUTPATIENT
Start: 2024-07-05 | End: 2024-07-05

## 2024-07-05 RX ORDER — NALOXONE HYDROCHLORIDE 0.4 MG/ML
0.4 INJECTION, SOLUTION INTRAMUSCULAR; INTRAVENOUS; SUBCUTANEOUS
Status: DISCONTINUED | OUTPATIENT
Start: 2024-07-05 | End: 2024-07-06 | Stop reason: HOSPADM

## 2024-07-05 RX ORDER — PROPOFOL 10 MG/ML
INJECTION, EMULSION INTRAVENOUS PRN
Status: DISCONTINUED | OUTPATIENT
Start: 2024-07-05 | End: 2024-07-05

## 2024-07-05 RX ORDER — NALOXONE HYDROCHLORIDE 0.4 MG/ML
0.2 INJECTION, SOLUTION INTRAMUSCULAR; INTRAVENOUS; SUBCUTANEOUS
Status: DISCONTINUED | OUTPATIENT
Start: 2024-07-05 | End: 2024-07-06 | Stop reason: HOSPADM

## 2024-07-05 RX ORDER — ONDANSETRON 2 MG/ML
4 INJECTION INTRAMUSCULAR; INTRAVENOUS EVERY 30 MIN PRN
Status: DISCONTINUED | OUTPATIENT
Start: 2024-07-05 | End: 2024-07-05 | Stop reason: HOSPADM

## 2024-07-05 RX ORDER — DULOXETIN HYDROCHLORIDE 20 MG/1
20 CAPSULE, DELAYED RELEASE ORAL DAILY
Status: DISCONTINUED | OUTPATIENT
Start: 2024-07-05 | End: 2024-07-06 | Stop reason: HOSPADM

## 2024-07-05 RX ORDER — BISACODYL 10 MG
10 SUPPOSITORY, RECTAL RECTAL DAILY PRN
Status: DISCONTINUED | OUTPATIENT
Start: 2024-07-05 | End: 2024-07-06 | Stop reason: HOSPADM

## 2024-07-05 RX ORDER — ACETAMINOPHEN 325 MG/1
975 TABLET ORAL EVERY 8 HOURS SCHEDULED
Status: DISCONTINUED | OUTPATIENT
Start: 2024-07-05 | End: 2024-07-06 | Stop reason: HOSPADM

## 2024-07-05 RX ORDER — FENTANYL CITRATE 0.05 MG/ML
25 INJECTION, SOLUTION INTRAMUSCULAR; INTRAVENOUS EVERY 5 MIN PRN
Status: DISCONTINUED | OUTPATIENT
Start: 2024-07-05 | End: 2024-07-05 | Stop reason: HOSPADM

## 2024-07-05 RX ORDER — NICOTINE POLACRILEX 4 MG
15-30 LOZENGE BUCCAL
Status: DISCONTINUED | OUTPATIENT
Start: 2024-07-05 | End: 2024-07-06 | Stop reason: HOSPADM

## 2024-07-05 RX ORDER — PROPOFOL 10 MG/ML
INJECTION, EMULSION INTRAVENOUS CONTINUOUS PRN
Status: DISCONTINUED | OUTPATIENT
Start: 2024-07-05 | End: 2024-07-05

## 2024-07-05 RX ORDER — ONDANSETRON 4 MG/1
4 TABLET, ORALLY DISINTEGRATING ORAL EVERY 30 MIN PRN
Status: DISCONTINUED | OUTPATIENT
Start: 2024-07-05 | End: 2024-07-05 | Stop reason: HOSPADM

## 2024-07-05 RX ORDER — LIDOCAINE HYDROCHLORIDE 20 MG/ML
INJECTION, SOLUTION INFILTRATION; PERINEURAL PRN
Status: DISCONTINUED | OUTPATIENT
Start: 2024-07-05 | End: 2024-07-05

## 2024-07-05 RX ORDER — DIPHENHYDRAMINE HYDROCHLORIDE 50 MG/ML
12.5 INJECTION INTRAMUSCULAR; INTRAVENOUS EVERY 6 HOURS PRN
Status: DISCONTINUED | OUTPATIENT
Start: 2024-07-05 | End: 2024-07-06 | Stop reason: HOSPADM

## 2024-07-05 RX ORDER — DEXTROSE MONOHYDRATE 25 G/50ML
25-50 INJECTION, SOLUTION INTRAVENOUS
Status: DISCONTINUED | OUTPATIENT
Start: 2024-07-05 | End: 2024-07-06 | Stop reason: HOSPADM

## 2024-07-05 RX ORDER — AMOXICILLIN 250 MG
1 CAPSULE ORAL 2 TIMES DAILY
Status: DISCONTINUED | OUTPATIENT
Start: 2024-07-05 | End: 2024-07-06 | Stop reason: HOSPADM

## 2024-07-05 RX ORDER — ONDANSETRON 4 MG/1
4 TABLET, ORALLY DISINTEGRATING ORAL EVERY 6 HOURS PRN
Status: DISCONTINUED | OUTPATIENT
Start: 2024-07-05 | End: 2024-07-06 | Stop reason: HOSPADM

## 2024-07-05 RX ORDER — GLYCOPYRROLATE 0.2 MG/ML
INJECTION, SOLUTION INTRAMUSCULAR; INTRAVENOUS PRN
Status: DISCONTINUED | OUTPATIENT
Start: 2024-07-05 | End: 2024-07-05

## 2024-07-05 RX ORDER — AMLODIPINE BESYLATE 5 MG/1
5 TABLET ORAL DAILY
Status: DISCONTINUED | OUTPATIENT
Start: 2024-07-05 | End: 2024-07-06 | Stop reason: HOSPADM

## 2024-07-05 RX ORDER — NALOXONE HYDROCHLORIDE 0.4 MG/ML
0.1 INJECTION, SOLUTION INTRAMUSCULAR; INTRAVENOUS; SUBCUTANEOUS
Status: DISCONTINUED | OUTPATIENT
Start: 2024-07-05 | End: 2024-07-05 | Stop reason: HOSPADM

## 2024-07-05 RX ORDER — LIDOCAINE 40 MG/G
CREAM TOPICAL
Status: DISCONTINUED | OUTPATIENT
Start: 2024-07-05 | End: 2024-07-06 | Stop reason: HOSPADM

## 2024-07-05 RX ORDER — TOLTERODINE TARTRATE 2 MG/1
2 TABLET, EXTENDED RELEASE ORAL ONCE
Status: COMPLETED | OUTPATIENT
Start: 2024-07-05 | End: 2024-07-05

## 2024-07-05 RX ORDER — SODIUM CHLORIDE, SODIUM LACTATE, POTASSIUM CHLORIDE, CALCIUM CHLORIDE 600; 310; 30; 20 MG/100ML; MG/100ML; MG/100ML; MG/100ML
INJECTION, SOLUTION INTRAVENOUS CONTINUOUS
Status: DISCONTINUED | OUTPATIENT
Start: 2024-07-05 | End: 2024-07-05 | Stop reason: HOSPADM

## 2024-07-05 RX ORDER — HYDROMORPHONE HCL IN WATER/PF 6 MG/30 ML
0.4 PATIENT CONTROLLED ANALGESIA SYRINGE INTRAVENOUS EVERY 5 MIN PRN
Status: DISCONTINUED | OUTPATIENT
Start: 2024-07-05 | End: 2024-07-05 | Stop reason: HOSPADM

## 2024-07-05 RX ORDER — ACETAMINOPHEN 325 MG/1
650 TABLET ORAL EVERY 4 HOURS PRN
Status: DISCONTINUED | OUTPATIENT
Start: 2024-07-08 | End: 2024-07-06 | Stop reason: HOSPADM

## 2024-07-05 RX ORDER — ONDANSETRON 2 MG/ML
INJECTION INTRAMUSCULAR; INTRAVENOUS PRN
Status: DISCONTINUED | OUTPATIENT
Start: 2024-07-05 | End: 2024-07-05

## 2024-07-05 RX ORDER — HYDROMORPHONE HCL IN WATER/PF 6 MG/30 ML
0.2 PATIENT CONTROLLED ANALGESIA SYRINGE INTRAVENOUS EVERY 5 MIN PRN
Status: DISCONTINUED | OUTPATIENT
Start: 2024-07-05 | End: 2024-07-05 | Stop reason: HOSPADM

## 2024-07-05 RX ORDER — HYDROXYCHLOROQUINE SULFATE 200 MG/1
200 TABLET, FILM COATED ORAL 2 TIMES DAILY
Status: DISCONTINUED | OUTPATIENT
Start: 2024-07-05 | End: 2024-07-06 | Stop reason: HOSPADM

## 2024-07-05 RX ORDER — OXYCODONE HYDROCHLORIDE 5 MG/1
10 TABLET ORAL EVERY 4 HOURS PRN
Status: DISCONTINUED | OUTPATIENT
Start: 2024-07-05 | End: 2024-07-06 | Stop reason: HOSPADM

## 2024-07-05 RX ORDER — ATORVASTATIN CALCIUM 20 MG/1
20 TABLET, FILM COATED ORAL DAILY
Status: DISCONTINUED | OUTPATIENT
Start: 2024-07-05 | End: 2024-07-06 | Stop reason: HOSPADM

## 2024-07-05 RX ORDER — CEFTRIAXONE 2 G/1
2 INJECTION, POWDER, FOR SOLUTION INTRAMUSCULAR; INTRAVENOUS
Status: COMPLETED | OUTPATIENT
Start: 2024-07-05 | End: 2024-07-05

## 2024-07-05 RX ORDER — MAGNESIUM HYDROXIDE 1200 MG/15ML
LIQUID ORAL PRN
Status: DISCONTINUED | OUTPATIENT
Start: 2024-07-05 | End: 2024-07-05 | Stop reason: HOSPADM

## 2024-07-05 RX ORDER — DEXAMETHASONE SODIUM PHOSPHATE 4 MG/ML
4 INJECTION, SOLUTION INTRA-ARTICULAR; INTRALESIONAL; INTRAMUSCULAR; INTRAVENOUS; SOFT TISSUE
Status: DISCONTINUED | OUTPATIENT
Start: 2024-07-05 | End: 2024-07-05 | Stop reason: HOSPADM

## 2024-07-05 RX ORDER — FENTANYL CITRATE 50 UG/ML
INJECTION, SOLUTION INTRAMUSCULAR; INTRAVENOUS PRN
Status: DISCONTINUED | OUTPATIENT
Start: 2024-07-05 | End: 2024-07-05

## 2024-07-05 RX ORDER — FUROSEMIDE 40 MG
40 TABLET ORAL DAILY
Status: DISCONTINUED | OUTPATIENT
Start: 2024-07-06 | End: 2024-07-06 | Stop reason: HOSPADM

## 2024-07-05 RX ORDER — FENTANYL CITRATE 0.05 MG/ML
50 INJECTION, SOLUTION INTRAMUSCULAR; INTRAVENOUS EVERY 5 MIN PRN
Status: DISCONTINUED | OUTPATIENT
Start: 2024-07-05 | End: 2024-07-05 | Stop reason: HOSPADM

## 2024-07-05 RX ORDER — ONDANSETRON 2 MG/ML
4 INJECTION INTRAMUSCULAR; INTRAVENOUS EVERY 6 HOURS PRN
Status: DISCONTINUED | OUTPATIENT
Start: 2024-07-05 | End: 2024-07-06 | Stop reason: HOSPADM

## 2024-07-05 RX ORDER — DEXMEDETOMIDINE HYDROCHLORIDE 4 UG/ML
INJECTION, SOLUTION INTRAVENOUS PRN
Status: DISCONTINUED | OUTPATIENT
Start: 2024-07-05 | End: 2024-07-05

## 2024-07-05 RX ORDER — PROCHLORPERAZINE MALEATE 5 MG
5 TABLET ORAL EVERY 6 HOURS PRN
Status: DISCONTINUED | OUTPATIENT
Start: 2024-07-05 | End: 2024-07-06 | Stop reason: HOSPADM

## 2024-07-05 RX ORDER — OXYCODONE HYDROCHLORIDE 5 MG/1
5 TABLET ORAL EVERY 4 HOURS PRN
Status: DISCONTINUED | OUTPATIENT
Start: 2024-07-05 | End: 2024-07-06 | Stop reason: HOSPADM

## 2024-07-05 RX ORDER — CELECOXIB 100 MG/1
100 CAPSULE ORAL 2 TIMES DAILY
Status: DISCONTINUED | OUTPATIENT
Start: 2024-07-05 | End: 2024-07-06 | Stop reason: HOSPADM

## 2024-07-05 RX ORDER — DIPHENHYDRAMINE HCL 12.5MG/5ML
12.5 LIQUID (ML) ORAL EVERY 6 HOURS PRN
Status: DISCONTINUED | OUTPATIENT
Start: 2024-07-05 | End: 2024-07-06 | Stop reason: HOSPADM

## 2024-07-05 RX ADMIN — PHENYLEPHRINE HYDROCHLORIDE 50 MCG: 10 INJECTION INTRAVENOUS at 09:29

## 2024-07-05 RX ADMIN — SODIUM CHLORIDE: 9 INJECTION, SOLUTION INTRAVENOUS at 12:26

## 2024-07-05 RX ADMIN — PROPOFOL 150 MG: 10 INJECTION, EMULSION INTRAVENOUS at 07:38

## 2024-07-05 RX ADMIN — ATORVASTATIN CALCIUM 20 MG: 20 TABLET, FILM COATED ORAL at 12:40

## 2024-07-05 RX ADMIN — ONDANSETRON 4 MG: 2 INJECTION INTRAMUSCULAR; INTRAVENOUS at 09:49

## 2024-07-05 RX ADMIN — HYDROXYCHLOROQUINE SULFATE 200 MG: 200 TABLET ORAL at 12:39

## 2024-07-05 RX ADMIN — FENTANYL CITRATE 50 MCG: 50 INJECTION INTRAMUSCULAR; INTRAVENOUS at 09:14

## 2024-07-05 RX ADMIN — FENTANYL CITRATE 25 MCG: 50 INJECTION, SOLUTION INTRAMUSCULAR; INTRAVENOUS at 10:36

## 2024-07-05 RX ADMIN — TOLTERODINE TARTRATE 2 MG: 2 TABLET, FILM COATED ORAL at 10:35

## 2024-07-05 RX ADMIN — AMLODIPINE BESYLATE 5 MG: 5 TABLET ORAL at 12:40

## 2024-07-05 RX ADMIN — GLYCOPYRROLATE 0.2 MG: 0.2 INJECTION, SOLUTION INTRAMUSCULAR; INTRAVENOUS at 08:17

## 2024-07-05 RX ADMIN — FENTANYL CITRATE 50 MCG: 50 INJECTION INTRAMUSCULAR; INTRAVENOUS at 07:54

## 2024-07-05 RX ADMIN — HYDROXYCHLOROQUINE SULFATE 200 MG: 200 TABLET ORAL at 20:40

## 2024-07-05 RX ADMIN — DEXMEDETOMIDINE HYDROCHLORIDE 8 MCG: 200 INJECTION INTRAVENOUS at 07:54

## 2024-07-05 RX ADMIN — OXYCODONE HYDROCHLORIDE 5 MG: 5 TABLET ORAL at 12:39

## 2024-07-05 RX ADMIN — PHENYLEPHRINE HYDROCHLORIDE 100 MCG: 10 INJECTION INTRAVENOUS at 09:32

## 2024-07-05 RX ADMIN — FENTANYL CITRATE 25 MCG: 50 INJECTION, SOLUTION INTRAMUSCULAR; INTRAVENOUS at 10:42

## 2024-07-05 RX ADMIN — CELECOXIB 100 MG: 100 CAPSULE ORAL at 12:39

## 2024-07-05 RX ADMIN — TRANEXAMIC ACID 1 G: 1 INJECTION, SOLUTION INTRAVENOUS at 09:19

## 2024-07-05 RX ADMIN — SODIUM CHLORIDE, POTASSIUM CHLORIDE, SODIUM LACTATE AND CALCIUM CHLORIDE: 600; 310; 30; 20 INJECTION, SOLUTION INTRAVENOUS at 07:10

## 2024-07-05 RX ADMIN — CEFTRIAXONE SODIUM 2 G: 2 INJECTION, POWDER, FOR SOLUTION INTRAMUSCULAR; INTRAVENOUS at 07:08

## 2024-07-05 RX ADMIN — DULOXETINE HYDROCHLORIDE 20 MG: 20 CAPSULE, DELAYED RELEASE ORAL at 12:39

## 2024-07-05 RX ADMIN — LIDOCAINE HYDROCHLORIDE 100 MG: 20 INJECTION, SOLUTION INFILTRATION; PERINEURAL at 07:38

## 2024-07-05 RX ADMIN — ACETAMINOPHEN 975 MG: 325 TABLET, FILM COATED ORAL at 12:39

## 2024-07-05 RX ADMIN — FENTANYL CITRATE 50 MCG: 50 INJECTION INTRAMUSCULAR; INTRAVENOUS at 07:51

## 2024-07-05 RX ADMIN — PROCHLORPERAZINE EDISYLATE 5 MG: 5 INJECTION INTRAMUSCULAR; INTRAVENOUS at 12:21

## 2024-07-05 RX ADMIN — ACETAMINOPHEN 975 MG: 325 TABLET, FILM COATED ORAL at 20:41

## 2024-07-05 RX ADMIN — SENNOSIDES AND DOCUSATE SODIUM 1 TABLET: 50; 8.6 TABLET ORAL at 12:39

## 2024-07-05 RX ADMIN — PHENYLEPHRINE HYDROCHLORIDE 50 MCG: 10 INJECTION INTRAVENOUS at 08:19

## 2024-07-05 RX ADMIN — CELECOXIB 100 MG: 100 CAPSULE ORAL at 20:41

## 2024-07-05 RX ADMIN — INSULIN ASPART 1 UNITS: 100 INJECTION, SOLUTION INTRAVENOUS; SUBCUTANEOUS at 12:46

## 2024-07-05 RX ADMIN — PROPOFOL 25 MCG/KG/MIN: 10 INJECTION, EMULSION INTRAVENOUS at 07:38

## 2024-07-05 RX ADMIN — PHENYLEPHRINE HYDROCHLORIDE 50 MCG: 10 INJECTION INTRAVENOUS at 08:17

## 2024-07-05 RX ADMIN — SODIUM CHLORIDE, POTASSIUM CHLORIDE, SODIUM LACTATE AND CALCIUM CHLORIDE: 600; 310; 30; 20 INJECTION, SOLUTION INTRAVENOUS at 10:41

## 2024-07-05 RX ADMIN — SENNOSIDES AND DOCUSATE SODIUM 1 TABLET: 50; 8.6 TABLET ORAL at 20:40

## 2024-07-05 ASSESSMENT — ACTIVITIES OF DAILY LIVING (ADL)
ADLS_ACUITY_SCORE: 45
ADLS_ACUITY_SCORE: 37
ADLS_ACUITY_SCORE: 45
ADLS_ACUITY_SCORE: 37
ADLS_ACUITY_SCORE: 41
ADLS_ACUITY_SCORE: 45
ADLS_ACUITY_SCORE: 38
ADLS_ACUITY_SCORE: 37
ADLS_ACUITY_SCORE: 37
ADLS_ACUITY_SCORE: 45
ADLS_ACUITY_SCORE: 37
ADLS_ACUITY_SCORE: 45
ADLS_ACUITY_SCORE: 45
ADLS_ACUITY_SCORE: 37
ADLS_ACUITY_SCORE: 45

## 2024-07-05 ASSESSMENT — LIFESTYLE VARIABLES: TOBACCO_USE: 1

## 2024-07-05 ASSESSMENT — ENCOUNTER SYMPTOMS: SEIZURES: 0

## 2024-07-05 NOTE — ANESTHESIA PROCEDURE NOTES
Airway       Patient location during procedure: OR  Staff -        Anesthesiologist:  Adrián Benito MD       CRNA: Lamar Milner APRN CRNA       Performed By: CRNAIndications and Patient Condition       Indications for airway management: hans-procedural       Induction type:intravenous       Mask difficulty assessment: 0 - not attempted    Final Airway Details       Final airway type: supraglottic airway    Supraglottic Airway Details        Type: LMA       Brand: I-Gel       LMA size: 5    Post intubation assessment        Placement verified by: capnometry, equal breath sounds and chest rise        Number of attempts at approach: 1       Number of other approaches attempted: 0       Secured with: tape       Ease of procedure: easy       Dentition: Intact and Unchanged

## 2024-07-05 NOTE — OP NOTE
Operative Report  7/5/2024    PREOPERATIVE DIAGNOSIS:  Prostatic hypertrophy with lower urinary tract symptoms  POSTOPERATIVE DIAGNOSIS: Same as above    PROCEDURE PERFORMED:     1.Holmium laser enucleation of the prostate  . Litholapaxy: with laser of multiple bladder stones (total over 2.5 cm)    STAFF SURGEON: Radha Gómez MD was present and participatory for the entire case.   RESIDENT(S): None      FINDINGS: Mostly bilobar hypertrophy, 2 bladder stones, fragmented and removed, 105 grams prostate enucleated en-bloc   ANESTHESIA: General  INTRAVENOUS FLUIDS: See anesthesia records  ESTIMATED BLOOD LOSS: Less than 50 ml   SPECIMENS: Prostate adenoma  DRAINS: 22-Setswana 3-way catheter with 60 ml in balloon     INDICATIONS FOR PROCEDURE: Thiago Hein is a(n) 74 year old male who was seen in consultation for BPH with obstructive voiding symptoms and bladder stone formation. He has elected treatment with laser enucleation. Prostate volume estimated to be 150 grams. After discussion of the risks, benefits and alternatives of the procedure, the patient agreed to proceed with the above stated procdure.    DESCRIPTION OF PROCEDURE: After obtaining informed consent, the patient was taken to the operating room and placed under general anesthesia.  He was repositioned in dorsal lithotomy making sure that the legs were positioned and padded safely.  He was then prepped and draped in standard sterile fashion.  Culture directed antibiotics were administered and bilateral sequential compression devices were placed.  A time out was performed confirming the appropriate patient identity and planned procedure.     The urethra was injected with lubricant jelly and was calibrated with sounds from 22 fr to 28fr sequentially in 2 fr increments.  These passed easily, and then 26 fr sheath was placed with the obturator.  The bladder was unremarkable.  The ureteral orifices were orthotopic.  The prostate had Bilobar anatomy  with outlet obstruction.    Bladder Stone Removal  We used the 500 micron laser fiber at settings of 1J and 15 Hz to fragment the several bladder stones into small fragments and then we were able to wash out the pieces through the scope. Once we were happy with the stone clearance we moved to the laser enucleation of prostate    Holmium laser Enucleation of Prostate  We began enucleation by making an apical incision adjacent to the veromontanum.  We developed the apical plane on the left side and carried this laterally until 3 oclock.  We then proceeded to make a counter incision in the same fashion on the right side.  We next dissected the prostate out in a circumferential fashion, coming over the top of the gland and entering the bladder neck.  We next identified the mucosal strip anteriorly and transected it with the laser.  We then proceeded to take down the remaining lateral and posterior attachments which allowed us to push the adenoma in an en-bloc fashion into the bladder. Total enucleation time was 60 minutes    At this point there was a moderate amount of bleeding and approximately 15 minutes were spent identifying bleeding vessels in the fossa and coagulating them with the laser.  Once hemostasis was adequate we switched from the laser resectoscope to the 26F offset telescope with the Piranha morcellation device.  We added an extra inflow to distend the bladder.  The blades were adjusted and set at a rate of 1500 RPM.  We proceeded with morcellation making sure that we morcellated the entirety of the adenoma.  Total morcellation time was 7 minutes.     We then switched back to the laser resectoscope one final time to ensure that no residual tissue was left in the bladder.  We also confirmed that the bladder was unharmed from morcellation and that both ureteral orifices were unharmed during the procedure.  We inspected the fossa and obtained final hemostasis.   We looked the scope out noting that the  sphincter was completely intact without evidence of thermal or mechanical injury.     We lubricated the urethra again and passed a 22F 3-way elizabeth catheter without catheter guide. We filled the balloon with 60  ml of sterile water and did place the catheter on gentle traction.  The patient was woken from anesthesia and taken to the recovery room in stable condition.     The specimen was weighed and found to be approximately 105 g.     POSTOPERATIVE PLAN:     -Void trial in :AM  -Labs CBC/BMP in PACU and CBC/BMP in AM  -Home Antibiotics Macrobid 100 bid 5 days

## 2024-07-05 NOTE — PLAN OF CARE
Goal Outcome Evaluation:      Plan of Care Reviewed With: patient    Overall Patient Progress: improvingOverall Patient Progress: improving    Patient VSS are at baseline   T: 97.6  BP: 134/72  R: 12  P: 50  O2: 97% on RA    Patient able to ambulate as they were prior to admission or with assist devices provided by therapies during their stay  No, Assist of 1-2 with GB/W, dizzy with movement.     Patient able to tolerate oral intake and maintain hydration status   Yes, minimal PO    Patient has adequate pain control using oral analgesics   Yes, Tylenol and Oxy    Patient must be voiding adequate amounts   Yes, CBI    Hypercapnia, Hypoventilation or hypoxia resolved for at least 2 hours without supplemental oxygen   Yes, patient on RA     Deficits in sensation, mobility or coordination have resolved if spinal or regional anesthesia used   NA    Patient has returned to baseline mental status   Yes

## 2024-07-05 NOTE — BRIEF OP NOTE
[facility] Brief Operative Note    Pre-operative diagnosis: Benign prostatic hyperplasia with incomplete bladder emptying [N40.1, R39.14]   Post-operative diagnosis * No post-op diagnosis entered *   Procedure: Procedure(s):  Holmium Laser Enucleation of the Prostate  LITHOTRIPSY, WITH CYSTOSCOPY   Surgeon: Radha Gómez MD   Assistants(s): none   Estimated blood loss: 40 cc    Specimens: Bladder stone and prostate    Findings: Mostly bilobar hypertrophy, 2 bladder stones, fragmented and removed, 105 grams prostate enucleated en-bloc

## 2024-07-05 NOTE — ANESTHESIA PREPROCEDURE EVALUATION
Anesthesia Pre-Procedure Evaluation    Patient: Thiago Hein   MRN: [id number] : 1950        Procedure : Procedure(s):  Holmium Laser Enucleation of the Prostate  LITHOTRIPSY, WITH CYSTOSCOPY holmium laser          Past Medical History:   Diagnosis Date    Diabetes (H)     Hypertension       Past Surgical History:   Procedure Laterality Date    ANESTHESIA OUT OF OR MRI N/A 10/2/2015    Procedure: ANESTHESIA OUT OF OR MRI;  Surgeon: GENERIC ANESTHESIA PROVIDER;  Location: [facility] OR    BACK SURGERY      lumbar spine surgery-- unclear details    SOFT TISSUE SURGERY      skin cancer removed from L shoulder, chest, right neck      No Known Allergies   Social History     Tobacco Use    Smoking status: Never     Passive exposure: Never    Smokeless tobacco: Former   Substance Use Topics    Alcohol use: No     Comment: Quit in       Wt Readings from Last 1 Encounters:   24 102.2 kg (225 lb 3.2 oz)        Anesthesia Evaluation            ROS/MED HX  ENT/Pulmonary:     (+)                tobacco use (smokeless), Past use,                       Neurologic:    (-) no seizures and no CVA   Cardiovascular:     (+) Dyslipidemia hypertension- -   -  - -                                      METS/Exercise Tolerance: >4 METS    Hematologic:       Musculoskeletal:   (+)  arthritis (rheumatoid),             GI/Hepatic:     (+)          cholecystitis/cholelithiasis,          Renal/Genitourinary:     (+)       Nephrolithiasis , BPH,      Endo:     (+)  type II DM,   Not using insulin,          Obesity,       Psychiatric/Substance Use:       Infectious Disease:       Malignancy:       Other:            Physical Exam    Airway        Mallampati: II   TM distance: > 3 FB   Neck ROM: full   Mouth opening: > 3 cm    Respiratory Devices and Support         Dental  no notable dental history         Cardiovascular          Rhythm and rate: regular and normal     Pulmonary   pulmonary exam normal                OUTSIDE  LABS:  CBC:   Lab Results   Component Value Date    WBC 5.6 06/20/2024    WBC 7.1 05/17/2024    HGB 14.4 06/20/2024    HGB 13.9 05/17/2024    HCT 42.7 06/20/2024    HCT 40.2 05/17/2024     06/20/2024     05/17/2024     BMP:   Lab Results   Component Value Date     06/20/2024     05/17/2024    POTASSIUM 4.9 06/20/2024    POTASSIUM 3.9 05/17/2024    CHLORIDE 106 06/20/2024    CHLORIDE 105 05/17/2024    CO2 26 06/20/2024    CO2 25 05/17/2024    BUN 14.3 06/20/2024    BUN 19.7 05/17/2024    CR 0.87 06/20/2024    CR 0.95 05/17/2024     (H) 06/20/2024    GLC 94 05/17/2024     COAGS:   Lab Results   Component Value Date    PTT 32 09/30/2015    INR 1.96 (H) 10/23/2015     POC:   Lab Results   Component Value Date     (H) 10/23/2015     HEPATIC:   Lab Results   Component Value Date    ALBUMIN 4.4 02/06/2024    PROTTOTAL 6.7 02/06/2024    ALT 17 02/06/2024    AST 16 02/06/2024    ALKPHOS 107 02/06/2024    BILITOTAL 0.5 02/06/2024    ZAHIRA 31 10/17/2015     OTHER:   Lab Results   Component Value Date    PH 7.45 10/12/2015    LACT 1.4 10/01/2015    A1C 6.1 (H) 06/20/2024    LOLA 9.5 06/20/2024    PHOS 2.7 10/18/2015    MAG 1.8 10/18/2015    LIPASE 138 10/12/2015    AMYLASE 32 10/12/2015    TSH 1.87 06/01/2018    CRP 0.2 08/24/2021    SED 2 08/24/2021       Anesthesia Plan    ASA Status:  2    NPO Status:  NPO Appropriate    Anesthesia Type: General.     - Airway: LMA   Induction: Intravenous, Propofol.   Maintenance: Balanced.        Consents    Anesthesia Plan(s) and associated risks, benefits, and realistic alternatives discussed. Questions answered and patient/representative(s) expressed understanding.     - Discussed:     - Discussed with:  Patient            Postoperative Care    Pain management: IV analgesics.   PONV prophylaxis: Ondansetron (or other 5HT-3), Background Propofol Infusion     Comments:               Adrián Benito MD    I have reviewed the pertinent notes and labs  in the chart from the past 30 days and (re)examined the patient.  Any updates or changes from those notes are reflected in this note.              # Obesity: Estimated body mass index is 30.54 kg/m  as calculated from the following:    Height as of 6/7/24: 1.829 m (6').    Weight as of 6/20/24: 102.2 kg (225 lb 3.2 oz).

## 2024-07-05 NOTE — ANESTHESIA CARE TRANSFER NOTE
Patient: Thiago Hein    Procedure: Procedure(s):  Holmium Laser Enucleation of the Prostate  LITHOTRIPSY, WITH CYSTOSCOPY       Diagnosis: Benign prostatic hyperplasia with incomplete bladder emptying [N40.1, R39.14]  Diagnosis Additional Information: No value filed.    Anesthesia Type:   General     Note:    Oropharynx: oropharynx clear of all foreign objects and spontaneously breathing  Level of Consciousness: awake  Oxygen Supplementation: face mask  Level of Supplemental Oxygen (L/min / FiO2): 10  Independent Airway: airway patency satisfactory and stable  Dentition: dentition unchanged  Vital Signs Stable: post-procedure vital signs reviewed and stable  Report to RN Given: handoff report given  Patient transferred to: PACU  Comments: Pt awake and able to verbalize needs. ALL monitors on and audible. Spontaneous respiration without difficulty. Pt denies pain. Report given to PACU RN.  Handoff Report: Identifed the Patient, Identified the Reponsible Provider, Reviewed the pertinent medical history, Discussed the surgical course, Reviewed Intra-OP anesthesia mangement and issues during anesthesia, Set expectations for post-procedure period and Allowed opportunity for questions and acknowledgement of understanding      Vitals:  Vitals Value Taken Time   /76 07/05/24 1006   Temp     Pulse 65 07/05/24 1013   Resp 19 07/05/24 1013   SpO2 97 % 07/05/24 1013   Vitals shown include unfiled device data.    Electronically Signed By: ZACKERY Hampton CRNA  July 5, 2024  10:14 AM

## 2024-07-05 NOTE — PROGRESS NOTES
Patient VSS are at baseline Yes, see flowsheets    Patient able to ambulate as they were prior to admission or with assist devices provided by therapies during their stay No, assist of 1-2, gb and walker. Has dangled since surgery    Patient able to tolerate oral intake and maintain hydration status No. Pt is now tolerating a clear liquids but poor oral intake    Patient has adequate pain control using oral analgesics Pt states 2/10 pain, denies the need for pain medications at this time    Patient must be voiding adequate amounts Yes, with elizabeth catheter in place and CBI. CBI is slow.     Hypercapnia, Hypoventilation or hypoxia resolved for at least 2 hours without supplemental oxygen N/A, on capno    Deficits in sensation, mobility or coordination have resolved if spinal or regional anesthesia used N/A    Patient has returned to baseline mental status Yes, AOX4. Appears to be forgetful at baseline

## 2024-07-05 NOTE — OR NURSING
Patient states that he drove here today, and plans to drive home tomorrow.  Patient states mom (Meena) is unable to  and lives in assisted living. Patient states that mom is 93 years old.

## 2024-07-05 NOTE — PROGRESS NOTES
I met with Navin    He has a large prostate about 150 grams and bladder stones and on maximal medical therapy with flomax and finasteride    We are planning to proceed with outlet procedure (see my clinic notes) and given size he has elected for holmium laser enucleation of prostate and lithotripsy of stones in bladder    We discussed the associated risks of this procedure included but not limited to the following:  -Bleeding, potentially significant enough to require clot evacuation and blood transfusion  -Infection, for which we will plan to treat preoperatively based on targeted antibiotic therapy  -Damage to the bladder, urethra and penis including the risk of urethral stricture and bladder neck contracture  -Risk of incidentally discovered prostate cancer.  We discussed that this would not preclude him from further therapy though it could prolong recovery and potentially increase risk of complications associated with cancer treatment.  Further preoperative workup to assess for prostate cancer prior to surgery was offered but patient deferred.  -Risk of retrograde ejaculation which would be expected to occur in the majority if not all men after HoLEP  -Risk of urinary incontinence.  We discussed that in the majority of men this is a transient process that is generally self limited to the first 6-12 weeks after surgery though could take longer to resolve depending on baseline bladder instability.    Radha Gómez MD

## 2024-07-06 VITALS
DIASTOLIC BLOOD PRESSURE: 67 MMHG | HEART RATE: 56 BPM | HEIGHT: 72 IN | BODY MASS INDEX: 30.51 KG/M2 | WEIGHT: 225.3 LBS | TEMPERATURE: 98.1 F | OXYGEN SATURATION: 94 % | SYSTOLIC BLOOD PRESSURE: 120 MMHG | RESPIRATION RATE: 16 BRPM

## 2024-07-06 LAB
ANION GAP SERPL CALCULATED.3IONS-SCNC: 8 MMOL/L (ref 7–15)
BUN SERPL-MCNC: 10.1 MG/DL (ref 8–23)
CALCIUM SERPL-MCNC: 8.3 MG/DL (ref 8.8–10.2)
CHLORIDE SERPL-SCNC: 105 MMOL/L (ref 98–107)
CREAT SERPL-MCNC: 0.76 MG/DL (ref 0.67–1.17)
DEPRECATED HCO3 PLAS-SCNC: 28 MMOL/L (ref 22–29)
EGFRCR SERPLBLD CKD-EPI 2021: >90 ML/MIN/1.73M2
ERYTHROCYTE [DISTWIDTH] IN BLOOD BY AUTOMATED COUNT: 13.4 % (ref 10–15)
GLUCOSE BLDC GLUCOMTR-MCNC: 113 MG/DL (ref 70–99)
GLUCOSE BLDC GLUCOMTR-MCNC: 116 MG/DL (ref 70–99)
GLUCOSE BLDC GLUCOMTR-MCNC: 129 MG/DL (ref 70–99)
GLUCOSE BLDC GLUCOMTR-MCNC: 132 MG/DL (ref 70–99)
GLUCOSE SERPL-MCNC: 120 MG/DL (ref 70–99)
HCT VFR BLD AUTO: 40.6 % (ref 40–53)
HGB BLD-MCNC: 13.2 G/DL (ref 13.3–17.7)
MCH RBC QN AUTO: 29.3 PG (ref 26.5–33)
MCHC RBC AUTO-ENTMCNC: 32.5 G/DL (ref 31.5–36.5)
MCV RBC AUTO: 90 FL (ref 78–100)
PLATELET # BLD AUTO: 163 10E3/UL (ref 150–450)
POTASSIUM SERPL-SCNC: 3.9 MMOL/L (ref 3.4–5.3)
RBC # BLD AUTO: 4.51 10E6/UL (ref 4.4–5.9)
SODIUM SERPL-SCNC: 141 MMOL/L (ref 135–145)
WBC # BLD AUTO: 8.2 10E3/UL (ref 4–11)

## 2024-07-06 PROCEDURE — 250N000013 HC RX MED GY IP 250 OP 250 PS 637: Performed by: STUDENT IN AN ORGANIZED HEALTH CARE EDUCATION/TRAINING PROGRAM

## 2024-07-06 PROCEDURE — 85027 COMPLETE CBC AUTOMATED: CPT | Performed by: STUDENT IN AN ORGANIZED HEALTH CARE EDUCATION/TRAINING PROGRAM

## 2024-07-06 PROCEDURE — 36415 COLL VENOUS BLD VENIPUNCTURE: CPT | Performed by: STUDENT IN AN ORGANIZED HEALTH CARE EDUCATION/TRAINING PROGRAM

## 2024-07-06 PROCEDURE — 80048 BASIC METABOLIC PNL TOTAL CA: CPT | Performed by: STUDENT IN AN ORGANIZED HEALTH CARE EDUCATION/TRAINING PROGRAM

## 2024-07-06 PROCEDURE — 250N000011 HC RX IP 250 OP 636: Performed by: STUDENT IN AN ORGANIZED HEALTH CARE EDUCATION/TRAINING PROGRAM

## 2024-07-06 PROCEDURE — 82962 GLUCOSE BLOOD TEST: CPT

## 2024-07-06 RX ORDER — NITROFURANTOIN 25; 75 MG/1; MG/1
100 CAPSULE ORAL 2 TIMES DAILY
Qty: 10 CAPSULE | Refills: 0 | Status: SHIPPED | OUTPATIENT
Start: 2024-07-06 | End: 2024-07-11

## 2024-07-06 RX ORDER — PHENAZOPYRIDINE HYDROCHLORIDE 100 MG/1
100 TABLET, FILM COATED ORAL 3 TIMES DAILY PRN
Qty: 12 TABLET | Refills: 0 | Status: SHIPPED | OUTPATIENT
Start: 2024-07-06 | End: 2024-07-10

## 2024-07-06 RX ADMIN — DULOXETINE HYDROCHLORIDE 20 MG: 20 CAPSULE, DELAYED RELEASE ORAL at 09:03

## 2024-07-06 RX ADMIN — SENNOSIDES AND DOCUSATE SODIUM 1 TABLET: 50; 8.6 TABLET ORAL at 09:01

## 2024-07-06 RX ADMIN — FUROSEMIDE 40 MG: 40 TABLET ORAL at 09:01

## 2024-07-06 RX ADMIN — ACETAMINOPHEN 975 MG: 325 TABLET, FILM COATED ORAL at 15:02

## 2024-07-06 RX ADMIN — CEFTRIAXONE SODIUM 2 G: 2 INJECTION, POWDER, FOR SOLUTION INTRAMUSCULAR; INTRAVENOUS at 06:40

## 2024-07-06 RX ADMIN — ATORVASTATIN CALCIUM 20 MG: 20 TABLET, FILM COATED ORAL at 09:01

## 2024-07-06 RX ADMIN — CELECOXIB 100 MG: 100 CAPSULE ORAL at 09:01

## 2024-07-06 RX ADMIN — ACETAMINOPHEN 975 MG: 325 TABLET, FILM COATED ORAL at 06:40

## 2024-07-06 RX ADMIN — AMLODIPINE BESYLATE 5 MG: 5 TABLET ORAL at 09:15

## 2024-07-06 RX ADMIN — HYDROXYCHLOROQUINE SULFATE 200 MG: 200 TABLET ORAL at 09:01

## 2024-07-06 RX ADMIN — POLYETHYLENE GLYCOL 3350 17 G: 17 POWDER, FOR SOLUTION ORAL at 09:15

## 2024-07-06 ASSESSMENT — ACTIVITIES OF DAILY LIVING (ADL)
ADLS_ACUITY_SCORE: 46
ADLS_ACUITY_SCORE: 45
ADLS_ACUITY_SCORE: 45
ADLS_ACUITY_SCORE: 46
ADLS_ACUITY_SCORE: 45
ADLS_ACUITY_SCORE: 46
ADLS_ACUITY_SCORE: 45
ADLS_ACUITY_SCORE: 44
ADLS_ACUITY_SCORE: 45
ADLS_ACUITY_SCORE: 46

## 2024-07-06 NOTE — PROGRESS NOTES
Urology Note    S: doing well, no issues overnight, pain minimal    O:  /67 (BP Location: Right arm)   Pulse 56   Temp 98.1  F (36.7  C) (Oral)   Resp 16   Ht 1.829 m (6')   Wt 102.2 kg (225 lb 4.8 oz)   SpO2 94%   BMI 30.56 kg/m    Urine connor with low CBI    I irrigated catheter, urine clear red  No clots  Removed elizabeth    A/P  75 yo male s/p laser enucleation of prostate 7.5 and bladder stone removal    Will do 3 bottle voiding trial today and if he passes can go home    Radha Gómez MD    Addendum  Patient voided 500 ml and PVR 11  Spoke to pt on phone  Will discharge home, he has large bladder capacity and I think this is a great void,

## 2024-07-06 NOTE — DISCHARGE INSTRUCTIONS
Caring for Yourself after HoLEP  Holmium Laser Enucleation of the Prostate at [facility]    For the first 24 hours:  - do not drive of operate any heavy equipment  -do not make complex decisions of sign legal documents  -do not drink alcohol  -ask for supervision when cooking, caring for infants or doing other activities that could cause harm to others  -you may have some nausea and vomiting    As you heal:  - start eating slowly. Start with sips of liquids, then add solid food when ou can handle it. If you do not feel like eating solids, have liquids.  -drink up to 2.5 to 3 liters per day (10 to 12 cups). Having plenty of fluids will hep you stay hydrated. This can make peeing more comfortable. It will also keep blood clots from forming in your bladder.  -if you were taking prostate medicines before surgery, you may stop taking them (unless we tell you differently).  -if you had to stop taking blood thinners before surgery, your care team will tell you when to start taking then again    Urinating:  -for the next few days, you may feel stinging or burning when you pee. This type of pain is not usually helped by narcotic pain medicine  -you may feel a sense of urgency to reach the bathroom  -you may leak urine before being able to reach the bathroom. This leakage can be bothersome and can last up to 3 months after surgery.   -if leaking is not getting better by 6 weeks after surgery, we may suggest physical therapy to learn exercises that help with urinary control   -it is rare for leaking to last a year or more  -for the first 7 to 10 days after surgery, you may see flecks of tissues or scabs in your urine  -for the first 4 weeks after surgery, you may see blood at the start or end of your urine stream. It can come and go    Physical activity:  -you can resume normal physical activity (such as walking, flights of stars, driving) after surgery  -wait 2 weeks before doing aerobic activities  -wait 4 weeks  "before doing straddle activities, such as biking, lawn mowing, motorcycle, snowmobiling, etc.  -wait 4 weeks before having sexual activity. You may resume sexual activity when your urine looks clear   -you likely will not see fluid expelled from the penis when you have sex (\"dry orgasm\"). This is not a dangerous condition. Your body still makes seminal fluid but as a result of surgery, it  will be pushed into the bladder rather than out of the penis at the time of climax. You will pass the fluid the next time you pee.  -if you notice more blood in your urine as you resume a physical activity, stop that activity and take things more slowly    Follow up exam:  You will need a check-up in the clinic in 6 to 8 weeks. This is to see how well you are healing.    When should I call the clin?  Call the urology clinic if your have any of these signs and symptoms:  -not able to pee. If you are not able to reach a nurse or doctor within 60 minutes, go to the emergency room  -fever over 101.5 F (38.6 C) along with sweats and chills  -bright red blood in urine or large blood clots that make it hard to pee  -bad pain that doesn't get better with pain medicines  -leg pain  -nausea or vomiting that is bad or lasts beyond that first day    How do I call the clinic?  [phone]  Open Monday through Friday, 7am to 7pm.  If calling after hours, ask for the urologist on call    For information purposes only. Not to replace the advice of your health care provider. Text adapted from [facility] under Creative Commons License (CC BY-NC-SA 4.0). Copyright C 2020 [employer]. All rights reserved. Clinically reviewed by Riley Savage MD, Department of Urology. [employer] [id number]/20    "

## 2024-07-06 NOTE — PROGRESS NOTES
Patient discharged home this afternoon.  Discussed AVS with patient. All questions and concerns related to discharge were addressed. All patient belongings AVS sent home with patient. Pt is aware that he needs to pickup his medications at [facility]. He is aware that the pharmacy closes at 5:00PM. Instructed pt to pickup medications tomorrow AM if he does not make it to the pharmacy on time.

## 2024-07-06 NOTE — ANESTHESIA POSTPROCEDURE EVALUATION
Patient: Thiago Hein    Procedure: Procedure(s):  Holmium Laser Enucleation of the Prostate  LITHOTRIPSY, WITH CYSTOSCOPY       Anesthesia Type:  General    Note:  Disposition: Inpatient   Postop Pain Control: Uneventful            Sign Out: Well controlled pain   PONV: No   Neuro/Psych: Uneventful            Sign Out: Acceptable/Baseline neuro status   Airway/Respiratory: Uneventful            Sign Out: Acceptable/Baseline resp. status   CV/Hemodynamics: Uneventful            Sign Out: Acceptable CV status   Other NRE: NONE   DID A NON-ROUTINE EVENT OCCUR? No           Last vitals:  Vitals Value Taken Time   /85 07/05/24 1117   Temp 36  C (96.8  F) 07/05/24 1115   Pulse 60 07/05/24 1128   Resp 8 07/05/24 1128   SpO2 92 % 07/05/24 1126   Vitals shown include unfiled device data.    Electronically Signed By: Adrián Benito MD  July 5, 2024  7:14 PM

## 2024-07-06 NOTE — PLAN OF CARE
Goal Outcome Evaluation:      Plan of Care Reviewed With: patient    Overall Patient Progress: improvingOverall Patient Progress: improving    Patient VSS are at baseline   T: 98.1  BP: 120/67  R: 16  P: 56  O2: 94% on RA     Patient able to ambulate as they were prior to admission or with assist devices provided by therapies during their stay  Yes, SBA with gait belt     Patient able to tolerate oral intake and maintain hydration status   Yes, tolerated food and water intake well.     Patient has adequate pain control using oral analgesics   Yes, scheduled Tylenol    Patient must be voiding adequate amounts   Yes, voided 500mL at 1400 post CBI/catheter removal this morning     Hypercapnia, Hypoventilation or hypoxia resolved for at least 2 hours without supplemental oxygen   Yes, patient on RA      Deficits in sensation, mobility or coordination have resolved if spinal or regional anesthesia used   NA     Patient has returned to baseline mental status   Yes

## 2024-07-06 NOTE — PLAN OF CARE
Surgery/POD#: POD #1 s/p HoLEP  Orientation: A&Ox4  ABNL VS/O2: VSS on RA   Pain Management: Scheduled Tylenol, denied pain.  Bowel/Bladder: Nick w/ CBI at very slow drip. Yellow to pink tinged UOP.  Drains: PIV SL  Diet: Regular  Activity Level: Ax1 gb/walker  Anticipated  DC Date: home pending voiding trial

## 2024-07-07 LAB
APPEARANCE STONE: NORMAL
COMPN STONE: NORMAL
SPECIMEN WT: 399 MG

## 2024-07-08 ENCOUNTER — TELEPHONE (OUTPATIENT)
Dept: SURGERY | Facility: CLINIC | Age: 74
End: 2024-07-08
Payer: MEDICARE

## 2024-07-08 LAB
PATH REPORT.COMMENTS IMP SPEC: NORMAL
PATH REPORT.COMMENTS IMP SPEC: NORMAL
PATH REPORT.FINAL DX SPEC: NORMAL
PATH REPORT.GROSS SPEC: NORMAL
PATH REPORT.MICROSCOPIC SPEC OTHER STN: NORMAL
PATH REPORT.RELEVANT HX SPEC: NORMAL
PHOTO IMAGE: NORMAL

## 2024-07-08 PROCEDURE — 88305 TISSUE EXAM BY PATHOLOGIST: CPT | Mod: 26 | Performed by: PATHOLOGY

## 2024-07-08 NOTE — TELEPHONE ENCOUNTER
Pt is doing well  Urine is gold yellow  Feels the stream is lot better than before  Has urgency  No leakage, not wearing pads

## 2024-07-14 ENCOUNTER — HOSPITAL ENCOUNTER (EMERGENCY)
Facility: CLINIC | Age: 74
Discharge: HOME | End: 2024-07-14
Attending: PHYSICIAN ASSISTANT | Admitting: PHYSICIAN ASSISTANT
Payer: MEDICARE

## 2024-07-14 ENCOUNTER — NURSE TRIAGE (OUTPATIENT)
Dept: NURSING | Facility: CLINIC | Age: 74
End: 2024-07-14
Payer: MEDICARE

## 2024-07-14 VITALS
OXYGEN SATURATION: 97 % | HEART RATE: 69 BPM | SYSTOLIC BLOOD PRESSURE: 101 MMHG | DIASTOLIC BLOOD PRESSURE: 56 MMHG | RESPIRATION RATE: 16 BRPM | TEMPERATURE: 98.3 F

## 2024-07-14 DIAGNOSIS — N39.0 URINARY TRACT INFECTION: ICD-10-CM

## 2024-07-14 LAB
ALBUMIN UR-MCNC: 100 MG/DL
APPEARANCE UR: ABNORMAL
BACTERIA #/AREA URNS HPF: ABNORMAL /HPF
BILIRUB UR QL STRIP: NEGATIVE
COLOR UR AUTO: YELLOW
GLUCOSE UR STRIP-MCNC: NEGATIVE MG/DL
HGB UR QL STRIP: ABNORMAL
HYALINE CASTS: 3 /LPF
KETONES UR STRIP-MCNC: NEGATIVE MG/DL
LEUKOCYTE ESTERASE UR QL STRIP: ABNORMAL
NITRATE UR QL: NEGATIVE
PH UR STRIP: 5 [PH] (ref 5–7)
RBC URINE: >182 /HPF
SP GR UR STRIP: 1.01 (ref 1–1.03)
UROBILINOGEN UR STRIP-MCNC: NORMAL MG/DL
WBC URINE: 41 /HPF

## 2024-07-14 PROCEDURE — G0463 HOSPITAL OUTPT CLINIC VISIT: HCPCS | Performed by: PHYSICIAN ASSISTANT

## 2024-07-14 PROCEDURE — 81001 URINALYSIS AUTO W/SCOPE: CPT | Performed by: PHYSICIAN ASSISTANT

## 2024-07-14 PROCEDURE — 87086 URINE CULTURE/COLONY COUNT: CPT | Performed by: PHYSICIAN ASSISTANT

## 2024-07-14 PROCEDURE — 99213 OFFICE O/P EST LOW 20 MIN: CPT | Performed by: PHYSICIAN ASSISTANT

## 2024-07-14 RX ORDER — SULFAMETHOXAZOLE/TRIMETHOPRIM 800-160 MG
1 TABLET ORAL 2 TIMES DAILY
Qty: 14 TABLET | Refills: 0 | Status: SHIPPED | OUTPATIENT
Start: 2024-07-14 | End: 2024-07-21

## 2024-07-14 ASSESSMENT — COLUMBIA-SUICIDE SEVERITY RATING SCALE - C-SSRS
1. IN THE PAST MONTH, HAVE YOU WISHED YOU WERE DEAD OR WISHED YOU COULD GO TO SLEEP AND NOT WAKE UP?: NO
2. HAVE YOU ACTUALLY HAD ANY THOUGHTS OF KILLING YOURSELF IN THE PAST MONTH?: NO
6. HAVE YOU EVER DONE ANYTHING, STARTED TO DO ANYTHING, OR PREPARED TO DO ANYTHING TO END YOUR LIFE?: NO

## 2024-07-14 ASSESSMENT — ENCOUNTER SYMPTOMS
BACK PAIN: 1
CONSTITUTIONAL NEGATIVE: 1
DYSURIA: 1
FEVER: 0
GASTROINTESTINAL NEGATIVE: 1

## 2024-07-14 ASSESSMENT — ACTIVITIES OF DAILY LIVING (ADL): ADLS_ACUITY_SCORE: 38

## 2024-07-14 NOTE — TELEPHONE ENCOUNTER
Nurse Triage SBAR    Is this a 2nd Level Triage? NO    Situation:   Spoke with 74 yr old Navin who c/o:    Pain with urination worsening in the past 2 days.  Urinary urgency.  Rates pain a 9 on a 0-10 pain scale.  When urinating also has the feeling he might have a bowel movement.   After done urination denies pain  Some pain in the lower back the past 2 days as well.     Consent: not needed    Background:   Prostate Surgery 7/5/24    Assessment:   Evaluation needed.    Protocol Recommended Disposition:   See HCP within 4 hours (or PCP triage)    Recommendation:   Advised to have an evaluation within the next 4 hours.  Patient intends to go to [facility] now.  Advised may take Tylenol or Ibuprofen for pain.  Advised to call back if symptoms worsen.    Genna Way RN  [facility]       Genna Way RN 1:04 PM 7/14/2024  Reason for Disposition   Side (flank) or lower back pain present    Additional Information   Negative: Shock suspected (e.g., cold/pale/clammy skin, too weak to stand, low BP, rapid pulse)   Negative: Sounds like a life-threatening emergency to the triager   Negative: Followed a genital area injury (e.g., penis, scrotum)   Negative: Taking antibiotic for urinary tract infection (UTI)   Negative: Pain in scrotum is main symptom   Negative: [1] Unable to urinate (or only a few drops) > 4 hours AND [2] bladder feels very full (e.g., feels blocked with strong urge to urinate; palpable bladder)   Negative: Swollen scrotum   Negative: [1] Constant pain in scrotum or testicle AND [2] present > 1 hour   Negative: Vomiting   Negative: Patient sounds very sick or weak to the triager   Negative: [1] SEVERE pain with urination (e.g., excruciating) AND [2] not improved after 2 hours of pain medicine (e.g., acetaminophen or ibuprofen)     Hasn't tried any pain medication.   Negative: Fever > 100.4 F (38.0 C)    Protocols used: Urination Pain - Male-A-

## 2024-07-14 NOTE — ED PROVIDER NOTES
History     Chief Complaint   Patient presents with    Urinary Problem     Burning pain with urination since Friday. Did have surgery on July 5th and has had some pain since with light pink urine. Low back pain. No fevers.      HPI  Thiago Hein is a 74 year old male who presents to Urgent Care with complaints of dysuria and painful urination for the past 2 days.  Also complains of associated diffuse low back pain as well as urinary urgency.  He feels like he is emptying his bladder fully.  Patient is 9 days postop laser nucleation of the prostate as well as laser of multiple bladder stones.  He was catheterized for this.  Denies fevers, chills, nausea, vomiting, abdominal pain, or flank pain.  Continues to have some pinkish appearing urine since his surgery.  Denies passing blood clots or bright red blood.      Allergies:  No Known Allergies    Problem List:    Patient Active Problem List    Diagnosis Date Noted    BPH (benign prostatic hyperplasia) 07/05/2024     Priority: Medium    Bladder stones 06/07/2024     Priority: Medium    Chronic polyarticular juvenile rheumatoid arthritis (H) 08/24/2021     Priority: Medium     Formatting of this note might be different from the original.  Created by Conversion    Replacement Utility updated for latest IMO load      Emotional lability 08/24/2021     Priority: Medium     Formatting of this note might be different from the original.  Created by Conversion      Lower back pain 08/24/2021     Priority: Medium     Formatting of this note might be different from the original.  Created by Conversion      Morbid obesity (H) 08/24/2021     Priority: Medium    BPH with urinary obstruction 02/20/2020     Priority: Medium    Primary osteoarthritis involving multiple joints 10/08/2019     Priority: Medium    Left carpal tunnel syndrome 12/27/2018     Priority: Medium    Trigger finger, right index finger 09/26/2018     Priority: Medium    Polyarthralgia 07/20/2018      Priority: Medium    Unintentional weight loss 06/01/2018     Priority: Medium    Non morbid obesity due to excess calories 07/14/2017     Priority: Medium    Peripheral edema 07/14/2017     Priority: Medium    Diverticulosis 11/15/2016     Priority: Medium    Hyperlipidemia 08/04/2016     Priority: Medium    Need for pneumococcal vaccination 01/11/2016     Priority: Medium    Need for vaccination 11/20/2015     Priority: Medium    Type 2 diabetes mellitus (H) 11/11/2015     Priority: Medium     Formatting of this note might be different from the original.  Created by Conversion      Bacteremia due to Staphylococcus aureus 11/06/2015     Priority: Medium    Dysphagia 11/06/2015     Priority: Medium    Epidural abscess 11/06/2015     Priority: Medium    Essential hypertension 11/06/2015     Priority: Medium     Formatting of this note might be different from the original.  Created by Conversion    Replacement Utility updated for latest IMO load      Lesion of salivary gland 11/06/2015     Priority: Medium    Somnolence 11/06/2015     Priority: Medium    Discitis 10/04/2015     Priority: Medium    Adult Still's disease (H) 10/01/2015     Priority: Medium    Leukocytosis 09/29/2015     Priority: Medium    Sepsis (H) 09/29/2015     Priority: Medium        Past Medical History:    Past Medical History:   Diagnosis Date    Diabetes (H)     Hypertension        Past Surgical History:    Past Surgical History:   Procedure Laterality Date    ANESTHESIA OUT OF OR MRI N/A 10/2/2015    Procedure: ANESTHESIA OUT OF OR MRI;  Surgeon: GENERIC ANESTHESIA PROVIDER;  Location: [facility]    BACK SURGERY  2008    lumbar spine surgery-- unclear details    CYSTOSCOPY, LITHOTRIPSY, COMBINED N/A 7/5/2024    Procedure: LITHOTRIPSY, WITH CYSTOSCOPY;  Surgeon: Radha Gómez MD;  Location: [facility]    LASER HOLMIUM ENUCLEATION PROSTATE N/A 7/5/2024    Procedure: Holmium Laser Enucleation of the Prostate;  Surgeon: Radha Gómez MD;   Location: [facility] OR    SOFT TISSUE SURGERY  2012    skin cancer removed from L shoulder, chest, right neck       Family History:    Family History   Problem Relation Age of Onset    Coronary Artery Disease Father     Hypertension Father        Social History:  Marital Status:  Single [1]  Social History     Tobacco Use    Smoking status: Never     Passive exposure: Never    Smokeless tobacco: Former   Substance Use Topics    Alcohol use: No     Comment: Quit in 1990    Drug use: No        Medications:    sulfamethoxazole-trimethoprim (BACTRIM DS) 800-160 MG tablet  amLODIPine (NORVASC) 5 MG tablet  atorvastatin (LIPITOR) 20 MG tablet  celecoxib (CELEBREX) 100 MG capsule  DULoxetine (CYMBALTA) 20 MG capsule  furosemide (LASIX) 40 MG tablet  hydroxychloroquine (PLAQUENIL) 200 MG tablet  lisinopril (ZESTRIL) 10 MG tablet  metFORMIN (GLUCOPHAGE XR) 500 MG 24 hr tablet  oxyCODONE (ROXICODONE) 5 MG tablet          Review of Systems   Constitutional: Negative.  Negative for fever.   Gastrointestinal: Negative.    Genitourinary:  Positive for dysuria and urgency.   Musculoskeletal:  Positive for back pain.   All other systems reviewed and are negative.      Physical Exam   BP: 101/56  Pulse: 69  Temp: 98.3  F (36.8  C)  Resp: 16  SpO2: 97 %      Physical Exam  Constitutional:       General: He is not in acute distress.     Appearance: He is well-developed. He is not ill-appearing, toxic-appearing or diaphoretic.   HENT:      Head: Normocephalic and atraumatic.   Eyes:      Conjunctiva/sclera: Conjunctivae normal.   Cardiovascular:      Pulses: Normal pulses.   Pulmonary:      Effort: Pulmonary effort is normal.   Abdominal:      General: There is no distension.      Palpations: Abdomen is soft.      Tenderness: There is no abdominal tenderness. There is no right CVA tenderness, left CVA tenderness, guarding or rebound.   Musculoskeletal:         General: Normal range of motion.      Cervical back: Neck supple.   Skin:      General: Skin is warm and dry.      Capillary Refill: Capillary refill takes less than 2 seconds.   Neurological:      Mental Status: He is alert.      Sensory: Sensation is intact.      Motor: Motor function is intact.         ED Course        Procedures      Results for orders placed or performed during the hospital encounter of 07/14/24 (from the past 24 hour(s))   UA with Microscopic reflex to Culture    Specimen: Urine, Clean Catch   Result Value Ref Range    Color Urine Yellow Colorless, Straw, Light Yellow, Yellow    Appearance Urine Slightly Cloudy (A) Clear    Glucose Urine Negative Negative mg/dL    Bilirubin Urine Negative Negative    Ketones Urine Negative Negative mg/dL    Specific Gravity Urine 1.015 1.003 - 1.035    Blood Urine Large (A) Negative    pH Urine 5.0 5.0 - 7.0    Protein Albumin Urine 100 (A) Negative mg/dL    Urobilinogen Urine Normal Normal, 2.0 mg/dL    Nitrite Urine Negative Negative    Leukocyte Esterase Urine Moderate (A) Negative    Bacteria Urine Few (A) None Seen /HPF    RBC Urine >182 (H) <=2 /HPF    WBC Urine 41 (H) <=5 /HPF    Hyaline Casts Urine 3 (H) <=2 /LPF    Narrative    Urine Culture ordered based on laboratory criteria       Medications - No data to display    Assessments & Plan (with Medical Decision Making)     Pt is a 74 year old male who presents to Urgent Care with complaints of dysuria and painful urination for the past 2 days.  Also complains of associated diffuse low back pain as well as urinary urgency.  He feels like he is emptying his bladder fully.  Patient is 9 days postop laser nucleation of the prostate as well as laser of multiple bladder stones.  He was catheterized for this.  Continues to have some pinkish appearing urine since his surgery.  Denies passing blood clots or bright red blood.    Pt is afebrile on arrival.  Exam as above.  Urinalysis was positive for moderate leuk esterase, > 182 RBCs, 41 WBCs.  Urine was sent for culture.  Discussed  results with patient.  Encouraged symptomatic treatments at home.  Return precautions were reviewed.  Hand-outs were provided.    Patient was sent with Bactrim and was instructed to follow-up with PCP for continued care and management.  He is to return to the ED for persistent and/or worsening symptoms.  Patient expressed understanding of the diagnosis and plan and was discharged home in good condition.    I have reviewed the nursing notes.    I have reviewed the findings, diagnosis, plan and need for follow up with the patient.    New Prescriptions    SULFAMETHOXAZOLE-TRIMETHOPRIM (BACTRIM DS) 800-160 MG TABLET    Take 1 tablet by mouth 2 times daily for 7 days       Final diagnoses:   Urinary tract infection       7/14/2024   [facility] EMERGENCY DEPT       Mattie Merchant PA-C  07/14/24 [id number]

## 2024-07-15 LAB — BACTERIA UR CULT: NO GROWTH

## 2024-07-22 DIAGNOSIS — M06.00 SERONEGATIVE RHEUMATOID ARTHRITIS (H): ICD-10-CM

## 2024-07-22 NOTE — TELEPHONE ENCOUNTER
Patient scheduled a follow up for 7/29. He will make the appointment for a lab when he comes in for that appointment.

## 2024-07-22 NOTE — TELEPHONE ENCOUNTER
[facility] refill- [place]    Hydroxychloroquine 200mg tablets  Take 1 tab (200mg) by mouth 2x daily.    Last ov 4/17/24    No follow-up appt.    Last eye  exam 1/16/24  SPE.

## 2024-07-29 ENCOUNTER — OFFICE VISIT (OUTPATIENT)
Dept: RHEUMATOLOGY | Facility: CLINIC | Age: 74
End: 2024-07-29
Payer: MEDICARE

## 2024-07-29 VITALS
WEIGHT: 215.2 LBS | SYSTOLIC BLOOD PRESSURE: 112 MMHG | BODY MASS INDEX: 29.19 KG/M2 | DIASTOLIC BLOOD PRESSURE: 64 MMHG | HEART RATE: 98 BPM | OXYGEN SATURATION: 97 %

## 2024-07-29 DIAGNOSIS — M15.0 PRIMARY OSTEOARTHRITIS INVOLVING MULTIPLE JOINTS: ICD-10-CM

## 2024-07-29 DIAGNOSIS — M06.00 SERONEGATIVE RHEUMATOID ARTHRITIS (H): Primary | ICD-10-CM

## 2024-07-29 DIAGNOSIS — Z79.899 HIGH RISK MEDICATION USE: ICD-10-CM

## 2024-07-29 PROCEDURE — 99214 OFFICE O/P EST MOD 30 MIN: CPT | Performed by: INTERNAL MEDICINE

## 2024-07-29 PROCEDURE — G2211 COMPLEX E/M VISIT ADD ON: HCPCS | Performed by: INTERNAL MEDICINE

## 2024-07-29 RX ORDER — HYDROXYCHLOROQUINE SULFATE 200 MG/1
200 TABLET, FILM COATED ORAL 2 TIMES DAILY
Qty: 180 TABLET | Refills: 1 | Status: SHIPPED | OUTPATIENT
Start: 2024-07-29

## 2024-07-29 NOTE — PROGRESS NOTES
"      Rheumatology follow-up visit note     Thiago is a 74 year old male presents today for follow-up.    Navin was seen today for recheck.    Diagnoses and all orders for this visit:    Seronegative rheumatoid arthritis (H)  -     hydroxychloroquine (PLAQUENIL) 200 MG tablet; Take 1 tablet (200 mg) by mouth 2 times daily    High risk medication use    Primary osteoarthritis involving multiple joints        The longitudinal plan of care for the diagnosis(es)/condition(s) as documented were addressed during this visit. Due to the added complexity in care, I will continue to support Navin in the subsequent management and with ongoing continuity of care.      Follow up in 6 months.    HPI    Thiago Hein is a 74 year old male is here for follow-up of seronegative rheumatoid arthritis, osteoarthritis, he is on hydroxychloroquine having decided not to go for methotrexate.  He noted that this is controlling his joint symptoms very well with the exception of knees on his previous visit corticosteroid injection was done in his left knee.  Repeat x-ray was taken.  That was commensurate with osteoarthritis.  Especially the left side which is worse with activity without effusion that he can identify.  He did not think that the left knee injection was any use for him.  He recalls that there was not even an improvement for a day.  This is \"not bad\".  He had his eyes examined earlier this year and those were reassuring those data are reviewed.  He never took the duloxetine in part because he was coming up with some dental work and did not want to start a new medication for the more by cutting back his food intake he has lost \"20 to 30 pounds\" and feels that his knees Uftoral not hurting that much at all and wonders if he still needs to take duloxetine.  He gets Celebrex from his primary physician.  DETAILED EXAMINATION  07/29/24  :    Vitals:    07/29/24 1311   BP: 112/64   BP Location: Right arm   Pulse: 98   SpO2: 97% " "  Weight: 97.6 kg (215 lb 3.2 oz)     Alert oriented. Head including the face is examined for malar rash, heliotropes, scarring, lupus pernio. Eyes examined for redness such as in episcleritis/scleritis, periorbital lesions.   Neck/ Face examined for parotid gland swelling, range of motion of neck.  Left upper and lower and right upper an+d lower extremities examined for tenderness, swelling, warmth of the appendicular joints, range of motion, edema, rash.  Some of the important findings included: he does not have evidence of synovitis in the palpable joints of the upper extremities.  No significant deformities of the digits.  + Heberden nodes.  Range of motion of the shoulders  show full abduction.  No JLT effusion or warmth of the knees.  he does not have dactylitis of the digits.  He has nail dystrophy of the left hand which is felt secondary to \"fungal\" infection.     Patient Active Problem List    Diagnosis Date Noted    BPH (benign prostatic hyperplasia) 07/05/2024     Priority: Medium    Bladder stones 06/07/2024     Priority: Medium    Chronic polyarticular juvenile rheumatoid arthritis (H) 08/24/2021     Priority: Medium     Formatting of this note might be different from the original.  Created by Conversion    Replacement Utility updated for latest IMO load      Emotional lability 08/24/2021     Priority: Medium     Formatting of this note might be different from the original.  Created by Conversion      Lower back pain 08/24/2021     Priority: Medium     Formatting of this note might be different from the original.  Created by Conversion      Morbid obesity (H) 08/24/2021     Priority: Medium    BPH with urinary obstruction 02/20/2020     Priority: Medium    Primary osteoarthritis involving multiple joints 10/08/2019     Priority: Medium    Left carpal tunnel syndrome 12/27/2018     Priority: Medium    Trigger finger, right index finger 09/26/2018     Priority: Medium    Polyarthralgia 07/20/2018     " Priority: Medium    Unintentional weight loss 06/01/2018     Priority: Medium    Non morbid obesity due to excess calories 07/14/2017     Priority: Medium    Peripheral edema 07/14/2017     Priority: Medium    Diverticulosis 11/15/2016     Priority: Medium    Hyperlipidemia 08/04/2016     Priority: Medium    Need for pneumococcal vaccination 01/11/2016     Priority: Medium    Need for vaccination 11/20/2015     Priority: Medium    Type 2 diabetes mellitus (H) 11/11/2015     Priority: Medium     Formatting of this note might be different from the original.  Created by Conversion      Bacteremia due to Staphylococcus aureus 11/06/2015     Priority: Medium    Dysphagia 11/06/2015     Priority: Medium    Epidural abscess 11/06/2015     Priority: Medium    Essential hypertension 11/06/2015     Priority: Medium     Formatting of this note might be different from the original.  Created by Conversion    Replacement Utility updated for latest IMO load      Lesion of salivary gland 11/06/2015     Priority: Medium    Somnolence 11/06/2015     Priority: Medium    Discitis 10/04/2015     Priority: Medium    Adult Still's disease (H) 10/01/2015     Priority: Medium    Leukocytosis 09/29/2015     Priority: Medium    Sepsis (H) 09/29/2015     Priority: Medium     Past Surgical History:   Procedure Laterality Date    ANESTHESIA OUT OF OR MRI N/A 10/2/2015    Procedure: ANESTHESIA OUT OF OR MRI;  Surgeon: GENERIC ANESTHESIA PROVIDER;  Location: [facility] OR    BACK SURGERY  2008    lumbar spine surgery-- unclear details    CYSTOSCOPY, LITHOTRIPSY, COMBINED N/A 7/5/2024    Procedure: LITHOTRIPSY, WITH CYSTOSCOPY;  Surgeon: Radha Gómez MD;  Location:  OR    LASER HOLMIUM ENUCLEATION PROSTATE N/A 7/5/2024    Procedure: Holmium Laser Enucleation of the Prostate;  Surgeon: Radha Gómez MD;  Location:  OR    SOFT TISSUE SURGERY  2012    skin cancer removed from L shoulder, chest, right neck      Past Medical History:    Diagnosis Date    Diabetes (H)     Hypertension      No Known Allergies  Current Outpatient Medications   Medication Sig Dispense Refill    amLODIPine (NORVASC) 5 MG tablet TAKE 1 TABLET(5 MG) BY MOUTH DAILY 90 tablet 1    atorvastatin (LIPITOR) 20 MG tablet TAKE 1 TABLET(20 MG) BY MOUTH DAILY 90 tablet 1    celecoxib (CELEBREX) 100 MG capsule TAKE 1 CAPSULE(100 MG) BY MOUTH TWICE DAILY 180 capsule 3    DULoxetine (CYMBALTA) 20 MG capsule Take 1 capsule (20 mg) by mouth daily for 30 days 30 capsule 2    furosemide (LASIX) 40 MG tablet TAKE 1 TABLET(40 MG) BY MOUTH DAILY 90 tablet 2    hydroxychloroquine (PLAQUENIL) 200 MG tablet Take 1 tablet (200 mg) by mouth 2 times daily 180 tablet 0    lisinopril (ZESTRIL) 10 MG tablet TAKE 1 TABLET(10 MG) BY MOUTH DAILY 90 tablet 2    metFORMIN (GLUCOPHAGE XR) 500 MG 24 hr tablet TAKE 3 TABLETS BY MOUTH EVERY MORNING AND 1 TABLET BY MOUTH EVERY EVENING 360 tablet 3    oxyCODONE (ROXICODONE) 5 MG tablet Take 1 tablet (5 mg) by mouth every 6 hours as needed for severe pain 12 tablet 0     family history includes Coronary Artery Disease in his father; Hypertension in his father.  Social Connections: Not on file          WBC   Date Value Ref Range Status   04/23/2021 8.2 4.0 - 11.0 10e9/L Final     WBC Count   Date Value Ref Range Status   07/06/2024 8.2 4.0 - 11.0 10e3/uL Final     RBC Count   Date Value Ref Range Status   07/06/2024 4.51 4.40 - 5.90 10e6/uL Final   04/23/2021 5.25 4.4 - 5.9 10e12/L Final     Hemoglobin   Date Value Ref Range Status   07/06/2024 13.2 (L) 13.3 - 17.7 g/dL Final   04/23/2021 15.2 13.3 - 17.7 g/dL Final     Hematocrit   Date Value Ref Range Status   07/06/2024 40.6 40.0 - 53.0 % Final   04/23/2021 44.8 40.0 - 53.0 % Final     MCV   Date Value Ref Range Status   07/06/2024 90 78 - 100 fL Final   04/23/2021 85 78 - 100 fl Final     MCH   Date Value Ref Range Status   07/06/2024 29.3 26.5 - 33.0 pg Final   04/23/2021 29.0 26.5 - 33.0 pg Final      Platelet Count   Date Value Ref Range Status   07/06/2024 163 150 - 450 10e3/uL Final   04/23/2021 222 150 - 450 10e9/L Final     % Lymphocytes   Date Value Ref Range Status   05/17/2024 35 % Final   04/23/2021 20.9 % Final     AST   Date Value Ref Range Status   02/06/2024 16 0 - 45 U/L Final     Comment:     Reference intervals for this test were updated on 6/12/2023 to more accurately reflect our healthy population. There may be differences in the flagging of prior results with similar values performed with this method. Interpretation of those prior results can be made in the context of the updated reference intervals.   10/12/2015 18 0 - 45 U/L Final     ALT   Date Value Ref Range Status   02/06/2024 17 0 - 70 U/L Final     Comment:     Reference intervals for this test were updated on 6/12/2023 to more accurately reflect our healthy population. There may be differences in the flagging of prior results with similar values performed with this method. Interpretation of those prior results can be made in the context of the updated reference intervals.     10/12/2015 30 0 - 70 U/L Final     Albumin   Date Value Ref Range Status   02/06/2024 4.4 3.5 - 5.2 g/dL Final   05/03/2022 4.0 3.5 - 5.0 g/dL Final   10/12/2015 2.2 (L) 3.4 - 5.0 g/dL Final     Alkaline Phosphatase   Date Value Ref Range Status   02/06/2024 107 40 - 150 U/L Final     Comment:     Reference intervals for this test were updated on 11/14/2023 to more accurately reflect our healthy population. There may be differences in the flagging of prior results with similar values performed with this method. Interpretation of those prior results can be made in the context of the updated reference intervals.   10/17/2015 133 40 - 150 U/L Final     Creatinine   Date Value Ref Range Status   07/06/2024 0.76 0.67 - 1.17 mg/dL Final   04/23/2021 0.86 0.66 - 1.25 mg/dL Final     GFR Estimate   Date Value Ref Range Status   07/06/2024 >90 >60 mL/min/1.73m2 Final      Comment:     eGFR calculated using 2021 CKD-EPI equation.   04/23/2021 87 >60 mL/min/[1.73_m2] Final     Comment:     Non  GFR Calc  Starting 12/18/2018, serum creatinine based estimated GFR (eGFR) will be   calculated using the Chronic Kidney Disease Epidemiology Collaboration   (CKD-EPI) equation.       GFR Estimate If Black   Date Value Ref Range Status   04/23/2021 >90 >60 mL/min/[1.73_m2] Final     Comment:      GFR Calc  Starting 12/18/2018, serum creatinine based estimated GFR (eGFR) will be   calculated using the Chronic Kidney Disease Epidemiology Collaboration   (CKD-EPI) equation.       Creatinine Urine mg/dL   Date Value Ref Range Status   10/03/2023 19.0 mg/dL Final     Comment:     The reference ranges have not been established in urine creatinine. The results should be integrated into the clinical context for interpretation.   08/24/2021 40 mg/dL Final     Sed Rate   Date Value Ref Range Status   04/23/2021 9 0 - 20 mm/h Final     Erythrocyte Sedimentation Rate   Date Value Ref Range Status   08/24/2021 2 0 - 15 mm/hr Final     CRP Inflammation   Date Value Ref Range Status   04/23/2021 3.6 0.0 - 8.0 mg/L Final     CRP   Date Value Ref Range Status   08/24/2021 0.2 0.0-<0.8 mg/dL Final

## 2024-07-30 RX ORDER — HYDROXYCHLOROQUINE SULFATE 200 MG/1
200 TABLET, FILM COATED ORAL 2 TIMES DAILY
Qty: 180 TABLET | Refills: 0 | OUTPATIENT
Start: 2024-07-30

## 2024-08-05 ENCOUNTER — TELEPHONE (OUTPATIENT)
Dept: SURGERY | Facility: CLINIC | Age: 74
End: 2024-08-05
Payer: MEDICARE

## 2024-08-05 ENCOUNTER — TELEPHONE (OUTPATIENT)
Dept: UROLOGY | Facility: CLINIC | Age: 74
End: 2024-08-05
Payer: MEDICARE

## 2024-08-05 DIAGNOSIS — R35.0 URINARY FREQUENCY: Primary | ICD-10-CM

## 2024-08-05 NOTE — TELEPHONE ENCOUNTER
"Dr Gómez replied in a routing comment:  \"He needs a urine analysis and urine culture and visit to clinic for PVR \"    Writer called patient, patient stated tomorrow morning at 0900 will be fine.  Patient was scheduled for Nurse Only appt for Tue 8/6/24 0900 for UA w/micro culture and PVR    Abraham MILLARD [facility]    "

## 2024-08-05 NOTE — TELEPHONE ENCOUNTER
[facility] Call Center    Phone Message    May a detailed message be left on voicemail: yes     Reason for Call: Other:  Pt had surgery 4 weeks ago and is in a lot of pain still. Pt is requesting to speak with Dr. Gómez or see him for an appt ASAP. Please call pt back to discuss. Thanks      Action Taken: Other: Uro    Travel Screening: Not Applicable     Date of Service:

## 2024-08-05 NOTE — TELEPHONE ENCOUNTER
Patient called in asking to speak to Dr Gómez.  Writer called patient back to triage.    Patient had procedure with Dr Gómez on 7/5/24    Patient has significant urgency with urination, states he cannot hold it back and it comes within seconds of hte onset of sensation    Since last Thur or Fri patient has had a lot of pain associated with urinating  The pain comes on after beginning to urinate  It builds until it becomes excruciating while patient is urinating and then the pain subsides after the void.  Pain is completely gone 5 minutes after urinating.    When patient urinates he only pees for a very short amount of time (says 3 seconds).  He feels like has to go more but it shuts off and the rest of the urine will not come out.  Patient says when he sits down he feels like there is something inside his penis.  When patient starts urinating he can sometimes feel something in his anus.  Since last Friday patient does have a little bit of urine incontinence, like a 1/2 tsp at a time.  Patient is urinating every 2 hours or so but does not feel like he is emptying his bladder.  Denies fever, weakness, lethargy, confusion, any other pain.   Says he feels good otherwise.    Routed to Dr Gómez:  Please advise on patient's best course of action.  He is willing to drive anywhere.      Abraham MILLARD [facility]

## 2024-08-06 ENCOUNTER — TELEPHONE (OUTPATIENT)
Dept: SURGERY | Facility: CLINIC | Age: 74
End: 2024-08-06

## 2024-08-06 ENCOUNTER — ALLIED HEALTH/NURSE VISIT (OUTPATIENT)
Dept: UROLOGY | Facility: CLINIC | Age: 74
End: 2024-08-06
Payer: MEDICARE

## 2024-08-06 DIAGNOSIS — R35.0 URINARY FREQUENCY: ICD-10-CM

## 2024-08-06 DIAGNOSIS — R30.0 DYSURIA: ICD-10-CM

## 2024-08-06 DIAGNOSIS — R35.0 URINARY FREQUENCY: Primary | ICD-10-CM

## 2024-08-06 LAB
ALBUMIN UR-MCNC: 100 MG/DL
APPEARANCE UR: ABNORMAL
BACTERIA #/AREA URNS HPF: ABNORMAL /HPF
BILIRUB UR QL STRIP: NEGATIVE
COLOR UR AUTO: YELLOW
GLUCOSE UR STRIP-MCNC: NEGATIVE MG/DL
HGB UR QL STRIP: ABNORMAL
KETONES UR STRIP-MCNC: ABNORMAL MG/DL
LEUKOCYTE ESTERASE UR QL STRIP: ABNORMAL
MUCOUS THREADS #/AREA URNS LPF: PRESENT /LPF
NITRATE UR QL: NEGATIVE
PH UR STRIP: 6.5 [PH] (ref 5–7)
RBC #/AREA URNS AUTO: ABNORMAL /HPF
SP GR UR STRIP: 1.02 (ref 1–1.03)
SQUAMOUS #/AREA URNS AUTO: ABNORMAL /LPF
UROBILINOGEN UR STRIP-ACNC: 1 E.U./DL
WBC #/AREA URNS AUTO: ABNORMAL /HPF

## 2024-08-06 PROCEDURE — 87086 URINE CULTURE/COLONY COUNT: CPT

## 2024-08-06 PROCEDURE — 99024 POSTOP FOLLOW-UP VISIT: CPT

## 2024-08-06 PROCEDURE — 81001 URINALYSIS AUTO W/SCOPE: CPT

## 2024-08-06 RX ORDER — SENNA AND DOCUSATE SODIUM 50; 8.6 MG/1; MG/1
1 TABLET, FILM COATED ORAL DAILY
Qty: 30 TABLET | Refills: 0 | Status: SHIPPED | OUTPATIENT
Start: 2024-08-06

## 2024-08-06 RX ORDER — OXYBUTYNIN CHLORIDE 10 MG/1
10 TABLET, EXTENDED RELEASE ORAL DAILY
Qty: 7 TABLET | Refills: 0 | Status: SHIPPED | OUTPATIENT
Start: 2024-08-06 | End: 2024-08-13

## 2024-08-06 RX ORDER — PHENAZOPYRIDINE HYDROCHLORIDE 200 MG/1
200 TABLET, FILM COATED ORAL 3 TIMES DAILY PRN
Qty: 12 TABLET | Refills: 0 | Status: SHIPPED | OUTPATIENT
Start: 2024-08-06 | End: 2024-08-10

## 2024-08-06 RX ORDER — SULFAMETHOXAZOLE/TRIMETHOPRIM 800-160 MG
1 TABLET ORAL 2 TIMES DAILY
Qty: 14 TABLET | Refills: 0 | Status: SHIPPED | OUTPATIENT
Start: 2024-08-06 | End: 2024-08-13

## 2024-08-06 NOTE — NURSING NOTE
Patient presents to the clinic for RN Post Void Residual and leave a Urine Specimen for lab.  Patient Diagnosis for visit= Urinary Frequency.  Verbal Order has been verified under Dr Gómez who is also the In-Clinic overseeing Provider:  After discussion of procedure today and patient verbal agreement; Patient allowed personal time to void and collect urine.  Pt measured 15ml  Bladder Scanner revealed 21ml  Because of pt complaint- Report given to Provider who then spoke with pt face to face to discuss plan.  No further orders for RN to complete.  Pt discharged by Provider.  Linn Jimenes RN  [facility]

## 2024-08-07 LAB — BACTERIA UR CULT: NO GROWTH

## 2024-08-08 ENCOUNTER — TRANSFERRED RECORDS (OUTPATIENT)
Dept: HEALTH INFORMATION MANAGEMENT | Facility: CLINIC | Age: 74
End: 2024-08-08
Payer: MEDICARE

## 2024-08-16 ENCOUNTER — ALLIED HEALTH/NURSE VISIT (OUTPATIENT)
Dept: UROLOGY | Facility: CLINIC | Age: 74
End: 2024-08-16
Payer: MEDICARE

## 2024-08-16 DIAGNOSIS — R35.0 URINARY FREQUENCY: Primary | ICD-10-CM

## 2024-08-16 DIAGNOSIS — R30.0 DYSURIA: ICD-10-CM

## 2024-08-16 DIAGNOSIS — N32.89 BLADDER SPASMS: ICD-10-CM

## 2024-08-16 LAB
ALBUMIN UR-MCNC: 100 MG/DL
APPEARANCE UR: ABNORMAL
BACTERIA #/AREA URNS HPF: ABNORMAL /HPF
BILIRUB UR QL STRIP: NEGATIVE
COLOR UR AUTO: YELLOW
GLUCOSE UR STRIP-MCNC: NEGATIVE MG/DL
HGB UR QL STRIP: ABNORMAL
KETONES UR STRIP-MCNC: ABNORMAL MG/DL
LEUKOCYTE ESTERASE UR QL STRIP: ABNORMAL
NITRATE UR QL: NEGATIVE
PH UR STRIP: 6 [PH] (ref 5–7)
RBC #/AREA URNS AUTO: ABNORMAL /HPF
SP GR UR STRIP: >=1.03 (ref 1–1.03)
SQUAMOUS #/AREA URNS AUTO: ABNORMAL /LPF
UROBILINOGEN UR STRIP-ACNC: 0.2 E.U./DL
WBC #/AREA URNS AUTO: >100 /HPF
WBC CLUMPS #/AREA URNS HPF: PRESENT /HPF

## 2024-08-16 PROCEDURE — 81001 URINALYSIS AUTO W/SCOPE: CPT

## 2024-08-16 PROCEDURE — 99207 PR NO CHARGE NURSE ONLY: CPT

## 2024-08-16 PROCEDURE — 87086 URINE CULTURE/COLONY COUNT: CPT

## 2024-08-16 RX ORDER — PHENAZOPYRIDINE HYDROCHLORIDE 200 MG/1
200 TABLET, FILM COATED ORAL 3 TIMES DAILY PRN
Qty: 12 TABLET | Refills: 0 | Status: SHIPPED | OUTPATIENT
Start: 2024-08-16 | End: 2024-09-17

## 2024-08-16 RX ORDER — CEPHALEXIN 500 MG/1
500 CAPSULE ORAL 2 TIMES DAILY
Qty: 14 CAPSULE | Refills: 0 | Status: SHIPPED | OUTPATIENT
Start: 2024-08-16 | End: 2024-08-23

## 2024-08-16 RX ORDER — OXYBUTYNIN CHLORIDE 5 MG/1
5 TABLET, EXTENDED RELEASE ORAL DAILY
Qty: 20 TABLET | Refills: 0 | Status: SHIPPED | OUTPATIENT
Start: 2024-08-16 | End: 2024-09-17

## 2024-08-16 NOTE — PROGRESS NOTES
Patient Active Problem List   Diagnosis    Leukocytosis    Sepsis (H)    Adult Still's disease (H)    Discitis    Need for vaccination    Need for pneumococcal vaccination    Bacteremia due to Staphylococcus aureus    BPH with urinary obstruction    Chronic polyarticular juvenile rheumatoid arthritis (H)    Diverticulosis    Dysphagia    Emotional lability    Epidural abscess    Essential hypertension    Hyperlipidemia    Left carpal tunnel syndrome    Lesion of salivary gland    Lower back pain    Non morbid obesity due to excess calories    Peripheral edema    Polyarthralgia    Primary osteoarthritis involving multiple joints    Somnolence    Trigger finger, right index finger    Type 2 diabetes mellitus (H)    Unintentional weight loss    Morbid obesity (H)    Bladder stones    BPH (benign prostatic hyperplasia)       No Known Allergies    Current Outpatient Medications   Medication Sig Dispense Refill    amLODIPine (NORVASC) 5 MG tablet TAKE 1 TABLET(5 MG) BY MOUTH DAILY 90 tablet 1    atorvastatin (LIPITOR) 20 MG tablet TAKE 1 TABLET(20 MG) BY MOUTH DAILY 90 tablet 1    celecoxib (CELEBREX) 100 MG capsule TAKE 1 CAPSULE(100 MG) BY MOUTH TWICE DAILY 180 capsule 3    cephALEXin (KEFLEX) 500 MG capsule Take 1 capsule (500 mg) by mouth 2 times daily for 7 days 14 capsule 0    furosemide (LASIX) 40 MG tablet TAKE 1 TABLET(40 MG) BY MOUTH DAILY 90 tablet 2    hydroxychloroquine (PLAQUENIL) 200 MG tablet Take 1 tablet (200 mg) by mouth 2 times daily 180 tablet 1    lisinopril (ZESTRIL) 10 MG tablet TAKE 1 TABLET(10 MG) BY MOUTH DAILY 90 tablet 2    metFORMIN (GLUCOPHAGE XR) 500 MG 24 hr tablet TAKE 3 TABLETS BY MOUTH EVERY MORNING AND 1 TABLET BY MOUTH EVERY EVENING 360 tablet 3    oxyBUTYnin ER (DITROPAN XL) 5 MG 24 hr tablet Take 1 tablet (5 mg) by mouth daily 20 tablet 0    phenazopyridine (PYRIDIUM) 200 MG tablet Take 1 tablet (200 mg) by mouth 3 times daily as needed for irritation 12 tablet 0     "SENNA-docusate sodium (SENNA S) 8.6-50 MG tablet Take 1 tablet by mouth daily For preventing constipation 30 tablet 0       Social History     Tobacco Use    Smoking status: Never     Passive exposure: Never    Smokeless tobacco: Former   Substance Use Topics    Alcohol use: No     Comment: Quit in 1990    Drug use: No       Thiago Hein comes into clinic today at the request of Flakita Malloy PA-C for a UA/UC and post void residual.    Patient diagnosis: Painful Urination, urinary retention, Urinary Tract Infection.     This service provided today was under the direct supervision of Flakita Malloy PA-C, who was available if needed.    Patient stated he had urge to urinate and a urine sample was done by the patient. This was very painful for the patient. Pain located in the urethra with bladder sensitivity. Just noted as \"extreme\".   Patient was given a specimen cup to measure urine output.  Patient voided approximately 25 mL of cloudy urine.  PVR 19 mL.    Results given to provider, and provider prescribed medications.    Teaching done with patient verbally as where to call or go if pain, fever, or unable to urinate should symptoms get worse or persist.     Prescriptions sent to [facility] pharmacy per patient preference.     Patient has an appointment with Dr. Vega on 8/20/24    Bryanna Nunez RN  8/16/2024  11:33 AM  "

## 2024-08-16 NOTE — PROGRESS NOTES
No chief complaint on file.      Patient Active Problem List   Diagnosis    Leukocytosis    Sepsis (H)    Adult Still's disease (H)    Discitis    Need for vaccination    Need for pneumococcal vaccination    Bacteremia due to Staphylococcus aureus    BPH with urinary obstruction    Chronic polyarticular juvenile rheumatoid arthritis (H)    Diverticulosis    Dysphagia    Emotional lability    Epidural abscess    Essential hypertension    Hyperlipidemia    Left carpal tunnel syndrome    Lesion of salivary gland    Lower back pain    Non morbid obesity due to excess calories    Peripheral edema    Polyarthralgia    Primary osteoarthritis involving multiple joints    Somnolence    Trigger finger, right index finger    Type 2 diabetes mellitus (H)    Unintentional weight loss    Morbid obesity (H)    Bladder stones    BPH (benign prostatic hyperplasia)       No Known Allergies

## 2024-08-17 LAB — BACTERIA UR CULT: NO GROWTH

## 2024-08-20 ENCOUNTER — OFFICE VISIT (OUTPATIENT)
Dept: UROLOGY | Facility: CLINIC | Age: 74
End: 2024-08-20
Payer: MEDICARE

## 2024-08-20 VITALS
DIASTOLIC BLOOD PRESSURE: 60 MMHG | HEIGHT: 72 IN | HEART RATE: 66 BPM | RESPIRATION RATE: 12 BRPM | SYSTOLIC BLOOD PRESSURE: 114 MMHG | BODY MASS INDEX: 29.58 KG/M2 | WEIGHT: 218.4 LBS | OXYGEN SATURATION: 97 %

## 2024-08-20 DIAGNOSIS — R35.0 URINARY FREQUENCY: Primary | ICD-10-CM

## 2024-08-20 PROCEDURE — 87086 URINE CULTURE/COLONY COUNT: CPT | Performed by: STUDENT IN AN ORGANIZED HEALTH CARE EDUCATION/TRAINING PROGRAM

## 2024-08-20 PROCEDURE — 52000 CYSTOURETHROSCOPY: CPT | Performed by: STUDENT IN AN ORGANIZED HEALTH CARE EDUCATION/TRAINING PROGRAM

## 2024-08-20 RX ORDER — OXYBUTYNIN CHLORIDE 15 MG/1
15 TABLET, EXTENDED RELEASE ORAL DAILY
Qty: 30 TABLET | Refills: 0 | Status: SHIPPED | OUTPATIENT
Start: 2024-08-20 | End: 2024-09-19

## 2024-08-20 ASSESSMENT — PAIN SCALES - GENERAL: PAINLEVEL: NO PAIN (0)

## 2024-08-20 NOTE — NURSING NOTE
Chief Complaint   Patient presents with    Cystoscopy       Vitals:    08/20/24 0920   BP: 114/60   BP Location: Left arm   Patient Position: Sitting   Cuff Size: Adult Large   Pulse: 66   Resp: 12   SpO2: 97%   Weight: 99.1 kg (218 lb 6.4 oz)   Height: 1.829 m (6')     Wt Readings from Last 1 Encounters:   08/20/24 99.1 kg (218 lb 6.4 oz)       Vijaya ENCISO CMA.................8/20/2024

## 2024-08-20 NOTE — PATIENT INSTRUCTIONS
Hai Holden    I did your cystoscopy today and it shows a inflamed prostate area that is still healing. Your prostate area is taking longer to heal I suspect due to fact that you take hydroxychloroquine.

## 2024-08-20 NOTE — PROGRESS NOTES
Reason for cystoscopy: DYSURIA    Brief History:  Thiago Hein is a very pleasant AGE: 74 year old year old person   He has a history of rheumatoid arthritis, type 2 diabetes, hyperlipidemia, adult stills disease.     May 20, 2024 he was found to have several bladder stones the largest measuring 1.2 cm he did have a flow rate that showed a plateaued curve diminishing at around 12 mL/s maximum He had a CT urogram performed on 6/6/2024 that did not show any other etiologies for the gross hematuria Prostate shows size of 7.0 x 7.0 x 6.4 = 156 grams on the CT scan based on my review    Given large prostate, bladder stones, we opted to do holep on 7/5/2024 and removed 105 grams. It was benign    Since then he has had lot of dysuria after the surgery. He has had negative urine cultures. However he has had UA with lot of WBCs and WBC clumps.     He continues to have small voids and urgency and frequency.  He has pain usually with voiding that improves shortly after.    The following distinct labs were reviewed   Most Recent 3 BMP's:  Recent Labs   Lab Test 07/06/24  1229 07/06/24  0734 07/06/24  0724 07/05/24  1127 07/05/24  1025 07/05/24  0710 06/20/24  0919   NA  --  141  --   --  144  --  140   POTASSIUM  --  3.9  --   --  4.0 3.6 4.9   CHLORIDE  --  105  --   --  107  --  106   CO2  --  28  --   --  28  --  26   BUN  --  10.1  --   --  10.0  --  14.3   CR  --  0.76  --   --  0.91  --  0.87   ANIONGAP  --  8  --   --  9  --  8   LOLA  --  8.3*  --   --  7.9*  --  9.5   * 120* 116*   < > 144* 153* 143*    < > = values in this interval not displayed.     Most Recent Urinalysis:  Recent Labs   Lab Test 08/16/24  1108   COLOR Yellow   APPEARANCE Slightly Cloudy*   URINEGLC Negative   URINEBILI Negative   URINEKETONE Trace*   SG >=1.030   UBLD Small*   URINEPH 6.0   PROTEIN 100*   UROBILINOGEN 0.2   NITRITE Negative   LEUKEST Moderate*   RBCU 10-25*   WBCU >100*         CYSTOSCOPY  We discussed the risks and  benefits of the procedure which include risk of bleeding and infection.  Informed consent was obtained, the patient was prepped and draped in the standard sterile fashion.  A flexible cystoscope was introduced through a well-lubricated urethra.     Anterior urethra strictures were absent.   The urinary sphincter was intact.  The prostate demonstrated evidence of prior laser nucleation of the prostate.  The fossa is open.  However there appears to be a lot of fibrinous white tissue versus dystrophic calcifications in the fossa        Bladder neck was open.   The flexible cystoscope was removed and the findings were described to the patient.     Assessment and Plan  There is a 74-year-old male with a history of lower urinary tract symptoms, urinary retention, bladder stones and a very enlarged prostate that I did a holmium laser nucleation of the prostate on about a month ago.  He was doing well and then he developed urinary urgency dysuria and his urine analyses have shown a lot of inflammatory changes but his cultures have been negative.  He has been on anticholinergic and Pyridium but still has bothersome urinary symptoms so we did a cystoscopy today that shows a healing fossa but shows quite a bit of fibrinous white tissue in the fossa much more than I would anticipate.    He does take hydroxychloroquine for his inflammatory arthritis and I am wondering if this is affected the healing process and could be the reason for these findings and perhaps is the reason for all his urinary symptoms.    I discussed with one of my colleagues and we think it may be beneficial to do a cystoscopy under anesthesia and potentially use a loop to resect some of this dystrophic healing to see if that will help with his symptoms.  The patient is also going to decrease the dose of his hydroxychloroquine and see if that will help.  Ideally I would want this tissue to slough off and he should avoid it out and maybe that will help.  He  wants to give it a few weeks to see if things improve but potentially in about a month I will plan to go to the OR for cystoscopy and a transurethral resection of prostate

## 2024-08-21 ENCOUNTER — HOSPITAL ENCOUNTER (OUTPATIENT)
Facility: CLINIC | Age: 74
End: 2024-08-21
Attending: STUDENT IN AN ORGANIZED HEALTH CARE EDUCATION/TRAINING PROGRAM | Admitting: STUDENT IN AN ORGANIZED HEALTH CARE EDUCATION/TRAINING PROGRAM
Payer: MEDICARE

## 2024-08-22 LAB — BACTERIA UR CULT: NO GROWTH

## 2024-09-08 DIAGNOSIS — E78.5 HYPERLIPIDEMIA, UNSPECIFIED HYPERLIPIDEMIA TYPE: ICD-10-CM

## 2024-09-09 RX ORDER — ATORVASTATIN CALCIUM 20 MG/1
TABLET, FILM COATED ORAL
Qty: 90 TABLET | Refills: 2 | Status: SHIPPED | OUTPATIENT
Start: 2024-09-09

## 2024-09-17 ENCOUNTER — OFFICE VISIT (OUTPATIENT)
Dept: UROLOGY | Facility: CLINIC | Age: 74
End: 2024-09-17
Payer: MEDICARE

## 2024-09-17 VITALS
OXYGEN SATURATION: 98 % | HEART RATE: 59 BPM | SYSTOLIC BLOOD PRESSURE: 108 MMHG | RESPIRATION RATE: 14 BRPM | BODY MASS INDEX: 29.61 KG/M2 | HEIGHT: 72 IN | WEIGHT: 218.6 LBS | DIASTOLIC BLOOD PRESSURE: 67 MMHG

## 2024-09-17 DIAGNOSIS — R35.0 URINARY FREQUENCY: Primary | ICD-10-CM

## 2024-09-17 ASSESSMENT — PAIN SCALES - GENERAL: PAINLEVEL: NO PAIN (0)

## 2024-09-17 NOTE — PROGRESS NOTES
UROLOGY OUTPATIENT VISIT      Chief Complaint:         Synopsis     Thiago Hein is a very pleasant AGE: 74 year old year old person   He has a history of rheumatoid arthritis, type 2 diabetes, hyperlipidemia, adult stills disease.     May 20, 2024 he was found to have several bladder stones the largest measuring 1.2 cm he did have a flow rate that showed a plateaued curve diminishing at around 12 mL/s maximum He had a CT urogram performed on 6/6/2024 that did not show any other etiologies for the gross hematuria Prostate shows size of 7.0 x 7.0 x 6.4 = 156 grams on the CT scan based on my review     Given large prostate, bladder stones, we opted to do holep on 7/5/2024 and removed 105 grams. It was benign     Since then he has had lot of dysuria after the surgery. He has had negative urine cultures. However he has had UA with lot of WBCs and WBC clumps.      He continues to have small voids and urgency and frequency.  He has pain usually with voiding that improves shortly after.    I did cystoscopy on 8/20/2024   The prostate demonstrated evidence of prior laser nucleation of the prostate.  The fossa is open.  However there appears to be a lot of fibrinous white tissue versus dystrophic calcifications in the fossa    He does take hydroxychloroquine for his inflammatory arthritis and I am wondering if this is affected the healing process and could be the reason for these findings and perhaps is the reason for all his urinary symptoms.    I discussed with my collegues who do Holep and there's consideration for TUR of the calcifications to see if it helps improve his voiding symptoms, perhaps hold his hydroxychloroquine     He decreased the dose and we will see how he is doing    I have him scheduled for TUR    9/17/2024 today he reports urination is much better, he is able to urinate longer stretched like 12-14 seconds instead of 2 seconds, he has noticed improvement taking the hydroxychloroquine down to 1  tab per day     Medical Comorbidities      Past Medical History:   Diagnosis Date    Diabetes (H)     Hypertension                Medications     Current Outpatient Medications   Medication Sig Dispense Refill    amLODIPine (NORVASC) 5 MG tablet TAKE 1 TABLET(5 MG) BY MOUTH DAILY 90 tablet 1    atorvastatin (LIPITOR) 20 MG tablet TAKE 1 TABLET(20 MG) BY MOUTH DAILY 90 tablet 2    celecoxib (CELEBREX) 100 MG capsule TAKE 1 CAPSULE(100 MG) BY MOUTH TWICE DAILY 180 capsule 3    furosemide (LASIX) 40 MG tablet TAKE 1 TABLET(40 MG) BY MOUTH DAILY 90 tablet 2    hydroxychloroquine (PLAQUENIL) 200 MG tablet Take 1 tablet (200 mg) by mouth 2 times daily 180 tablet 1    lisinopril (ZESTRIL) 10 MG tablet TAKE 1 TABLET(10 MG) BY MOUTH DAILY 90 tablet 2    metFORMIN (GLUCOPHAGE XR) 500 MG 24 hr tablet TAKE 3 TABLETS BY MOUTH EVERY MORNING AND 1 TABLET BY MOUTH EVERY EVENING 360 tablet 3    oxyBUTYnin ER (DITROPAN XL) 15 MG 24 hr tablet Take 1 tablet (15 mg) by mouth daily for 30 days Daily while stent in place for bladder spasms 30 tablet 0    oxyBUTYnin ER (DITROPAN XL) 5 MG 24 hr tablet Take 1 tablet (5 mg) by mouth daily 20 tablet 0    phenazopyridine (PYRIDIUM) 200 MG tablet Take 1 tablet (200 mg) by mouth 3 times daily as needed for irritation 12 tablet 0    SENNA-docusate sodium (SENNA S) 8.6-50 MG tablet Take 1 tablet by mouth daily For preventing constipation 30 tablet 0     No current facility-administered medications for this visit.            Assessment/Plan   There is a 74-year-old male with a history of lower urinary tract symptoms, urinary retention, bladder stones and a very enlarged prostate that I did a holmium laser nucleation of the prostate on about a month ago.  He was doing well and then he developed urinary urgency dysuria and his urine analyses have shown a lot of inflammatory changes but his cultures have been negative.  He has been on anticholinergic and Pyridium but still has bothersome urinary  symptoms so we did a cystoscopy  that shows a healing fossa but shows quite a bit of fibrinous white tissue in the fossa much more than I would anticipate.    We were planning to do a TUR of the area to remove the fibrinous white tissue sleeve that will help his symptoms.  Today was a check-in visit to see how he is doing.  Thankfully it seems to be improving on its own after cutting back on the hydroxychloroquine.  He reports that now he is able to pee for a longer stretch of time like 12 to 15 seconds only has little bit of pain at the very end.    He wants to hold off on surgery for now which is reasonable and we will wait and recheck in a couple weeks on his urinary symptoms    CC:  Don Armijo

## 2024-09-17 NOTE — NURSING NOTE
Chief Complaint   Patient presents with    Follow Up     Issues after HOLEP, discuss next procedure       Vitals:    09/17/24 1128   BP: 108/67   BP Location: Left arm   Patient Position: Sitting   Cuff Size: Adult Regular   Pulse: 59   Resp: 14   SpO2: 98%   Weight: 99.2 kg (218 lb 9.6 oz)   Height: 1.829 m (6')     Wt Readings from Last 1 Encounters:   09/17/24 99.2 kg (218 lb 9.6 oz)       Vjiaya ENCISO CMA.................9/17/2024

## 2024-09-24 ENCOUNTER — OFFICE VISIT (OUTPATIENT)
Dept: FAMILY MEDICINE | Facility: CLINIC | Age: 74
End: 2024-09-24
Payer: MEDICARE

## 2024-09-24 VITALS
OXYGEN SATURATION: 98 % | TEMPERATURE: 98.3 F | WEIGHT: 210.2 LBS | SYSTOLIC BLOOD PRESSURE: 114 MMHG | BODY MASS INDEX: 29.43 KG/M2 | RESPIRATION RATE: 17 BRPM | DIASTOLIC BLOOD PRESSURE: 68 MMHG | HEART RATE: 79 BPM | HEIGHT: 71 IN

## 2024-09-24 VITALS
RESPIRATION RATE: 17 BRPM | SYSTOLIC BLOOD PRESSURE: 114 MMHG | TEMPERATURE: 98.3 F | BODY MASS INDEX: 29.43 KG/M2 | OXYGEN SATURATION: 98 % | HEART RATE: 79 BPM | DIASTOLIC BLOOD PRESSURE: 68 MMHG | WEIGHT: 210.2 LBS | HEIGHT: 71 IN

## 2024-09-24 DIAGNOSIS — Z01.818 PRE-OP EXAMINATION: ICD-10-CM

## 2024-09-24 DIAGNOSIS — Z01.818 PRE-OP EXAMINATION: Primary | ICD-10-CM

## 2024-09-24 DIAGNOSIS — M06.1 ADULT STILL'S DISEASE (H): Primary | ICD-10-CM

## 2024-09-24 DIAGNOSIS — I10 ESSENTIAL HYPERTENSION: ICD-10-CM

## 2024-09-24 DIAGNOSIS — E11.9 TYPE 2 DIABETES MELLITUS WITHOUT COMPLICATION, WITHOUT LONG-TERM CURRENT USE OF INSULIN (H): ICD-10-CM

## 2024-09-24 DIAGNOSIS — R60.0 PERIPHERAL EDEMA: ICD-10-CM

## 2024-09-24 DIAGNOSIS — N13.8 BPH WITH URINARY OBSTRUCTION: ICD-10-CM

## 2024-09-24 DIAGNOSIS — N40.1 BPH WITH URINARY OBSTRUCTION: ICD-10-CM

## 2024-09-24 DIAGNOSIS — M06.1 ADULT STILL'S DISEASE (H): ICD-10-CM

## 2024-09-24 DIAGNOSIS — E78.5 HYPERLIPIDEMIA, UNSPECIFIED HYPERLIPIDEMIA TYPE: ICD-10-CM

## 2024-09-24 LAB
ANION GAP SERPL CALCULATED.3IONS-SCNC: 11 MMOL/L (ref 7–15)
BUN SERPL-MCNC: 16.6 MG/DL (ref 8–23)
CALCIUM SERPL-MCNC: 9.4 MG/DL (ref 8.8–10.4)
CHLORIDE SERPL-SCNC: 105 MMOL/L (ref 98–107)
CREAT SERPL-MCNC: 0.82 MG/DL (ref 0.67–1.17)
EGFRCR SERPLBLD CKD-EPI 2021: >90 ML/MIN/1.73M2
ERYTHROCYTE [DISTWIDTH] IN BLOOD BY AUTOMATED COUNT: 12.9 % (ref 10–15)
EST. AVERAGE GLUCOSE BLD GHB EST-MCNC: 126 MG/DL
GLUCOSE SERPL-MCNC: 116 MG/DL (ref 70–99)
HBA1C MFR BLD: 6 % (ref 0–5.6)
HCO3 SERPL-SCNC: 25 MMOL/L (ref 22–29)
HCT VFR BLD AUTO: 43.9 % (ref 40–53)
HGB BLD-MCNC: 14.8 G/DL (ref 13.3–17.7)
MCH RBC QN AUTO: 28.8 PG (ref 26.5–33)
MCHC RBC AUTO-ENTMCNC: 33.7 G/DL (ref 31.5–36.5)
MCV RBC AUTO: 86 FL (ref 78–100)
PLATELET # BLD AUTO: 221 10E3/UL (ref 150–450)
POTASSIUM SERPL-SCNC: 3.9 MMOL/L (ref 3.4–5.3)
RBC # BLD AUTO: 5.13 10E6/UL (ref 4.4–5.9)
SODIUM SERPL-SCNC: 141 MMOL/L (ref 135–145)
WBC # BLD AUTO: 6.4 10E3/UL (ref 4–11)

## 2024-09-24 PROCEDURE — 80048 BASIC METABOLIC PNL TOTAL CA: CPT

## 2024-09-24 PROCEDURE — 85027 COMPLETE CBC AUTOMATED: CPT

## 2024-09-24 PROCEDURE — 99214 OFFICE O/P EST MOD 30 MIN: CPT

## 2024-09-24 PROCEDURE — 83036 HEMOGLOBIN GLYCOSYLATED A1C: CPT

## 2024-09-24 PROCEDURE — 93005 ELECTROCARDIOGRAM TRACING: CPT

## 2024-09-24 PROCEDURE — 36415 COLL VENOUS BLD VENIPUNCTURE: CPT

## 2024-09-24 ASSESSMENT — PAIN SCALES - GENERAL
PAINLEVEL: NO PAIN (0)
PAINLEVEL: NO PAIN (0)

## 2024-09-24 NOTE — PATIENT INSTRUCTIONS
- ACE/ARB: Continue taking on day of surgery.   - Calcium Channel Blockers: May be continued on the day of surgery.   - Diuretics: DO NOT TAKE on the day of surgery.   - Statins: Continue taking on the day of surgery.    - celecoxib (Celebrex): DO NOT TAKE 3 days before surgery. May continue without modification for management of severe pain.   - Oxybutynin: Continue taking on day of surgery  - hydroxychloroquine: DO NOT TAKE on the day of surgery.

## 2024-09-24 NOTE — PROGRESS NOTES
Preoperative Evaluation  [facility]  [address]  [facility] [address]  Phone: [phone]  Fax: [phone]  Primary Provider: Don Armijo MD  Pre-op Performing Provider: ZACKERY Norton CNP  Sep 24, 2024             9/24/2024   Surgical Information   What procedure is being done? Cystoscopy with transurethral resection of prostate   Facility or Hospital where procedure/surgery will be performed: [facility]   Who is doing the procedure / surgery? Dr. Gómez   Date of surgery / procedure: 10/9/24   Time of surgery / procedure: 3:30 pm   Where do you plan to recover after surgery? at home with family      Fax number for surgical facility: Note does not need to be faxed, will be available electronically in Epic.    Assessment & Plan     The proposed surgical procedure is considered INTERMEDIATE risk.    Pre-op examination  Scheduled 10/9/2024 for cystoscopy with transurethral resection of prostate with Dr. Radha Gómez.  No prior complications with anesthesia.    - EKG 12-lead, tracing only  - Basic metabolic panel  (Ca, Cl, CO2, Creat, Gluc, K, Na, BUN); Future  - CBC with platelets; Future    BPH with urinary obstruction  History of urinary retention, bladder stones, enlarged prostate, and lower urinary tract symptoms.  Dysuria and urinary retention have improved with dosage reduction of hydroxychloroquine.    Hyperlipidemia, unspecified hyperlipidemia type  10/2023 LDL 41.  Hyperlipidemia managed with atorvastatin 20 mg daily.      Essential hypertension  Blood pressure in clinic 114/60.  Hypertension managed with amlodipine 5 mg and lisinopril 10 mg daily.      Peripheral edema  Takes furosemide 40 mg for peripheral edema.     Type 2 diabetes mellitus without complication, without long-term current use of insulin (H)   6/2024 A1c 6.1.  Type 2 diabetes managed with metformin 2000 mg daily (1500 mg in the morning and 500 mg at  nighttime).     Adult Still's disease (H)  For pain r/t osteoarthritis takes celecoxib 100 mg BID.      Rheumatoid arthritis  Hydroxychloroquine for RA was reduced to 200 mg daily.        - No identified additional risk factors other than previously addressed    Antiplatelet or Anticoagulation Medication Instructions   - Patient is on no antiplatelet or anticoagulation medications.    Additional Medication Instructions   - ACE/ARB: Continue taking on day of surgery.   - Calcium Channel Blockers: May be continued on the day of surgery.   - Diuretics: DO NOT TAKE on the day of surgery.   - Statins: Continue taking on the day of surgery.    - celecoxib (Celebrex): DO NOT TAKE 3 days before surgery. May continue without modification for management of severe pain.   - Oxybutynin: Continue taking on day of surgery  - hydroxychloroquine: DO NOT TAKE on the day of surgery.     Recommendation  Approval given to proceed with proposed procedure, without further diagnostic evaluation.    Aj   Navin is a 74 year old, presenting for the following:  Pre-Op Exam        9/24/2024     8:17 AM   Additional Questions   Roomed by BENNY Dietz related to upcoming procedure:     Patient is a 74-year old male presenting for pre-op exam.  Medical history includes RA, obesity, hyperlipidemia, type 2 diabetes, HTN, peripheral edema, osteoarthritis, BPH.  Scheduled 10/9/2024 for cystoscopy with transurethral resection of prostate with Dr. Radha Gómez.  No prior complications with anesthesia.  History of urinary retention, bladder stones, enlarged prostate, and lower urinary tract symptoms.  Underwent homium laser nucleation of prostate in July where 105 grams was removed, benign.  Patient developed persistent urinary symptoms without evidence of infection.  Cystoscopy showed healing fossa but remaining fibrinous white tissue.  It was recommended to decrease hydroxychloroquine as this may have been contributing factor  to delayed healing process after holep.  Dysuria and urinary retention have been improving.  Previously had urinary stream that lasted approximately 2 seconds, now last approximately 15 seconds.  Denies fever, chills, nausea vomiting, flank pain.         9/24/2024   Pre-Op Questionnaire   Have you ever had a heart attack or stroke? No   Have you ever had surgery on your heart or blood vessels, such as a stent placement, a coronary artery bypass, or surgery on an artery in your head, neck, heart, or legs? No   Do you have chest pain with activity? No   Do you have a history of heart failure? No   Do you currently have a cold, bronchitis or symptoms of other infection? No   Do you have a cough, shortness of breath, or wheezing? No   Do you or anyone in your family have previous history of blood clots? No   Do you or does anyone in your family have a serious bleeding problem such as prolonged bleeding following surgeries or cuts? No   Have you ever had problems with anemia or been told to take iron pills? No   Have you had any abnormal blood loss such as black, tarry or bloody stools? No   Have you ever had a blood transfusion? No   Are you willing to have a blood transfusion if it is medically needed before, during, or after your surgery? Yes   Have you or any of your relatives ever had problems with anesthesia? No   Do you have sleep apnea, excessive snoring or daytime drowsiness? No   Do you have any artifical heart valves or other implanted medical devices like a pacemaker, defibrillator, or continuous glucose monitor? No   Do you have artificial joints? No   Are you allergic to latex? No      Health Care Directive  Patient does not have a Health Care Directive or Living Will: Advance Directive received and scanned. Click on Code in the patient header to view.    Preoperative Review of    reviewed - controlled substances reflected in medication list.      Patient Active Problem List    Diagnosis Date Noted     BPH (benign prostatic hyperplasia) 07/05/2024     Priority: Medium    Bladder stones 06/07/2024     Priority: Medium    Chronic polyarticular juvenile rheumatoid arthritis (H) 08/24/2021     Priority: Medium     Formatting of this note might be different from the original.  Created by Conversion    Replacement Utility updated for latest IMO load      Emotional lability 08/24/2021     Priority: Medium     Formatting of this note might be different from the original.  Created by Conversion      Lower back pain 08/24/2021     Priority: Medium     Formatting of this note might be different from the original.  Created by Conversion      Morbid obesity (H) 08/24/2021     Priority: Medium    BPH with urinary obstruction 02/20/2020     Priority: Medium    Primary osteoarthritis involving multiple joints 10/08/2019     Priority: Medium    Left carpal tunnel syndrome 12/27/2018     Priority: Medium    Trigger finger, right index finger 09/26/2018     Priority: Medium    Polyarthralgia 07/20/2018     Priority: Medium    Unintentional weight loss 06/01/2018     Priority: Medium    Non morbid obesity due to excess calories 07/14/2017     Priority: Medium    Peripheral edema 07/14/2017     Priority: Medium    Diverticulosis 11/15/2016     Priority: Medium    Hyperlipidemia 08/04/2016     Priority: Medium    Need for pneumococcal vaccination 01/11/2016     Priority: Medium    Need for vaccination 11/20/2015     Priority: Medium    Type 2 diabetes mellitus (H) 11/11/2015     Priority: Medium     Formatting of this note might be different from the original.  Created by Conversion      Bacteremia due to Staphylococcus aureus 11/06/2015     Priority: Medium    Dysphagia 11/06/2015     Priority: Medium    Epidural abscess 11/06/2015     Priority: Medium    Essential hypertension 11/06/2015     Priority: Medium     Formatting of this note might be different from the original.  Created by Conversion    Replacement Utility updated for  latest IMO load      Lesion of salivary gland 11/06/2015     Priority: Medium    Somnolence 11/06/2015     Priority: Medium    Discitis 10/04/2015     Priority: Medium    Adult Still's disease (H) 10/01/2015     Priority: Medium    Leukocytosis 09/29/2015     Priority: Medium    Sepsis (H) 09/29/2015     Priority: Medium      Past Medical History:   Diagnosis Date    Diabetes (H)     Hypertension      Past Surgical History:   Procedure Laterality Date    ANESTHESIA OUT OF OR MRI N/A 10/2/2015    Procedure: ANESTHESIA OUT OF OR MRI;  Surgeon: GENERIC ANESTHESIA PROVIDER;  Location: [facility] OR    BACK SURGERY  2008    lumbar spine surgery-- unclear details    CYSTOSCOPY, LITHOTRIPSY, COMBINED N/A 7/5/2024    Procedure: LITHOTRIPSY, WITH CYSTOSCOPY;  Surgeon: Radha Gómez MD;  Location:  OR    LASER HOLMIUM ENUCLEATION PROSTATE N/A 7/5/2024    Procedure: Holmium Laser Enucleation of the Prostate;  Surgeon: Radha Gómez MD;  Location:  OR    SOFT TISSUE SURGERY  2012    skin cancer removed from L shoulder, chest, right neck     Current Outpatient Medications   Medication Sig Dispense Refill    amLODIPine (NORVASC) 5 MG tablet TAKE 1 TABLET(5 MG) BY MOUTH DAILY 90 tablet 1    atorvastatin (LIPITOR) 20 MG tablet TAKE 1 TABLET(20 MG) BY MOUTH DAILY 90 tablet 2    celecoxib (CELEBREX) 100 MG capsule TAKE 1 CAPSULE(100 MG) BY MOUTH TWICE DAILY 180 capsule 3    furosemide (LASIX) 40 MG tablet TAKE 1 TABLET(40 MG) BY MOUTH DAILY 90 tablet 2    hydroxychloroquine (PLAQUENIL) 200 MG tablet Take 1 tablet (200 mg) by mouth 2 times daily 180 tablet 1    lisinopril (ZESTRIL) 10 MG tablet TAKE 1 TABLET(10 MG) BY MOUTH DAILY 90 tablet 2    metFORMIN (GLUCOPHAGE XR) 500 MG 24 hr tablet TAKE 3 TABLETS BY MOUTH EVERY MORNING AND 1 TABLET BY MOUTH EVERY EVENING 360 tablet 3    SENNA-docusate sodium (SENNA S) 8.6-50 MG tablet Take 1 tablet by mouth daily For preventing constipation (Patient taking differently: Take 1  "tablet by mouth as needed. For preventing constipation) 30 tablet 0     No Known Allergies     Social History     Tobacco Use    Smoking status: Never     Passive exposure: Never    Smokeless tobacco: Former   Substance Use Topics    Alcohol use: No     Comment: Quit in 1990     Family History   Problem Relation Age of Onset    Coronary Artery Disease Father     Hypertension Father      History   Drug Use No     Review of Systems  Review of systems negative otherwise known HPI.    Objective    /68 (BP Location: Right arm, Patient Position: Sitting, Cuff Size: Adult Regular)   Pulse 79   Temp 98.3  F (36.8  C) (Temporal)   Resp 17   Ht 1.803 m (5' 11\")   Wt 95.3 kg (210 lb 3.2 oz)   SpO2 98%   BMI 29.32 kg/m     Estimated body mass index is 29.32 kg/m  as calculated from the following:    Height as of this encounter: 1.803 m (5' 11\").    Weight as of this encounter: 95.3 kg (210 lb 3.2 oz).  Physical Exam  General: Alert, oriented, no acute distress.    Head: Normocephalic and atraumatic.   Eyes: Conjunctiva and sclera clear. RSOIO.   Ears: External ear nontender. TMs intact and clear. Grossly normal hearing.   Oropharynx: No teeth. Oral and posterior pharynx pink and moist.     Neck: Supple, no masses or nodes. No adenopathy.    Respiratory: Lungs clear, unlabored. No rales, rhonchi, or wheezes.    Cardiovascular: Regular rate and rhythm, S1 and S2. No murmurs. No peripheral edema.     Gastrointestinal: Normoactive bowel sounds. Soft, nontender, no organomegaly.     Musculoskeletal: No joint tenderness, deformity, or swelling.     Skin: No suspicious lesions, rashes, or abnormalities.    Neurologic: No gross motor or sensory deficit. Mentation intact and speech normal.     Psychiatric: Appropriate affect.     Recent Labs   Lab Test 07/06/24  0734 07/05/24  1025 07/05/24  0710 06/20/24  0919 05/17/24  1917 02/06/24  1034   HGB 13.2* 13.1*  --  14.4   < >  --     167  --  195   < >  --     144 "  --  140   < > 139   POTASSIUM 3.9 4.0   < > 4.9   < > 4.8   CR 0.76 0.91  --  0.87   < > 0.88   A1C  --   --   --  6.1*  --  7.2*    < > = values in this interval not displayed.      Diagnostics  Labs pending at this time.  Results will be reviewed when available.   EKG: Normal Sinus Rhythm, 1st degree AV block, normal axis, normal intervals, no acute ST/T changes c/w ischemia, unchanged from previous tracings    Revised Cardiac Risk Index (RCRI)  The patient has the following serious cardiovascular risks for perioperative complications:   - No serious cardiac risks = 0 points     RCRI Interpretation: 0 points: Class I (very low risk - 0.4% complication rate)    Signed Electronically by: ZACKERY Norton CNP  A copy of this evaluation report is provided to the requesting physician.

## 2024-09-24 NOTE — PROGRESS NOTES
Preoperative Evaluation  [facility]  [address]  [facility] [address]  Phone: [phone]  Fax: [phone]  Primary Provider: Don Armijo MD  Pre-op Performing Provider: ZACKERY Norton CNP  Sep 24, 2024               9/24/2024   Surgical Information   What procedure is being done? Dental Implants       Facility or Hospital where procedure/surgery will be performed: [facility]      Who is doing the procedure / surgery? Dr. Sanchez       Date of surgery / procedure: 10/16/24      Time of surgery / procedure: 7:30 am       Where do you plan to recover after surgery? at home with family           Multiple values from one day are sorted in reverse-chronological order     Fax number for surgical facility: [phone]    Assessment & Plan     The proposed surgical procedure is considered LOW risk.    Pre-op examination  Scheduled 10/16/2024 for dental implants with Dr. Sanchez at [facility].  No prior complications with anesthesia.       BPH with urinary obstruction  Scheduled 10/9/2024 for cystoscopy with transurethral resection of prostate.      Hyperlipidemia, unspecified hyperlipidemia type  10/2023 LDL 41.  Hyperlipidemia managed with atorvastatin 20 mg daily.       Essential hypertension  Blood pressure in clinic 114/60.  Hypertension managed with amlodipine 5 mg and lisinopril 10 mg daily.       Peripheral edema  Takes furosemide 40 mg for peripheral edema.      Type 2 diabetes mellitus without complication, without long-term current use of insulin (H)  6/2024 A1c 6.1.  Type 2 diabetes managed with metformin 2000 mg daily (1500 mg in the morning and 500 mg at nighttime).      Adult Still's disease (H)  For pain r/t osteoarthritis takes celecoxib 100 mg BID.       Rheumatoid arthritis  Hydroxychloroquine for RA was reduced to 200 mg daily.         - No identified additional risk factors other than previously  addressed     Antiplatelet or Anticoagulation Medication Instructions   - Patient is on no antiplatelet or anticoagulation medications.     Additional Medication Instructions  -May take medications as prescribed.     Recommendation  Approval given to proceed with proposed procedure, without further diagnostic evaluation.     Subjective   Navin is a 74 year old, presenting for the following:  Pre-Op Exam        9/24/2024     8:33 AM   Additional Questions   Roomed by BENNY Dietz related to upcoming procedure:      Patient is a 74-year old male presenting for pre-op exam.  Medical history includes RA, obesity, hyperlipidemia, type 2 diabetes, HTN, peripheral edema, osteoarthritis, BPH.  Scheduled 10/16/2024 for dental implants with Dr. Sanchez at [facility].  No prior complications with anesthesia.  All upper and lower teeth have been already extracted.     Patient Active Problem List    Diagnosis Date Noted    BPH (benign prostatic hyperplasia) 07/05/2024     Priority: Medium    Bladder stones 06/07/2024     Priority: Medium    Chronic polyarticular juvenile rheumatoid arthritis (H) 08/24/2021     Priority: Medium     Formatting of this note might be different from the original.  Created by Conversion    Replacement Utility updated for latest IMO load      Emotional lability 08/24/2021     Priority: Medium     Formatting of this note might be different from the original.  Created by Conversion      Lower back pain 08/24/2021     Priority: Medium     Formatting of this note might be different from the original.  Created by Conversion      Morbid obesity (H) 08/24/2021     Priority: Medium    BPH with urinary obstruction 02/20/2020     Priority: Medium    Primary osteoarthritis involving multiple joints 10/08/2019     Priority: Medium    Left carpal tunnel syndrome 12/27/2018     Priority: Medium    Trigger finger, right index finger 09/26/2018     Priority: Medium    Polyarthralgia  07/20/2018     Priority: Medium    Unintentional weight loss 06/01/2018     Priority: Medium    Non morbid obesity due to excess calories 07/14/2017     Priority: Medium    Peripheral edema 07/14/2017     Priority: Medium    Diverticulosis 11/15/2016     Priority: Medium    Hyperlipidemia 08/04/2016     Priority: Medium    Need for pneumococcal vaccination 01/11/2016     Priority: Medium    Need for vaccination 11/20/2015     Priority: Medium    Type 2 diabetes mellitus (H) 11/11/2015     Priority: Medium     Formatting of this note might be different from the original.  Created by Conversion      Bacteremia due to Staphylococcus aureus 11/06/2015     Priority: Medium    Dysphagia 11/06/2015     Priority: Medium    Epidural abscess 11/06/2015     Priority: Medium    Essential hypertension 11/06/2015     Priority: Medium     Formatting of this note might be different from the original.  Created by Conversion    Replacement Utility updated for latest IMO load      Lesion of salivary gland 11/06/2015     Priority: Medium    Somnolence 11/06/2015     Priority: Medium    Discitis 10/04/2015     Priority: Medium    Adult Still's disease (H) 10/01/2015     Priority: Medium    Leukocytosis 09/29/2015     Priority: Medium    Sepsis (H) 09/29/2015     Priority: Medium      Past Medical History:   Diagnosis Date    Diabetes (H)     Hypertension      Past Surgical History:   Procedure Laterality Date    ANESTHESIA OUT OF OR MRI N/A 10/2/2015    Procedure: ANESTHESIA OUT OF OR MRI;  Surgeon: GENERIC ANESTHESIA PROVIDER;  Location: [facility] OR    BACK SURGERY  2008    lumbar spine surgery-- unclear details    CYSTOSCOPY, LITHOTRIPSY, COMBINED N/A 7/5/2024    Procedure: LITHOTRIPSY, WITH CYSTOSCOPY;  Surgeon: Radha Gómez MD;  Location:  OR    LASER HOLMIUM ENUCLEATION PROSTATE N/A 7/5/2024    Procedure: Holmium Laser Enucleation of the Prostate;  Surgeon: Radha Gómez MD;  Location:  OR    SOFT TISSUE SURGERY   "2012    skin cancer removed from L shoulder, chest, right neck     Current Outpatient Medications   Medication Sig Dispense Refill    amLODIPine (NORVASC) 5 MG tablet TAKE 1 TABLET(5 MG) BY MOUTH DAILY 90 tablet 1    atorvastatin (LIPITOR) 20 MG tablet TAKE 1 TABLET(20 MG) BY MOUTH DAILY 90 tablet 2    celecoxib (CELEBREX) 100 MG capsule TAKE 1 CAPSULE(100 MG) BY MOUTH TWICE DAILY 180 capsule 3    furosemide (LASIX) 40 MG tablet TAKE 1 TABLET(40 MG) BY MOUTH DAILY 90 tablet 2    hydroxychloroquine (PLAQUENIL) 200 MG tablet Take 1 tablet (200 mg) by mouth 2 times daily 180 tablet 1    lisinopril (ZESTRIL) 10 MG tablet TAKE 1 TABLET(10 MG) BY MOUTH DAILY 90 tablet 2    metFORMIN (GLUCOPHAGE XR) 500 MG 24 hr tablet TAKE 3 TABLETS BY MOUTH EVERY MORNING AND 1 TABLET BY MOUTH EVERY EVENING 360 tablet 3    SENNA-docusate sodium (SENNA S) 8.6-50 MG tablet Take 1 tablet by mouth daily For preventing constipation (Patient taking differently: Take 1 tablet by mouth as needed. For preventing constipation) 30 tablet 0     No Known Allergies     Social History     Tobacco Use    Smoking status: Never     Passive exposure: Never    Smokeless tobacco: Former   Substance Use Topics    Alcohol use: No     Comment: Quit in 1990     Family History   Problem Relation Age of Onset    Coronary Artery Disease Father     Hypertension Father      History   Drug Use No     Review of Systems  Review of systems negative otherwise known HPI.    Objective    /68 (BP Location: Right arm, Patient Position: Sitting, Cuff Size: Adult Regular)   Pulse 79   Temp 98.3  F (36.8  C) (Temporal)   Resp 17   Ht 1.803 m (5' 11\")   Wt 95.3 kg (210 lb 3.2 oz)   SpO2 98%   BMI 29.32 kg/m     Estimated body mass index is 29.32 kg/m  as calculated from the following:    Height as of this encounter: 1.803 m (5' 11\").    Weight as of this encounter: 95.3 kg (210 lb 3.2 oz).  Physical Exam  General: Alert, oriented, no acute distress.    Head: " Normocephalic and atraumatic.   Eyes: Conjunctiva and sclera clear. ROSIO.   Ears: External ear nontender. TMs intact and clear. Grossly normal hearing.   Oropharynx: No teeth. Oral and posterior pharynx pink and moist.     Neck: Supple, no masses or nodes. No adenopathy.    Respiratory: Lungs clear, unlabored. No rales, rhonchi, or wheezes.    Cardiovascular: Regular rate and rhythm, S1 and S2. No murmurs. No peripheral edema.     Gastrointestinal: Normoactive bowel sounds. Soft, nontender, no organomegaly.     Musculoskeletal: No joint tenderness, deformity, or swelling.     Skin: No suspicious lesions, rashes, or abnormalities.    Neurologic: No gross motor or sensory deficit. Mentation intact and speech normal.     Psychiatric: Appropriate affect.     Recent Labs   Lab Test 07/06/24  0734 07/05/24  1025 07/05/24  0710 06/20/24  0919 05/17/24  1917 02/06/24  1034   HGB 13.2* 13.1*  --  14.4   < >  --     167  --  195   < >  --     144  --  140   < > 139   POTASSIUM 3.9 4.0   < > 4.9   < > 4.8   CR 0.76 0.91  --  0.87   < > 0.88   A1C  --   --   --  6.1*  --  7.2*    < > = values in this interval not displayed.     Diagnostics  No labs were ordered during this visit.   No EKG required for low risk surgery (cataract, skin procedure, breast biopsy, etc).    Revised Cardiac Risk Index (RCRI)  The patient has the following serious cardiovascular risks for perioperative complications:   - No serious cardiac risks = 0 points     RCRI Interpretation: 0 points: Class I (very low risk - 0.4% complication rate)     Signed Electronically by: ZACKERY Norton CNP  A copy of this evaluation report is provided to the requesting physician.

## 2024-09-25 ENCOUNTER — TELEPHONE (OUTPATIENT)
Dept: SURGERY | Facility: CLINIC | Age: 74
End: 2024-09-25
Payer: MEDICARE

## 2024-09-25 NOTE — TELEPHONE ENCOUNTER
He reports that he pees for quite some time 10 seconds or more. He feels like he empties out pretty  The pain is quite minimal  He said he has came a long way in the last 2 weeks  He is back to 2 a day of hydroxychloroquine  He feels like he has healed   Will cancel the TURP procedure for oct 9  Will still check in with him next week    Radha Gómez MD

## 2024-09-26 LAB
ATRIAL RATE - MUSE: 70 BPM
DIASTOLIC BLOOD PRESSURE - MUSE: NORMAL MMHG
INTERPRETATION ECG - MUSE: NORMAL
P AXIS - MUSE: 66 DEGREES
PR INTERVAL - MUSE: 222 MS
QRS DURATION - MUSE: 92 MS
QT - MUSE: 414 MS
QTC - MUSE: 447 MS
R AXIS - MUSE: 28 DEGREES
SYSTOLIC BLOOD PRESSURE - MUSE: NORMAL MMHG
T AXIS - MUSE: 19 DEGREES
VENTRICULAR RATE- MUSE: 70 BPM

## 2024-09-26 PROCEDURE — 93010 ELECTROCARDIOGRAM REPORT: CPT | Mod: OFF | Performed by: STUDENT IN AN ORGANIZED HEALTH CARE EDUCATION/TRAINING PROGRAM

## 2024-09-27 ENCOUNTER — OFFICE VISIT (OUTPATIENT)
Dept: FAMILY MEDICINE | Facility: CLINIC | Age: 74
End: 2024-09-27
Payer: MEDICARE

## 2024-09-27 VITALS
SYSTOLIC BLOOD PRESSURE: 118 MMHG | TEMPERATURE: 98.1 F | WEIGHT: 216 LBS | RESPIRATION RATE: 16 BRPM | BODY MASS INDEX: 30.24 KG/M2 | DIASTOLIC BLOOD PRESSURE: 70 MMHG | HEART RATE: 65 BPM | HEIGHT: 71 IN | OXYGEN SATURATION: 99 %

## 2024-09-27 DIAGNOSIS — I10 ESSENTIAL HYPERTENSION: ICD-10-CM

## 2024-09-27 DIAGNOSIS — E66.811 CLASS 1 OBESITY WITH SERIOUS COMORBIDITY AND BODY MASS INDEX (BMI) OF 30.0 TO 30.9 IN ADULT, UNSPECIFIED OBESITY TYPE: ICD-10-CM

## 2024-09-27 DIAGNOSIS — E11.9 TYPE 2 DIABETES MELLITUS WITHOUT COMPLICATION, WITHOUT LONG-TERM CURRENT USE OF INSULIN (H): ICD-10-CM

## 2024-09-27 DIAGNOSIS — Z23 ENCOUNTER FOR IMMUNIZATION: ICD-10-CM

## 2024-09-27 DIAGNOSIS — E78.5 HYPERLIPIDEMIA, UNSPECIFIED HYPERLIPIDEMIA TYPE: ICD-10-CM

## 2024-09-27 DIAGNOSIS — Z00.00 MEDICARE ANNUAL WELLNESS VISIT, SUBSEQUENT: Primary | ICD-10-CM

## 2024-09-27 LAB
ANION GAP SERPL CALCULATED.3IONS-SCNC: 11 MMOL/L (ref 7–15)
BUN SERPL-MCNC: 11.4 MG/DL (ref 8–23)
CALCIUM SERPL-MCNC: 9.2 MG/DL (ref 8.8–10.4)
CHLORIDE SERPL-SCNC: 104 MMOL/L (ref 98–107)
CHOLEST SERPL-MCNC: 91 MG/DL
CREAT SERPL-MCNC: 0.75 MG/DL (ref 0.67–1.17)
EGFRCR SERPLBLD CKD-EPI 2021: >90 ML/MIN/1.73M2
FASTING STATUS PATIENT QL REPORTED: ABNORMAL
FASTING STATUS PATIENT QL REPORTED: ABNORMAL
GLUCOSE SERPL-MCNC: 113 MG/DL (ref 70–99)
HCO3 SERPL-SCNC: 27 MMOL/L (ref 22–29)
HDLC SERPL-MCNC: 31 MG/DL
LDLC SERPL CALC-MCNC: 42 MG/DL
NONHDLC SERPL-MCNC: 60 MG/DL
POTASSIUM SERPL-SCNC: 4 MMOL/L (ref 3.4–5.3)
SODIUM SERPL-SCNC: 142 MMOL/L (ref 135–145)
TRIGL SERPL-MCNC: 89 MG/DL

## 2024-09-27 PROCEDURE — 91320 SARSCV2 VAC 30MCG TRS-SUC IM: CPT | Performed by: FAMILY MEDICINE

## 2024-09-27 PROCEDURE — 36415 COLL VENOUS BLD VENIPUNCTURE: CPT | Performed by: FAMILY MEDICINE

## 2024-09-27 PROCEDURE — G0439 PPPS, SUBSEQ VISIT: HCPCS | Performed by: FAMILY MEDICINE

## 2024-09-27 PROCEDURE — 82043 UR ALBUMIN QUANTITATIVE: CPT | Performed by: FAMILY MEDICINE

## 2024-09-27 PROCEDURE — 80048 BASIC METABOLIC PNL TOTAL CA: CPT | Performed by: FAMILY MEDICINE

## 2024-09-27 PROCEDURE — G0008 ADMIN INFLUENZA VIRUS VAC: HCPCS | Performed by: FAMILY MEDICINE

## 2024-09-27 PROCEDURE — 90662 IIV NO PRSV INCREASED AG IM: CPT | Performed by: FAMILY MEDICINE

## 2024-09-27 PROCEDURE — 82570 ASSAY OF URINE CREATININE: CPT | Performed by: FAMILY MEDICINE

## 2024-09-27 PROCEDURE — 90480 ADMN SARSCOV2 VAC 1/ONLY CMP: CPT | Performed by: FAMILY MEDICINE

## 2024-09-27 PROCEDURE — 80061 LIPID PANEL: CPT | Performed by: FAMILY MEDICINE

## 2024-09-27 PROCEDURE — 99214 OFFICE O/P EST MOD 30 MIN: CPT | Mod: 25 | Performed by: FAMILY MEDICINE

## 2024-09-27 ASSESSMENT — PAIN SCALES - GENERAL: PAINLEVEL: NO PAIN (0)

## 2024-09-27 NOTE — PROGRESS NOTES
Preventive Care Visit  [facility]  Don Armijo MD, Family Medicine  Sep 27, 2024      Assessment & Plan     Medicare annual wellness visit, subsequent  Annual Wellness Visit completed.  Risk questionaire reviewed in detail.  Appropriate preventive services were discussed with this patient, including applicable screening as appropriate for fall prevention, nutrition, physical activity, tobacco-use cessation, weight loss, and cognition.  Annual Wellness Visits recommended to continue.     Class 1 obesity with serious comorbidity and body mass index (BMI) of 30.0 to 30.9 in adult, unspecified obesity type  Dietary and exercise modification for weight goal less than 210 pounds initially, less than 200 pounds ideally with BMI currently 30.13.    Type 2 diabetes mellitus without complication, without long-term current use of insulin (H)  Type 2 diabetes stable with A1c at 6% September 24, 2024 and continues metformin  mg using 3 tablets in the morning 1 tablet in the evening.  Microalbumin screen to be updated today.  Monofilament testing was normal.  Diabetic eye exams to continue annually.  Reassess at follow-up in 4 months.  - Albumin Random Urine Quantitative with Creat Ratio  - Basic metabolic panel  - Albumin Random Urine Quantitative with Creat Ratio  - Basic metabolic panel    Essential hypertension  Hypertension reviewed.  Lisinopril 10 mg daily and amlodipine 5 mg daily continuing.  - Basic metabolic panel  - Basic metabolic panel    Hyperlipidemia, unspecified hyperlipidemia type  Utilizing atorvastatin 20 mg daily for lipid management.  - Lipid panel reflex to direct LDL Fasting  - Lipid panel reflex to direct LDL Fasting    Encounter for immunization  High-dose flu shot and COVID boosters were provided today.  - INFLUENZA HIGH DOSE, TRIVALENT, PF (FLUZONE)  - COVID-19 12+ (PFIZER)      Patient has been advised of split billing requirements and indicates understanding:  "Yes        BMI  Estimated body mass index is 30.13 kg/m  as calculated from the following:    Height as of this encounter: 1.803 m (5' 11\").    Weight as of this encounter: 98 kg (216 lb).   Weight management plan: Discussed healthy diet and exercise guidelines    Counseling  Appropriate preventive services were addressed with this patient via screening, questionnaire, or discussion as appropriate for fall prevention, nutrition, physical activity, Tobacco-use cessation, social engagement, weight loss and cognition.  Checklist reviewing preventive services available has been given to the patient.  Reviewed patient's diet, addressing concerns and/or questions.   Patient reported safety concerns were addressed today.Information on urinary incontinence and treatment options given to patient.           Aj Holden is a 74 year old, presenting for the following:  Medicare Visit (Pt is fasting)        9/27/2024     9:24 AM   Additional Questions   Roomed by          Health Care Directive  Patient does not have a Health Care Directive or Living Will: Discussed advance care planning with patient; information given to patient to review.    HPI    Patient seen today for annual wellness visit.  Risk questionnaire reviewed in detail.  Underlying history of type 2 diabetes reviewed.  Has achieved weight loss through better diet and more consistent exercise.  9 pound weight loss since September 2024.  Needs update microalbumin screen.  No history of neuropathy.  Has eye exams done annually.  Is on hydroxychloroquine 200 mg twice daily for rheumatoid arthritis diagnosed in 2002.  Celebrex 100 mg utilized as well.  Patient did have simple robotic prostatectomy procedure described due to prostate enlargement of 150 g instead of 30 g.  Atorvastatin 20 mg daily for lipid management.  Lisinopril 10 mg daily and amlodipine 5 mg daily for hypertension.  Furosemide 40 mg each morning for peripheral edema.  No orthopnea or PND.  Had " "laser lithotripsy for bladder stones as well.  Comprehensive review of systems as above otherwise all negative.    Single   1 son - 32 \"Kraig\"   Tobacco: none   EtOH: none   Mom - Meena   Dad - currently 86 Yovanny - DM, CAD, HTN, high cholesterol, back problems, arthritis, spinal stenosis, pacemaker/defibrillator   1 bro - Be - HTN   Surgeries: \"lumbar back surgery for cyst removal\" - September, 2008 (Dr. 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 Good   Feel stress (tense, anxious, or unable to sleep) Not at all            9/27/2024   Nutrition   Diet: Regular (no restrictions)            9/27/2024   Exercise   Days per week of moderate/strenous exercise 7 days            9/27/2024   Social Factors   Frequency of gathering with friends or relatives Once a week   Worry food won't last until get money to buy more No   Food not last or not have enough money for food? No   Do you have housing? (Housing is defined as stable permanent housing and does not include staying ouside in a car, in a tent, in an abandoned building, in an overnight shelter, or couch-surfing.) Yes   Are you worried about losing your housing? No   Lack of transportation? No   Unable to get utilities (heat,electricity)? No            9/27/2024   Fall Risk   Fallen 2 or more times in the past year? No   Trouble with walking or balance? No             9/27/2024   Activities of Daily Living- Home Safety   Needs help with the following daily activites None of the above   Safety concerns in the home No grab bars in the bathroom            9/27/2024   Dental   Dentist two times every year? Yes            9/27/2024   Hearing Screening   Hearing concerns? None of the above            9/27/2024   Driving Risk Screening "   Patient/family members have concerns about driving No            9/27/2024   General Alertness/Fatigue Screening   Have you been more tired than usual lately? No            9/27/2024   Urinary Incontinence Screening   Bothered by leaking urine in past 6 months Yes            9/27/2024   TB Screening   Were you born outside of the [place]? No              Today's PHQ-2 Score:       2/6/2024    10:26 AM   PHQ-2 ( 1999 [employer])   Q1: Little interest or pleasure in doing things 0   Q2: Feeling down, depressed or hopeless 0   PHQ-2 Score 0   Q1: Little interest or pleasure in doing things Not at all   Q2: Feeling down, depressed or hopeless Not at all   PHQ-2 Score 0         9/27/2024   Substance Use   Alcohol more than 3/day or more than 7/wk No   Do you have a current opioid prescription? No   How severe/bad is pain from 1 to 10? 0/10 (No Pain)   Do you use any other substances recreationally? No        Social History     Tobacco Use    Smoking status: Never     Passive exposure: Never    Smokeless tobacco: Former   Vaping Use    Vaping status: Never Used   Substance Use Topics    Alcohol use: No     Comment: Quit in 1990    Drug use: No           9/27/2024   AAA Screening   Family history of Abdominal Aortic Aneurysm (AAA)? No      ASCVD Risk   The ASCVD Risk score (Kassy DK, et al., 2019) failed to calculate for the following reasons:    The valid total cholesterol range is 130 to 320 mg/dL            Reviewed and updated as needed this visit by Provider                    Past Medical History:   Diagnosis Date    Diabetes (H)     Hypertension      Past Surgical History:   Procedure Laterality Date    ANESTHESIA OUT OF OR MRI N/A 10/2/2015    Procedure: ANESTHESIA OUT OF OR MRI;  Surgeon: GENERIC ANESTHESIA PROVIDER;  Location: [facility] OR    BACK SURGERY  2008    lumbar spine surgery-- unclear details    CYSTOSCOPY, LITHOTRIPSY, COMBINED N/A 7/5/2024    Procedure: LITHOTRIPSY, WITH CYSTOSCOPY;  Surgeon: Dalia  MD Radha;  Location:  OR    LASER HOLMIUM ENUCLEATION PROSTATE N/A 7/5/2024    Procedure: Holmium Laser Enucleation of the Prostate;  Surgeon: Radha Gómez MD;  Location:  OR    SOFT TISSUE SURGERY  2012    skin cancer removed from L shoulder, chest, right neck     Lab work is in process  Labs reviewed in EPIC  BP Readings from Last 3 Encounters:   09/27/24 118/70   09/24/24 114/68   09/24/24 114/68    Wt Readings from Last 3 Encounters:   09/27/24 98 kg (216 lb)   09/24/24 95.3 kg (210 lb 3.2 oz)   09/24/24 95.3 kg (210 lb 3.2 oz)                  Patient Active Problem List   Diagnosis    Leukocytosis    Sepsis (H)    Adult Still's disease (H)    Discitis    Need for vaccination    Need for pneumococcal vaccination    Bacteremia due to Staphylococcus aureus    BPH with urinary obstruction    Chronic polyarticular juvenile rheumatoid arthritis (H)    Diverticulosis    Dysphagia    Emotional lability    Epidural abscess    Essential hypertension    Hyperlipidemia    Left carpal tunnel syndrome    Lesion of salivary gland    Lower back pain    Non morbid obesity due to excess calories    Peripheral edema    Polyarthralgia    Primary osteoarthritis involving multiple joints    Somnolence    Trigger finger, right index finger    Type 2 diabetes mellitus (H)    Unintentional weight loss    Morbid obesity (H)    Bladder stones    BPH (benign prostatic hyperplasia)    Rheumatoid arthritis, unspecified (H)     Past Surgical History:   Procedure Laterality Date    ANESTHESIA OUT OF OR MRI N/A 10/2/2015    Procedure: ANESTHESIA OUT OF OR MRI;  Surgeon: GENERIC ANESTHESIA PROVIDER;  Location: [facility] OR    BACK SURGERY  2008    lumbar spine surgery-- unclear details    CYSTOSCOPY, LITHOTRIPSY, COMBINED N/A 7/5/2024    Procedure: LITHOTRIPSY, WITH CYSTOSCOPY;  Surgeon: Radha Gómez MD;  Location:  OR    LASER HOLMIUM ENUCLEATION PROSTATE N/A 7/5/2024    Procedure: Holmium Laser Enucleation of  the Prostate;  Surgeon: Radha Gómez MD;  Location: [facility] OR    SOFT TISSUE SURGERY  2012    skin cancer removed from L shoulder, chest, right neck       Social History     Tobacco Use    Smoking status: Never     Passive exposure: Never    Smokeless tobacco: Former   Substance Use Topics    Alcohol use: No     Comment: Quit in 1990     Family History   Problem Relation Age of Onset    Coronary Artery Disease Father     Hypertension Father          Current Outpatient Medications   Medication Sig Dispense Refill    amLODIPine (NORVASC) 5 MG tablet TAKE 1 TABLET(5 MG) BY MOUTH DAILY 90 tablet 1    atorvastatin (LIPITOR) 20 MG tablet TAKE 1 TABLET(20 MG) BY MOUTH DAILY 90 tablet 2    celecoxib (CELEBREX) 100 MG capsule TAKE 1 CAPSULE(100 MG) BY MOUTH TWICE DAILY 180 capsule 3    furosemide (LASIX) 40 MG tablet TAKE 1 TABLET(40 MG) BY MOUTH DAILY 90 tablet 2    hydroxychloroquine (PLAQUENIL) 200 MG tablet Take 1 tablet (200 mg) by mouth 2 times daily 180 tablet 1    lisinopril (ZESTRIL) 10 MG tablet TAKE 1 TABLET(10 MG) BY MOUTH DAILY 90 tablet 2    metFORMIN (GLUCOPHAGE XR) 500 MG 24 hr tablet TAKE 3 TABLETS BY MOUTH EVERY MORNING AND 1 TABLET BY MOUTH EVERY EVENING 360 tablet 3    SENNA-docusate sodium (SENNA S) 8.6-50 MG tablet Take 1 tablet by mouth daily For preventing constipation (Patient taking differently: Take 1 tablet by mouth as needed. For preventing constipation) 30 tablet 0     No Known Allergies  Recent Labs   Lab Test 09/24/24  0906 07/06/24  0734 07/05/24  0710 06/20/24  0919 05/17/24  1917 02/06/24  1034 01/10/24  1017 12/01/23  1105 10/03/23  1112 10/07/22  1034 09/23/22  1107 07/06/22  1254 05/03/22  0906 04/30/21  1007 04/23/21  0711 03/04/21  1802 07/05/18  1002 06/01/18  1043   A1C 6.0*  --   --  6.1*  --  7.2*  --   --  7.3*   < > 6.8*  --  7.5*   < >  --   --    < >  --    LDL  --   --   --   --   --   --   --   --  41  --  35  --  38   < >  --   --    < >  --    HDL  --   --   --   --    --   --   --   --  29*  --  27*  --  31*   < >  --   --    < >  --    TRIG  --   --   --   --   --   --   --   --  154*  --  191*  --  183*   < >  --   --    < >  --    ALT  --   --   --   --   --  17 20  --  17   < > 17   < > 22   < >  --  19   < > 19   CR 0.82 0.76   < > 0.87   < > 0.88 0.84   < > 0.89   < > 0.80   < > 0.86   < > 0.86 0.97   < > 1.25   GFRESTIMATED >90 >90   < > >90   < > >90 >90   < > 90   < > >90   < > >90   < > 87 >60   < > 57*   GFRESTBLACK  --   --   --   --   --   --   --   --   --   --   --   --   --   --  >90 >60   < > >60   POTASSIUM 3.9 3.9   < > 4.9   < > 4.8  --    < > 3.9   < > 4.2  --   --    < > 4.1 3.6   < > 4.7   TSH  --   --   --   --   --   --   --   --   --   --   --   --   --   --   --   --   --  1.87    < > = values in this interval not displayed.      Current providers sharing in care for this patient include:  Patient Care Team:  Don Armijo MD as PCP - General (Family Medicine)  Gill Love MD as MD (Infectious Diseases)  Yamilka Mercedes MD as MD (Neurological Surgery)  Cheng Hatfield MD as MD (Physical Medicine & Rehabilitation - Pain Medicine)  Don Armijo MD as Assigned PCP  Elias Bundy MBBS as Assigned Rheumatology Provider  Don Armijo MD as Referring Physician (Family Medicine)  Mariel Alston PA-C as Physician Assistant (Dermatology)  Mariel Alston PA-C as Physician Assistant (Dermatology)  Flakita Malloy PA-C as Physician Assistant (Urology)  Thiago Pack DO as Physician (Hospice And Palliative Care)  Radha Gómez MD as Assigned Surgical Provider    The following health maintenance items are reviewed in Epic and correct as of today:  Health Maintenance   Topic Date Due    RSV VACCINE (1 - Risk 60-74 years 1-dose series) Never done    ZOSTER IMMUNIZATION (3 of 3) 06/25/2021    MEDICARE ANNUAL WELLNESS VISIT  05/31/2024    LIPID  10/03/2024    MICROALBUMIN  10/03/2024    EYE EXAM  01/16/2025     "DIABETIC FOOT EXAM  02/06/2025    A1C  03/24/2025    BMP  09/24/2025    ANNUAL REVIEW OF HM ORDERS  09/27/2025    FALL RISK ASSESSMENT  09/27/2025    ADVANCE CARE PLANNING  09/27/2029    COLORECTAL CANCER SCREENING  09/30/2031    HEPATITIS C SCREENING  Completed    PHQ-2 (once per calendar year)  Completed    INFLUENZA VACCINE  Completed    Pneumococcal Vaccine: 65+ Years  Completed    COVID-19 Vaccine  Completed    HPV IMMUNIZATION  Aged Out    MENINGITIS IMMUNIZATION  Aged Out    RSV MONOCLONAL ANTIBODY  Aged Out    DTAP/TDAP/TD IMMUNIZATION  Discontinued         Review of Systems  Constitutional, HEENT, cardiovascular, pulmonary, GI, , musculoskeletal, neuro, skin, endocrine and psych systems are negative, except as otherwise noted.     Objective    Exam  /70   Pulse 65   Temp 98.1  F (36.7  C)   Resp 16   Ht 1.803 m (5' 11\")   Wt 98 kg (216 lb)   SpO2 99%   BMI 30.13 kg/m     Estimated body mass index is 30.13 kg/m  as calculated from the following:    Height as of this encounter: 1.803 m (5' 11\").    Weight as of this encounter: 98 kg (216 lb).    Physical Exam  GENERAL: alert and no distress.  BMI 30.13.  EYES: Eyes grossly normal to inspection, PERRL and conjunctivae and sclerae normal  HENT: ear canals and TM's normal, nose and mouth without ulcers or lesions.  Edentulous with anticipated dental implants upcoming.  NECK: no adenopathy, no asymmetry, masses, or scars  RESP: lungs clear to auscultation - no rales, rhonchi or wheezes  CV: regular rate and rhythm, normal S1 S2, no S3 or S4, no murmur, click or rub, no peripheral edema  ABDOMEN: soft, nontender, no hepatosplenomegaly, no masses and bowel sounds normal   (male): normal male genitalia without lesions or urethral discharge, no hernia  MS: no gross musculoskeletal defects noted, no edema  SKIN: no suspicious lesions or rashes  NEURO: Normal strength and tone, mentation intact and speech normal  PSYCH: mentation appears normal, affect " normal/bright        9/27/2024   Mini Cog   Clock Draw Score 2 Normal   3 Item Recall 3 objects recalled   Mini Cog Total Score 5                 Signed Electronically by: Don Armijo MD

## 2024-09-27 NOTE — LETTER
September 29, 2024      Navin ZARIA Angel Luis  [address] [place] [address]        Dear ,    We are writing to inform you of your test results.    Your cholesterol results were mildly abnormal.  Continue your current medication as discussed.  Ensure regular exercise, healthy diet, and weight loss modifications in order to further improve.  Weight goal < 210 pounds initially, < 200 pounds ideally.  We will plan to recheck your labs while fasting in the next 6-12 months to ensure desired improvement.      Mild increase in urinary protein.  Continue your current medication (lisinopril) as discussed which helps to protect your kidneys.  We will continue to follow closely.      Continue your current diabetes management as discussed in order to further improve.    Your kidney function tests (i.e. Basic Metabolic Panel) were otherwise fine.      Resulted Orders   Lipid panel reflex to direct LDL Fasting   Result Value Ref Range    Cholesterol 91 <200 mg/dL    Triglycerides 89 <150 mg/dL    Direct Measure HDL 31 (L) >=40 mg/dL    LDL Cholesterol Calculated 42 <100 mg/dL    Non HDL Cholesterol 60 <130 mg/dL    Patient Fasting > 8hrs? Unknown     Narrative    Cholesterol  Desirable: < 200 mg/dL  Borderline High: 200 - 239 mg/dL  High: >= 240 mg/dL    Triglycerides  Normal: < 150 mg/dL  Borderline High: 150 - 199 mg/dL  High: 200-499 mg/dL  Very High: >= 500 mg/dL    Direct Measure HDL  Female: >= 50 mg/dL   Male: >= 40 mg/dL    LDL Cholesterol  Desirable: < 100 mg/dL  Above Desirable: 100 - 129 mg/dL   Borderline High: 130 - 159 mg/dL   High:  160 - 189 mg/dL   Very High: >= 190 mg/dL    Non HDL Cholesterol  Desirable: < 130 mg/dL  Above Desirable: 130 - 159 mg/dL  Borderline High: 160 - 189 mg/dL  High: 190 - 219 mg/dL  Very High: >= 220 mg/dL   Albumin Random Urine Quantitative with Creat Ratio   Result Value Ref Range    Creatinine Urine mg/dL 59.8 mg/dL      Comment:      The reference ranges have not been  established in urine creatinine. The results should be integrated into the clinical context for interpretation.    Albumin Urine mg/L 59.8 mg/L      Comment:      The reference ranges have not been established in urine albumin. The results should be integrated into the clinical context for interpretation.    Albumin Urine mg/g Cr 100.00 (H) 0.00 - 17.00 mg/g Cr      Comment:      Microalbuminuria is defined as an albumin:creatinine ratio of 17 to 299 for males and 25 to 299 for females. A ratio of albumin:creatinine of 300 or higher is indicative of overt proteinuria.  Due to biologic variability, positive results should be confirmed by a second, first-morning random or 24-hour timed urine specimen. If there is discrepancy, a third specimen is recommended. When 2 out of 3 results are in the microalbuminuria range, this is evidence for incipient nephropathy and warrants increased efforts at glucose control, blood pressure control, and institution of therapy with an angiotensin-converting-enzyme (ACE) inhibitor (if the patient can tolerate it).     Basic metabolic panel   Result Value Ref Range    Sodium 142 135 - 145 mmol/L    Potassium 4.0 3.4 - 5.3 mmol/L    Chloride 104 98 - 107 mmol/L    Carbon Dioxide (CO2) 27 22 - 29 mmol/L    Anion Gap 11 7 - 15 mmol/L    Urea Nitrogen 11.4 8.0 - 23.0 mg/dL    Creatinine 0.75 0.67 - 1.17 mg/dL    GFR Estimate >90 >60 mL/min/1.73m2      Comment:      eGFR calculated using 2021 CKD-EPI equation.    Calcium 9.2 8.8 - 10.4 mg/dL      Comment:      Reference intervals for this test were updated on 7/16/2024 to reflect our healthy population more accurately. There may be differences in the flagging of prior results with similar values performed with this method. Those prior results can be interpreted in the context of the updated reference intervals.    Glucose 113 (H) 70 - 99 mg/dL    Patient Fasting > 8hrs? Unknown        If you have any questions or concerns, please call the  clinic at the number listed above.       Sincerely,      Don Armijo MD

## 2024-09-28 LAB
CREAT UR-MCNC: 59.8 MG/DL
MICROALBUMIN UR-MCNC: 59.8 MG/L
MICROALBUMIN/CREAT UR: 100 MG/G CR (ref 0–17)

## 2024-10-01 ENCOUNTER — VIRTUAL VISIT (OUTPATIENT)
Dept: UROLOGY | Facility: CLINIC | Age: 74
End: 2024-10-01
Payer: MEDICARE

## 2024-10-01 DIAGNOSIS — R35.0 URINARY FREQUENCY: Primary | ICD-10-CM

## 2024-10-01 PROCEDURE — 99024 POSTOP FOLLOW-UP VISIT: CPT | Mod: 93 | Performed by: STUDENT IN AN ORGANIZED HEALTH CARE EDUCATION/TRAINING PROGRAM

## 2024-10-01 RX ORDER — OXYBUTYNIN CHLORIDE 10 MG/1
10 TABLET, EXTENDED RELEASE ORAL DAILY
Qty: 60 TABLET | Refills: 0 | Status: SHIPPED | OUTPATIENT
Start: 2024-10-01 | End: 2024-11-30

## 2024-10-01 NOTE — PROGRESS NOTES
Navin is a 74 year old who is being evaluated via a billable telephone visit.    What phone number would you like to be contacted at? [phone]   How would you like to obtain your AVS? Mail a copy  Originating Location (pt. Location): Home    Distant Location (provider location):  On-site  Phone call duration: 10 minutes (3:38 pm to 3:48 pm)          UROLOGY OUTPATIENT VISIT      Chief Complaint:   frequency      Synopsis   Thiago Hein is a very pleasant AGE: 74 year old year old person   He has a history of rheumatoid arthritis, type 2 diabetes, hyperlipidemia, adult stills disease.     May 20, 2024 he was found to have several bladder stones the largest measuring 1.2 cm he did have a flow rate that showed a plateaued curve diminishing at around 12 mL/s maximum He had a CT urogram performed on 6/6/2024 that did not show any other etiologies for the gross hematuria Prostate shows size of 7.0 x 7.0 x 6.4 = 156 grams on the CT scan based on my review     Given large prostate, bladder stones, we opted to do holep on 7/5/2024 and removed 105 grams. It was benign     Since then he has had lot of dysuria after the surgery. He has had negative urine cultures. However he has had UA with lot of WBCs and WBC clumps.      He continues to have small voids and urgency and frequency.  He has pain usually with voiding that improves shortly after.    I did cystoscopy on 8/20/2024   The prostate demonstrated evidence of prior laser nucleation of the prostate.  The fossa is open.  However there appears to be a lot of fibrinous white tissue versus dystrophic calcifications in the fossa    He does take hydroxychloroquine for his inflammatory arthritis and I am wondering if this is affected the healing process and could be the reason for these findings and perhaps is the reason for all his urinary symptoms.    I discussed with my collegues who do Holep and there's consideration for TUR of the calcifications to see if it helps  improve his voiding symptoms, perhaps hold his hydroxychloroquine      He decreased the dose and we will see how he is doing     I have him scheduled for TUR     9/17/2024 today he reports urination is much better, he is able to urinate longer stretched like 12-14 seconds instead of 2 seconds, he has noticed improvement taking the hydroxychloroquine down to 1 tab per day     10/1/2024  Pain improved  After he voids, he has urine dribble out and underwear gets gets wet  He says when he lays in bed he can feel that he has got to pee, and he puts it off for 5-10 minutes  Once he stands up he can't get to bathroom  He recently completed his oxybutynin    Medical Comorbidities      Past Medical History:   Diagnosis Date    Diabetes (H)     Hypertension                Medications     Current Outpatient Medications   Medication Sig Dispense Refill    amLODIPine (NORVASC) 5 MG tablet TAKE 1 TABLET(5 MG) BY MOUTH DAILY 90 tablet 1    atorvastatin (LIPITOR) 20 MG tablet TAKE 1 TABLET(20 MG) BY MOUTH DAILY 90 tablet 2    celecoxib (CELEBREX) 100 MG capsule TAKE 1 CAPSULE(100 MG) BY MOUTH TWICE DAILY 180 capsule 3    furosemide (LASIX) 40 MG tablet TAKE 1 TABLET(40 MG) BY MOUTH DAILY 90 tablet 2    hydroxychloroquine (PLAQUENIL) 200 MG tablet Take 1 tablet (200 mg) by mouth 2 times daily 180 tablet 1    lisinopril (ZESTRIL) 10 MG tablet TAKE 1 TABLET(10 MG) BY MOUTH DAILY 90 tablet 2    metFORMIN (GLUCOPHAGE XR) 500 MG 24 hr tablet TAKE 3 TABLETS BY MOUTH EVERY MORNING AND 1 TABLET BY MOUTH EVERY EVENING 360 tablet 3    SENNA-docusate sodium (SENNA S) 8.6-50 MG tablet Take 1 tablet by mouth daily For preventing constipation (Patient taking differently: Take 1 tablet by mouth as needed. For preventing constipation) 30 tablet 0     No current facility-administered medications for this visit.            Assessment/Plan   There is a 74-year-old male with a history of lower urinary tract symptoms, urinary retention, bladder stones  and a very enlarged prostate that I did a holmium laser nucleation of the prostate on about a month ago.  He was doing well and then he developed urinary urgency dysuria and his urine analyses have shown a lot of inflammatory changes but his cultures have been negative.  He has been on anticholinergic and Pyridium but still has bothersome urinary symptoms so we did a cystoscopy  that shows a healing fossa but shows quite a bit of fibrinous white tissue in the fossa much more than I would anticipate.    After he stopped hydroxychloroquine the pain with urination is improved dramatically.  We were planning to do a TUR of the area but were no longer planning to do that.  He is able to void for longer periods of time    However he is now having some postvoid dribbling and urgency  We will plan to restart the oxybutynin 10 mg extended release daily.  I will have him do urethral bulbar massage to help with the postvoid dribbling  And then I will plan to check in with him again in 2 weeks to assess his symptoms  We will also have him do a urine analysis to check for infection    CC:  Don Armijo

## 2024-10-14 ENCOUNTER — TELEPHONE (OUTPATIENT)
Dept: FAMILY MEDICINE | Facility: CLINIC | Age: 74
End: 2024-10-14
Payer: MEDICARE

## 2024-10-14 NOTE — TELEPHONE ENCOUNTER
Called patient to relay message from Genoveva, patient verbalized understanding and has no further questions at this time.     Derek Singh RN  [facility]

## 2024-10-14 NOTE — TELEPHONE ENCOUNTER
Dental procedure is low risk, do not need to have any medication modifications continue as prescribed.

## 2024-10-14 NOTE — TELEPHONE ENCOUNTER
Patient called and stated that he saw Genoveva Raman on 9/24/24, for his Pre op for his Dental Implants, Procedure is scheduled for 10/16/24.     Patient stated that he believes he was told to stop taking metformin before the procedure and to reduce the Hydroxychloroquine.     There does not seem to be any instructions in the pre op note to stop these, patient is curious if there are any thoughts form the provider as to weather or not he should change those meds or any meds before the procedure.     Derek Singh RN  [facility]

## 2024-10-15 ENCOUNTER — VIRTUAL VISIT (OUTPATIENT)
Dept: UROLOGY | Facility: CLINIC | Age: 74
End: 2024-10-15
Payer: MEDICARE

## 2024-10-15 DIAGNOSIS — N39.43 POST-VOID DRIBBLING: Primary | ICD-10-CM

## 2024-10-15 PROCEDURE — 99441 PR PHYSICIAN TELEPHONE EVALUATION 5-10 MIN: CPT | Mod: 93 | Performed by: STUDENT IN AN ORGANIZED HEALTH CARE EDUCATION/TRAINING PROGRAM

## 2024-10-15 ASSESSMENT — PAIN SCALES - GENERAL: PAINLEVEL: NO PAIN (0)

## 2024-10-15 NOTE — PROGRESS NOTES
Acute Otitis Media with Infection (Child)    Your child has a middle ear infection (acute otitis media). It's caused by bacteria or viruses. The middle ear is the space behind the eardrum. The eustachian tube connects the ear to the nasal passage. The eustachian tubes help drain fluid from the ears. They also keep the air pressure equal inside and outside the ears. These tubes are shorter and more horizontal in children. This makes it more likely for the tubes to become blocked. A blockage lets fluid and pressure build up in the middle ear. Bacteria or fungi can grow in this fluid and cause an ear infection. This infection is commonly known as an earache.   The main symptom of an ear infection is ear pain. Other symptoms may include pulling at the ear, being more fussy than usual, fever, decreased appetite, and vomiting or diarrhea. Your child’s hearing may also be affected. Your child may have had a respiratory infection first.   An ear infection may clear up on its own. Or your child may need to take medicine. After the infection goes away, your child may still have fluid in the middle ear. It may take weeks or months for this fluid to go away. During that time, your child may have temporary hearing loss. But all other symptoms of the earache should be gone.   Home care  Follow these guidelines when caring for your child at home:  · The healthcare provider will likely prescribe medicines for pain. The provider may also prescribe antibiotics to treat the infection. These may be liquid medicines to give by mouth. Or they may be ear drops. Follow the provider’s instructions for giving these medicines to your child.  Don't give your child any other medicine without first asking your child's healthcare provider, especially the first time.  · Because ear infections can clear up on their own, the provider may suggest waiting for a few days before giving your child medicines for infection.  · To reduce pain, have your  Navin is a 74 year old who is being evaluated via a billable telephone visit.    What phone number would you like to be contacted at? [phone]  How would you like to obtain your AVS? Mail a copy  Originating Location (pt. Location): Home    Distant Location (provider location):  On-site    CC: Follow-up for BPH post-HoLEP surgery    HPI:  The patient is a male with a history of BPH, rheumatoid arthritis, type 2 diabetes, hyperlipidemia, and adult Still s disease. He presented with lower urinary tract symptoms, including urgency, frequency, and small voids. Initial evaluation revealed several bladder stones, the largest measuring 1.2 cm, and a plateaued flow rate diminishing at 12 mL/s. A CT urogram on 6/6/2024 identified an enlarged prostate (156 grams) with no other causes for gross hematuria. HoLEP was performed on 7/5/2024, removing 105 grams of benign tissue. Postoperatively, the patient experienced significant dysuria, with negative urine cultures but urinalysis showing white blood cells and WBC clumps.    A cystoscopy on 8/20/2024 showed an open fossa with evidence of prior laser enucleation, as well as fibrinous tissue or dystrophic calcifications. The patient s hydroxychloroquine for inflammatory arthritis was suspected to impact healing. The dosage was reduced, and the patient reported improvements. He was scheduled for a TUR of the calcifications.    As of 9/17/2024, the patient noticed improved urination duration (12-14 seconds). Pain had improved as of 10/1/2024, but postvoid dribbling and urgency persisted. The patient had completed a course of oxybutynin and was restarted on the extended-release 10 mg daily. The plan included urethral bulbar massage for dribbling and a follow-up urinalysis.    At today s visit, the patient reported continued improvement in urinary stream force, although postvoid dribbling remains. He experiences leakage shortly after voiding and occasionally with coughing or  child rest in an upright position. Hot or cold compresses held against the ear may help ease pain.  · Don't smoke in the house or around your child. Keep your child away from secondhand smoke.  To help prevent future infections:  · Don't smoke near your child. Secondhand smoke raises the risk for ear infections in children.  · Make sure your child gets all appropriate vaccines.  · Don't bottle-feed while your baby is lying on his or her back. (This position can cause middle ear infections because it allows milk to run into the eustachian tubes.)      · If you breastfeed, continue until your child is 6 to 12 months of age.  To apply ear drops:  1. Put the bottle in warm water if the medicine is kept in the refrigerator. Cold drops in the ear are uncomfortable.  2. Have your child lie down on a flat surface. Gently hold your child’s head to one side.  3. Remove any drainage from the ear with a clean tissue or cotton swab. Clean only the outer ear. Don’t put the cotton swab into the ear canal.  4. Straighten the ear canal by gently pulling the earlobe up and back.  5. Keep the dropper a half-inch above the ear canal. This will keep the dropper from becoming contaminated. Put the drops against the side of the ear canal.  6. Have your child stay lying down for 2 to 3 minutes. This gives time for the medicine to enter the ear canal. If your child doesn’t have pain, gently massage the outer ear near the opening.  7. Wipe any extra medicine away from the outer ear with a clean cotton ball.    Follow-up care  Follow up with your child’s healthcare provider as directed. Your child will need to have the ear rechecked to make sure the infection has gone away. Check with the healthcare provider to see when they want to see your child.   Special note to parents  If your child continues to get earaches, he or she may need ear tubes. The provider will put small tubes in your child’s eardrum to help keep fluid from building up. This  sneezing. The patient uses folded paper towels for leakage control, going through three sets daily. He has not yet seen a physical therapist but expressed interest in pursuing pelvic floor exercises.    Assessment and Plan:  The patient has post-HoLEP urinary symptoms, including postvoid dribbling and occasional urgency, likely due to pelvic floor muscle weakness and residual effects from prior inflammation.    -Physical therapy: Referral to pelvic floor physical therapy for exercises aimed at strengthening the pelvic muscles.  -Urethral massage: Instructed to perform urethral bulbar massage post-void to reduce residual dribbling.  -Follow-up: Reassess symptoms in 6 weeks.    The patient was advised on the nature of post-surgical recovery, including the expected gradual improvement of symptoms over time. Referral to physical therapy was initiated, and instructions for daily management were reinforced.          Phone call duration: 9 minutes  Signed Electronically by: Radha Gómez MD       procedure is a simple and works well.   When to seek medical advice  Call your child's healthcare provider for any of the following:   · Fever (see Fever and children, below)  · New symptoms, especially swelling around the ear or weakness of face muscles  · Severe pain  · Infection seems to get worse, not better   · Neck pain  · Your child acts very sick or not himself or herself  · Fever or pain don't improve with antibiotics after 48 hours  Fever and children  Use a digital thermometer to check your child’s temperature. Don’t use a mercury thermometer. There are different kinds and uses of digital thermometers. They include:   · Rectal. For children younger than 3 years, a rectal temperature is the most accurate.  · Forehead (temporal). This works for children age 3 months and older. If a child under 3 months old has signs of illness, this can be used for a first pass. The provider may want to confirm with a rectal temperature.  · Ear (tympanic). Ear temperatures are accurate after 6 months of age, but not before.  · Armpit (axillary). This is the least reliable but may be used for a first pass to check a child of any age with signs of illness. The provider may want to confirm with a rectal temperature.  · Mouth (oral). Don’t use a thermometer in your child’s mouth until he or she is at least 4 years old.  Use the rectal thermometer with care. Follow the product maker’s directions for correct use. Insert it gently. Label it and make sure it’s not used in the mouth. It may pass on germs from the stool. If you don’t feel OK using a rectal thermometer, ask the healthcare provider what type to use instead. When you talk with any healthcare provider about your child’s fever, tell him or her which type you used.   Below are guidelines to know if your young child has a fever. Your child’s healthcare provider may give you different numbers for your child. Follow your provider’s specific instructions.   Fever readings for  a baby under 3 months old:   · First, ask your child’s healthcare provider how you should take the temperature.  · Rectal or forehead: 100.4°F (38°C) or higher  · Armpit: 99°F (37.2°C) or higher  Fever readings for a child age 3 months to 36 months (3 years):   · Rectal, forehead, or ear: 102°F (38.9°C) or higher  · Armpit: 101°F (38.3°C) or higher  Call the healthcare provider in these cases:   · Repeated temperature of 104°F (40°C) or higher in a child of any age  · Fever of 100.4° F (38° C) or higher in baby younger than 3 months  · Fever that lasts more than 24 hours in a child under age 2  · Fever that lasts for 3 days in a child age 2 or older    Clarence last reviewed this educational content on 2020 © 2000-2021 [employer]. All rights reserved. This information is not intended as a substitute for professional medical care. Always follow your healthcare professional's instructions.

## 2024-11-12 ENCOUNTER — THERAPY VISIT (OUTPATIENT)
Dept: PHYSICAL THERAPY | Facility: CLINIC | Age: 74
End: 2024-11-12
Attending: STUDENT IN AN ORGANIZED HEALTH CARE EDUCATION/TRAINING PROGRAM
Payer: MEDICARE

## 2024-11-12 DIAGNOSIS — R35.0 URINARY FREQUENCY: ICD-10-CM

## 2024-11-12 DIAGNOSIS — N39.43 POST-VOID DRIBBLING: ICD-10-CM

## 2024-11-12 PROCEDURE — 97161 PT EVAL LOW COMPLEX 20 MIN: CPT | Mod: GP | Performed by: PHYSICAL THERAPIST

## 2024-11-12 PROCEDURE — 97530 THERAPEUTIC ACTIVITIES: CPT | Mod: GP | Performed by: PHYSICAL THERAPIST

## 2024-11-12 PROCEDURE — 97110 THERAPEUTIC EXERCISES: CPT | Mod: GP | Performed by: PHYSICAL THERAPIST

## 2024-11-12 NOTE — PROGRESS NOTES
PHYSICAL THERAPY EVALUATION  Type of Visit: Evaluation       Fall Risk Screen:  Fall screen completed by: PT  Have you fallen 2 or more times in the past year?: No  Have you fallen and had an injury in the past year?: No  Is patient a fall risk?: No    Subjective         Presenting condition or subjective complaint:  Patient had a HoLEP performed on 7/5/24 and was initially experiencing dysuria post op. Things have drastically changed since this last visit with Dr. Gómez in a good way with no pain and has a great stream. Does note some minor leakage with coughing, sneezing, and when drying off after using the tub. Patient is not currently doing any exercises and not wearing pads. 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   Date of onset: 10/15/24    Relevant medical history: (Patient-Rptd) Arthritis; Diabetes; Rheumatoid arthritis   Dates & types of surgery:      Prior diagnostic imaging/testing results: (Patient-Rptd) MRI; CT scan     Prior therapy history for the same diagnosis, illness or injury: (Patient-Rptd) No        Living Environment  Social support: (Patient-Rptd) Alone   Type of home: (Patient-Rptd) House; 1 level   Stairs to enter the home: (Patient-Rptd) Yes (Patient-Rptd) 3 Is there a railing: (Patient-Rptd) Yes     Ramp: (Patient-Rptd) No   Stairs inside the home: (Patient-Rptd) No       Help at home: (Patient-Rptd) None  Equipment owned:       Employment: (Patient-Rptd) Yes (Patient-Rptd) farmer  Hobbies/Interests:      Patient goals for therapy:         Objective      PELVIC EVALUATION  ADDITIONAL HISTORY:  Sex assigned at birth: (Patient-Rptd) Male  Gender identity: (Patient-Rptd) Male    Pronouns:        Bladder History:  Feels bladder filling: (Patient-Rptd) No  Triggers for feeling of inability to wait to go to the bathroom:      How long can you wait to urinate: (Patient-Rptd) not long  Gets up at night to urinate: (Patient-Rptd) Yes (Patient-Rptd) 1  Can stop the flow of urine when urinating:    Volume of urine  usually released: (Patient-Rptd) Medium   Other issues:    Number of bladder infections in last 12 months:    Fluid intake per day:        Medications taken for bladder: (Patient-Rptd) No     Activities causing urine leak: (Patient-Rptd) Cough; Hurrying to the bathroom due to a strong urge to urinate (pee)    Amount of urine typically leaked: (Patient-Rptd) small amount  Pads used to help with leaking: (Patient-Rptd) Yes I use this many pads per day: (Patient-Rptd) 3   I use this type/brand: (Patient-Rptd) paper towel      Bowel History:  Frequency of bowel movement: (Patient-Rptd) 3  Consistency of stool: (Patient-Rptd) Soft-formed    Ignores the urge to defecate: (Patient-Rptd) No  Other bowel issues:    Length of time spent trying to have a bowel movement:      Sexual Function History:  Sexual orientation: (Patient-Rptd) Ortiz    Sexually active: (Patient-Rptd) No  Lubrication used: (Patient-Rptd) Yes (Patient-Rptd) Yes  Pelvic pain:      Pain or difficulty with orgasms/erection/ejaculation: (Patient-Rptd) No    State of menopause:    Hormone medications: (Patient-Rptd) No      Are you currently pregnant: (Patient-Rptd) No  Number of previous pregnancies: (Patient-Rptd) none  Have you been diagnosed with pelvic prolapse or abdominal separation: (Patient-Rptd) No  Do you get regular exercise: (Patient-Rptd) Yes  Have you tried pelvic floor strengthening exercises for 4 weeks: (Patient-Rptd) No  Do you have any history of trauma that is relevant to your care that you d like to share: (Patient-Rptd) No      Discussed reason for referral regarding pelvic health needs and external/internal pelvic floor muscle examination with patient/guardian.  Opportunity provided to ask questions and verbal consent for assessment and intervention was given.    LUMBAR SCREEN: AROM WNL  Some general LBP  HIP SCREEN:  Strength: WNL  ROM is fair, moderately limited     Functional Strength Testing: SLS: poor bilaterally    PELVIC/SI  SCREEN:  WNL   PAIN PROVOCATION TEST: WNL  BREATHING SYMMETRY: Asymmetrical    PELVIC EXAM  External Visual Inspection:  Deferred, patient reports ability to contract/relax in supine/seated    ABDOMINAL ASSESSMENT  Abdominal Activation/Strength:  poor, mild TA activation    Fascial Tension/Restriction: WNL    Assessment & Plan   CLINICAL IMPRESSIONS  Medical Diagnosis: Post-void dribbling, Urinary frequency    Treatment Diagnosis: pelvic floor dysfunction   Impression/Assessment: Patient is a 74 year old male with pelvic floor dysfunction complaints.  The following significant findings have been identified: Decreased ROM/flexibility, Decreased joint mobility, Decreased strength, Impaired balance, Impaired muscle performance, and Decreased activity tolerance. These impairments interfere with their ability to perform self care tasks, work tasks, recreational activities, household chores, household mobility, and community mobility as compared to previous level of function.     Clinical Decision Making (Complexity):  Clinical Presentation: Stable/Uncomplicated  Clinical Presentation Rationale: based on medical and personal factors listed in PT evaluation  Clinical Decision Making (Complexity): Low complexity    PLAN OF CARE  Treatment Interventions:  Modalities: Biofeedback, E-stim, Ultrasound  Interventions: Gait Training, Manual Therapy, Neuromuscular Re-education, Therapeutic Activity, Therapeutic Exercise, Self-Care/Home Management    Long Term Goals     PT Goal 1  Goal Identifier: 1.  Goal Description: Patient will demonstrate ability to fully contract/relax PFM in order to have control over bladder.  Target Date: 12/24/24  PT Goal 2  Goal Identifier: 2.  Goal Description: Patient will report 75% decrease in leakage with coughing/sneezing in order to improve hygiene.  Target Date: 12/24/24  PT Goal 3  Goal Identifier: 3.  Goal Description: Patient will report no leakage upon standing and getting out of the tub in  order to reduce hygiene expenses.  Target Date: 01/21/25  PT Goal 4  Goal Identifier: 4.  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  Target Date: 02/04/25      Frequency of Treatment: 1x/week, every other weeks  Duration of Treatment: 12 weeks    Education Assessment:        Risks and benefits of evaluation/treatment have been explained.   Patient/Family/caregiver agrees with Plan of Care.     Evaluation Time:     PT Eval, Low Complexity Minutes (25676): 20     Signing Clinician: JERMAINE Oquendo [facility]                                                                                   OUTPATIENT PHYSICAL THERAPY      PLAN OF TREATMENT FOR OUTPATIENT REHABILITATION   Patient's Last Name, First Name, Thiago Quijano YOB: 1950   Provider's Name   [facility]   Medical Record No.  [id number]     Onset Date: 10/15/24  Start of Care Date: 11/12/24     Medical Diagnosis:  Post-void dribbling, Urinary frequency      PT Treatment Diagnosis:  pelvic floor dysfunction Plan of Treatment  Frequency/Duration: 1x/week, every other weeks/ 12 weeks    Certification date from 11/12/24 to 02/04/25         See note for plan of treatment details and functional goals     Laurence Anne, PT                         I CERTIFY THE NEED FOR THESE SERVICES FURNISHED UNDER        THIS PLAN OF TREATMENT AND WHILE UNDER MY CARE     (Physician attestation of this document indicates review and certification of the therapy plan).              Referring Provider:  aRdha Gómez    Initial Assessment  See Epic Evaluation- Start of Care Date: 11/12/24

## 2024-11-19 ENCOUNTER — THERAPY VISIT (OUTPATIENT)
Dept: PHYSICAL THERAPY | Facility: CLINIC | Age: 74
End: 2024-11-19
Attending: STUDENT IN AN ORGANIZED HEALTH CARE EDUCATION/TRAINING PROGRAM
Payer: MEDICARE

## 2024-11-19 DIAGNOSIS — N39.43 POST-VOID DRIBBLING: ICD-10-CM

## 2024-11-19 DIAGNOSIS — R35.0 URINARY FREQUENCY: Primary | ICD-10-CM

## 2024-11-19 PROCEDURE — 97530 THERAPEUTIC ACTIVITIES: CPT | Mod: GP | Performed by: PHYSICAL THERAPIST

## 2024-11-19 PROCEDURE — 97110 THERAPEUTIC EXERCISES: CPT | Mod: GP | Performed by: PHYSICAL THERAPIST

## 2025-01-23 ENCOUNTER — TRANSFERRED RECORDS (OUTPATIENT)
Dept: HEALTH INFORMATION MANAGEMENT | Facility: CLINIC | Age: 75
End: 2025-01-23
Payer: MEDICARE

## 2025-01-23 LAB — RETINOPATHY: NEGATIVE

## 2025-01-27 ENCOUNTER — LAB (OUTPATIENT)
Dept: LAB | Facility: CLINIC | Age: 75
End: 2025-01-27
Payer: MEDICARE

## 2025-01-27 DIAGNOSIS — M06.00 SERONEGATIVE RHEUMATOID ARTHRITIS (H): ICD-10-CM

## 2025-01-27 DIAGNOSIS — R35.0 URINARY FREQUENCY: ICD-10-CM

## 2025-01-27 DIAGNOSIS — E11.9 TYPE 2 DIABETES MELLITUS WITHOUT COMPLICATION, WITHOUT LONG-TERM CURRENT USE OF INSULIN (H): ICD-10-CM

## 2025-01-27 LAB
ALBUMIN SERPL BCG-MCNC: 4.2 G/DL (ref 3.5–5.2)
ALBUMIN UR-MCNC: 30 MG/DL
ALT SERPL W P-5'-P-CCNC: 18 U/L (ref 0–70)
APPEARANCE UR: CLEAR
BACTERIA #/AREA URNS HPF: ABNORMAL /HPF
BILIRUB UR QL STRIP: NEGATIVE
COLOR UR AUTO: YELLOW
CREAT SERPL-MCNC: 0.78 MG/DL (ref 0.67–1.17)
EGFRCR SERPLBLD CKD-EPI 2021: >90 ML/MIN/1.73M2
ERYTHROCYTE [DISTWIDTH] IN BLOOD BY AUTOMATED COUNT: 12.9 % (ref 10–15)
EST. AVERAGE GLUCOSE BLD GHB EST-MCNC: 143 MG/DL
GLUCOSE UR STRIP-MCNC: NEGATIVE MG/DL
HBA1C MFR BLD: 6.6 % (ref 0–5.6)
HCT VFR BLD AUTO: 44 % (ref 40–53)
HGB BLD-MCNC: 15.2 G/DL (ref 13.3–17.7)
HGB UR QL STRIP: NEGATIVE
KETONES UR STRIP-MCNC: NEGATIVE MG/DL
LEUKOCYTE ESTERASE UR QL STRIP: NEGATIVE
MCH RBC QN AUTO: 29.1 PG (ref 26.5–33)
MCHC RBC AUTO-ENTMCNC: 34.5 G/DL (ref 31.5–36.5)
MCV RBC AUTO: 84 FL (ref 78–100)
NITRATE UR QL: NEGATIVE
PH UR STRIP: 5.5 [PH] (ref 5–8)
PLATELET # BLD AUTO: 212 10E3/UL (ref 150–450)
RBC # BLD AUTO: 5.22 10E6/UL (ref 4.4–5.9)
RBC #/AREA URNS AUTO: ABNORMAL /HPF
SP GR UR STRIP: >=1.03 (ref 1–1.03)
SQUAMOUS #/AREA URNS AUTO: ABNORMAL /LPF
UROBILINOGEN UR STRIP-ACNC: 1 E.U./DL
WBC # BLD AUTO: 7.5 10E3/UL (ref 4–11)
WBC #/AREA URNS AUTO: ABNORMAL /HPF

## 2025-01-27 PROCEDURE — 83036 HEMOGLOBIN GLYCOSYLATED A1C: CPT

## 2025-01-28 ENCOUNTER — OFFICE VISIT (OUTPATIENT)
Dept: FAMILY MEDICINE | Facility: CLINIC | Age: 75
End: 2025-01-28
Payer: MEDICARE

## 2025-01-28 VITALS
WEIGHT: 236 LBS | OXYGEN SATURATION: 96 % | DIASTOLIC BLOOD PRESSURE: 70 MMHG | HEART RATE: 80 BPM | TEMPERATURE: 98.3 F | SYSTOLIC BLOOD PRESSURE: 120 MMHG | HEIGHT: 71 IN | BODY MASS INDEX: 33.04 KG/M2 | RESPIRATION RATE: 17 BRPM

## 2025-01-28 DIAGNOSIS — E11.29 TYPE 2 DIABETES MELLITUS WITH DIABETIC MICROALBUMINURIA, WITHOUT LONG-TERM CURRENT USE OF INSULIN (H): Primary | ICD-10-CM

## 2025-01-28 DIAGNOSIS — R80.9 TYPE 2 DIABETES MELLITUS WITH DIABETIC MICROALBUMINURIA, WITHOUT LONG-TERM CURRENT USE OF INSULIN (H): Primary | ICD-10-CM

## 2025-01-28 DIAGNOSIS — E66.811 CLASS 1 OBESITY WITH SERIOUS COMORBIDITY AND BODY MASS INDEX (BMI) OF 33.0 TO 33.9 IN ADULT, UNSPECIFIED OBESITY TYPE: ICD-10-CM

## 2025-01-28 DIAGNOSIS — I10 ESSENTIAL HYPERTENSION: ICD-10-CM

## 2025-01-28 DIAGNOSIS — E78.5 HYPERLIPIDEMIA, UNSPECIFIED HYPERLIPIDEMIA TYPE: ICD-10-CM

## 2025-01-28 PROBLEM — E66.01 MORBID OBESITY (H): Status: RESOLVED | Noted: 2021-08-24 | Resolved: 2025-01-28

## 2025-01-28 PROCEDURE — 99214 OFFICE O/P EST MOD 30 MIN: CPT | Performed by: FAMILY MEDICINE

## 2025-01-28 ASSESSMENT — PAIN SCALES - GENERAL: PAINLEVEL_OUTOF10: NO PAIN (0)

## 2025-01-28 NOTE — PROGRESS NOTES
Assessment/Plan:    Type 2 diabetes mellitus with diabetic microalbuminuria, without long-term current use of insulin (H)  Type 2 diabetes reviewed.  Stable at 6.6% despite 20 pound weight gain since September 27, 2020 for annual wellness visit.  Attempts at weight goal less than 220 pounds initially, less than 210 pounds ideally.  Microalbuminuria noted with ratio of 100 on September 27, 2024.  No history of retinopathy or neuropathy findings historically.  Will reassess at follow-up in 4 months.  Continues metformin  mg using 3 tablets in the morning 1 tablet in the evening.    Essential hypertension  Hypertension stable with lisinopril 10 mg daily and amlodipine 5 mg daily.    Hyperlipidemia, unspecified hyperlipidemia type  Utilizing atorvastatin 20 mg daily for lipid management.    Class 1 obesity with serious comorbidity and body mass index (BMI) of 33.0 to 33.9 in adult, unspecified obesity type  Dietary and exercise modifications as above for weight goal less than 220 pounds initially, less than 210 pounds ideally.      The following high BMI interventions were performed this visit: encouragement to exercise, weight monitoring, weight loss from baseline weight, and lifestyle education regarding diet .  Ensure ongoing efforts to achieve weight goal < 220 pounds initially, < 210 pounds ideally.         Subjective:    Thiago Hein is seen today for follow-up assessment.  Type 2 diabetes.  20 pound weight gain since September 27, 2024 after getting implants and eating everything in sight TSAT.  Does have metformin  mg using 3 tablets in the morning 1 tablet in the evening for blood sugar control.  Lisinopril 10 mg daily and amlodipine 5 mg daily for hypertension while utilizing atorvastatin 20 mg daily for lipid management.  Microalbuminuria noted September 27, 2024 with ratio of 100 and continues ACE inhibitor for renal protective benefits.  Denies recent illness.  In general feels good.  Had  "wellness visit September 27, 2024.  Comprehensive review of systems as above otherwise all negative.      Single   1 son - 32 \"Kraig\"   Tobacco: none   EtOH: none   Mom - Meena   Dad - currently 86 Yovanny - DM, CAD, HTN, high cholesterol, back problems, arthritis, spinal stenosis, pacemaker/defibrillator   1 bro - Be - HTN   Surgeries: \"lumbar back surgery for cyst removal\" - September, 2008 (Dr. 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[place], [place])   Hobbies:       Answers submitted by the patient for this visit:  Diabetes Visit (Submitted on 1/28/2025)  Chief Complaint: Chronic problems general questions HPI Form  Frequency of checking blood sugars:: not at all  Diabetic concerns:: none  Paraesthesia present:: none of these symptoms  General Questionnaire (Submitted on 1/28/2025)  Chief Complaint: Chronic problems general questions HPI Form  How many servings of fruits and vegetables do you eat daily?: 2-3  How many minutes a day do you exercise enough to make your heart beat faster?: 9 or less  How many days per week do you miss taking your medication?: 1  Questionnaire about: Chronic problems general questions HPI Form (Submitted on 1/28/2025)  Chief Complaint: Chronic problems general questions HPI Form          Past Surgical History:   Procedure Laterality Date    ANESTHESIA OUT OF OR MRI N/A 10/2/2015    Procedure: ANESTHESIA OUT OF OR MRI;  Surgeon: GENERIC ANESTHESIA PROVIDER;  Location: [facility] OR    BACK SURGERY  2008    lumbar spine surgery-- unclear details    CYSTOSCOPY, LITHOTRIPSY, COMBINED N/A 7/5/2024    Procedure: LITHOTRIPSY, WITH CYSTOSCOPY;  Surgeon: Radha Gómez MD;  Location:  OR    LASER HOLMIUM ENUCLEATION PROSTATE N/A 7/5/2024    Procedure: Holmium Laser Enucleation of the Prostate;  Surgeon: Radha Gómez MD;  Location:  OR    SOFT TISSUE SURGERY  2012 " "   skin cancer removed from L shoulder, chest, right neck        Family History   Problem Relation Age of Onset    Coronary Artery Disease Father     Hypertension Father         Past Medical History:   Diagnosis Date    Diabetes (H)     Hypertension         Social History     Tobacco Use    Smoking status: Never     Passive exposure: Never    Smokeless tobacco: Former   Vaping Use    Vaping status: Never Used   Substance Use Topics    Alcohol use: No     Comment: Quit in 1990    Drug use: No        Current Outpatient Medications   Medication Sig Dispense Refill    amLODIPine (NORVASC) 5 MG tablet TAKE 1 TABLET(5 MG) BY MOUTH DAILY 90 tablet 1    atorvastatin (LIPITOR) 20 MG tablet TAKE 1 TABLET(20 MG) BY MOUTH DAILY 90 tablet 2    celecoxib (CELEBREX) 100 MG capsule TAKE 1 CAPSULE(100 MG) BY MOUTH TWICE DAILY 180 capsule 3    furosemide (LASIX) 40 MG tablet TAKE 1 TABLET(40 MG) BY MOUTH DAILY 90 tablet 2    hydroxychloroquine (PLAQUENIL) 200 MG tablet Take 1 tablet (200 mg) by mouth 2 times daily 180 tablet 1    lisinopril (ZESTRIL) 10 MG tablet TAKE 1 TABLET(10 MG) BY MOUTH DAILY 90 tablet 2    metFORMIN (GLUCOPHAGE XR) 500 MG 24 hr tablet TAKE 3 TABLETS BY MOUTH EVERY MORNING AND 1 TABLET BY MOUTH EVERY EVENING 360 tablet 3    SENNA-docusate sodium (SENNA S) 8.6-50 MG tablet Take 1 tablet by mouth daily For preventing constipation (Patient not taking: Reported on 1/28/2025) 30 tablet 0          Objective:    Vitals:    01/28/25 0945   BP: 120/70   Pulse: 80   Resp: 17   Temp: 98.3  F (36.8  C)   SpO2: 96%   Weight: 107 kg (236 lb)   Height: 1.803 m (5' 11\")      Body mass index is 32.92 kg/m .    Alert.  No apparent distress.  Cardiac exam regular.  Blood pressure at goal.  Extremities warm and dry.      This note has been dictated using voice recognition software and as a result may contain minor grammatical errors and unintended word substitutions.   "

## 2025-01-30 ENCOUNTER — OFFICE VISIT (OUTPATIENT)
Dept: RHEUMATOLOGY | Facility: CLINIC | Age: 75
End: 2025-01-30
Payer: MEDICARE

## 2025-01-30 VITALS
BODY MASS INDEX: 31.42 KG/M2 | WEIGHT: 225.3 LBS | OXYGEN SATURATION: 98 % | RESPIRATION RATE: 16 BRPM | DIASTOLIC BLOOD PRESSURE: 60 MMHG | SYSTOLIC BLOOD PRESSURE: 116 MMHG | HEART RATE: 80 BPM

## 2025-01-30 DIAGNOSIS — M06.00 SERONEGATIVE RHEUMATOID ARTHRITIS (H): Primary | ICD-10-CM

## 2025-01-30 DIAGNOSIS — Z79.899 HIGH RISK MEDICATION USE: ICD-10-CM

## 2025-01-30 DIAGNOSIS — M15.0 PRIMARY OSTEOARTHRITIS INVOLVING MULTIPLE JOINTS: ICD-10-CM

## 2025-01-30 RX ORDER — HYDROXYCHLOROQUINE SULFATE 200 MG/1
200 TABLET, FILM COATED ORAL 2 TIMES DAILY
Qty: 180 TABLET | Refills: 1 | Status: SHIPPED | OUTPATIENT
Start: 2025-01-30

## 2025-01-30 NOTE — PROGRESS NOTES
"      Rheumatology follow-up visit note     Thiago is a 74 year old male presents today for follow-up.    Navin was seen today for recheck.    Diagnoses and all orders for this visit:    Seronegative rheumatoid arthritis (H)  -     hydroxychloroquine (PLAQUENIL) 200 MG tablet; Take 1 tablet (200 mg) by mouth 2 times daily.    High risk medication use    Primary osteoarthritis involving multiple joints        The longitudinal plan of care for the diagnosis(es)/condition(s) as documented were addressed during this visit. Due to the added complexity in care, I will continue to support Navin in the subsequent management and with ongoing continuity of care.    Follow up in 6 months.    HPI    Thiago Hein is a 74 year old male is here for follow-up of seronegative rheumatoid arthritis, osteoarthritis, he is on hydroxychloroquine having decided not to go for methotrexate.  He noted that this is controlling his joint symptoms very well with the exception of knees on his previous visit corticosteroid injection was done in his left knee.  Repeat x-ray was taken.  That was commensurate with osteoarthritis.  Especially the left side which is worse with activity without effusion that he can identify.  He did not think that the left knee injection was any use for him.  He recalls that there was not even an improvement for a day.  He finds a hot tub that he has at home is more therapeutic for his knee pain than anything else along with the Celebrex.    He had his eyes examined earlier this month.  No evidence of hydroxychloroquine maculopathy.  Following is the excerpt from a previous note:    He never took the duloxetine in part because he was coming up with some dental work and did not want to start a new medication for the more by cutting back his food intake he has lost \"20 to 30 pounds\" and feels that his knees Uftoral not hurting that much at all and wonders if he still needs to take duloxetine.  He gets Celebrex from his " "primary physician.  DETAILED EXAMINATION  01/30/25  :    Vitals:    01/30/25 1003   BP: 116/60   BP Location: Right arm   Patient Position: Sitting   Cuff Size: Adult Large   Pulse: 80   Resp: 16   SpO2: 98%   Weight: 102.2 kg (225 lb 4.8 oz)     Alert oriented. Head including the face is examined for malar rash, heliotropes, scarring, lupus pernio. Eyes examined for redness such as in episcleritis/scleritis, periorbital lesions.   Neck/ Face examined for parotid gland swelling, range of motion of neck.  Left upper and lower and right upper and lower extremities examined for tenderness, swelling, warmth of the appendicular joints, range of motion, edema, rash.  Some of the important findings included: he does not have evidence of synovitis in the palpable joints of the upper extremities.  No significant deformities of the digits.   Heberden nodes.  Range of motion of the shoulders  show full abduction.  No JLT effusion or warmth of the knees.  he does not have dactylitis of the digits.  Dystrophy of nails left hand, thought to be \"fungal infection\".     Patient Active Problem List    Diagnosis Date Noted    Urinary frequency 11/12/2024     Priority: Medium    Post-void dribbling 11/12/2024     Priority: Medium    BPH (benign prostatic hyperplasia) 07/05/2024     Priority: Medium    Bladder stones 06/07/2024     Priority: Medium    Chronic polyarticular juvenile rheumatoid arthritis (H) 08/24/2021     Priority: Medium     Formatting of this note might be different from the original.  Created by Conversion    Replacement Utility updated for latest IMO load      Emotional lability 08/24/2021     Priority: Medium     Formatting of this note might be different from the original.  Created by Conversion      Lower back pain 08/24/2021     Priority: Medium     Formatting of this note might be different from the original.  Created by Conversion      BPH with urinary obstruction 02/20/2020     Priority: Medium    Primary " osteoarthritis involving multiple joints 10/08/2019     Priority: Medium    Left carpal tunnel syndrome 12/27/2018     Priority: Medium    Trigger finger, right index finger 09/26/2018     Priority: Medium    Polyarthralgia 07/20/2018     Priority: Medium    Unintentional weight loss 06/01/2018     Priority: Medium    Non morbid obesity due to excess calories 07/14/2017     Priority: Medium    Peripheral edema 07/14/2017     Priority: Medium    Diverticulosis 11/15/2016     Priority: Medium    Hyperlipidemia 08/04/2016     Priority: Medium    Need for pneumococcal vaccination 01/11/2016     Priority: Medium    Need for vaccination 11/20/2015     Priority: Medium    Type 2 diabetes mellitus (H) 11/11/2015     Priority: Medium     Formatting of this note might be different from the original.  Created by Conversion      Bacteremia due to Staphylococcus aureus 11/06/2015     Priority: Medium    Dysphagia 11/06/2015     Priority: Medium    Epidural abscess 11/06/2015     Priority: Medium    Essential hypertension 11/06/2015     Priority: Medium     Formatting of this note might be different from the original.  Created by Conversion    Replacement Utility updated for latest IMO load      Lesion of salivary gland 11/06/2015     Priority: Medium    Somnolence 11/06/2015     Priority: Medium    Rheumatoid arthritis, unspecified (H) 10/23/2015     Priority: Medium    Discitis 10/04/2015     Priority: Medium    Adult Still's disease (H) 10/01/2015     Priority: Medium    Leukocytosis 09/29/2015     Priority: Medium    Sepsis (H) 09/29/2015     Priority: Medium     Past Surgical History:   Procedure Laterality Date    ANESTHESIA OUT OF OR MRI N/A 10/2/2015    Procedure: ANESTHESIA OUT OF OR MRI;  Surgeon: GENERIC ANESTHESIA PROVIDER;  Location: [facility] OR    BACK SURGERY  2008    lumbar spine surgery-- unclear details    CYSTOSCOPY, LITHOTRIPSY, COMBINED N/A 7/5/2024    Procedure: LITHOTRIPSY, WITH CYSTOSCOPY;  Surgeon: Dalia  MD Radha;  Location:  OR    LASER HOLMIUM ENUCLEATION PROSTATE N/A 7/5/2024    Procedure: Holmium Laser Enucleation of the Prostate;  Surgeon: Radha Gómez MD;  Location: [facility] OR    SOFT TISSUE SURGERY  2012    skin cancer removed from L shoulder, chest, right neck      Past Medical History:   Diagnosis Date    Diabetes (H)     Hypertension      No Known Allergies  Current Outpatient Medications   Medication Sig Dispense Refill    amLODIPine (NORVASC) 5 MG tablet TAKE 1 TABLET(5 MG) BY MOUTH DAILY 90 tablet 1    atorvastatin (LIPITOR) 20 MG tablet TAKE 1 TABLET(20 MG) BY MOUTH DAILY 90 tablet 2    celecoxib (CELEBREX) 100 MG capsule TAKE 1 CAPSULE(100 MG) BY MOUTH TWICE DAILY 180 capsule 3    furosemide (LASIX) 40 MG tablet TAKE 1 TABLET(40 MG) BY MOUTH DAILY 90 tablet 2    hydroxychloroquine (PLAQUENIL) 200 MG tablet Take 1 tablet (200 mg) by mouth 2 times daily 180 tablet 1    lisinopril (ZESTRIL) 10 MG tablet TAKE 1 TABLET(10 MG) BY MOUTH DAILY 90 tablet 2    metFORMIN (GLUCOPHAGE XR) 500 MG 24 hr tablet TAKE 3 TABLETS BY MOUTH EVERY MORNING AND 1 TABLET BY MOUTH EVERY EVENING 360 tablet 3     family history includes Coronary Artery Disease in his father; Hypertension in his father.  Social Connections: Unknown (9/27/2024)    Social Connection and Isolation Panel [NHANES]     Frequency of Communication with Friends and Family: Not on file     Frequency of Social Gatherings with Friends and Family: Once a week     Attends Yarsani Services: Not on file     Active Member of Clubs or Organizations: Not on file     Attends Club or Organization Meetings: Not on file     Marital Status: Not on file          WBC   Date Value Ref Range Status   04/23/2021 8.2 4.0 - 11.0 10e9/L Final     WBC Count   Date Value Ref Range Status   01/27/2025 7.5 4.0 - 11.0 10e3/uL Final     RBC Count   Date Value Ref Range Status   01/27/2025 5.22 4.40 - 5.90 10e6/uL Final   04/23/2021 5.25 4.4 - 5.9 10e12/L Final      Hemoglobin   Date Value Ref Range Status   01/27/2025 15.2 13.3 - 17.7 g/dL Final   04/23/2021 15.2 13.3 - 17.7 g/dL Final     Hematocrit   Date Value Ref Range Status   01/27/2025 44.0 40.0 - 53.0 % Final   04/23/2021 44.8 40.0 - 53.0 % Final     MCV   Date Value Ref Range Status   01/27/2025 84 78 - 100 fL Final   04/23/2021 85 78 - 100 fl Final     MCH   Date Value Ref Range Status   01/27/2025 29.1 26.5 - 33.0 pg Final   04/23/2021 29.0 26.5 - 33.0 pg Final     Platelet Count   Date Value Ref Range Status   01/27/2025 212 150 - 450 10e3/uL Final   04/23/2021 222 150 - 450 10e9/L Final     % Lymphocytes   Date Value Ref Range Status   05/17/2024 35 % Final   04/23/2021 20.9 % Final     AST   Date Value Ref Range Status   02/06/2024 16 0 - 45 U/L Final     Comment:     Reference intervals for this test were updated on 6/12/2023 to more accurately reflect our healthy population. There may be differences in the flagging of prior results with similar values performed with this method. Interpretation of those prior results can be made in the context of the updated reference intervals.   10/12/2015 18 0 - 45 U/L Final     ALT   Date Value Ref Range Status   01/27/2025 18 0 - 70 U/L Final   10/12/2015 30 0 - 70 U/L Final     Albumin   Date Value Ref Range Status   01/27/2025 4.2 3.5 - 5.2 g/dL Final   05/03/2022 4.0 3.5 - 5.0 g/dL Final   10/12/2015 2.2 (L) 3.4 - 5.0 g/dL Final     Alkaline Phosphatase   Date Value Ref Range Status   02/06/2024 107 40 - 150 U/L Final     Comment:     Reference intervals for this test were updated on 11/14/2023 to more accurately reflect our healthy population. There may be differences in the flagging of prior results with similar values performed with this method. Interpretation of those prior results can be made in the context of the updated reference intervals.   10/17/2015 133 40 - 150 U/L Final     Creatinine   Date Value Ref Range Status   01/27/2025 0.78 0.67 - 1.17 mg/dL  Final   04/23/2021 0.86 0.66 - 1.25 mg/dL Final     GFR Estimate   Date Value Ref Range Status   01/27/2025 >90 >60 mL/min/1.73m2 Final     Comment:     eGFR calculated using 2021 CKD-EPI equation.   04/23/2021 87 >60 mL/min/[1.73_m2] Final     Comment:     Non  GFR Calc  Starting 12/18/2018, serum creatinine based estimated GFR (eGFR) will be   calculated using the Chronic Kidney Disease Epidemiology Collaboration   (CKD-EPI) equation.       GFR Estimate If Black   Date Value Ref Range Status   04/23/2021 >90 >60 mL/min/[1.73_m2] Final     Comment:      GFR Calc  Starting 12/18/2018, serum creatinine based estimated GFR (eGFR) will be   calculated using the Chronic Kidney Disease Epidemiology Collaboration   (CKD-EPI) equation.       Creatinine Urine mg/dL   Date Value Ref Range Status   09/27/2024 59.8 mg/dL Final     Comment:     The reference ranges have not been established in urine creatinine. The results should be integrated into the clinical context for interpretation.   08/24/2021 40 mg/dL Final     Sed Rate   Date Value Ref Range Status   04/23/2021 9 0 - 20 mm/h Final     Erythrocyte Sedimentation Rate   Date Value Ref Range Status   08/24/2021 2 0 - 15 mm/hr Final     CRP Inflammation   Date Value Ref Range Status   04/23/2021 3.6 0.0 - 8.0 mg/L Final     CRP   Date Value Ref Range Status   08/24/2021 0.2 0.0-<0.8 mg/dL Final

## 2025-02-05 DIAGNOSIS — I10 ESSENTIAL HYPERTENSION: ICD-10-CM

## 2025-02-05 RX ORDER — AMLODIPINE BESYLATE 5 MG/1
5 TABLET ORAL DAILY
Qty: 90 TABLET | Refills: 2 | Status: SHIPPED | OUTPATIENT
Start: 2025-02-05

## 2025-03-12 ENCOUNTER — OFFICE VISIT (OUTPATIENT)
Dept: UROLOGY | Facility: CLINIC | Age: 75
End: 2025-03-12
Payer: MEDICARE

## 2025-03-12 VITALS — SYSTOLIC BLOOD PRESSURE: 176 MMHG | HEART RATE: 91 BPM | RESPIRATION RATE: 16 BRPM | DIASTOLIC BLOOD PRESSURE: 92 MMHG

## 2025-03-12 DIAGNOSIS — N48.83 ACQUIRED BURIED PENIS: ICD-10-CM

## 2025-03-12 DIAGNOSIS — N40.1 BPH WITH URINARY OBSTRUCTION: Primary | ICD-10-CM

## 2025-03-12 DIAGNOSIS — N13.8 BPH WITH URINARY OBSTRUCTION: Primary | ICD-10-CM

## 2025-03-12 NOTE — PROGRESS NOTES
UROLOGY OUTPATIENT VISIT      Chief Complaint:   Post-op holep      Synopsis   Thiago Hein is a very pleasant AGE: 75 year old year old person rheumatoid arthritis, type 2 diabetes, hyperlipidemia, and adult Still s disease. He presented with lower urinary tract symptoms, including urgency, frequency, and small voids. Initial evaluation revealed several bladder stones, the largest measuring 1.2 cm, and a plateaued flow rate diminishing at 12 mL/A CT urogram on 6/6/2024 identified an enlarged prostate (156 grams) with no other causes for gross hematuria. HoLEP was performed on 7/5/2024, removing 105 grams of benign tissue. Postoperatively, the patient experienced significant dysuria, with negative urine cultures but urinalysis showing white blood cells and WBC clumps.     A cystoscopy on 8/20/2024 showed an open fossa with evidence of prior laser enucleation, as well as fibrinous tissue or dystrophic calcifications. The patient s hydroxychloroquine for inflammatory arthritis was suspected to impact healing. The dosage was reduced, and the patient reported improvements. He was scheduled for a TUR of the calcifications.    As of 9/17/2024, the patient noticed improved urination duration (12-14 seconds). Pain had improved as of 10/1/2024, but postvoid dribbling and urgency persisted. The patient had completed a course of oxybutynin and was restarted on the extended-release 10 mg daily. The plan included urethral bulbar massage for dribbling and a follow-up urinalysis.    10/15/2024 At today s visit, the patient reported continued improvement in urinary stream force, although postvoid dribbling remains. He experiences leakage shortly after voiding and occasionally with coughing or sneezing. The patient uses folded paper towels for leakage control, going through three sets daily.      12/13/2024 Over the course of recent months, the patient has undergone pelvic floor physical therapy. The patient reports that  the therapy has been helpful in reducing urinary leakage. Previously, the patient was using tissue paper or paper towels for leakage control, changing them about three times daily. Currently, the patient only needs to change them one or two times a day, indicating a reduction in leakage by half. The patient also mentioned a recent change in sensation during bowel movements, experiencing more space, potentially due to the reduction in prostate size post-surgery.    3/12/2025   He now experiences no pain during urination and states that he empties his bladder completely. His urinary stream is stronger than it has been in 25 years.    He experiences mild urinary leakage, particularly when he is excited or attracted to someone. He does not use pads or Depends but occasionally uses a folded paper towel for minor leakage, No significant urinary incontinence requiring pads or Depends.    He describes an issue with his penis retracting, which causes difficulty when urinating while sitting down, as the urine sometimes goes outside toilet. He attributes this to the penis being 'so shriveled up' and 'buried in,' especially when sitting. He inquires about potential solutions to prevent this retraction.    1. Over the past month, how often have you had a sensation of not emptying your bladder completely after you finished urinating?: Not at all  2. Over the past month, how often have you had to urinate again less than two hours after you finished urinating?: Less than 1 time in 5  3. Over the past month, how often have you found you stopped and started again several times when you urinated?: Not at all  4. Over the past month, how often have you found it difficult to postpone urination?: Not at all  5. Over the past month, how often have you had a weak urine stream?: Not at all  6. Over the past month, how often have you had to push or strain to begin urinating?: Not at all  7. Over the past month, how many times did you  typically get up to urinate from the time you went to bed at night until the time you got up in the morning?: 1 time  The Disease-specific Quality of Life Question: If you were to spend the rest of your life with your urinary condition just the way it is now, how would you feel about that? (highlight or underline): Pleased  AUA Score: 2    PVR 7 ml      Most Recent 3 PSA:  Recent Labs   Lab Test 05/23/24  1251 02/20/20  0920   PSA 2.69 2.5     Most Recent Urinalysis:  Recent Labs   Lab Test 01/27/25  1006   COLOR Yellow   APPEARANCE Clear   URINEGLC Negative   URINEBILI Negative   URINEKETONE Negative   SG >=1.030   UBLD Negative   URINEPH 5.5   PROTEIN 30*   UROBILINOGEN 1.0   NITRITE Negative   LEUKEST Negative   RBCU None Seen   WBCU 0-5       Medical Comorbidities      Past Medical History:   Diagnosis Date    Diabetes (H)     Hypertension                Medications     Current Outpatient Medications   Medication Sig Dispense Refill    amLODIPine (NORVASC) 5 MG tablet TAKE ONE TABLET BY MOUTH ONCE DAILY 90 tablet 2    atorvastatin (LIPITOR) 20 MG tablet TAKE 1 TABLET(20 MG) BY MOUTH DAILY 90 tablet 2    celecoxib (CELEBREX) 100 MG capsule TAKE 1 CAPSULE(100 MG) BY MOUTH TWICE DAILY 180 capsule 3    furosemide (LASIX) 40 MG tablet TAKE 1 TABLET(40 MG) BY MOUTH DAILY 90 tablet 2    hydroxychloroquine (PLAQUENIL) 200 MG tablet Take 1 tablet (200 mg) by mouth 2 times daily. 180 tablet 1    lisinopril (ZESTRIL) 10 MG tablet TAKE 1 TABLET(10 MG) BY MOUTH DAILY 90 tablet 2    metFORMIN (GLUCOPHAGE XR) 500 MG 24 hr tablet TAKE 3 TABLETS BY MOUTH EVERY MORNING AND 1 TABLET BY MOUTH EVERY EVENING 360 tablet 3     No current facility-administered medications for this visit.            Assessment/Plan   Thiago Hein is a very pleasant AGE:  75 year old year old person rheumatoid arthritis, type 2 diabetes, hyperlipidemia, and adult Still s disease    BPH -  S/p holep and bladder stone removal  Post-op complicated by  severe dysuria, due to calcifications in fossa during healing, suspect could be from the hydroxychloroquine and once we stopped it his symptoms improved.   Doing very well  Minimal PVR   Follow-up as needed    Penile retraction  Penile retraction due to suprapubic fat pad  - Advise standing to urinate to avoid spillage.  - Consider weight loss to reduce fat around the penis.  I examined and not buried penis, just some suprapubic fat pad causing some advancement of foreskin    CC:  Don Armijo    Additional Coding Information:    Time spent:  19 minutes spent by me on the date of the encounter doing chart review, patient visit, and documentation

## 2025-03-12 NOTE — NURSING NOTE
"Initial BP (!) 176/92 (BP Location: Left arm, Patient Position: Chair, Cuff Size: Adult Regular)   Pulse 91   Resp 16  Estimated body mass index is 31.42 kg/m  as calculated from the following:    Height as of 1/28/25: 1.803 m (5' 11\").    Weight as of 1/30/25: 102.2 kg (225 lb 4.8 oz). .  Patient is here for a recheck of osvaldo.  solange german LPN    "

## 2025-03-12 NOTE — NURSING NOTE
Active order to obtain bladder scan? Yes   Name of ordering provider: Dr. Gómez  Bladder scan preformed post void Yes    Bladder scan reveled 16 ML  Provider notified?  Yes    Shayy Dillon RN on 3/12/2025 at 11:37 AM

## 2025-05-13 ENCOUNTER — OFFICE VISIT (OUTPATIENT)
Dept: FAMILY MEDICINE | Facility: CLINIC | Age: 75
End: 2025-05-13
Payer: MEDICARE

## 2025-05-13 VITALS
DIASTOLIC BLOOD PRESSURE: 68 MMHG | TEMPERATURE: 97.6 F | BODY MASS INDEX: 33.74 KG/M2 | RESPIRATION RATE: 17 BRPM | WEIGHT: 241 LBS | HEIGHT: 71 IN | HEART RATE: 70 BPM | OXYGEN SATURATION: 97 % | SYSTOLIC BLOOD PRESSURE: 114 MMHG

## 2025-05-13 DIAGNOSIS — M06.00 RHEUMATOID ARTHRITIS WITH NEGATIVE RHEUMATOID FACTOR, INVOLVING UNSPECIFIED SITE (H): ICD-10-CM

## 2025-05-13 DIAGNOSIS — R80.9 TYPE 2 DIABETES MELLITUS WITH DIABETIC MICROALBUMINURIA, WITHOUT LONG-TERM CURRENT USE OF INSULIN (H): Primary | ICD-10-CM

## 2025-05-13 DIAGNOSIS — E11.29 TYPE 2 DIABETES MELLITUS WITH DIABETIC MICROALBUMINURIA, WITHOUT LONG-TERM CURRENT USE OF INSULIN (H): Primary | ICD-10-CM

## 2025-05-13 DIAGNOSIS — Z23 NEED FOR VACCINATION: ICD-10-CM

## 2025-05-13 DIAGNOSIS — E78.5 HYPERLIPIDEMIA, UNSPECIFIED HYPERLIPIDEMIA TYPE: ICD-10-CM

## 2025-05-13 DIAGNOSIS — I10 ESSENTIAL HYPERTENSION: ICD-10-CM

## 2025-05-13 LAB
EST. AVERAGE GLUCOSE BLD GHB EST-MCNC: 151 MG/DL
HBA1C MFR BLD: 6.9 % (ref 0–5.6)
HOLD SPECIMEN: NORMAL
HOLD SPECIMEN: NORMAL

## 2025-05-13 PROCEDURE — 82043 UR ALBUMIN QUANTITATIVE: CPT | Performed by: FAMILY MEDICINE

## 2025-05-13 PROCEDURE — 99214 OFFICE O/P EST MOD 30 MIN: CPT | Performed by: FAMILY MEDICINE

## 2025-05-13 PROCEDURE — 91320 SARSCV2 VAC 30MCG TRS-SUC IM: CPT | Performed by: FAMILY MEDICINE

## 2025-05-13 PROCEDURE — 3078F DIAST BP <80 MM HG: CPT | Performed by: FAMILY MEDICINE

## 2025-05-13 PROCEDURE — 3074F SYST BP LT 130 MM HG: CPT | Performed by: FAMILY MEDICINE

## 2025-05-13 PROCEDURE — 90480 ADMN SARSCOV2 VAC 1/ONLY CMP: CPT | Performed by: FAMILY MEDICINE

## 2025-05-13 PROCEDURE — 1126F AMNT PAIN NOTED NONE PRSNT: CPT | Performed by: FAMILY MEDICINE

## 2025-05-13 PROCEDURE — 83036 HEMOGLOBIN GLYCOSYLATED A1C: CPT | Performed by: FAMILY MEDICINE

## 2025-05-13 PROCEDURE — G2211 COMPLEX E/M VISIT ADD ON: HCPCS | Performed by: FAMILY MEDICINE

## 2025-05-13 PROCEDURE — 36415 COLL VENOUS BLD VENIPUNCTURE: CPT | Performed by: FAMILY MEDICINE

## 2025-05-13 PROCEDURE — 82570 ASSAY OF URINE CREATININE: CPT | Performed by: FAMILY MEDICINE

## 2025-05-13 PROCEDURE — 80048 BASIC METABOLIC PNL TOTAL CA: CPT | Performed by: FAMILY MEDICINE

## 2025-05-13 ASSESSMENT — PAIN SCALES - GENERAL: PAINLEVEL_OUTOF10: NO PAIN (0)

## 2025-05-13 NOTE — PROGRESS NOTES
Assessment/Plan:    Type 2 diabetes mellitus with diabetic microalbuminuria, without long-term current use of insulin (H)  Type 2 diabetes reviewed with A1c increasing slightly from 6.6% to 6.9%.  Patient certain that he can get number lower through dietary and exercise modifications.  Using metformin  mg taken 3 tablets in the morning 1 tablet at bedtime.  Will update microalbumin screen today.  No history of neuropathy.  Diabetic eye exams to continue annually.  Reassess at wellness visit in early October 2025.  - Hemoglobin A1c  - Hemoglobin A1c  - Albumin Random Urine Quantitative with Creat Ratio  - Basic metabolic panel    Essential hypertension  Hypertension stable with lisinopril 10 mg daily and amlodipine 5 mg daily with blood pressure 114/68 on recheck.  - Basic metabolic panel    Hyperlipidemia, unspecified hyperlipidemia type  Hyperlipidemia.  Atorvastatin 20 mg daily continuing.  Lipid cascade reviewed from September 27, 2024.    Rheumatoid arthritis with negative rheumatoid factor, involving unspecified site (H)  Continues hydroxychloroquine 200 mg twice daily and Celebrex 100 mg twice daily.    Need for vaccination  Updated COVID booster to be provided.  - COVID-19 12+ (PFIZER)    The longitudinal plan of care for the diagnosis(es)/condition(s) as documented were addressed during this visit. Due to the added complexity in care, I will continue to support Navin in the subsequent management and with ongoing continuity of care.            Subjective:    Thiago Hein is seen today for follow-up assessment.  Type 2 diabetes reviewed.  Metformin  mg using 3 tablets in the morning 1 tablet at bedtime.  Lisinopril 10 mg daily and amlodipine 5 mg daily for hypertension.  Atorvastatin 20 mg daily for lipid management.  Rheumatoid arthritis.  Hydroxychloroquine 200 mg twice daily and Celebrex 100 mg twice daily help control symptoms.  Follows with Dr. Bundy.  Needs COVID booster.  Denies recent  "illness.  Microalbumin screen normal September 27, 2024.  Comprehensive review of systems as above otherwise all negative.    Single   1 son - 32 \"Kraig\"   Tobacco: none   EtOH: none   Mom - Meena   Dad - currently 86 Yovanny - DM, CAD, HTN, high cholesterol, back problems, arthritis, spinal stenosis, pacemaker/defibrillator   1 bro - Be - HTN   Surgeries: \"lumbar back surgery for cyst removal\" - September, 2008 (Dr. 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[place], [place])   Hobbies:       Past Surgical History:   Procedure Laterality Date    ANESTHESIA OUT OF OR MRI N/A 10/2/2015    Procedure: ANESTHESIA OUT OF OR MRI;  Surgeon: GENERIC ANESTHESIA PROVIDER;  Location: [facility] OR    BACK SURGERY  2008    lumbar spine surgery-- unclear details    CYSTOSCOPY, LITHOTRIPSY, COMBINED N/A 7/5/2024    Procedure: LITHOTRIPSY, WITH CYSTOSCOPY;  Surgeon: Radha Gómez MD;  Location:  OR    LASER HOLMIUM ENUCLEATION PROSTATE N/A 7/5/2024    Procedure: Holmium Laser Enucleation of the Prostate;  Surgeon: Radha Gómez MD;  Location:  OR    SOFT TISSUE SURGERY  2012    skin cancer removed from L shoulder, chest, right neck        Family History   Problem Relation Age of Onset    Coronary Artery Disease Father     Hypertension Father         Past Medical History:   Diagnosis Date    Diabetes (H)     Hypertension         Social History     Tobacco Use    Smoking status: Never     Passive exposure: Never    Smokeless tobacco: Former   Vaping Use    Vaping status: Never Used   Substance Use Topics    Alcohol use: No     Comment: Quit in 1990    Drug use: No        Current Outpatient Medications   Medication Sig Dispense Refill    amLODIPine (NORVASC) 5 MG tablet TAKE ONE TABLET BY MOUTH ONCE DAILY 90 tablet 2    atorvastatin (LIPITOR) 20 MG tablet TAKE 1 TABLET(20 MG) BY MOUTH DAILY 90 tablet 2    " "celecoxib (CELEBREX) 100 MG capsule TAKE 1 CAPSULE(100 MG) BY MOUTH TWICE DAILY 180 capsule 3    furosemide (LASIX) 40 MG tablet TAKE 1 TABLET(40 MG) BY MOUTH DAILY 90 tablet 2    hydroxychloroquine (PLAQUENIL) 200 MG tablet Take 1 tablet (200 mg) by mouth 2 times daily. 180 tablet 1    lisinopril (ZESTRIL) 10 MG tablet TAKE 1 TABLET(10 MG) BY MOUTH DAILY 90 tablet 2    metFORMIN (GLUCOPHAGE XR) 500 MG 24 hr tablet TAKE 3 TABLETS BY MOUTH EVERY MORNING AND 1 TABLET BY MOUTH EVERY EVENING 360 tablet 3          Objective:    Vitals:    05/13/25 1045 05/13/25 1109   BP: (!) 150/80 114/68   Pulse: 70    Resp: 17    Temp: 97.6  F (36.4  C)    SpO2: 97%    Weight: 109.3 kg (241 lb)    Height: 1.803 m (5' 11\")       Body mass index is 33.61 kg/m .    Alert.  No apparent distress.  Blood pressure 114/68 on recheck.  Chest clear.  Cardiac exam regular with occasional cardiac ectopy noted.  Extremities warm and dry.      This note has been dictated using voice recognition software and as a result may contain minor grammatical errors and unintended word substitutions.           "

## 2025-05-14 LAB
ANION GAP SERPL CALCULATED.3IONS-SCNC: 10 MMOL/L (ref 7–15)
BUN SERPL-MCNC: 15.5 MG/DL (ref 8–23)
CALCIUM SERPL-MCNC: 9.8 MG/DL (ref 8.8–10.4)
CHLORIDE SERPL-SCNC: 103 MMOL/L (ref 98–107)
CREAT SERPL-MCNC: 0.9 MG/DL (ref 0.67–1.17)
CREAT UR-MCNC: 26.6 MG/DL
EGFRCR SERPLBLD CKD-EPI 2021: 89 ML/MIN/1.73M2
GLUCOSE SERPL-MCNC: 112 MG/DL (ref 70–99)
HCO3 SERPL-SCNC: 25 MMOL/L (ref 22–29)
MICROALBUMIN UR-MCNC: <12 MG/L
MICROALBUMIN/CREAT UR: NORMAL MG/G{CREAT}
POTASSIUM SERPL-SCNC: 4.6 MMOL/L (ref 3.4–5.3)
SODIUM SERPL-SCNC: 138 MMOL/L (ref 135–145)

## 2025-05-15 ENCOUNTER — RESULTS FOLLOW-UP (OUTPATIENT)
Dept: FAMILY MEDICINE | Facility: CLINIC | Age: 75
End: 2025-05-15
Payer: MEDICARE

## 2025-05-21 DIAGNOSIS — I10 ESSENTIAL HYPERTENSION: ICD-10-CM

## 2025-05-21 RX ORDER — LISINOPRIL 10 MG/1
10 TABLET ORAL
Qty: 90 TABLET | Refills: 3 | Status: SHIPPED | OUTPATIENT
Start: 2025-05-21

## 2025-05-28 ENCOUNTER — TELEPHONE (OUTPATIENT)
Dept: FAMILY MEDICINE | Facility: CLINIC | Age: 75
End: 2025-05-28
Payer: MEDICARE

## 2025-05-28 NOTE — TELEPHONE ENCOUNTER
Fax came in from [facility] request medication changes to go to [facility] for patient refill patient was notified and did not request the Pharmacy change.

## 2025-06-21 DIAGNOSIS — R60.0 PERIPHERAL EDEMA: ICD-10-CM

## 2025-06-23 RX ORDER — FUROSEMIDE 40 MG/1
40 TABLET ORAL DAILY
Qty: 90 TABLET | Refills: 2 | Status: SHIPPED | OUTPATIENT
Start: 2025-06-23

## 2025-07-06 DIAGNOSIS — E78.5 HYPERLIPIDEMIA, UNSPECIFIED HYPERLIPIDEMIA TYPE: ICD-10-CM

## 2025-07-07 RX ORDER — ATORVASTATIN CALCIUM 20 MG/1
20 TABLET, FILM COATED ORAL DAILY
Qty: 90 TABLET | Refills: 2 | Status: SHIPPED | OUTPATIENT
Start: 2025-07-07

## 2025-08-05 PROBLEM — R35.0 URINARY FREQUENCY: Status: RESOLVED | Noted: 2024-11-12 | Resolved: 2025-08-05

## 2025-08-05 PROBLEM — N39.43 POST-VOID DRIBBLING: Status: RESOLVED | Noted: 2024-11-12 | Resolved: 2025-08-05

## 2025-08-19 DIAGNOSIS — M15.0 PRIMARY OSTEOARTHRITIS INVOLVING MULTIPLE JOINTS: ICD-10-CM

## 2025-08-19 DIAGNOSIS — E11.9 TYPE 2 DIABETES MELLITUS WITHOUT COMPLICATION, WITHOUT LONG-TERM CURRENT USE OF INSULIN (H): ICD-10-CM

## 2025-08-20 RX ORDER — METFORMIN HYDROCHLORIDE 500 MG/1
TABLET, EXTENDED RELEASE ORAL
Qty: 360 TABLET | Refills: 0 | Status: SHIPPED | OUTPATIENT
Start: 2025-08-20

## 2025-08-20 RX ORDER — CELECOXIB 100 MG/1
100 CAPSULE ORAL 2 TIMES DAILY
Qty: 180 CAPSULE | Refills: 1 | Status: SHIPPED | OUTPATIENT
Start: 2025-08-20

## (undated) DEVICE — SOL NACL 0.9% IRRIG 1000ML BOTTLE 2F7124

## (undated) DEVICE — BLADE MORCELLATOR WOLF PIRANHA 4.75X385MM 49700103

## (undated) DEVICE — LASER FIBER HOLMIUM MOSES 550 D/F/L AC-10030120

## (undated) DEVICE — FILTER PIRANHA DISP 2228.901

## (undated) DEVICE — SOL NACL 0.9% IRRIG 3000ML BAG 2B7477

## (undated) DEVICE — CATH LASER URETERAL 7.1FRX40CM G17797  022403-7.1-40

## (undated) DEVICE — SYR 50ML LL W/O NDL 309653

## (undated) DEVICE — GLOVE BIOGEL SENSOR 7.0 LATEX

## (undated) DEVICE — DRAPE MAYO STAND 23X54 8337

## (undated) DEVICE — DECANTER BAG 2002S

## (undated) DEVICE — DRAPE GYN/UROLOGY FLUID POUCH TUR 29455

## (undated) DEVICE — SOL WATER IRRIG 3000ML BAG 2B7117

## (undated) DEVICE — CATH FOLEY 3WAY 22FR 30ML LATEX 0167SI22

## (undated) DEVICE — PACK TUR CUSTOM SBA15RUFSE

## (undated) DEVICE — SPECIMEN TRAP TISSUE CONTAINER PIRANHA 2208120

## (undated) DEVICE — SOL WATER IRRIG 1000ML BOTTLE 2F7114

## (undated) DEVICE — TUBING SET PIRANHA 41702208

## (undated) DEVICE — BAG DRAIN URO FOR SIEMENS 8MM ADAPTER NS CC164NS-A

## (undated) DEVICE — JELLY LUBRICATING SURGILUBE 4OZ TUBE

## (undated) DEVICE — TUBING IRRIG TUR Y TYPE 96" LF 6543-01

## (undated) DEVICE — TUBING SUCTION 12"X1/4" N612

## (undated) DEVICE — LINEN TOWEL PACK X5 5464

## (undated) DEVICE — BAG URINARY DRAIN 4000ML LF 153509

## (undated) RX ORDER — TOLTERODINE TARTRATE 2 MG/1
TABLET, EXTENDED RELEASE ORAL
Status: DISPENSED
Start: 2024-07-05

## (undated) RX ORDER — PROPOFOL 10 MG/ML
INJECTION, EMULSION INTRAVENOUS
Status: DISPENSED
Start: 2024-07-05

## (undated) RX ORDER — FENTANYL CITRATE 0.05 MG/ML
INJECTION, SOLUTION INTRAMUSCULAR; INTRAVENOUS
Status: DISPENSED
Start: 2024-07-05

## (undated) RX ORDER — CEFTRIAXONE 2 G/1
INJECTION, POWDER, FOR SOLUTION INTRAMUSCULAR; INTRAVENOUS
Status: DISPENSED
Start: 2024-07-05

## (undated) RX ORDER — FENTANYL CITRATE 50 UG/ML
INJECTION, SOLUTION INTRAMUSCULAR; INTRAVENOUS
Status: DISPENSED
Start: 2024-07-05

## (undated) RX ORDER — GLYCOPYRROLATE 0.2 MG/ML
INJECTION, SOLUTION INTRAMUSCULAR; INTRAVENOUS
Status: DISPENSED
Start: 2024-07-05